# Patient Record
Sex: MALE | Race: WHITE | Employment: OTHER | URBAN - METROPOLITAN AREA
[De-identification: names, ages, dates, MRNs, and addresses within clinical notes are randomized per-mention and may not be internally consistent; named-entity substitution may affect disease eponyms.]

---

## 2017-03-19 ENCOUNTER — TRANSFERRED RECORDS (OUTPATIENT)
Dept: CARDIOLOGY | Facility: CLINIC | Age: 82
End: 2017-03-19

## 2017-03-20 ENCOUNTER — TRANSFERRED RECORDS (OUTPATIENT)
Dept: CARDIOLOGY | Facility: CLINIC | Age: 82
End: 2017-03-20

## 2017-03-22 ENCOUNTER — OFFICE VISIT (OUTPATIENT)
Dept: CARDIOLOGY | Facility: CLINIC | Age: 82
End: 2017-03-22
Payer: MEDICARE

## 2017-03-22 VITALS
BODY MASS INDEX: 25.74 KG/M2 | DIASTOLIC BLOOD PRESSURE: 65 MMHG | HEART RATE: 88 BPM | HEIGHT: 67 IN | SYSTOLIC BLOOD PRESSURE: 160 MMHG | WEIGHT: 164 LBS

## 2017-03-22 DIAGNOSIS — E78.00 HYPERCHOLESTEROLEMIA: ICD-10-CM

## 2017-03-22 DIAGNOSIS — I10 BENIGN ESSENTIAL HYPERTENSION: ICD-10-CM

## 2017-03-22 DIAGNOSIS — I25.10 CORONARY ARTERY DISEASE INVOLVING NATIVE CORONARY ARTERY OF NATIVE HEART WITHOUT ANGINA PECTORIS: ICD-10-CM

## 2017-03-22 DIAGNOSIS — R07.2 PRECORDIAL PAIN: Primary | ICD-10-CM

## 2017-03-22 DIAGNOSIS — I42.9 PRIMARY CARDIOMYOPATHY (H): ICD-10-CM

## 2017-03-22 PROCEDURE — 99214 OFFICE O/P EST MOD 30 MIN: CPT | Performed by: INTERNAL MEDICINE

## 2017-03-22 NOTE — MR AVS SNAPSHOT
After Visit Summary   3/22/2017    Katie Shannon    MRN: [id number]           Patient Information     Date Of Birth          1935        Visit Information        Provider Department      3/22/2017 10:15 AM Robles Edwards MD [facility]        Today's Diagnoses     Precordial pain    -  1    Hypercholesterolemia        Benign essential hypertension        Primary cardiomyopathy (H)           Follow-ups after your visit        Your next 10 appointments already scheduled     Jun 08, 2017 10:10 AM CDT   LAB with [facility]   [facility] ([facility])    [address] [place] [address] [address]  [facility] [id number] [phone]           Patient must bring picture ID.  Patient should be prepared to give a urine specimen  Please do not eat 10-12 hours before your appointment if you are coming in fasting for labs on lipids, cholesterol, or glucose (sugar).  Pregnant women should follow their Care Team instructions. Water with medications is okay. Do not drink coffee or other fluids.   If you have concerns about taking  your medications, please ask at office or if scheduling via Ho[employer], send a message by clicking on Secure Messaging, Message Your Care Team.            Jun 08, 2017 10:30 AM CDT   Ech Complete with [id number]   [facility] CV Echocardiography (Card[facility]ovascular [facility] at [facility])    [address] [place] [address]  [address]  [facility] [id number] [phone]           1.  Please bring or wear a comfortable two-piece outfit. 2.  You may eat, drink and take your normal medicines. 3.  For any questions that cannot be answered, please contact the ordering physician            Jun 08, 2017  1:15 PM CDT   Return Visit with Rah Ayala MD   [facility] ([facility])    [address] [place] [address] [address]  [facility] [id number] [phone]             "  Future tests that were ordered for you today     Open Future Orders        Priority Expected Expires Ordered    NM Lexiscan stress test (nuc card) Routine 3/29/2017 3/22/2018 3/22/2017            Who to contact     If you have questions or need follow up information about today's clinic visit or your schedule please contact [facility] [place] directly at [phone].  Normal or non-critical lab and imaging results will be communicated to you by Hit Systemshart, letter or phone within 4 business days after the clinic has received the results. If you do not hear from us within 7 days, please contact the clinic through Hit Systemshart or phone. If you have a critical or abnormal lab result, we will notify you by phone as soon as possible.  Submit refill requests through youbeQ - Maps With Life or call your pharmacy and they will forward the refill request to us. Please allow 3 business days for your refill to be completed.          Additional Information About Your Visit        Hit SystemsharKeemotion Information     youbeQ - Maps With Life lets you send messages to your doctor, view your test results, renew your prescriptions, schedule appointments and more. To sign up, go to [email or web address] - Maps With Life . Click on \"Log in\" on the left side of the screen, which will take you to the Welcome page. Then click on \"Sign up Now\" on the right side of the page.     You will be asked to enter the access code listed below, as well as some personal information. Please follow the directions to create your username and password.     Your access code is: [email or web address]  Expires: 2017 11:18 AM     Your access code will  in 90 days. If you need help or a new code, please call your [facility] clinic or [phone].        Care EveryWhere ID     This is your Care EveryWhere ID. This could be used by other organizations to access your [facility] medical records  [id number]        Your Vitals Were     Pulse Height BMI (Body Mass Index)             88 1.702 m " "(5' 7\") 25.69 kg/m2          Blood Pressure from Last 3 Encounters:   03/22/17 160/65   05/18/16 120/50   07/28/15 122/68    Weight from Last 3 Encounters:   03/22/17 74.4 kg (164 lb)   05/18/16 74.3 kg (163 lb 11.2 oz)   07/28/15 75.2 kg (165 lb 12.8 oz)                 Today's Medication Changes          These changes are accurate as of: 3/22/17 11:18 AM.  If you have any questions, ask your nurse or doctor.               Stop taking these medicines if you haven't already. Please contact your care team if you have questions.     pravastatin 20 MG tablet   Commonly known as:  PRAVACHOL   Stopped by:  Robles Edwards MD                    Primary Care Provider Office Phone # Fax #    Jaden Butts [phone]       [place] [address]        Thank you!     Thank you for choosing [facility] [place]  for your care. Our goal is always to provide you with excellent care. Hearing back from our patients is one way we can continue to improve our services. Please take a few minutes to complete the written survey that you may receive in the mail after your visit with us. Thank you!             Your Updated Medication List - Protect others around you: Learn how to safely use, store and throw away your medicines at www.disposemymeds.org.          This list is accurate as of: 3/22/17 11:18 AM.  Always use your most recent med list.                   Brand Name Dispense Instructions for use    carvedilol 6.25 MG tablet    COREG     Take 6.25 mg by mouth 2 times daily (with meals)       CYCLOBENZAPRINE HCL PO      Take 5 mg by mouth At Bedtime       diphenhydrAMINE-acetaminophen  MG tablet    TYLENOL PM     Take 1 tablet by mouth nightly as needed for sleep       FLOMAX 0.4 MG capsule   Generic drug:  tamsulosin      Take 0.4 mg by mouth daily       isosorbide mononitrate 60 MG 24 hr tablet    IMDUR     Take 60 mg by mouth " daily       lisinopril 10 MG tablet    PRINIVIL/ZESTRIL    90 tablet    Take 1 tablet (10 mg) by mouth daily       PEPCID PO      Take 20 mg by mouth 2 times daily       PLAVIX PO      Take 75 mg by mouth daily       PRILOSEC PO      Take 20 mg by mouth daily       SIMVASTATIN PO      Take 20 mg by mouth At Bedtime       sodium chloride 5 % ophthalmic solution    SHELL 128     Place 1 drop into both eyes At Bedtime

## 2017-03-22 NOTE — LETTER
3/22/2017    Jaden Butts MD  [facility]   [address] [facility] [address]    RE: Katie Shannon       Dear Colleague,    I had the pleasure of seeing your patient, Katie Shannon, at [facility] for evaluation of atypical chest discomfort.  This patient is normally followed by my associate, Dr. Rah Ayala, for a nonischemic cardiomyopathy.  The patient has known mild coronary artery disease when admitted for chest discomfort at [facility] in 2011.  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  The study does not provide details as to the course of the circumflex artery, i.e., whether it is between the pulmonary artery and aorta or not.  His ejection fraction has been stable at 40%-45% without significant valvular insufficiency.  One wonders if this is due to longstanding hypertension or not.  The patient describes abdominal pain intermittently as well as left thigh pain.      The patient had atypical back and chest discomfort 2 days ago.  He sought medical attention at the [facility] for evaluation of his blood pressure as well.  He states he wanted to check his blood pressure and heart rate and see if his machine at home was accurate.  He ruled out for myocardial infarction after being placed in observation overnight.  The patient has a history of mixed hyperlipidemia and hypertriglyceridemia of 700 at one point.  He is now treated with simvastatin and was previously on fenofibrate.  This was not covered by his insurance, and the fibric acid was discontinued completely.      PHYSICAL EXAMINATION:   VITAL SIGNS:  Current blood pressure is 160/65, pulse is 88 with frequent ectopics.  Weight is 164 pounds, BMI is 26.   CHEST:  Clear to auscultation.   CARDIAC:  Regularly irregular rhythm with frequent ectopics, most likely PVCs with the compensatory pause.  Normal S1 and S2 without gallop or murmur.  No JVD.  Pulses  were all intact without bruits.   ABDOMEN:  Benign.   EXTREMITIES:  Without cyanosis, clubbing or edema.      EKG from 03/20/2017 at [facility] shows normal sinus rhythm with a left bundle branch block.     Outpatient Encounter Prescriptions as of 3/22/2017   Medication Sig Dispense Refill     SIMVASTATIN PO Take 20 mg by mouth At Bedtime       lisinopril (PRINIVIL,ZESTRIL) 10 MG tablet Take 1 tablet (10 mg) by mouth daily 90 tablet 3     isosorbide mononitrate (IMDUR) 60 MG 24 hr tablet Take 60 mg by mouth daily       carvedilol (COREG) 6.25 MG tablet Take 6.25 mg by mouth 2 times daily (with meals)       CYCLOBENZAPRINE HCL PO Take 5 mg by mouth At Bedtime        Clopidogrel Bisulfate (PLAVIX PO) Take 75 mg by mouth daily       tamsulosin (FLOMAX) 0.4 MG 24 hr capsule Take 0.4 mg by mouth daily       Omeprazole (PRILOSEC PO) Take 20 mg by mouth daily       diphenhydrAMINE-acetaminophen (TYLENOL PM)  MG tablet Take 1 tablet by mouth nightly as needed for sleep       Famotidine (PEPCID PO) Take 20 mg by mouth 2 times daily       sodium chloride (SHELL 128) 5 % ophthalmic solution Place 1 drop into both eyes At Bedtime       [DISCONTINUED] pravastatin (PRAVACHOL) 20 MG tablet Take by mouth daily       No facility-administered encounter medications on file as of 3/22/2017.       ASSESSMENT:   1.  Katie Shannon is a delightful 82-year-old gentleman with mild to moderate coronary artery disease on coronary angiography in 2011.  He has a mild nonischemic cardiomyopathy with ejection fraction of 40%-45%.  Etiology of this is not clear but given his current hypertension one wonders if this has been relatively undetected in the past.  Because of his chest discomfort I looked at his old records to see if he has had a recent stress test.  I cannot find a recent stress test within the [employer] system.  Perhaps it was done it [place], [place], in the distant past.  We are going to set this patient up for a [facility]  nuclear stress test to evaluate for cardiac ischemia.  In addition we will review his blood pressure at that visit.  This has all been explained to the patient completely.   2.  Left bundle branch block.  I suggest that this may also be due to possible longstanding hypertension.  I reviewed this patient's most recent echocardiogram from 05/18/2016 where he had grade I left ventricular diastolic dysfunction.  There was mild concentric left ventricular hypertrophy as well.      It is my pleasure to assist in the care of Katie Shannon.  I will make sure that Dr. Ayala sees the results of the nuclear stress test.  All the patient's questions were answered to his satisfaction today.     Sincerely,    Robles Edwards MD     [facility]

## 2017-03-22 NOTE — PROGRESS NOTES
HPI and Plan:   See dictation:[id number]    Orders Placed This Encounter   Procedures     NM Lexiscan stress test (nuc card)       Orders Placed This Encounter   Medications     SIMVASTATIN PO     Sig: Take 20 mg by mouth At Bedtime       Medications Discontinued During This Encounter   Medication Reason     pravastatin (PRAVACHOL) 20 MG tablet Alternate therapy         Encounter Diagnoses   Name Primary?     Hypercholesterolemia      Benign essential hypertension      Primary cardiomyopathy (H)      Precordial pain Yes       CURRENT MEDICATIONS:  Current Outpatient Prescriptions   Medication Sig Dispense Refill     SIMVASTATIN PO Take 20 mg by mouth At Bedtime       lisinopril (PRINIVIL,ZESTRIL) 10 MG tablet Take 1 tablet (10 mg) by mouth daily 90 tablet 3     isosorbide mononitrate (IMDUR) 60 MG 24 hr tablet Take 60 mg by mouth daily       carvedilol (COREG) 6.25 MG tablet Take 6.25 mg by mouth 2 times daily (with meals)       CYCLOBENZAPRINE HCL PO Take 5 mg by mouth At Bedtime        Clopidogrel Bisulfate (PLAVIX PO) Take 75 mg by mouth daily       tamsulosin (FLOMAX) 0.4 MG 24 hr capsule Take 0.4 mg by mouth daily       Omeprazole (PRILOSEC PO) Take 20 mg by mouth daily       diphenhydrAMINE-acetaminophen (TYLENOL PM)  MG tablet Take 1 tablet by mouth nightly as needed for sleep       Famotidine (PEPCID PO) Take 20 mg by mouth 2 times daily       sodium chloride (SHELL 128) 5 % ophthalmic solution Place 1 drop into both eyes At Bedtime         ALLERGIES     Allergies   Allergen Reactions     Atorvastatin      Muscle aches     Diphenhydramine      Metoprolol Succinate [Metoprolol]      Strawberry      Sulfa Drugs        PAST MEDICAL HISTORY:  History reviewed. No pertinent past medical history.    PAST SURGICAL HISTORY:  History reviewed. No pertinent surgical history.    FAMILY HISTORY:  Family History   Problem Relation Age of Onset     HEART DISEASE Mother 88     mi     CEREBROVASCULAR DISEASE Father 71  "      SOCIAL HISTORY:  Social History     Social History     Marital status:      Spouse name: N/A     Number of children: N/A     Years of education: N/A     Social History Main Topics     Smoking status: Never Smoker     Smokeless tobacco: None     Alcohol use No     Drug use: No     Sexual activity: Not Asked     Other Topics Concern     Caffeine Concern No     Sleep Concern No     Weight Concern No     Special Diet No     Exercise Yes     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 No     Social History Narrative       Review of Systems:  Skin:  Negative       Eyes:  Positive for glasses    ENT:  Positive for hearing loss    Respiratory:  Negative       Cardiovascular:    Positive for states has chest discomfort when shoveling snow in the winter that goes a way resting in b/n  Gastroenterology: Negative      Genitourinary:  not assessed      Musculoskeletal:  Positive for joint pain;arthritis    Neurologic:  Negative      Psychiatric:  Negative      Heme/Lymph/Imm:  Negative      Endocrine:  Negative        Physical Exam:  Vitals: /65 (BP Location: Left arm, Cuff Size: Adult Large)  Pulse 88  Ht 1.702 m (5' 7\")  Wt 74.4 kg (164 lb)  BMI 25.69 kg/m2    Constitutional:  cooperative, alert and oriented, well developed, well nourished, in no acute distress        Skin:  warm and dry to the touch, no apparent skin lesions or masses noted        Head:  normocephalic, no masses or lesions        Eyes:  pupils equal and round, conjunctivae and lids unremarkable, sclera white, no xanthalasma, EOMS intact, no nystagmus        ENT:  no pallor or cyanosis, dentition good        Neck:  carotid pulses are full and equal bilaterally, JVP normal, no carotid bruit, no thyromegaly        Chest:  normal breath sounds, clear to auscultation, normal A-P diameter, normal symmetry, normal respiratory excursion, no use of accessory muscles          Cardiac: regular rhythm, normal S1/S2, " no S3 or S4, apical impulse not displaced, no murmurs, gallops or rubs frequent premature beats           PVCs    Abdomen:  abdomen soft, non-tender, BS normoactive, no mass, no HSM, no bruits        Vascular: pulses full and equal, no bruits auscultated;pulses full and equal                                        Extremities and Back:  no deformities, clubbing, cyanosis, erythema observed;no edema              Neurological:  affect appropriate, oriented to time, person and place;no gross motor deficits              CC  No referring provider defined for this encounter.

## 2017-03-23 ENCOUNTER — HOSPITAL ENCOUNTER (OUTPATIENT)
Dept: CARDIOLOGY | Facility: CLINIC | Age: 82
Discharge: HOME | End: 2017-03-23
Attending: INTERNAL MEDICINE | Admitting: INTERNAL MEDICINE
Payer: MEDICARE

## 2017-03-23 DIAGNOSIS — R07.2 PRECORDIAL PAIN: ICD-10-CM

## 2017-03-23 PROCEDURE — 25000128 H RX IP 250 OP 636: Performed by: INTERNAL MEDICINE

## 2017-03-23 PROCEDURE — 78452 HT MUSCLE IMAGE SPECT MULT: CPT

## 2017-03-23 PROCEDURE — A9502 TC99M TETROFOSMIN: HCPCS | Performed by: INTERNAL MEDICINE

## 2017-03-23 PROCEDURE — 93018 CV STRESS TEST I&R ONLY: CPT | Performed by: INTERNAL MEDICINE

## 2017-03-23 PROCEDURE — 78452 HT MUSCLE IMAGE SPECT MULT: CPT | Mod: 26 | Performed by: INTERNAL MEDICINE

## 2017-03-23 PROCEDURE — 34300033 ZZH RX 343: Performed by: INTERNAL MEDICINE

## 2017-03-23 PROCEDURE — 93016 CV STRESS TEST SUPVJ ONLY: CPT | Performed by: INTERNAL MEDICINE

## 2017-03-23 RX ORDER — REGADENOSON 0.08 MG/ML
0.4 INJECTION, SOLUTION INTRAVENOUS ONCE
Status: COMPLETED | OUTPATIENT
Start: 2017-03-23 | End: 2017-03-23

## 2017-03-23 RX ORDER — ACYCLOVIR 200 MG/1
0-1 CAPSULE ORAL
Status: DISCONTINUED | OUTPATIENT
Start: 2017-03-23 | End: 2017-03-24 | Stop reason: HOSPADM

## 2017-03-23 RX ORDER — ALBUTEROL SULFATE 90 UG/1
2 AEROSOL, METERED RESPIRATORY (INHALATION) EVERY 5 MIN PRN
Status: DISCONTINUED | OUTPATIENT
Start: 2017-03-23 | End: 2017-03-24 | Stop reason: HOSPADM

## 2017-03-23 RX ORDER — AMINOPHYLLINE 25 MG/ML
50-100 INJECTION, SOLUTION INTRAVENOUS
Status: COMPLETED | OUTPATIENT
Start: 2017-03-23 | End: 2017-03-23

## 2017-03-23 RX ADMIN — TETROFOSMIN 3.35 MCI.: 0.23 INJECTION, POWDER, LYOPHILIZED, FOR SOLUTION INTRAVENOUS at 11:33

## 2017-03-23 RX ADMIN — TETROFOSMIN 9.21 MCI.: 0.23 INJECTION, POWDER, LYOPHILIZED, FOR SOLUTION INTRAVENOUS at 13:05

## 2017-03-23 RX ADMIN — AMINOPHYLLINE 50 MG: 25 INJECTION, SOLUTION INTRAVENOUS at 13:27

## 2017-03-23 RX ADMIN — REGADENOSON 0.4 MG: 0.08 INJECTION, SOLUTION INTRAVENOUS at 13:03

## 2017-03-23 NOTE — PROGRESS NOTES
HISTORY OF PRESENT ILLNESS:  I had the pleasure of seeing your patient, Katie Shannon, at [facility] for evaluation of atypical chest discomfort.  This patient is normally followed by my associate, Dr. Rah Ayala, for a nonischemic cardiomyopathy.  The patient has known mild coronary artery disease when admitted for chest discomfort at [facility] in 2011.  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  The study does not provide details as to the course of the circumflex artery, i.e., whether it is between the pulmonary artery and aorta or not.  His ejection fraction has been stable at 40%-45% without significant valvular insufficiency.  One wonders if this is due to longstanding hypertension or not.  The patient describes abdominal pain intermittently as well as left thigh pain.      The patient had atypical back and chest discomfort 2 days ago.  He sought medical attention at the [facility] for evaluation of his blood pressure as well.  He states he wanted to check his blood pressure and heart rate and see if his machine at home was accurate.  He ruled out for myocardial infarction after being placed in observation overnight.  The patient has a history of mixed hyperlipidemia and hypertriglyceridemia of 700 at one point.  He is now treated with simvastatin and was previously on fenofibrate.  This was not covered by his insurance, and the fibric acid was discontinued completely.      PHYSICAL EXAMINATION:   VITAL SIGNS:  Current blood pressure is 160/65, pulse is 88 with frequent ectopics.  Weight is 164 pounds, BMI is 26.   CHEST:  Clear to auscultation.   CARDIAC:  Regularly irregular rhythm with frequent ectopics, most likely PVCs with the compensatory pause.  Normal S1 and S2 without gallop or murmur.  No JVD.  Pulses were all intact without bruits.   ABDOMEN:  Benign.   EXTREMITIES:  Without cyanosis, clubbing or edema.      EKG  from 2017 at [facility] shows normal sinus rhythm with a left bundle branch block.      ASSESSMENT:   1.  Rupert Man is a delightful 82-year-old gentleman with mild to moderate coronary artery disease on coronary angiography in .  He has a mild nonischemic cardiomyopathy with ejection fraction of 40%-45%.  Etiology of this is not clear but given his current hypertension one wonders if this has been relatively undetected in the past.  Because of his chest discomfort I looked at his old records to see if he has had a recent stress test.  I cannot find a recent stress test within the [employer] system.  Perhaps it was done it [place], [place], in the distant past.  We are going to set this patient up for a Lexiscan nuclear stress test to evaluate for cardiac ischemia.  In addition we will review his blood pressure at that visit.  This has all been explained to the patient completely.   2.  Left bundle branch block.  I suggest that this may also be due to possible longstanding hypertension.  I reviewed this patient's most recent echocardiogram from 2016 where he had grade I left ventricular diastolic dysfunction.  There was mild concentric left ventricular hypertrophy as well.      It is my pleasure to assist in the care of Rupert Man.  I will make sure that Dr. Ayala sees the results of the nuclear stress test.  All the patient's questions were answered to his satisfaction today.      cc:   [place] [address]         EARNEST CONCEPCION MD, [facility]             D: 2017 11:32   T: 2017 03:17   MT: VISHAL      Name:     RUPERT MAN   MRN:      [id number]        Account:      [id number]   :      1935           Service Date: 2017      Document: [id number]

## 2017-03-24 ENCOUNTER — TELEPHONE (OUTPATIENT)
Dept: CARDIOLOGY | Facility: CLINIC | Age: 82
End: 2017-03-24

## 2017-03-31 ENCOUNTER — TELEPHONE (OUTPATIENT)
Dept: DERMATOLOGY | Facility: CLINIC | Age: 82
End: 2017-03-31

## 2017-03-31 NOTE — LETTER
[address] [address]  Phone: [phone]  Fax: [phone]     March 31, 2017     Katie Shannon  [address] [place] [address]            Dear Katie:    You have appointment scheduled with Dr Villalobos on May 4 th @ 1 15 pm.     Thank you,      [facility].

## 2017-03-31 NOTE — TELEPHONE ENCOUNTER
Reason for Call:  Same Day Appointment, Requested Provider:    Mal Villalobos MD    PCP: Jaden Butts    Reason for visit: sore on chest not healing     Duration of symptoms:   had approx 1 yr off/on.    Have you been treated for this in the past? yes    Additional comments:  Referral from Dr Robles Edwards, cardiologist. The patient did schedule an appt with Perri on 04.21.17, but wants to know if he can get in sooner with beverley Will    Can we leave a detailed message on this number? YES    Phone number patient can be reached at: Home number on file [phone] (home)    Best Time: anytime    Call taken on 3/31/2017 at 11:09 AM by Nani Ocampo

## 2017-05-04 ENCOUNTER — OFFICE VISIT (OUTPATIENT)
Dept: DERMATOLOGY | Facility: CLINIC | Age: 82
End: 2017-05-04
Payer: MEDICARE

## 2017-05-04 VITALS — DIASTOLIC BLOOD PRESSURE: 73 MMHG | OXYGEN SATURATION: 98 % | HEART RATE: 94 BPM | SYSTOLIC BLOOD PRESSURE: 146 MMHG

## 2017-05-04 DIAGNOSIS — D18.00 ANGIOMA: ICD-10-CM

## 2017-05-04 DIAGNOSIS — L81.4 LENTIGO: ICD-10-CM

## 2017-05-04 DIAGNOSIS — L82.1 SK (SEBORRHEIC KERATOSIS): Primary | ICD-10-CM

## 2017-05-04 DIAGNOSIS — C44.41 BASAL CELL CARCINOMA, SCALP/NECK: ICD-10-CM

## 2017-05-04 DIAGNOSIS — C44.519 BASAL CELL CARCINOMA OF ANTERIOR CHEST: ICD-10-CM

## 2017-05-04 PROCEDURE — 17313 MOHS 1 STAGE T/A/L: CPT | Performed by: DERMATOLOGY

## 2017-05-04 PROCEDURE — 13101 CMPLX RPR TRUNK 2.6-7.5 CM: CPT | Performed by: DERMATOLOGY

## 2017-05-04 PROCEDURE — 11101 HC REPAIR COMPLEX, WOUND TRUNK 2.6-7.5 CM: CPT | Performed by: DERMATOLOGY

## 2017-05-04 PROCEDURE — 11100: CPT | Mod: 59 | Performed by: DERMATOLOGY

## 2017-05-04 PROCEDURE — 88331 PATH CONSLTJ SURG 1 BLK 1SPC: CPT | Mod: 59 | Performed by: DERMATOLOGY

## 2017-05-04 PROCEDURE — 17311 MOHS 1 STAGE H/N/HF/G: CPT | Performed by: DERMATOLOGY

## 2017-05-04 PROCEDURE — 99203 OFFICE O/P NEW LOW 30 MIN: CPT | Mod: 25 | Performed by: DERMATOLOGY

## 2017-05-04 NOTE — PROGRESS NOTES
Katie Shannon is a 82 year old year old male patient here today for growth on scalp and chest.   .  Patient states this has been present for years.  Patient reports the following symptoms:  bleeding.  Patient reports the following previous treatments none.  Patient reports the following modifying factors none.  Associated symptoms: none.  Patient has no other skin complaints today.  Remainder of the HPI, Meds, PMH, Allergies, FH, and SH was reviewed in chart.    Pertinent Hx:   No hx of skin cancer  No past medical history on file.    No past surgical history on file.     Family History   Problem Relation Age of Onset     HEART DISEASE Mother 88     mi     CEREBROVASCULAR DISEASE Father 71       Social History     Social History     Marital status:      Spouse name: N/A     Number of children: N/A     Years of education: N/A     Occupational History     Not on file.     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 No     Social History Narrative       Outpatient Encounter Prescriptions as of 5/4/2017   Medication Sig Dispense Refill     SIMVASTATIN PO Take 20 mg by mouth At Bedtime       lisinopril (PRINIVIL,ZESTRIL) 10 MG tablet Take 1 tablet (10 mg) by mouth daily 90 tablet 3     isosorbide mononitrate (IMDUR) 60 MG 24 hr tablet Take 60 mg by mouth daily       carvedilol (COREG) 6.25 MG tablet Take 6.25 mg by mouth 2 times daily (with meals)       CYCLOBENZAPRINE HCL PO Take 5 mg by mouth At Bedtime        Clopidogrel Bisulfate (PLAVIX PO) Take 75 mg by mouth daily       tamsulosin (FLOMAX) 0.4 MG 24 hr capsule Take 0.4 mg by mouth daily       Omeprazole (PRILOSEC PO) Take 20 mg by  mouth daily       diphenhydrAMINE-acetaminophen (TYLENOL PM)  MG tablet Take 1 tablet by mouth nightly as needed for sleep       Famotidine (PEPCID PO) Take 20 mg by mouth 2 times daily       sodium chloride (SHELL 128) 5 % ophthalmic solution Place 1 drop into both eyes At Bedtime       No facility-administered encounter medications on file as of 5/4/2017.              Review Of Systems  Skin: As above  Eyes: negative  Ears/Nose/Throat: negative  Respiratory: No shortness of breath, dyspnea on exertion, cough, or hemoptysis  Cardiovascular: negative  Gastrointestinal: negative  Genitourinary: negative  Musculoskeletal: negative  Neurologic: negative  Psychiatric: negative  Hematologic/Lymphatic/Immunologic: negative  Endocrine: negative      O:   NAD, WDWN, Alert & Oriented, Mood & Affect wnl, Vitals stable   Here today alone   /73  Pulse 94  SpO2 98%   General appearance normal   Vitals stable   Alert, oriented and in no acute distress      Following lymph nodes palpated: Occipital, Cervical, Supraclavicular no lad   L vertex scalp 9mm pink pearly papule    L chest 2.2cm red plaque   Stuck on papules and brown macules on trunk and ext    Red papules on trunk         The remainder of expanded problem focused exam was unremarkable; the following areas were examined:  scalp/hair, conjunctiva/lids, face, neck, lips, back, chest, ab, , chest, digits/nails, RUE, LUE.      Eyes: Conjunctivae/lids:Normal     ENT: Lips, buccal mucosa, tongue: normal    MSK:Normal    Cardiovascular: peripheral edema none    Pulm: Breathing Normal    Lymph Nodes: No Head and Neck Lymphadenopathy     Neuro/Psych: Orientation:Normal; Mood/Affect:Normal      MICRO:     L chest:Orthokeratosis of epidermis with a proliferation of nests of basaloid cells, with peripheral palisading and a haphazard arrangement in the center extending into the dermis, forming infiltrative strands.   The tumor cells have hyperchromatic nuclei. Poor  cytoplasm and intercellular bridging.    Scalp:Orthokeratosis of epidermis with a proliferation of nests of basaloid cells, with peripheral palisading and a haphazard arrangement in the center extending into the dermis, forming infiltrative strands. .  The tumor cells have hyperchromatic nuclei. Poor cytoplasm and intercellular bridging.    A/P:  1. Seborrheic keratosis, lentigo, angioma  2. R/o basal cell carcinoma   TANGENTIAL BIOPSY IN HOUSE:  After consent, anesthesia with LEC and prep, tangential excision performed and dx above confirmed with frozen section histology.  No complications and routine wound care.  Patient told result   L chest basal cell carcinoma infiltrative  Scalp basal cell carcinoma infiltrative   Treat today        BENIGN LESIONS DISCUSSED WITH PATIENT:  I discussed the specifics of tumor, prognosis, and genetics of benign lesions.  I explained that treatment of these lesions would be purely cosmetic and not medically neccessary.  I discussed with patient different removal options including excision, cautery and /or laser.      Nature and genetics of benign skin lesions dicussed with patient.  Signs and Symptoms of skin cancer discussed with patient.  Patient encouraged to perform monthly skin exams.  UV precautions reviewed with patient.  Skin care regimen reviewed with patient: Eliminate harsh soaps, i.e. Dial, zest, irsih spring; Mild soaps such as Cetaphil or Dove sensitive skin, avoid hot or cold showers, aggressive use of emollients including vanicream, cetaphil or cerave discussed with patient.    Risks of non-melanoma skin cancer discussed with patient   Return to clinic 6 months      PROCEDURE NOTE  Scalp basal cell carcinoma   MOHS:   Location    After PGACAC discussed with patient, decision for Mohs surgery was made. Indication for Mohs was Location. Patient confirmed the site with Dr. Villalobos.  After anesthesia with LEC, the tumor was excised using standard Mohs technique in 1  stages(s).  CLEAR MARGINS OBTAINED and Final defect size was 1.3 cm.       REPAIR SECOND INTENT: We discussed the options for wound management in full with the patient including risks/benefits/possible outcomes. Decision made to allow the wound to heal by second intention. EBL minimal; complications none; wound care routine.  The patient was discharged in good condition and will return in one month or prn for wound evaluation.      L chest basal cell carcinoma   MOHS:   Size, histology    After PGACAC discussed with patient, decision for Mohs surgery was made. Indication for Mohs was Size and Aggressive histology. Patient confirmed the site with Dr. Villalobos.  After anesthesia with LEC, the tumor was excised using standard Mohs technique in 1 stages(s).  Single positive margin, patient didn't want additioanl layer,.   Final defect size was 3 cm.       REPAIR COMPLEX: Because of the tightness of the surrounding skin and Because of the size and full thickness nature of the defect, a complex closure was planned. After LEC anesthesia and prep, Burow's triangles were excised in the relaxed skin tension lines. The wound edges were widely undermined by dissection in the subcutaneous plane until adequate tissue mobility was obtained. Hemostasis was obtained. The wound edges were closed in a layered fashion using Vicryl and Fast Absorbing Plain Gut sutures. Postoperative length was 4.8 cm.   EBL minimal; complications none; wound care routine.  The patient was discharged in good condition and will return in one week for wound evaluation.

## 2017-05-04 NOTE — PATIENT INSTRUCTIONS
Wound Care Instructions  [facility]     FOR SUPERFICIAL WOUNDS     [facility] [phone]    [facility] [phone]                       AFTER 24 HOURS YOU SHOULD REMOVE THE BANDAGE AND BEGIN DAILY DRESSING CHANGES AS FOLLOWS:     1) Remove Dressing.     2) Clean and dry the area with tap water using a Q-tip or sterile gauze pad.     3) Apply Vaseline, Aquaphor, Polysporin ointment or Bacitracin ointment over entire wound.  Do NOT use Neosporin ointment.     4) Cover the wound with a band-aid, or a sterile non-stick gauze pad and micropore paper tape      REPEAT THESE INSTRUCTIONS AT LEAST ONCE A DAY UNTIL THE WOUND HAS COMPLETELY HEALED.    It is an old wives tale that a wound heals better when it is exposed to air and allowed to dry out. The wound will heal faster with a better cosmetic result if it is kept moist with ointment and covered with a bandage.    **Do not let the wound dry out.**      Supplies Needed:      *Cotton tipped applicators (Q-tips)    *Polysporin Ointment or Bacitracin Ointment (NOT NEOSPORIN)    *Band-aids or non-stick gauze pads and micropore paper tape.      PATIENT INFORMATION:    During the healing process you will notice a number of changes. All wounds develop a small halo of redness surrounding the wound.  This means healing is occurring. Severe itching with extensive redness usually indicates sensitivity to the ointment or bandage tape used to dress the wound.  You should call our office if this develops.      Swelling  and/or discoloration around your surgical site is common, particularly when performed around the eye.    All wounds normally drain.  The larger the wound the more drainage there will be.  After 7-10 days, you will notice the wound beginning to shrink and new skin will begin to grow.  The wound is healed when you can see skin has formed over the entire area.  A healed wound has a healthy, shiny look to the surface and is red to dark pink in  color to normalize.  Wounds may take approximately 4-6 weeks to heal.  Larger wounds may take 6-8 weeks.  After the wound is healed you may discontinue dressing changes.    You may experience a sensation of tightness as your wound heals. This is normal and will gradually subside.    Your healed wound may be sensitive to temperature changes. This sensitivity improves with time, but if you re having a lot of discomfort, try to avoid temperature extremes.    Patients frequently experience itching after their wound appears to have healed because of the continue healing under the skin.  Plain Vaseline will help relieve the itching.        POSSIBLE COMPLICATIONS    BLEEDIN. Leave the bandage in place.  2. Use tightly rolled up gauze or a cloth to apply direct pressure over the bandage for 30  minutes.  3. Reapply pressure for an additional 30 minutes if necessary  4. Use additional gauze and tape to maintain pressure once the bleeding has stopped.        Sutured Wound Care [facility]: [phone]    [facility]: [phone]          ? No strenuous activity for 48 hours. Resume moderate activity in 48 hours. No heavy exercising until you are seen for follow up in one week.     ? Take Tylenol as needed for discomfort.                         ? Do not drink alcoholic beverages for 48 hours.     ? Keep the pressure bandage in place for 24 hours. If the bandage becomes blood tinged or loose, reinforce it with gauze and tape.        (Refer to the reverse side of this page for management of bleeding).    ? Remove pressure bandage in 24 hours     ? Leave the flat bandage in place until next week when you change bandage    ? Keep the bandage dry. Wash around it carefully.    ? If the tape becomes soiled or starts to come off, reinforce it with additional paper tape.    ? Do not smoke for 3 weeks; smoking is detrimental to wound healing.    ? It is normal to have swelling and bruising around the  surgical site. The bruising will fade in approximately 10-14 days. Elevate the area to reduce swelling.    ? Numbness, itchiness and sensitivity to temperature changes can occur after surgery and may take up to 18 months to normalize.      POSSIBLE COMPLICATIONS    BLEEDIN. Leave the bandage in place.  6. Use tightly rolled up gauze or a cloth to apply direct pressure over the bandage for 20   minutes.  7. Reapply pressure for an additional 20 minutes if necessary  8. Call the office or go to the nearest emergency room if pressure fails to stop the bleeding.  9. Use additional gauze and tape to maintain pressure once the bleeding has stopped.        PAIN:    1. Post operative pain should slowly get better, never worse.  2. A severe increase in pain may indicate a problem. Call the office if this occurs.    In case of emergency phone:Dr Villalobos [phone]        Thursday, MAY 11TH CHANGE BANDAGE  Remove bandage  Blot stitches with water  Apply steri-stipes  Apply brown tape  Apply tegaderm film    WOUND CARE INSTRUCTIONS  For  CHEST    ONE WEEK AFTER SURGERY          1) Leave flat bandage on your skin for one week after today s bandage change ()  2) In one week when you remove the bandage, MAY 18TH you may resume your regular skin care routine, including washing with mild soap and water, applying moisturizer, make-up and sunscreen.    3) If there are any open or bleeding areas at the incision/graft site you should begin to cover the area with a bandage daily as follows:    1) Clean and dry the area with plain tap water using a Q-tip or sterile gauze pad.  2) Apply Polysporin or Bacitracin ointment to the open area.  3) Cover the wound with a band-aid or a sterile non-stick gauze pad and micropore paper tape.     BANDAGE ON CHEST CAN COME OFF FOR GOOD ON MAY 18TH.            *Once the bandages are removed, the scar will be red and firm (especially in the lip/chin area). This is normal and will fade in  time. It might take 6-12 months for this to happen.     *Massaging the area will help the scar soften and fade quicker. Begin to massage the area one month after the bandages have been removed. To massage apply pressure directly and firmly over the scar with the fingertips and move in a circular motion. Massage the area for a few minutes several times a day. Continue to massage the site for several months.    *Approximately 6-8 weeks after surgery it is not uncommon to see the formation of  tender pimple-like  bump along the scar. This is normal. As the scar continues to mature and the stitches underneath the skin begin to dissolve, this might occur. Do not pick or squeeze, this will resolve on it s own. Should one break open producing a small amount of drainage, apply Polysporin or Bacitracin ointment a few times a day until the wound is completely healed.    *Numbness in the surgical area is expected. It might take 12-18 months for the feeling to return to normal. During this time sensations of itchiness, tingling and occasional sharp pains might be noted. These feelings are normal and will subside once the nerves have completely healed.         IN CASE OF EMERGENCY: Dr Villalobos [phone]     If you were seen in [place] call: [phone]    If you were seen in [place] call: [phone]

## 2017-05-04 NOTE — NURSING NOTE
Surgical Office Location:  [facility]  [address] [place] [id number]

## 2017-05-04 NOTE — NURSING NOTE
"Initial /73  Pulse 94  SpO2 98% Estimated body mass index is 25.69 kg/(m^2) as calculated from the following:    Height as of 3/22/17: 1.702 m (5' 7\").    Weight as of 3/22/17: 74.4 kg (164 lb). .      "

## 2017-05-04 NOTE — MR AVS SNAPSHOT
After Visit Summary   5/4/2017    Katie Shannon    MRN: [id number]           Patient Information     Date Of Birth          1935        Visit Information        Provider Department      5/4/2017 1:15 PM Mal Villalobos MD [facility]        Care Instructions          Wound Care Instructions  SCALP     FOR SUPERFICIAL WOUNDS     [facility] [phone]    [facility] [phone]                       AFTER 24 HOURS YOU SHOULD REMOVE THE BANDAGE AND BEGIN DAILY DRESSING CHANGES AS FOLLOWS:     1) Remove Dressing.     2) Clean and dry the area with tap water using a Q-tip or sterile gauze pad.     3) Apply Vaseline, Aquaphor, Polysporin ointment or Bacitracin ointment over entire wound.  Do NOT use Neosporin ointment.     4) Cover the wound with a band-aid, or a sterile non-stick gauze pad and micropore paper tape      REPEAT THESE INSTRUCTIONS AT LEAST ONCE A DAY UNTIL THE WOUND HAS COMPLETELY HEALED.    It is an old wives tale that a wound heals better when it is exposed to air and allowed to dry out. The wound will heal faster with a better cosmetic result if it is kept moist with ointment and covered with a bandage.    **Do not let the wound dry out.**      Supplies Needed:      *Cotton tipped applicators (Q-tips)    *Polysporin Ointment or Bacitracin Ointment (NOT NEOSPORIN)    *Band-aids or non-stick gauze pads and micropore paper tape.      PATIENT INFORMATION:    During the healing process you will notice a number of changes. All wounds develop a small halo of redness surrounding the wound.  This means healing is occurring. Severe itching with extensive redness usually indicates sensitivity to the ointment or bandage tape used to dress the wound.  You should call our office if this develops.      Swelling  and/or discoloration around your surgical site is common, particularly when performed around the eye.    All wounds normally  drain.  The larger the wound the more drainage there will be.  After 7-10 days, you will notice the wound beginning to shrink and new skin will begin to grow.  The wound is healed when you can see skin has formed over the entire area.  A healed wound has a healthy, shiny look to the surface and is red to dark pink in color to normalize.  Wounds may take approximately 4-6 weeks to heal.  Larger wounds may take 6-8 weeks.  After the wound is healed you may discontinue dressing changes.    You may experience a sensation of tightness as your wound heals. This is normal and will gradually subside.    Your healed wound may be sensitive to temperature changes. This sensitivity improves with time, but if you re having a lot of discomfort, try to avoid temperature extremes.    Patients frequently experience itching after their wound appears to have healed because of the continue healing under the skin.  Plain Vaseline will help relieve the itching.        POSSIBLE COMPLICATIONS    BLEEDIN. Leave the bandage in place.  2. Use tightly rolled up gauze or a cloth to apply direct pressure over the bandage for 30  minutes.  3. Reapply pressure for an additional 30 minutes if necessary  4. Use additional gauze and tape to maintain pressure once the bleeding has stopped.        Sutured Wound Care [facility]: [phone]    [facility]: [phone]          ? No strenuous activity for 48 hours. Resume moderate activity in 48 hours. No heavy exercising until you are seen for follow up in one week.     ? Take Tylenol as needed for discomfort.                         ? Do not drink alcoholic beverages for 48 hours.     ? Keep the pressure bandage in place for 24 hours. If the bandage becomes blood tinged or loose, reinforce it with gauze and tape.        (Refer to the reverse side of this page for management of bleeding).    ? Remove pressure bandage in 24 hours     ? Leave the flat bandage in place  until next week when you change bandage    ? Keep the bandage dry. Wash around it carefully.    ? If the tape becomes soiled or starts to come off, reinforce it with additional paper tape.    ? Do not smoke for 3 weeks; smoking is detrimental to wound healing.    ? It is normal to have swelling and bruising around the surgical site. The bruising will fade in approximately 10-14 days. Elevate the area to reduce swelling.    ? Numbness, itchiness and sensitivity to temperature changes can occur after surgery and may take up to 18 months to normalize.      POSSIBLE COMPLICATIONS    BLEEDIN. Leave the bandage in place.  6. Use tightly rolled up gauze or a cloth to apply direct pressure over the bandage for 20   minutes.  7. Reapply pressure for an additional 20 minutes if necessary  8. Call the office or go to the nearest emergency room if pressure fails to stop the bleeding.  9. Use additional gauze and tape to maintain pressure once the bleeding has stopped.        PAIN:    1. Post operative pain should slowly get better, never worse.  2. A severe increase in pain may indicate a problem. Call the office if this occurs.    In case of emergency phone:Dr Villalobos [phone]        Thursday, MAY 11TH CHANGE BANDAGE  Remove bandage  Blot stitches with water  Apply steri-stipes  Apply brown tape  Apply tegaderm film    WOUND CARE INSTRUCTIONS  For  CHEST    ONE WEEK AFTER SURGERY          1) Leave flat bandage on your skin for one week after today s bandage change ()  2) In one week when you remove the bandage, MAY 18TH you may resume your regular skin care routine, including washing with mild soap and water, applying moisturizer, make-up and sunscreen.    3) If there are any open or bleeding areas at the incision/graft site you should begin to cover the area with a bandage daily as follows:    1) Clean and dry the area with plain tap water using a Q-tip or sterile gauze pad.  2) Apply Polysporin or Bacitracin  ointment to the open area.  3) Cover the wound with a band-aid or a sterile non-stick gauze pad and micropore paper tape.     BANDAGE ON CHEST CAN COME OFF FOR GOOD ON MAY 18TH.            *Once the bandages are removed, the scar will be red and firm (especially in the lip/chin area). This is normal and will fade in time. It might take 6-12 months for this to happen.     *Massaging the area will help the scar soften and fade quicker. Begin to massage the area one month after the bandages have been removed. To massage apply pressure directly and firmly over the scar with the fingertips and move in a circular motion. Massage the area for a few minutes several times a day. Continue to massage the site for several months.    *Approximately 6-8 weeks after surgery it is not uncommon to see the formation of  tender pimple-like  bump along the scar. This is normal. As the scar continues to mature and the stitches underneath the skin begin to dissolve, this might occur. Do not pick or squeeze, this will resolve on it s own. Should one break open producing a small amount of drainage, apply Polysporin or Bacitracin ointment a few times a day until the wound is completely healed.    *Numbness in the surgical area is expected. It might take 12-18 months for the feeling to return to normal. During this time sensations of itchiness, tingling and occasional sharp pains might be noted. These feelings are normal and will subside once the nerves have completely healed.         IN CASE OF EMERGENCY: Dr Villalobos [phone]     If you were seen in [place] call: [phone]    If you were seen in [place] call: [phone]            Follow-ups after your visit        Your next 10 appointments already scheduled     Jun 08, 2017 10:10 AM CDT   LAB with BYOD LAB   [facility] AT [place] ([facility])    [address] [place] [id number] [phone]           Patient must  bring picture ID.  Patient should be prepared to give a urine specimen  Please do not eat 10-12 hours before your appointment if you are coming in fasting for labs on lipids, cholesterol, or glucose (sugar).  Pregnant women should follow their Care Team instructions. Water with medications is okay. Do not drink coffee or other fluids.   If you have concerns about taking  your medications, please ask at office or if scheduling via [employer], send a message by clicking on Secure Messaging, Message Your Care Team.            Jun 08, 2017 10:30 AM CDT   Ech Complete with [id number]   [facility] CV Echocardiography (Cardiovascular Imaging at [facility])    [address]  [facility] [id number] [phone]           1.  Please bring or wear a comfortable two-piece outfit. 2.  You may eat, drink and take your normal medicines. 3.  For any questions that cannot be answered, please contact the ordering physician            Jun 08, 2017  1:15 PM CDT   Return Visit with Rah Ayala MD   [facility] PHYSICIANS [facility] AT [place] ([facility])    [address]  [facility] [address] [phone]              Who to contact     If you have questions or need follow up information about today's clinic visit or your schedule please contact [facility] directly at [phone].  Normal or non-critical lab and imaging results will be communicated to you by MyChart, letter or phone within 4 business days after the clinic has received the results. If you do not hear from us within 7 days, please contact the clinic through MyChart or phone. If you have a critical or abnormal lab result, we will notify you by phone as soon as possible.  Submit refill requests through [employer] or call your pharmacy and they will forward the refill request to us. Please allow 3 business days for your refill to be completed.          Additional Information  "About Your Visit        MyChart Information     [employer] lets you send messages to your doctor, view your test results, renew your prescriptions, schedule appointments and more. To sign up, go to [email or web address] . Click on \"Log in\" on the left side of the screen, which will take you to the Welcome page. Then click on \"Sign up Now\" on the right side of the page.     You will be asked to enter the access code listed below, as well as some personal information. Please follow the directions to create your username and password.     Your access code is: [email or web address]  Expires: 2017 11:18 AM     Your access code will  in 90 days. If you need help or a new code, please call your [place] clinic or [phone].        Care EveryWhere ID     This is your Care EveryWhere ID. This could be used by other organizations to access your [place] medical records  [id number]        Your Vitals Were     Pulse Pulse Oximetry                94 98%           Blood Pressure from Last 3 Encounters:   17 146/73   17 160/65   16 120/50    Weight from Last 3 Encounters:   17 74.4 kg (164 lb)   16 74.3 kg (163 lb 11.2 oz)   07/28/15 75.2 kg (165 lb 12.8 oz)              Today, you had the following     No orders found for display       Primary Care Provider Office Phone # Fax #    Jaden Butts [phone]       [address] [address]        Thank you!     Thank you for choosing [facility]  for your care. Our goal is always to provide you with excellent care. Hearing back from our patients is one way we can continue to improve our services. Please take a few minutes to complete the written survey that you may receive in the mail after your visit with us. Thank you!             Your Updated Medication List - Protect others around you: Learn how to safely use, store and throw away your medicines at " www.disposemymeds.org.          This list is accurate as of: 5/4/17  1:53 PM.  Always use your most recent med list.                   Brand Name Dispense Instructions for use    carvedilol 6.25 MG tablet    COREG     Take 6.25 mg by mouth 2 times daily (with meals)       CYCLOBENZAPRINE HCL PO      Take 5 mg by mouth At Bedtime       diphenhydrAMINE-acetaminophen  MG tablet    TYLENOL PM     Take 1 tablet by mouth nightly as needed for sleep       FLOMAX 0.4 MG capsule   Generic drug:  tamsulosin      Take 0.4 mg by mouth daily       isosorbide mononitrate 60 MG 24 hr tablet    IMDUR     Take 60 mg by mouth daily       lisinopril 10 MG tablet    PRINIVIL/ZESTRIL    90 tablet    Take 1 tablet (10 mg) by mouth daily       PEPCID PO      Take 20 mg by mouth 2 times daily       PLAVIX PO      Take 75 mg by mouth daily       PRILOSEC PO      Take 20 mg by mouth daily       SIMVASTATIN PO      Take 20 mg by mouth At Bedtime       sodium chloride 5 % ophthalmic solution    SHELL 128     Place 1 drop into both eyes At Bedtime

## 2017-05-09 ENCOUNTER — TELEPHONE (OUTPATIENT)
Dept: DERMATOLOGY | Facility: CLINIC | Age: 82
End: 2017-05-09

## 2017-06-08 ENCOUNTER — TELEPHONE (OUTPATIENT)
Dept: CARDIOLOGY | Facility: CLINIC | Age: 82
End: 2017-06-08

## 2017-06-08 ENCOUNTER — OFFICE VISIT (OUTPATIENT)
Dept: CARDIOLOGY | Facility: CLINIC | Age: 82
End: 2017-06-08
Attending: INTERNAL MEDICINE
Payer: MEDICARE

## 2017-06-08 ENCOUNTER — HOSPITAL ENCOUNTER (OUTPATIENT)
Dept: ULTRASOUND IMAGING | Facility: CLINIC | Age: 82
End: 2017-06-08
Attending: INTERNAL MEDICINE
Payer: MEDICARE

## 2017-06-08 ENCOUNTER — HOSPITAL ENCOUNTER (OUTPATIENT)
Dept: CARDIOLOGY | Facility: CLINIC | Age: 82
Discharge: HOME | End: 2017-06-08
Attending: INTERNAL MEDICINE | Admitting: INTERNAL MEDICINE
Payer: MEDICARE

## 2017-06-08 VITALS
SYSTOLIC BLOOD PRESSURE: 132 MMHG | BODY MASS INDEX: 26.18 KG/M2 | HEART RATE: 69 BPM | WEIGHT: 166.8 LBS | DIASTOLIC BLOOD PRESSURE: 70 MMHG | HEIGHT: 67 IN

## 2017-06-08 DIAGNOSIS — I25.10 CORONARY ARTERY DISEASE INVOLVING NATIVE CORONARY ARTERY OF NATIVE HEART WITHOUT ANGINA PECTORIS: ICD-10-CM

## 2017-06-08 DIAGNOSIS — I10 BENIGN ESSENTIAL HYPERTENSION: ICD-10-CM

## 2017-06-08 DIAGNOSIS — I10 ESSENTIAL HYPERTENSION WITH GOAL BLOOD PRESSURE LESS THAN 140/90: ICD-10-CM

## 2017-06-08 DIAGNOSIS — I42.9 PRIMARY CARDIOMYOPATHY (H): ICD-10-CM

## 2017-06-08 DIAGNOSIS — E78.00 HYPERCHOLESTEROLEMIA: ICD-10-CM

## 2017-06-08 DIAGNOSIS — R60.0 LOCALIZED EDEMA: ICD-10-CM

## 2017-06-08 DIAGNOSIS — I42.9 PRIMARY CARDIOMYOPATHY (H): Primary | ICD-10-CM

## 2017-06-08 LAB
ALT SERPL W P-5'-P-CCNC: <5 U/L (ref 5–30)
ANION GAP SERPL CALCULATED.3IONS-SCNC: 11.2 MMOL/L (ref 6–17)
BUN SERPL-MCNC: 20 MG/DL (ref 7–30)
CALCIUM SERPL-MCNC: 8.9 MG/DL (ref 8.5–10.5)
CHLORIDE SERPL-SCNC: 101 MMOL/L (ref 98–107)
CHOLEST SERPL-MCNC: 107 MG/DL
CO2 SERPL-SCNC: 26 MMOL/L (ref 23–29)
CREAT SERPL-MCNC: 1.04 MG/DL (ref 0.7–1.3)
GFR SERPL CREATININE-BSD FRML MDRD: 69 ML/MIN/1.7M2
GLUCOSE SERPL-MCNC: 91 MG/DL (ref 70–105)
HDLC SERPL-MCNC: 32 MG/DL
LDLC SERPL CALC-MCNC: 47 MG/DL
NONHDLC SERPL-MCNC: 75 MG/DL
POTASSIUM SERPL-SCNC: 4.2 MMOL/L (ref 3.5–5.1)
SODIUM SERPL-SCNC: 134 MMOL/L (ref 136–145)
TRIGL SERPL-MCNC: 142 MG/DL

## 2017-06-08 PROCEDURE — 80048 BASIC METABOLIC PNL TOTAL CA: CPT | Performed by: INTERNAL MEDICINE

## 2017-06-08 PROCEDURE — 84460 ALANINE AMINO (ALT) (SGPT): CPT | Performed by: INTERNAL MEDICINE

## 2017-06-08 PROCEDURE — 93306 TTE W/DOPPLER COMPLETE: CPT

## 2017-06-08 PROCEDURE — 80061 LIPID PANEL: CPT | Performed by: INTERNAL MEDICINE

## 2017-06-08 PROCEDURE — 93306 TTE W/DOPPLER COMPLETE: CPT | Mod: 26 | Performed by: INTERNAL MEDICINE

## 2017-06-08 PROCEDURE — 36415 COLL VENOUS BLD VENIPUNCTURE: CPT | Performed by: INTERNAL MEDICINE

## 2017-06-08 PROCEDURE — 93971 EXTREMITY STUDY: CPT | Mod: LT

## 2017-06-08 PROCEDURE — 93971 EXTREMITY STUDY: CPT | Mod: LT | Performed by: INTERNAL MEDICINE

## 2017-06-08 PROCEDURE — 99214 OFFICE O/P EST MOD 30 MIN: CPT | Mod: 25 | Performed by: INTERNAL MEDICINE

## 2017-06-08 RX ORDER — CARVEDILOL 12.5 MG/1
12.5 TABLET ORAL 2 TIMES DAILY WITH MEALS
Qty: 180 TABLET | Refills: 3 | Status: SHIPPED | OUTPATIENT
Start: 2017-06-08 | End: 2022-10-24

## 2017-06-08 RX ORDER — SIMVASTATIN 10 MG
10 TABLET ORAL AT BEDTIME
Qty: 90 TABLET | Refills: 3 | Status: SHIPPED | OUTPATIENT
Start: 2017-06-08 | End: 2019-12-04

## 2017-06-08 NOTE — MR AVS SNAPSHOT
After Visit Summary   6/8/2017    Katie Shannon    MRN: [id number]           Patient Information     Date Of Birth          1935        Visit Information        Provider Department      6/8/2017 1:15 PM Rah Ayala MD [facility] HEART AT [place]        Today's Diagnoses     Primary cardiomyopathy (H)    -  1    Benign essential hypertension        Localized edema        Coronary artery disease involving native coronary artery of native heart without angina pectoris        Hypercholesterolemia           Follow-ups after your visit        Additional Services     Follow-Up with Cardiologist                 Your next 10 appointments already scheduled     Oct 12, 2017 10:15 AM CDT   [facility]    [address] [place] [address] [phone]              Future tests that were ordered for you today     Open Future Orders        Priority Expected Expires Ordered    Echocardiogram Routine 10/9/2017 6/8/2018 6/8/2017    Follow-Up with Cardiologist Routine 10/6/2017 6/8/2018 6/8/2017    Basic metabolic panel Routine 9/6/2017 6/8/2018 6/8/2017    US Lower Extremity Venous Duplex Left Routine 7/8/2017 6/8/2018 6/8/2017            Who to contact     If you have questions or need follow up information about today's clinic visit or your schedule please contact [facility] directly at [phone].  Normal or non-critical lab and imaging results will be communicated to you by MyChart, letter or phone within 4 business days after the clinic has received the results. If you do not hear from us within 7 days, please contact the clinic through MyChart or phone. If you have a critical or abnormal lab result, we will notify you by phone as soon as possible.  Submit refill requests through [employer] or call your pharmacy and  "they will forward the refill request to us. Please allow 3 business days for your refill to be completed.          Additional Information About Your Visit        Mytonomyhart Information     [employer] lets you send messages to your doctor, view your test results, renew your prescriptions, schedule appointments and more. To sign up, go to [email or web address] . Click on \"Log in\" on the left side of the screen, which will take you to the Welcome page. Then click on \"Sign up Now\" on the right side of the page.     You will be asked to enter the access code listed below, as well as some personal information. Please follow the directions to create your username and password.     Your access code is: [email or web address]  Expires: 2017 11:18 AM     Your access code will  in 90 days. If you need help or a new code, please call your [place] clinic or [phone].        Care EveryWhere ID     This is your Care EveryWhere ID. This could be used by other organizations to access your [place] medical records  [id number]        Your Vitals Were     Pulse Height BMI (Body Mass Index)             69 1.702 m (5' 7\") 26.12 kg/m2          Blood Pressure from Last 3 Encounters:   17 132/70   17 146/73   17 160/65    Weight from Last 3 Encounters:   17 75.7 kg (166 lb 12.8 oz)   17 74.4 kg (164 lb)   16 74.3 kg (163 lb 11.2 oz)              We Performed the Following     Follow-Up with Cardiologist          Today's Medication Changes          These changes are accurate as of: 17  1:59 PM.  If you have any questions, ask your nurse or doctor.               These medicines have changed or have updated prescriptions.        Dose/Directions    carvedilol 12.5 MG tablet   Commonly known as:  COREG   This may have changed:    - medication strength  - how much to take   Used for:  Primary cardiomyopathy (H)   Changed by:  Rah Ayala MD        Dose:  12.5 mg   Take 1 tablet (12.5 mg) by mouth " 2 times daily (with meals)   Quantity:  180 tablet   Refills:  3       simvastatin 10 MG tablet   Commonly known as:  ZOCOR   This may have changed:    - medication strength  - how much to take   Used for:  Hypercholesterolemia   Changed by:  Rah Ayala MD        Dose:  10 mg   Take 1 tablet (10 mg) by mouth At Bedtime   Quantity:  90 tablet   Refills:  3            Where to get your medicines      These medications were sent to [facility] - [place], [place] - [address]  [address] [address]     Phone:  [phone]     carvedilol 12.5 MG tablet    simvastatin 10 MG tablet                Primary Care Provider Office Phone # Fax #    Jaden Butts [phone]       [facility] [address]  [facility] [address]        Thank you!     Thank you for choosing [facility]YS[facility] HEART AT [place]  for your care. Our goal is always to provide you with excellent care. Hearing back from our patients is one way we can continue to improve our services. Please take a few minutes to complete the written survey that you may receive in the mail after your visit with us. Thank you!             Your Updated Medication List - Protect others around you: Learn how to safely use, store and throw away your medicines at www.disposemymeds.org.          This list is accurate as of: 6/8/17  1:59 PM.  Always use your most recent med list.                   Brand Name Dispense Instructions for use    carvedilol 12.5 MG tablet    COREG    180 tablet    Take 1 tablet (12.5 mg) by mouth 2 times daily (with meals)       CYCLOBENZAPRINE HCL PO      Take 5 mg by mouth At Bedtime       diphenhydrAMINE-acetaminophen  MG tablet    TYLENOL PM     Take 1 tablet by mouth nightly as needed for sleep       FLOMAX 0.4 MG capsule   Generic drug:  tamsulosin      Take 0.4 mg by mouth daily       isosorbide mononitrate 60 MG 24 hr tablet    IMDUR     Take 60 mg by mouth  daily       lisinopril 10 MG tablet    PRINIVIL/ZESTRIL    90 tablet    Take 1 tablet (10 mg) by mouth daily       PEPCID PO      Take 20 mg by mouth 2 times daily       PLAVIX PO      Take 75 mg by mouth daily       PRILOSEC PO      Take 20 mg by mouth daily       simvastatin 10 MG tablet    ZOCOR    90 tablet    Take 1 tablet (10 mg) by mouth At Bedtime       sodium chloride 5 % ophthalmic solution    SHELL 128     Place 1 drop into both eyes At Bedtime

## 2017-06-08 NOTE — TELEPHONE ENCOUNTER
Received call from Lee in Vascular that ultrasound of left leg is negative for DVT but probably has a ruptured Baker's cyst. He was told he could go home. Dr. Ayala notified and wants patient to follow up on the cyst. Wilbert

## 2017-06-08 NOTE — LETTER
6/8/2017    Jaden Butts MD  [address] [address]    RE: Katie Shannon       Dear Colleague,    I had the opportunity to see Mr. Katie Shannon in Cardiology Clinic today at the [facility] in [place] for reevaluation of nonischemic cardiomyopathy.  Mr. Shannon was originally diagnosed with coronary artery disease back in 2001, but he did not have any severe blockages requiring stenting or bypass.  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  At that time he was also found to have a cardiomyopathy with an ejection fraction of 40%-45% for unclear reasons.  Fortunately, he did not have any severe congestive heart failure issues and we have been able to manage that situation well with basic medical therapy.      He developed some chest discomfort earlier this spring and came in to see us in March with that concern.  His chest discomfort was occurring primarily with shoveling snow in the cold air and he described it as a central burning chest discomfort.  Fortunately, his stress test on 03/23/2017 using Lexiscan and nuclear perfusion imaging did not show any evidence of ischemia.  However, it continued to show evidence of a cardiomyopathy with moderate left ventricular enlargement and reduced ejection fraction of 34%.  He followed up with me today to address that further.  We repeated an echocardiogram to compare with his previous one and confirmed that his left ventricular systolic function did appear lower with a similar ejection fraction on the echo today of 30%-35% with biplane measurement of 33%.  He had mild mitral regurgitation as well.      He continues to be free of any concerning congestive heart failure symptoms.  He denies shortness of breath, fatigue and has not had any recurrent chest discomfort since his stress test in March.  He does have some lower extremity edema, but it is primarily left-sided.   He complains of a lot of left knee pain on that side as well.      PHYSICAL EXAMINATION:  Blood pressure is 132/70, heart rate 69, weight 166 pounds.  His lungs are clear.  His heart rhythm is regular.  I do not hear any cardiac murmurs or carotid bruits.     Outpatient Encounter Prescriptions as of 6/8/2017   Medication Sig Dispense Refill     carvedilol (COREG) 12.5 MG tablet Take 1 tablet (12.5 mg) by mouth 2 times daily (with meals) 180 tablet 3     simvastatin (ZOCOR) 10 MG tablet Take 1 tablet (10 mg) by mouth At Bedtime 90 tablet 3     lisinopril (PRINIVIL,ZESTRIL) 10 MG tablet Take 1 tablet (10 mg) by mouth daily 90 tablet 3     isosorbide mononitrate (IMDUR) 60 MG 24 hr tablet Take 60 mg by mouth daily       CYCLOBENZAPRINE HCL PO Take 5 mg by mouth At Bedtime        Clopidogrel Bisulfate (PLAVIX PO) Take 75 mg by mouth daily       tamsulosin (FLOMAX) 0.4 MG 24 hr capsule Take 0.4 mg by mouth daily       Omeprazole (PRILOSEC PO) Take 20 mg by mouth daily       diphenhydrAMINE-acetaminophen (TYLENOL PM)  MG tablet Take 1 tablet by mouth nightly as needed for sleep       Famotidine (PEPCID PO) Take 20 mg by mouth 2 times daily       sodium chloride (SHELL 128) 5 % ophthalmic solution Place 1 drop into both eyes At Bedtime       [DISCONTINUED] SIMVASTATIN PO Take 20 mg by mouth At Bedtime       [DISCONTINUED] carvedilol (COREG) 6.25 MG tablet Take 6.25 mg by mouth 2 times daily (with meals)       No facility-administered encounter medications on file as of 6/8/2017.       IMPRESSIONS:  Mr. Katie Shannon is an 82-year-old gentleman with nonocclusive coronary artery disease and recent stress test showing no evidence of ischemia.  He has a nonischemic cardiomyopathy with decreasing left ventricular systolic function, now with an ejection fraction down to 30%-35%.  He has no congestive heart failure symptoms but is somewhat limited by knee pain.      His cholesterol numbers are now excellent on a small dose  of simvastatin 20 mg a day.  In fact, his cholesterol numbers are a bit low and he seems to have some issue tolerating statins very well.  I will decrease his simvastatin 10 mg a day.      I will increase his carvedilol to 12.5 mg p.o. b.i.d. and then have him follow up with me again in about 4 months to reevaluate his left ventricular function.  I am hopeful that it may improve and we do not necessarily have to consider more aggressive therapies for his heart failure issues.  However, if his heart function does not improve, we may wish to consider an ICD or biventricular device based on his symptoms at that time.      He has unilateral left-sided lower extremity edema and I am concerned about the possibility of the left lower extremity DVT.  I will go ahead and check an ultrasound of that left leg today.      Thank you for allowing me to participate in Mr. Shannon's care.     Sincerely,    ISIDORO MARI MD     [facility]

## 2017-06-08 NOTE — PROGRESS NOTES
HPI and Plan:   See dictation    Orders Placed This Encounter   Procedures     US Lower Extremity Venous Duplex Left     Basic metabolic panel     Follow-Up with Cardiologist     Echocardiogram       Orders Placed This Encounter   Medications     carvedilol (COREG) 12.5 MG tablet     Sig: Take 1 tablet (12.5 mg) by mouth 2 times daily (with meals)     Dispense:  180 tablet     Refill:  3     simvastatin (ZOCOR) 10 MG tablet     Sig: Take 1 tablet (10 mg) by mouth At Bedtime     Dispense:  90 tablet     Refill:  3       Medications Discontinued During This Encounter   Medication Reason     carvedilol (COREG) 6.25 MG tablet Reorder     SIMVASTATIN PO Reorder         Encounter Diagnoses   Name Primary?     Primary cardiomyopathy (H) Yes     Benign essential hypertension      Localized edema      Coronary artery disease involving native coronary artery of native heart without angina pectoris      Hypercholesterolemia        CURRENT MEDICATIONS:  Current Outpatient Prescriptions   Medication Sig Dispense Refill     carvedilol (COREG) 12.5 MG tablet Take 1 tablet (12.5 mg) by mouth 2 times daily (with meals) 180 tablet 3     simvastatin (ZOCOR) 10 MG tablet Take 1 tablet (10 mg) by mouth At Bedtime 90 tablet 3     lisinopril (PRINIVIL,ZESTRIL) 10 MG tablet Take 1 tablet (10 mg) by mouth daily 90 tablet 3     isosorbide mononitrate (IMDUR) 60 MG 24 hr tablet Take 60 mg by mouth daily       CYCLOBENZAPRINE HCL PO Take 5 mg by mouth At Bedtime        Clopidogrel Bisulfate (PLAVIX PO) Take 75 mg by mouth daily       tamsulosin (FLOMAX) 0.4 MG 24 hr capsule Take 0.4 mg by mouth daily       Omeprazole (PRILOSEC PO) Take 20 mg by mouth daily       diphenhydrAMINE-acetaminophen (TYLENOL PM)  MG tablet Take 1 tablet by mouth nightly as needed for sleep       Famotidine (PEPCID PO) Take 20 mg by mouth 2 times daily       sodium chloride (SHELL 128) 5 % ophthalmic solution Place 1 drop into both eyes At Bedtime        "[DISCONTINUED] SIMVASTATIN PO Take 20 mg by mouth At Bedtime       [DISCONTINUED] carvedilol (COREG) 6.25 MG tablet Take 6.25 mg by mouth 2 times daily (with meals)         ALLERGIES     Allergies   Allergen Reactions     Atorvastatin      Muscle aches     Diphenhydramine      Metoprolol Succinate [Metoprolol]      Strawberry      Sulfa Drugs        PAST MEDICAL HISTORY:  History reviewed. No pertinent past medical history.    PAST SURGICAL HISTORY:  History reviewed. No pertinent surgical history.    FAMILY HISTORY:  Family History   Problem Relation Age of Onset     HEART DISEASE Mother 88     mi     CEREBROVASCULAR DISEASE Father 71       SOCIAL HISTORY:  Social History     Social History     Marital status:      Spouse name: N/A     Number of children: N/A     Years of education: N/A     Social History Main Topics     Smoking status: Never Smoker     Smokeless tobacco: None     Alcohol use No     Drug use: No     Sexual activity: Not Asked     Other Topics Concern     Caffeine Concern No     Sleep Concern No     Weight Concern No     Special Diet No     Exercise Yes     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 No     Social History Narrative       Review of Systems:  Skin:  Negative       Eyes:  Positive for glasses    ENT:  Negative      Respiratory:  Negative       Cardiovascular:  Negative      Gastroenterology: Negative      Genitourinary:  not assessed      Musculoskeletal:  Positive for arthritis    Neurologic:  Positive for numbness or tingling of feet    Psychiatric:  Negative      Heme/Lymph/Imm:  Positive for allergies    Endocrine:  Negative        Physical Exam:  Vitals: /70  Pulse 69  Ht 1.702 m (5' 7\")  Wt 75.7 kg (166 lb 12.8 oz)  BMI 26.12 kg/m2    Constitutional:  cooperative, alert and oriented, well developed, well nourished, in no acute distress        Skin:  warm and dry to the touch, no apparent skin lesions or masses noted        Head:  " normocephalic, no masses or lesions        Eyes:  pupils equal and round, conjunctivae and lids unremarkable, sclera white, no xanthalasma, EOMS intact, no nystagmus        ENT:  no pallor or cyanosis, dentition good        Neck:  carotid pulses are full and equal bilaterally, JVP normal, no carotid bruit, no thyromegaly        Chest:  normal breath sounds, clear to auscultation, normal A-P diameter, normal symmetry, normal respiratory excursion, no use of accessory muscles          Cardiac: regular rhythm, normal S1/S2, no S3 or S4, apical impulse not displaced, no murmurs, gallops or rubs                  Abdomen:           Vascular: pulses full and equal, no bruits auscultated;pulses full and equal                                        Extremities and Back:  no deformities, clubbing, cyanosis, erythema observed       LLE edema;2+      Neurological:  affect appropriate, oriented to time, person and place;no gross motor deficits              CC  Rah Ayala MD   [facility]  [address]  [place] [address]

## 2017-06-08 NOTE — TELEPHONE ENCOUNTER
Left message with patient that ultrasound did not show a clot but he has a cyst. I asked that he call us back to discuss. Wilbert

## 2017-06-09 NOTE — TELEPHONE ENCOUNTER
Patient returned call. I told him that the ultrasound showed no clot but the possibility of a Baker's cyst. I advised him to follow up on this issue with his PMD. I will mail a copy of this report to him today. Wilbert

## 2017-06-09 NOTE — PROGRESS NOTES
HISTORY OF PRESENT ILLNESS:  I had the opportunity to see Mr. Katie Shannon in Cardiology Clinic today at the [facility] in [place] for reevaluation of nonischemic cardiomyopathy.  Mr. Shannon was originally diagnosed with coronary artery disease back in 2001, but he did not have any severe blockages requiring stenting or bypass.  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  At that time he was also found to have a cardiomyopathy with an ejection fraction of 40%-45% for unclear reasons.  Fortunately, he did not have any severe congestive heart failure issues and we have been able to manage that situation well with basic medical therapy.      He developed some chest discomfort earlier this spring and came in to see us in March with that concern.  His chest discomfort was occurring primarily with shoveling snow in the cold air and he described it as a central burning chest discomfort.  Fortunately, his stress test on 03/23/2017 using Lexiscan and nuclear perfusion imaging did not show any evidence of ischemia.  However, it continued to show evidence of a cardiomyopathy with moderate left ventricular enlargement and reduced ejection fraction of 34%.  He followed up with me today to address that further.  We repeated an echocardiogram to compare with his previous one and confirmed that his left ventricular systolic function did appear lower with a similar ejection fraction on the echo today of 30%-35% with biplane measurement of 33%.  He had mild mitral regurgitation as well.      He continues to be free of any concerning congestive heart failure symptoms.  He denies shortness of breath, fatigue and has not had any recurrent chest discomfort since his stress test in March.  He does have some lower extremity edema, but it is primarily left-sided.  He complains of a lot of left knee pain on that side as well.      PHYSICAL EXAMINATION:  Blood pressure is  132/70, heart rate 69, weight 166 pounds.  His lungs are clear.  His heart rhythm is regular.  I do not hear any cardiac murmurs or carotid bruits.      IMPRESSIONS:  Mr. Rupert Shannon is an 82-year-old gentleman with nonocclusive coronary artery disease and recent stress test showing no evidence of ischemia.  He has a nonischemic cardiomyopathy with decreasing left ventricular systolic function, now with an ejection fraction down to 30%-35%.  He has no congestive heart failure symptoms but is somewhat limited by knee pain.      His cholesterol numbers are now excellent on a small dose of simvastatin 20 mg a day.  In fact, his cholesterol numbers are a bit low and he seems to have some issue tolerating statins very well.  I will decrease his simvastatin 10 mg a day.      I will increase his carvedilol to 12.5 mg p.o. b.i.d. and then have him follow up with me again in about 4 months to reevaluate his left ventricular function.  I am hopeful that it may improve and we do not necessarily have to consider more aggressive therapies for his heart failure issues.  However, if his heart function does not improve, we may wish to consider an ICD or biventricular device based on his symptoms at that time.      He has unilateral left-sided lower extremity edema and I am concerned about the possibility of the left lower extremity DVT.  I will go ahead and check an ultrasound of that left leg today.      Thank you for allowing me to participate in Mr. Shannon's care.      cc:      [place] [address]         ISIDORO MARI MD, [facility]             D: 2017 14:08   T: 2017 08:42   MT: [id number]      Name:     RUPERT SHANNON   MRN:      [id number]        Account:      [id number]   :      1935           Service Date: 2017      Document: [id number]

## 2017-06-14 ENCOUNTER — TRANSFERRED RECORDS (OUTPATIENT)
Dept: CARDIOLOGY | Facility: CLINIC | Age: 82
End: 2017-06-14

## 2017-06-30 ENCOUNTER — TRANSFERRED RECORDS (OUTPATIENT)
Dept: HEALTH INFORMATION MANAGEMENT | Facility: CLINIC | Age: 82
End: 2017-06-30

## 2017-10-12 ENCOUNTER — OFFICE VISIT (OUTPATIENT)
Dept: DERMATOLOGY | Facility: CLINIC | Age: 82
End: 2017-10-12
Payer: MEDICARE

## 2017-10-12 VITALS
HEIGHT: 67 IN | BODY MASS INDEX: 25.27 KG/M2 | WEIGHT: 161 LBS | SYSTOLIC BLOOD PRESSURE: 126 MMHG | HEART RATE: 69 BPM | DIASTOLIC BLOOD PRESSURE: 68 MMHG | OXYGEN SATURATION: 98 %

## 2017-10-12 DIAGNOSIS — L82.1 SK (SEBORRHEIC KERATOSIS): ICD-10-CM

## 2017-10-12 DIAGNOSIS — Z85.828 HISTORY OF SKIN CANCER: Primary | ICD-10-CM

## 2017-10-12 DIAGNOSIS — D18.00 ANGIOMA: ICD-10-CM

## 2017-10-12 DIAGNOSIS — L81.4 LENTIGO: ICD-10-CM

## 2017-10-12 PROCEDURE — 99213 OFFICE O/P EST LOW 20 MIN: CPT | Performed by: DERMATOLOGY

## 2017-10-12 NOTE — MR AVS SNAPSHOT
After Visit Summary   10/12/2017    Katie Shannon    MRN: [id number]           Patient Information     Date Of Birth          1935        Visit Information        Provider Department      10/12/2017 10:15 AM Mal Villalobos MD [facility]        Today's Diagnoses     History of skin cancer    -  1    Lentigo        SK (seborrheic keratosis)        Angioma           Follow-ups after your visit        Your next 10 appointments already scheduled     Oct 13, 2017  8:30 AM CDT   Ech Complete with [id number]   [facility] CV Echocardiography (Cardiovascular Imaging at [facility])    [address] [place] [address]  [address]  [facility] [id number] [phone]           1. Please bring or wear a comfortable two-piece outfit. 2. You may eat, drink and take your normal medicines. 3. For any questions that cannot be answered, please contact the ordering physician            Oct 13, 2017  9:30 AM CDT   LAB with BOYD LAB   [facility])    [address] [place] [address] [address]  [facility] [id number] [phone]           Patient must bring picture ID. Patient should be prepared to give a urine specimen  Please do not eat 10-12 hours before your appointment if you are coming in fasting for labs on lipids, cholesterol, or glucose (sugar). Pregnant women should follow their Care Team instructions. Water with medications is okay. Do not drink coffee or other fluids. If you have concerns about taking  your medications, please ask at office or if scheduling via Avanthahart, send a message by clicking on Secure Messaging, Message Your Care Team.            Oct 13, 2017 11:15 AM CDT   Return Visit with Rah Ayala MD   [facility])    [address] [place] [address] [address]  [facility] [id number] [phone]              Who to contact     If you have questions or  "need follow up information about today's clinic visit or your schedule please contact [facility] directly at [phone].  Normal or non-critical lab and imaging results will be communicated to you by MyChart, letter or phone within 4 business days after the clinic has received the results. If you do not hear from us within 7 days, please contact the clinic through Bangbitehart or phone. If you have a critical or abnormal lab result, we will notify you by phone as soon as possible.  Submit refill requests through [employer]Sphere or call your pharmacy and they will forward the refill request to us. Please allow 3 business days for your refill to be completed.          Additional Information About Your Visit        BangbiteharRayV Information     EndoSphere lets you send messages to your doctor, view your test results, renew your prescriptions, schedule appointments and more. To sign up, go to [email or web address] . Click on \"Log in\" on the left side of the screen, which will take you to the Welcome page. Then click on \"Sign up Now\" on the right side of the page.     You will be asked to enter the access code listed below, as well as some personal information. Please follow the directions to create your username and password.     Your access code is: [email or web address]  Expires: 1/10/2018 10:35 AM     Your access code will  in 90 days. If you need help or a new code, please call your [facility] clinic or [phone].        Care EveryWhere ID     This is your Care EveryWhere ID. This could be used by other organizations to access your [facility] medical records  [id number]        Your Vitals Were     Pulse Height Pulse Oximetry BMI (Body Mass Index)          69 1.702 m (5' 7\") 98% 25.22 kg/m2         Blood Pressure from Last 3 Encounters:   10/12/17 126/68   17 132/70   17 146/73    Weight from Last 3 Encounters:   10/12/17 73 kg (161 lb)   17 75.7 kg (166 lb 12.8 oz)   17 74.4 kg (164 lb) "              Today, you had the following     No orders found for display       Primary Care Provider Office Phone # Fax #    Jaden Butts [phone]       [facility] CLINIC [address] [place] [address]        Equal Access to Services     JAYSHREELENA MARILYNN : Hadii aad ku hadasho Soomaali, waaxda luqadaha, qaybta kaalmada adeegyada, waxay rafaelin hayjackien adeeg scarlett laKatinajackieroscoe . So [facility] [phone].    ATENCIÓN: Si habla español, tiene a howell disposición servicios gratuitos de asistencia lingüística. Llame al [phone].    We comply with applicable federal civil rights laws and [place] laws. We do not discriminate on the basis of race, color, national origin, age, disability, sex, sexual orientation, or gender identity.            Thank you!     Thank you for choosing [facility]  for your care. Our goal is always to provide you with excellent care. Hearing back from our patients is one way we can continue to improve our services. Please take a few minutes to complete the written survey that you may receive in the mail after your visit with us. Thank you!             Your Updated Medication List - Protect others around you: Learn how to safely use, store and throw away your medicines at www.disposemymeds.org.          This list is accurate as of: 10/12/17 10:35 AM.  Always use your most recent med list.                   Brand Name Dispense Instructions for use Diagnosis    carvedilol 12.5 MG tablet    COREG    180 tablet    Take 1 tablet (12.5 mg) by mouth 2 times daily (with meals)    Primary cardiomyopathy (H)       CYCLOBENZAPRINE HCL PO      Take 5 mg by mouth At Bedtime        diphenhydrAMINE-acetaminophen  MG tablet    TYLENOL PM     Take 1 tablet by mouth nightly as needed for sleep        FLOMAX 0.4 MG capsule   Generic drug:  tamsulosin      Take 0.4 mg by mouth daily        isosorbide mononitrate 60 MG 24 hr tablet    IMDUR     Take 60 mg by mouth  daily        lisinopril 10 MG tablet    PRINIVIL/ZESTRIL    90 tablet    Take 1 tablet (10 mg) by mouth daily    Primary cardiomyopathy (H)       PEPCID PO      Take 20 mg by mouth 2 times daily        PLAVIX PO      Take 75 mg by mouth daily        PRILOSEC PO      Take 20 mg by mouth daily        simvastatin 10 MG tablet    ZOCOR    90 tablet    Take 1 tablet (10 mg) by mouth At Bedtime    Hypercholesterolemia       sodium chloride 5 % ophthalmic solution    SHELL 128     Place 1 drop into both eyes At Bedtime

## 2017-10-12 NOTE — NURSING NOTE
"Chief Complaint   Patient presents with     Derm Problem     full body exam       Initial /68  Pulse 69  Ht 1.702 m (5' 7\")  Wt 73 kg (161 lb)  SpO2 98%  BMI 25.22 kg/m2 Estimated body mass index is 25.22 kg/(m^2) as calculated from the following:    Height as of this encounter: 1.702 m (5' 7\").    Weight as of this encounter: 73 kg (161 lb).  Medication Reconciliation: complete    "

## 2017-10-12 NOTE — PROGRESS NOTES
Katie Shannon is a 82 year old year old male patient here today for f/u hx of non-melanoma skin cancer.  He has no concerns today.  Patient reports the following symptoms:  none .  Patient reports the following previous treatments none.  Patient reports the following modifying factors none.  Associated symptoms: none.  Patient has no other skin complaints today.  Remainder of the HPI, Meds, PMH, Allergies, FH, and SH was reviewed in chart.    Pertinent Hx:   Non-melanoma skin cancer   No past medical history on file.    No past surgical history on file.     Family History   Problem Relation Age of Onset     HEART DISEASE Mother 88     mi     CEREBROVASCULAR DISEASE Father 71       Social History     Social History     Marital status:      Spouse name: N/A     Number of children: N/A     Years of education: N/A     Occupational History     Not on file.     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 No     Social History Narrative       Outpatient Encounter Prescriptions as of 10/12/2017   Medication Sig Dispense Refill     carvedilol (COREG) 12.5 MG tablet Take 1 tablet (12.5 mg) by mouth 2 times daily (with meals) 180 tablet 3     simvastatin (ZOCOR) 10 MG tablet Take 1 tablet (10 mg) by mouth At Bedtime 90 tablet 3     lisinopril (PRINIVIL,ZESTRIL) 10 MG tablet Take 1 tablet (10 mg) by mouth daily 90 tablet 3     isosorbide mononitrate (IMDUR) 60 MG 24 hr tablet Take 60 mg by mouth daily       CYCLOBENZAPRINE HCL PO Take 5 mg by mouth At Bedtime        Clopidogrel Bisulfate (PLAVIX PO) Take 75 mg by mouth daily       tamsulosin (FLOMAX) 0.4 MG 24 hr capsule Take 0.4 mg by mouth daily  "      Omeprazole (PRILOSEC PO) Take 20 mg by mouth daily       diphenhydrAMINE-acetaminophen (TYLENOL PM)  MG tablet Take 1 tablet by mouth nightly as needed for sleep       Famotidine (PEPCID PO) Take 20 mg by mouth 2 times daily       sodium chloride (SHELL 128) 5 % ophthalmic solution Place 1 drop into both eyes At Bedtime       No facility-administered encounter medications on file as of 10/12/2017.              Review Of Systems  Skin: As above  Eyes: negative  Ears/Nose/Throat: negative  Respiratory: No shortness of breath, dyspnea on exertion, cough, or hemoptysis  Cardiovascular: negative  Gastrointestinal: negative  Genitourinary: negative  Musculoskeletal: negative  Neurologic: negative  Psychiatric: negative  Hematologic/Lymphatic/Immunologic: negative  Endocrine: negative      O:   NAD, WDWN, Alert & Oriented, Mood & Affect wnl, Vitals stable   Here today alone   /68  Pulse 69  Ht 1.702 m (5' 7\")  Wt 73 kg (161 lb)  SpO2 98%  BMI 25.22 kg/m2   General appearance normal   Vitals stable   Alert, oriented and in no acute distress      Following lymph nodes palpated: Occipital, Cervical, Supraclavicular no lad   Stuck on papules and brown macules on trunk and ext    Red papules on trunk   Scalp and chest well healed         The remainder of the full exam was unremarkable; the following areas were examined:  conjunctiva/lids, oral mucosa, neck, peripheral vascular system, abdomen, lymph nodes, digits/nails, eccrine and apocrine glands, scalp/hair, face, neck, chest, abdomen, buttocks, back, RUE, LUE, RLE, LLE       Eyes: Conjunctivae/lids:Normal     ENT: Lips, buccal mucosa, tongue: normal    MSK:Normal    Cardiovascular: peripheral edema none    Pulm: Breathing Normal    Lymph Nodes: No Head and Neck Lymphadenopathy     Neuro/Psych: Orientation:Normal; Mood/Affect:Normal      A/P:  1. Seborrheic keratosis,lentigo, angioma, hx of non-melanoma skin cancer   BENIGN LESIONS DISCUSSED WITH " PATIENT:  I discussed the specifics of tumor, prognosis, and genetics of benign lesions.  I explained that treatment of these lesions would be purely cosmetic and not medically neccessary.  I discussed with patient different removal options including excision, cautery and /or laser.      Nature and genetics of benign skin lesions dicussed with patient.  Signs and Symptoms of skin cancer discussed with patient.  ABCDEs of melanoma reviewed with patient.  Patient encouraged to perform monthly skin exams.  UV precautions reviewed with patient.  Skin care regimen reviewed with patient: Eliminate harsh soaps, i.e. Dial, zest, irsih spring; Mild soaps such as Cetaphil or Dove sensitive skin, avoid hot or cold showers, aggressive use of emollients including vanicream, cetaphil or cerave discussed with patient.    Risks of non-melanoma skin cancer discussed with patient   Return to clinic 12 months

## 2017-10-12 NOTE — LETTER
10/12/2017         RE: Katie Shannon  [address]  [facility] [address]        Dear Colleague,    Thank you for referring your patient, Katie Shannon, to the [facility]. Please see a copy of my visit note below.    Katie Shannon is a 82 year old year old male patient here today for f/u hx of non-melanoma skin cancer.  He has no concerns today.  Patient reports the following symptoms:  none .  Patient reports the following previous treatments none.  Patient reports the following modifying factors none.  Associated symptoms: none.  Patient has no other skin complaints today.  Remainder of the HPI, Meds, PMH, Allergies, FH, and SH was reviewed in chart.    Pertinent Hx:   Non-melanoma skin cancer   No past medical history on file.    No past surgical history on file.     Family History   Problem Relation Age of Onset     HEART DISEASE Mother 88     mi     CEREBROVASCULAR DISEASE Father 71       Social History     Social History     Marital status:      Spouse name: N/A     Number of children: N/A     Years of education: N/A     Occupational History     Not on file.     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 No     Social History Narrative       Outpatient Encounter Prescriptions as of 10/12/2017   Medication Sig Dispense Refill     carvedilol (COREG) 12.5 MG tablet Take 1 tablet (12.5 mg) by mouth 2 times daily (with meals) 180 tablet 3     simvastatin (ZOCOR) 10 MG tablet Take 1 tablet (10 mg) by mouth At Bedtime 90 tablet 3     lisinopril (PRINIVIL,ZESTRIL) 10 MG tablet Take 1 tablet (10 mg) by mouth daily 90 tablet 3     isosorbide mononitrate  "(IMDUR) 60 MG 24 hr tablet Take 60 mg by mouth daily       CYCLOBENZAPRINE HCL PO Take 5 mg by mouth At Bedtime        Clopidogrel Bisulfate (PLAVIX PO) Take 75 mg by mouth daily       tamsulosin (FLOMAX) 0.4 MG 24 hr capsule Take 0.4 mg by mouth daily       Omeprazole (PRILOSEC PO) Take 20 mg by mouth daily       diphenhydrAMINE-acetaminophen (TYLENOL PM)  MG tablet Take 1 tablet by mouth nightly as needed for sleep       Famotidine (PEPCID PO) Take 20 mg by mouth 2 times daily       sodium chloride (SHELL 128) 5 % ophthalmic solution Place 1 drop into both eyes At Bedtime       No facility-administered encounter medications on file as of 10/12/2017.              Review Of Systems  Skin: As above  Eyes: negative  Ears/Nose/Throat: negative  Respiratory: No shortness of breath, dyspnea on exertion, cough, or hemoptysis  Cardiovascular: negative  Gastrointestinal: negative  Genitourinary: negative  Musculoskeletal: negative  Neurologic: negative  Psychiatric: negative  Hematologic/Lymphatic/Immunologic: negative  Endocrine: negative      O:   NAD, WDWN, Alert & Oriented, Mood & Affect wnl, Vitals stable   Here today alone   /68  Pulse 69  Ht 1.702 m (5' 7\")  Wt 73 kg (161 lb)  SpO2 98%  BMI 25.22 kg/m2   General appearance normal   Vitals stable   Alert, oriented and in no acute distress      Following lymph nodes palpated: Occipital, Cervical, Supraclavicular no lad   Stuck on papules and brown macules on trunk and ext    Red papules on trunk   Scalp and chest well healed         The remainder of the full exam was unremarkable; the following areas were examined:  conjunctiva/lids, oral mucosa, neck, peripheral vascular system, abdomen, lymph nodes, digits/nails, eccrine and apocrine glands, scalp/hair, face, neck, chest, abdomen, buttocks, back, RUE, LUE, RLE, LLE       Eyes: Conjunctivae/lids:Normal     ENT: Lips, buccal mucosa, tongue: normal    MSK:Normal    Cardiovascular: peripheral edema " none    Pulm: Breathing Normal    Lymph Nodes: No Head and Neck Lymphadenopathy     Neuro/Psych: Orientation:Normal; Mood/Affect:Normal      A/P:  1. Seborrheic keratosis,lentigo, angioma, hx of non-melanoma skin cancer   BENIGN LESIONS DISCUSSED WITH PATIENT:  I discussed the specifics of tumor, prognosis, and genetics of benign lesions.  I explained that treatment of these lesions would be purely cosmetic and not medically neccessary.  I discussed with patient different removal options including excision, cautery and /or laser.      Nature and genetics of benign skin lesions dicussed with patient.  Signs and Symptoms of skin cancer discussed with patient.  ABCDEs of melanoma reviewed with patient.  Patient encouraged to perform monthly skin exams.  UV precautions reviewed with patient.  Skin care regimen reviewed with patient: Eliminate harsh soaps, i.e. Dial, zest, irsih spring; Mild soaps such as Cetaphil or Dove sensitive skin, avoid hot or cold showers, aggressive use of emollients including vanicream, cetaphil or cerave discussed with patient.    Risks of non-melanoma skin cancer discussed with patient   Return to clinic 12 months      Again, thank you for allowing me to participate in the care of your patient.        Sincerely,        Mal Villalobos MD

## 2017-10-13 ENCOUNTER — HOSPITAL ENCOUNTER (OUTPATIENT)
Dept: CARDIOLOGY | Facility: CLINIC | Age: 82
Discharge: HOME | End: 2017-10-13
Attending: INTERNAL MEDICINE | Admitting: INTERNAL MEDICINE
Payer: MEDICARE

## 2017-10-13 ENCOUNTER — OFFICE VISIT (OUTPATIENT)
Dept: CARDIOLOGY | Facility: CLINIC | Age: 82
End: 2017-10-13
Attending: INTERNAL MEDICINE
Payer: MEDICARE

## 2017-10-13 VITALS
HEIGHT: 67 IN | WEIGHT: 163.7 LBS | BODY MASS INDEX: 25.69 KG/M2 | DIASTOLIC BLOOD PRESSURE: 64 MMHG | SYSTOLIC BLOOD PRESSURE: 144 MMHG | HEART RATE: 73 BPM

## 2017-10-13 DIAGNOSIS — I10 ESSENTIAL HYPERTENSION WITH GOAL BLOOD PRESSURE LESS THAN 140/90: ICD-10-CM

## 2017-10-13 DIAGNOSIS — I50.22 CHRONIC SYSTOLIC HEART FAILURE (H): Primary | ICD-10-CM

## 2017-10-13 DIAGNOSIS — I10 BENIGN ESSENTIAL HYPERTENSION: ICD-10-CM

## 2017-10-13 DIAGNOSIS — I42.9 PRIMARY CARDIOMYOPATHY (H): ICD-10-CM

## 2017-10-13 LAB
ANION GAP SERPL CALCULATED.3IONS-SCNC: 9.7 MMOL/L (ref 6–17)
BUN SERPL-MCNC: 14 MG/DL (ref 7–30)
CALCIUM SERPL-MCNC: 8.8 MG/DL (ref 8.5–10.5)
CHLORIDE SERPL-SCNC: 95 MMOL/L (ref 98–107)
CO2 SERPL-SCNC: 30 MMOL/L (ref 23–29)
CREAT SERPL-MCNC: 1 MG/DL (ref 0.7–1.3)
GFR SERPL CREATININE-BSD FRML MDRD: 72 ML/MIN/1.7M2
GLUCOSE SERPL-MCNC: 93 MG/DL (ref 70–105)
POTASSIUM SERPL-SCNC: 4.7 MMOL/L (ref 3.5–5.1)
SODIUM SERPL-SCNC: 130 MMOL/L (ref 136–145)

## 2017-10-13 PROCEDURE — 36415 COLL VENOUS BLD VENIPUNCTURE: CPT | Performed by: INTERNAL MEDICINE

## 2017-10-13 PROCEDURE — 93306 TTE W/DOPPLER COMPLETE: CPT | Mod: 26 | Performed by: INTERNAL MEDICINE

## 2017-10-13 PROCEDURE — 93306 TTE W/DOPPLER COMPLETE: CPT

## 2017-10-13 PROCEDURE — 99214 OFFICE O/P EST MOD 30 MIN: CPT | Mod: 25 | Performed by: INTERNAL MEDICINE

## 2017-10-13 PROCEDURE — 80048 BASIC METABOLIC PNL TOTAL CA: CPT | Performed by: INTERNAL MEDICINE

## 2017-10-13 RX ORDER — TRAMADOL HYDROCHLORIDE 50 MG/1
50 TABLET ORAL EVERY 6 HOURS PRN
COMMUNITY
End: 2018-01-09

## 2017-10-13 NOTE — MR AVS SNAPSHOT
After Visit Summary   10/13/2017    Katie Shannon    MRN: [id number]           Patient Information     Date Of Birth          1935        Visit Information        Provider Department      10/13/2017 11:15 AM Rah Ayala MD [facility]        Today's Diagnoses     Chronic systolic heart failure (H)    -  1    Primary cardiomyopathy (H)        Benign essential hypertension        Essential hypertension with goal blood pressure less than 140/90           Follow-ups after your visit        Additional Services     [facility]Up with [facility]           Follow-Up with Cardiac Advanced Practice Provider           Follow-Up with Cardiologist                 Future tests that were ordered for you today     Open Future Orders        Priority Expected Expires Ordered    Basic metabolic panel Routine 4/11/2018 10/13/2018 10/13/2017    Echocardiogram Routine 4/11/2018 10/13/2018 10/13/2017    Follow-Up with Cardiologist Routine 4/11/2018 10/13/2018 10/13/2017    Basic metabolic panel Routine 1/11/2018 10/13/2018 10/13/2017    Follow-Up with Cardiac Advanced Practice Provider Routine 1/11/2018 10/13/2018 10/13/2017    Follow-[facility] with [facility] Routine 1/11/2018 10/13/2018 10/13/2017            Who to contact     If you have questions or need follow up information about today's clinic visit or your schedule please contact [facility] directly at [phone].  Normal or non-critical lab and imaging results will be communicated to you by MyChart, letter or phone within 4 business days after the clinic has received the results. If you do not hear from us within 7 days, please contact the clinic through MyChart or phone. If you have a critical or abnormal lab result, we will notify you by phone as soon as possible.  Submit refill requests through [email or web address] or call your pharmacy and they will forward the refill request to us. Please  "allow 3 business days for your refill to be completed.          Additional Information About Your Visit        MyChart Information     [facility] lets you send messages to your doctor, view your test results, renew your prescriptions, schedule appointments and more. To sign up, go to [email or web address] . Click on \"Log in\" on the left side of the screen, which will take you to the Welcome page. Then click on \"Sign up Now\" on the right side of the page.     You will be asked to enter the access code listed below, as well as some personal information. Please follow the directions to create your username and password.     Your access code is: [email or web address]  Expires: 1/10/2018 10:35 AM     Your access code will  in 90 days. If you need help or a new code, please call your [place] clinic or [phone].        Care EveryWhere ID     This is your Care EveryWhere ID. This could be used by other organizations to access your [place] medical records  [id number]        Your Vitals Were     Pulse Height BMI (Body Mass Index)             73 1.702 m (5' 7\") 25.64 kg/m2          Blood Pressure from Last 3 Encounters:   10/13/17 144/64   10/12/17 126/68   17 132/70    Weight from Last 3 Encounters:   10/13/17 74.3 kg (163 lb 11.2 oz)   10/12/17 73 kg (161 lb)   17 75.7 kg (166 lb 12.8 oz)              We Performed the Following     Follow-Up with Cardiologist          Today's Medication Changes          These changes are accurate as of: 10/13/17 12:31 PM.  If you have any questions, ask your nurse or doctor.               Start taking these medicines.        Dose/Directions    sacubitril-valsartan 24-26 MG per tablet   Commonly known as:  ENTRESTO   Used for:  Chronic systolic heart failure (H)   Started by:  Rah Ayala MD        Dose:  1 tablet   Take 1 tablet by mouth 2 times daily   Quantity:  60 tablet   Refills:  11         Stop taking these medicines if you haven't already. Please contact your " care team if you have questions.     lisinopril 10 MG tablet   Commonly known as:  PRINIVIL/ZESTRIL   Stopped by:  Rah Ayala MD                Where to get your medicines      These medications were sent to [facility] - [place], [place] - [address]  [address], [facility] [address]     Phone:  [phone]     sacubitril-valsartan 24-26 MG per tablet                Primary Care Provider Office Phone # Fax #    Jaden Butts [phone]       [facility] [address]  [facility] [address]        Equal Access to Services     [facility]: Hadii aad ku hadasho Soomaali, waaxda luqadaha, qaybta kaalmada adeegyada, waxvaleriano maldonado . So [facility] [phone].    ATENCIÓN: Si habla español, tiene a howell disposición servicios gratuitos de asistencia lingüística. Llame al [phone].    We comply with applicable federal civil rights laws and [place] laws. We do not discriminate on the basis of race, color, national origin, age, disability, sex, sexual orientation, or gender identity.            Thank you!     Thank you for choosing [facility]  for your care. Our goal is always to provide you with excellent care. Hearing back from our patients is one way we can continue to improve our services. Please take a few minutes to complete the written survey that you may receive in the mail after your visit with us. Thank you!             Your Updated Medication List - Protect others around you: Learn how to safely use, store and throw away your medicines at www.disposemymeds.org.          This list is accurate as of: 10/13/17 12:31 PM.  Always use your most recent med list.                   Brand Name Dispense Instructions for use Diagnosis    carvedilol 12.5 MG tablet    COREG    180 tablet    Take 1 tablet (12.5 mg) by mouth 2 times daily (with meals)    Primary cardiomyopathy (H)       CYCLOBENZAPRINE HCL PO       Take 5 mg by mouth At Bedtime        diphenhydrAMINE-acetaminophen  MG tablet    TYLENOL PM     Take 1 tablet by mouth nightly as needed for sleep        FLOMAX 0.4 MG capsule   Generic drug:  tamsulosin      Take 0.4 mg by mouth daily        isosorbide mononitrate 60 MG 24 hr tablet    IMDUR     Take 60 mg by mouth daily        PEPCID PO      Take 20 mg by mouth 2 times daily        PLAVIX PO      Take 75 mg by mouth daily        PRILOSEC PO      Take 20 mg by mouth daily        sacubitril-valsartan 24-26 MG per tablet    ENTRESTO    60 tablet    Take 1 tablet by mouth 2 times daily    Chronic systolic heart failure (H)       simvastatin 10 MG tablet    ZOCOR    90 tablet    Take 1 tablet (10 mg) by mouth At Bedtime    Hypercholesterolemia       sodium chloride 5 % ophthalmic solution    SHELL 128     Place 1 drop into both eyes At Bedtime        traMADol 50 MG tablet    ULTRAM     Take 50 mg by mouth every 6 hours as needed for moderate pain

## 2017-10-13 NOTE — PROGRESS NOTES
HPI and Plan:   See dictation    Orders Placed This Encounter   Procedures     Basic metabolic panel     Basic metabolic panel     Follow-Up with Cardiologist     Follow-Up with Cardiac Advanced Practice Provider     [facility] with [facility]     Echocardiogram       Orders Placed This Encounter   Medications     traMADol (ULTRAM) 50 MG tablet     Sig: Take 50 mg by mouth every 6 hours as needed for moderate pain     sacubitril-valsartan (ENTRESTO) 24-26 MG per tablet     Sig: Take 1 tablet by mouth 2 times daily     Dispense:  60 tablet     Refill:  11       Medications Discontinued During This Encounter   Medication Reason     lisinopril (PRINIVIL,ZESTRIL) 10 MG tablet          Encounter Diagnoses   Name Primary?     Primary cardiomyopathy (H)      Benign essential hypertension      Chronic systolic heart failure (H) Yes     Essential hypertension with goal blood pressure less than 140/90        CURRENT MEDICATIONS:  Current Outpatient Prescriptions   Medication Sig Dispense Refill     traMADol (ULTRAM) 50 MG tablet Take 50 mg by mouth every 6 hours as needed for moderate pain       sacubitril-valsartan (ENTRESTO) 24-26 MG per tablet Take 1 tablet by mouth 2 times daily 60 tablet 11     carvedilol (COREG) 12.5 MG tablet Take 1 tablet (12.5 mg) by mouth 2 times daily (with meals) 180 tablet 3     simvastatin (ZOCOR) 10 MG tablet Take 1 tablet (10 mg) by mouth At Bedtime 90 tablet 3     isosorbide mononitrate (IMDUR) 60 MG 24 hr tablet Take 60 mg by mouth daily       CYCLOBENZAPRINE HCL PO Take 5 mg by mouth At Bedtime        Clopidogrel Bisulfate (PLAVIX PO) Take 75 mg by mouth daily       tamsulosin (FLOMAX) 0.4 MG 24 hr capsule Take 0.4 mg by mouth daily       Omeprazole (PRILOSEC PO) Take 20 mg by mouth daily       diphenhydrAMINE-acetaminophen (TYLENOL PM)  MG tablet Take 1 tablet by mouth nightly as needed for sleep       Famotidine (PEPCID PO) Take 20 mg by mouth 2 times daily       sodium chloride  "(SHELL 128) 5 % ophthalmic solution Place 1 drop into both eyes At Bedtime         ALLERGIES     Allergies   Allergen Reactions     Atorvastatin      Muscle aches     Diphenhydramine      Metoprolol Succinate [Metoprolol]      Strawberry      Sulfa Drugs        PAST MEDICAL HISTORY:  History reviewed. No pertinent past medical history.    PAST SURGICAL HISTORY:  History reviewed. No pertinent surgical history.    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 No     Social History Narrative       Review of Systems:  Skin:  Negative       Eyes:  Positive for glasses    ENT:  Negative      Respiratory:  Negative       Cardiovascular:  Negative      Gastroenterology: Negative      Genitourinary:  Negative      Musculoskeletal:  Positive for arthritis    Neurologic:  Negative      Psychiatric:  Negative      Heme/Lymph/Imm:  Positive for allergies    Endocrine:  Negative        Physical Exam:  Vitals: /64  Pulse 73  Ht 1.702 m (5' 7\")  Wt 74.3 kg (163 lb 11.2 oz)  BMI 25.64 kg/m2    Constitutional:  cooperative, alert and oriented, well developed, well nourished, in no acute distress        Skin:  warm and dry to the touch, no apparent skin lesions or masses noted        Head:  normocephalic, no masses or lesions        Eyes:  pupils equal and round, conjunctivae and lids unremarkable, sclera " white, no xanthalasma, EOMS intact, no nystagmus        ENT:  no pallor or cyanosis, dentition good        Neck:  carotid pulses are full and equal bilaterally, JVP normal, no carotid bruit, no thyromegaly        Chest:  normal breath sounds, clear to auscultation, normal A-P diameter, normal symmetry, normal respiratory excursion, no use of accessory muscles          Cardiac: regular rhythm, normal S1/S2, no S3 or S4, apical impulse not displaced, no murmurs, gallops or rubs frequent premature beats           PVCs    Abdomen:  abdomen soft, non-tender, BS normoactive, no mass, no HSM, no bruits        Vascular: pulses full and equal, no bruits auscultated;pulses full and equal                                        Extremities and Back:  no deformities, clubbing, cyanosis, erythema observed;no edema              Neurological:  affect appropriate, oriented to time, person and place;no gross motor deficits              CC  Rah Ayala MD  [address]  [place] [address]

## 2017-10-13 NOTE — PROGRESS NOTES
HISTORY OF PRESENT ILLNESS:  I had the opportunity to see Mr. Katie Shannon in Cardiology Clinic today at [facility] in [place] for reevaluation of chronic nonischemic cardiomyopathy with a history of mild to moderate nonocclusive coronary artery disease, hypertension, dyslipidemia and mild congestive heart failure at most.  When I saw him last in June, I was concerned that his left ventricular function appeared to be declining.  He had an ejection fraction of 40%-45% prior to that but had been experiencing some angina during snow shoveling in March and was referred for a stress test with nuclear imaging, which suggested a drop in ejection fraction to about 34%.  There was no ischemia identified on that study.  A subsequent echocardiogram confirmed that, with an ejection fraction of 30%-35% and a biplane quantitative measurement of ejection fraction of 33%.      Fortunately, when I saw him in June, he was still feeling quite well without any concerning congestive heart failure symptoms and without any recurrent angina.  He continued to be active, although was limited by some joint pains.      I suggested that we intensify his medical program for treatment of his cardiomyopathy and have him return at this time for reevaluation of his left ventricular function.  Over the summer, he has still been somewhat active but can only walk about a half a block because of hip and knee pain.  He does not get short of breath doing that.  He does not have PND or orthopnea and has not been having any problems with edema.  He did fall recently on the sidewalk and bruised his left anterior rib cage.  He then came in for his followup echocardiogram, which was somewhat challenging due to the pain of the procedure when we were pressing on his chest.  I reviewed those images myself, and I agree with the report that he appears to have mild left ventricular enlargement with a further drop in ejection fraction down to  about 25%-30%.  The hypokinesis appears to be global.      PHYSICAL EXAMINATION:  On examination today, his blood pressure was 144/64, although it was just 126/68 yesterday.  His weight is down a few pounds at 163.  His heart rate is 73.  His lungs are quite clear.  Heart rhythm is regular, and he has no significant cardiac murmurs.      IMPRESSIONS:  Mr. Katie Shannon is an 82-year-old gentleman with a history of mild to moderate nonocclusive coronary artery disease by angiogram with a nonischemic cardiomyopathy.  His left ventricular function appears to be declining over the course of the last year, and now his ejection fraction looks like 25%-30% based on his echocardiogram earlier today.  He is not having any recurrent anginal symptoms, and his stress test last spring did not suggest any progression in coronary artery disease.  The cause of his cardiomyopathy remains unclear but is probably nonischemic.  He does have a wide QRS complex.  I am still hopeful that we might be able to improve his left ventricular function and avoid implantation of a prophylactic defibrillator.  I suggested that we could add Entresto to his program and stop his lisinopril.  I advised them to stop his lipid lisinopril for 36 hours prior to initiating Entresto and wrote that instruction out for them as well.  He will continue his carvedilol 12.5 b.i.d. and Imdur 60 mg daily.  I will have him follow up with us in Cardiology Clinic here in 3 months to continue to adjust his medications, probably increasing his Entresto and carvedilol at the next visit if possible.  We might have to reduce his Imdur to accomplish that.  I will then reevaluate his left ventricular function in 6 months and determine whether we need to add a prophylactic defibrillator or a biventricular ICD at that time.  So far he seems to be having only mild heart failure symptoms at most.      cc:   Jaden Butts MD    [address] [place]  [address]         ISIDORO MARI MD, [facility]             D: 10/13/2017 14:22   T: 10/13/2017 16:50   MT: VISHAL      Name:     RUPERT MAN   MRN:      [id number]        Account:      [id number]   :      1935           Service Date: 10/13/2017      Document: [id number]

## 2017-10-13 NOTE — LETTER
10/13/2017    Jaden Butts MD  [facility]   [address] [address]      RE: Katie Shannon       Dear Colleague,    I had the pleasure of seeing Katie Shannon in the [facility].    HISTORY OF PRESENT ILLNESS:  I had the opportunity to see Mr. 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  When I saw him last in June, I was concerned that his left ventricular function appeared to be declining.  He had an ejection fraction of 40%-45% prior to that but had been experiencing some angina during snow shoveling in March and was referred for a stress test with nuclear imaging, which suggested a drop in ejection fraction to about 34%.  There was no ischemia identified on that study.  A subsequent echocardiogram confirmed that, with an ejection fraction of 30%-35% and a biplane quantitative measurement of ejection fraction of 33%.      Fortunately, when I saw him in June, he was still feeling quite well without any concerning congestive heart failure symptoms and without any recurrent angina.  He continued to be active, although was limited by some joint pains.      I suggested that we intensify his medical program for treatment of his cardiomyopathy and have him return at this time for reevaluation of his left ventricular function.  Over the summer, he has still been somewhat active but can only walk about a half a block because of hip and knee pain.  He does not get short of breath doing that.  He does not have PND or orthopnea and has not been having any problems with edema.  He did fall recently on the sidewalk and bruised his left anterior rib cage.  He then came in for his followup echocardiogram, which was somewhat  challenging due to the pain of the procedure when we were pressing on his chest.  I reviewed those images myself, and I agree with the report that he appears to have mild left ventricular enlargement with a further drop in ejection fraction down to about 25%-30%.  The hypokinesis appears to be global.      PHYSICAL EXAMINATION:  On examination today, his blood pressure was 144/64, although it was just 126/68 yesterday.  His weight is down a few pounds at 163.  His heart rate is 73.  His lungs are quite clear.  Heart rhythm is regular, and he has no significant cardiac murmurs.   Outpatient Encounter Prescriptions as of 10/13/2017   Medication Sig Dispense Refill     traMADol (ULTRAM) 50 MG tablet Take 50 mg by mouth every 6 hours as needed for moderate pain       sacubitril-valsartan (ENTRESTO) 24-26 MG per tablet Take 1 tablet by mouth 2 times daily 60 tablet 11     carvedilol (COREG) 12.5 MG tablet Take 1 tablet (12.5 mg) by mouth 2 times daily (with meals) 180 tablet 3     simvastatin (ZOCOR) 10 MG tablet Take 1 tablet (10 mg) by mouth At Bedtime 90 tablet 3     isosorbide mononitrate (IMDUR) 60 MG 24 hr tablet Take 60 mg by mouth daily       CYCLOBENZAPRINE HCL PO Take 5 mg by mouth At Bedtime        Clopidogrel Bisulfate (PLAVIX PO) Take 75 mg by mouth daily       tamsulosin (FLOMAX) 0.4 MG 24 hr capsule Take 0.4 mg by mouth daily       Omeprazole (PRILOSEC PO) Take 20 mg by mouth daily       diphenhydrAMINE-acetaminophen (TYLENOL PM)  MG tablet Take 1 tablet by mouth nightly as needed for sleep       Famotidine (PEPCID PO) Take 20 mg by mouth 2 times daily       sodium chloride (SHELL 128) 5 % ophthalmic solution Place 1 drop into both eyes At Bedtime       [DISCONTINUED] lisinopril (PRINIVIL,ZESTRIL) 10 MG tablet Take 1 tablet (10 mg) by mouth daily 90 tablet 3     No facility-administered encounter medications on file as of 10/13/2017.       IMPRESSIONS:  Mr. 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  His left ventricular function appears to be declining over the course of the last year, and now his ejection fraction looks like 25%-30% based on his echocardiogram earlier today.  He is not having any recurrent anginal symptoms, and his stress test last spring did not suggest any progression in coronary artery disease.  The cause of his cardiomyopathy remains unclear but is probably nonischemic.  He does have a wide QRS complex.  I am still hopeful that we might be able to improve his left ventricular function and avoid implantation of a prophylactic defibrillator.  I suggested that we could add Entresto to his program and stop his lisinopril.  I advised them to stop his lipid lisinopril for 36 hours prior to initiating Entresto and wrote that instruction out for them as well.  He will continue his carvedilol 12.5 b.i.d. and Imdur 60 mg daily.  I will have him follow up with us in Cardiology Clinic here in 3 months to continue to adjust his medications, probably increasing his Entresto and carvedilol at the next visit if possible.  We might have to reduce his Imdur to accomplish that.  I will then reevaluate his left ventricular function in 6 months and determine whether we need to add a prophylactic defibrillator or a biventricular ICD at that time.  So far he seems to be having only mild heart failure symptoms at most.      Again, thank you for allowing me to participate in the care of your patient.      Sincerely,    ISIDORO MARI MD     [facility]

## 2018-01-09 ENCOUNTER — CARE COORDINATION (OUTPATIENT)
Dept: CARDIOLOGY | Facility: CLINIC | Age: 83
End: 2018-01-09

## 2018-01-09 ENCOUNTER — OFFICE VISIT (OUTPATIENT)
Dept: CARDIOLOGY | Facility: CLINIC | Age: 83
End: 2018-01-09
Attending: INTERNAL MEDICINE
Payer: MEDICARE

## 2018-01-09 VITALS
HEIGHT: 67 IN | HEART RATE: 74 BPM | DIASTOLIC BLOOD PRESSURE: 76 MMHG | BODY MASS INDEX: 25.69 KG/M2 | SYSTOLIC BLOOD PRESSURE: 128 MMHG | WEIGHT: 163.7 LBS

## 2018-01-09 DIAGNOSIS — I50.22 CHRONIC SYSTOLIC HEART FAILURE (H): Primary | ICD-10-CM

## 2018-01-09 DIAGNOSIS — I50.22 CHRONIC SYSTOLIC HEART FAILURE (H): ICD-10-CM

## 2018-01-09 LAB
ANION GAP SERPL CALCULATED.3IONS-SCNC: 10.6 MMOL/L (ref 6–17)
BUN SERPL-MCNC: 15 MG/DL (ref 7–30)
CALCIUM SERPL-MCNC: 8.7 MG/DL (ref 8.5–10.5)
CHLORIDE SERPL-SCNC: 99 MMOL/L (ref 98–107)
CO2 SERPL-SCNC: 29 MMOL/L (ref 23–29)
CREAT SERPL-MCNC: 1.07 MG/DL (ref 0.7–1.3)
GFR SERPL CREATININE-BSD FRML MDRD: 67 ML/MIN/1.7M2
GLUCOSE SERPL-MCNC: 106 MG/DL (ref 70–105)
POTASSIUM SERPL-SCNC: 4.6 MMOL/L (ref 3.5–5.1)
SODIUM SERPL-SCNC: 134 MMOL/L (ref 136–145)

## 2018-01-09 PROCEDURE — 36415 COLL VENOUS BLD VENIPUNCTURE: CPT | Performed by: INTERNAL MEDICINE

## 2018-01-09 PROCEDURE — 80048 BASIC METABOLIC PNL TOTAL CA: CPT | Performed by: INTERNAL MEDICINE

## 2018-01-09 PROCEDURE — 99214 OFFICE O/P EST MOD 30 MIN: CPT | Performed by: PHYSICIAN ASSISTANT

## 2018-01-09 NOTE — PROGRESS NOTES
PRIMARY CARDIOLOGIST:  Rah Ayala MD, F.A.C.C.       REASON FOR VISIT:  Entresto change.      HISTORY OF PRESENT ILLNESS:  Mr. Shannon is a delightful 82-year-old gentleman who has a past cardiac history significant for the followin.  Nonischemic cardiomyopathy with history of EF initially about 40%-45% recently declining down to about 30%-35%.   2.  Nonocclusive coronary artery disease with angiogram in  showing a moderate nonocclusive disease with a stress test in 2017 showing no ischemia or infarct.   3.  Dyslipidemia.   4.  Hypertension.      He has been followed closely by Dr. Ayala, and given his drop in his EF and his normal stress test, he elected to switch him from lisinopril to sacubitril losartan.  This was a 24/26 mg dose.  He comes in today for followup.        Unfortunately, he thought he was getting an echocardiogram today and that was not part of the plan, so he was initially a little frustrated, although he quickly becomes what I expect this is a normal jovial self.  He says that even though he felt pretty well before starting the Entresto, now he feels even better.  He noticed over the first 6 weeks, he did not have much change and over the last 6 weeks, he has really felt better.  He has had more energy.  He continues to deny orthopnea or PND.  He tells me that the last week in the cold and high winds, he cleared about a quarter mile of brush and small trees of the [address] near his house.  This is about 200 yards of cutting trees in about 1 hour.  He had no angina with this.  His urination is fine.  He is anxious to know if his heart is improved or if he needs a defibrillator.      SOCIAL HISTORY:  He comes in today with his wife, Radha.  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  He is , nonsmoker, no drinking.      PHYSICAL EXAMINATION:   GENERAL:  Well-developed, well-nourished, delightful and jovial gentleman in no acute distress.    VITAL SIGNS:  Blood pressure today of 128/76, pulse 74.  His weight is 161 pounds at home 163 on our clinic scale, which is stable.   HEENT:  Normocephalic, atraumatic.   HEART:  Regular, I do not appreciate murmur, rub or gallop.   LUNGS:  Clear with good airflow deep bilateral lobes.   EXTREMITIES:  No peripheral edema.   SKIN:  Warm and dry.   NECK:  Veins are flat at 30 degrees.      ASSESSMENT AND PLAN:  Chronic systolic heart failure with class I-II symptoms and an excellent response to contrast.  We do know from the research that the benefits of reduce mortality and morbidity were on the maximum dose.  At this point, his renal function is stable as is his potassium and his sodium levels are improved, we will increase the dose to 49/51 mg b.i.d.  The full dose is 97/103 mg and ideally I would like to get him on that before we reassess his EF.  He lives about 150 miles away.  As such, we decided that he will check his blood pressure daily.  We discussed in very clear terms that blood pressures in the 110s, 100 or even 90s systolic are fine as long as he feels okay.  He will note these.  He will get a BMP in 2 weeks in his home clinic.  Our nursing staff will call him and as long as his BMP looks stable and his pressures are adequate, we will increase him to 97/103 mg dose.  He will then followup with an echocardiogram and Dr. Ayala in April.  If his EF has not improved at that time, he can be considered for BiV ICD.  His last ECG showed a wide QRS so he would likely benefit from biventricular pacemaker to help with recovery of his EF.      Thank you for allowing me to participate in this delightful patient's care.  I'm happy to share his care with Dr. Ayala.           ELISSA GUERRERO PA-C             D: 2018 14:46   T: 2018 16:38   MT: JENNYFER      Name:     RUPERT MAN   MRN:      [id number]        Account:      [id number]   :      1935           Service Date: 2018       Document: [id number]

## 2018-01-09 NOTE — NURSING NOTE
The After Visit Summary was reviewed with the patient following their office visit with ALMA Hart. Patient was educated about any medication changes, the importance of following a low sodium diet, importance of recording daily weights, and when to call [facility] clinic. Patient verbalized understanding of the information and agrees to call with any questions or concerns.     Labs: Lab results from today were reviewed with the patient during the office visit. A copy of the results were provided on the After Visit Summary.     Return appointment: Patient was given instructions on when and how to schedule their next office visit with the [facility] clinic.     Medication changes: Increase Entresto to 49/51mg twice daily    Pt will have BMP done in 2 weeks at PCP clinic in [place], [place]. Order given to patient's wife. Per ALMA Hart, messaged CORE board to call in 2 weeks for BP update and labs.      Abril Davis RN  [facility] Care Coordinator  [phone]

## 2018-01-09 NOTE — LETTER
2018      Jaden Butts MD  [facility] [address] [address]      RE: Katie Shannon       Dear Colleague,    I had the pleasure of seeing Katie Shannon in the [facility].    PRIMARY CARDIOLOGIST:  Rah Ayala MD, F.A.C.C.       REASON FOR VISIT:  Entresto change.      HISTORY OF PRESENT ILLNESS:  Mr. Shannon is a delightful 82-year-old gentleman who has a past cardiac history significant for the followin.  Nonischemic cardiomyopathy with history of EF initially about 40%-45% recently declining down to about 30%-35%.   2.  Nonocclusive coronary artery disease with angiogram in  showing a moderate nonocclusive disease with a stress test in 2017 showing no ischemia or infarct.   3.  Dyslipidemia.   4.  Hypertension.      He has been followed closely by Dr. Ayala, and given his drop in his EF and his normal stress test, he elected to switch him from lisinopril to sacubitril losartan.  This was a 24/26 mg dose.  He comes in today for followup.        Unfortunately, he thought he was getting an echocardiogram today and that was not part of the plan, so he was initially a little frustrated, although he quickly becomes what I expect this is a normal jovial self.  He says that even though he felt pretty well before starting the Entresto, now he feels even better.  He noticed over the first 6 weeks, he did not have much change and over the last 6 weeks, he has really felt better.  He has had more energy.  He continues to deny orthopnea or PND.  He tells me that the last week in the cold and high winds, he cleared about a quarter mile of brush and small trees of the [address] near his house.  This is about 200 yards of cutting trees in about 1 hour.  He had no angina with this.  His urination is fine.  He is anxious to know if his heart is improved or if he needs a defibrillator.      SOCIAL HISTORY:  He comes in today with his wife, Radha.  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  He is , nonsmoker, no drinking.      PHYSICAL EXAMINATION:   GENERAL:  Well-developed, well-nourished, delightful and jovial gentleman in no acute distress.   VITAL SIGNS:  Blood pressure today of 128/76, pulse 74.  His weight is 161 pounds at home 163 on our clinic scale, which is stable.   HEENT:  Normocephalic, atraumatic.   HEART:  Regular, I do not appreciate murmur, rub or gallop.   LUNGS:  Clear with good airflow deep bilateral lobes.   EXTREMITIES:  No peripheral edema.   SKIN:  Warm and dry.   NECK:  Veins are flat at 30 degrees.      ASSESSMENT AND PLAN:  Chronic systolic heart failure with class I-II symptoms and an excellent response to contrast.  We do know from the research that the benefits of reduce mortality and morbidity were on the maximum dose.  At this point, his renal function is stable as is his potassium and his sodium levels are improved, we will increase the dose to 49/51 mg b.i.d.  The full dose is 97/103 mg and ideally I would like to get him on that before we reassess his EF.  He lives about 150 miles away.  As such, we decided that he will check his blood pressure daily.  We discussed in very clear terms that blood pressures in the 110s, 100 or even 90s systolic are fine as long as he feels okay.  He will note these.  He will get a BMP in 2 weeks in his home clinic.  Our nursing staff will call him and as long as his BMP looks stable and his pressures are adequate, we will increase him to 97/103 mg dose.  He will then followup with an echocardiogram and Dr. Ayala in April.  If his EF has not improved at that time, he can be considered for BiV ICD.  His last ECG showed a wide QRS so he would likely benefit from biventricular pacemaker to help with recovery of his EF.      Thank you for allowing me to participate in this delightful patient's care.  I'm happy to share his care with Dr. Ayala.         Outpatient  Encounter Prescriptions as of 1/9/2018   Medication Sig Dispense Refill     sacubitril-valsartan (ENTRESTO) 49-51 MG per tablet Take 1 tablet by mouth 2 times daily 180 tablet 3     carvedilol (COREG) 12.5 MG tablet Take 1 tablet (12.5 mg) by mouth 2 times daily (with meals) 180 tablet 3     simvastatin (ZOCOR) 10 MG tablet Take 1 tablet (10 mg) by mouth At Bedtime 90 tablet 3     isosorbide mononitrate (IMDUR) 60 MG 24 hr tablet Take 60 mg by mouth daily       CYCLOBENZAPRINE HCL PO Take 5 mg by mouth At Bedtime        Clopidogrel Bisulfate (PLAVIX PO) Take 75 mg by mouth daily       tamsulosin (FLOMAX) 0.4 MG 24 hr capsule Take 0.4 mg by mouth daily       Omeprazole (PRILOSEC PO) Take 20 mg by mouth daily       diphenhydrAMINE-acetaminophen (TYLENOL PM)  MG tablet Take 1 tablet by mouth nightly as needed for sleep       Famotidine (PEPCID PO) Take 20 mg by mouth 2 times daily       sodium chloride (SHELL 128) 5 % ophthalmic solution Place 1 drop into both eyes At Bedtime       [DISCONTINUED] traMADol (ULTRAM) 50 MG tablet Take 50 mg by mouth every 6 hours as needed for moderate pain       [DISCONTINUED] sacubitril-valsartan (ENTRESTO) 24-26 MG per tablet Take 1 tablet by mouth 2 times daily 60 tablet 11     No facility-administered encounter medications on file as of 1/9/2018.      Again, thank you for allowing me to participate in the care of your patient.      Sincerely,    Oneyda Matt PA-C     [facility]

## 2018-01-09 NOTE — PATIENT INSTRUCTIONS
Call CORE nurse for any questions or concerns:  [phone]   *If you have concerns after hours, please call [phone], option 2 to speak with on call Cardiologist.    1.  Increase your Entresto to 49/51 mg twice a day.      Please check your blood pressure once a day.  If your blood pressure is higher than 95/50 AND your feel well don't worry about the numbers.      In 2 weeks, please get your labs checked.  As long as they look good and your blood pressure is ok, we'll increase your Entresto over the phone.      Then in April, we'll check and echocardiogram and you can see Dr. Ayala.       2. Weigh yourself daily and write it down.     3. Call CORE nurse if your weight is up more than 2 pounds in one day or 5 pounds in one week.     4. Call CORE nurse if you feel more short of breath, have more abdominal bloating, or leg swelling.     5. Continue low sodium diet (less than 2000 mg daily). If you eat less salt, you will retain less fluid.     6. Alcohol can weaken your heart further. You should avoid alcohol or limit its use to special times, such as a holiday or birthday.      7. Do NOT take Aleve or ibuprofen without talking to your doctor first.      8. Lab Results:  Labs today look good.    Component      Latest Ref Rng & Units 1/9/2018   Sodium      136 - 145 mmol/L 134 (L)   Potassium      3.5 - 5.1 mmol/L 4.6   Chloride      98 - 107 mmol/L 99   Carbon Dioxide      23 - 29 mmol/L 29   Anion Gap      6 - 17 mmol/L 10.6   Glucose      70 - 105 mg/dL 106 (H)   Urea Nitrogen      7 - 30 mg/dL 15   Creatinine      0.70 - 1.30 mg/dL 1.07   GFR Estimate      >60 mL/min/1.7m2 67   GFR Estimate If Black      >60 mL/min/1.7m2 80   Calcium      8.5 - 10.5 mg/dL 8.7        [facility]: Cardiomyopathy, Optimization, Rehabilitation, Education  The [facility] is a heart failure specialty clinic within the [facility] where you will work with specialized nurse practitioners, physician assistants, doctors, and  registered nurses. They are dedicated to helping patients with heart failure to carefully adjust medications, receive education, and learn who and when to call if symptoms develop. They specialize in helping you better understand your condition, slow the progression of your disease, improve the length and quality of your life, help you detect future heart problems before they become life threatening, and avoid hospitalizations.

## 2018-01-09 NOTE — PROGRESS NOTES
Dictation cut off early...  HPI and Plan:   See dictation    Orders this Visit:  Orders Placed This Encounter   Procedures     Basic metabolic panel     Basic metabolic panel     Follow-Up with [facility] - Return MD visit     Echocardiogram     Orders Placed This Encounter   Medications     sacubitril-valsartan (ENTRESTO) 49-51 MG per tablet     Sig: Take 1 tablet by mouth 2 times daily     Dispense:  180 tablet     Refill:  3     Medications Discontinued During This Encounter   Medication Reason     traMADol (ULTRAM) 50 MG tablet Therapy completed     sacubitril-valsartan (ENTRESTO) 24-26 MG per tablet          Encounter Diagnosis   Name Primary?     Chronic systolic heart failure (H)        CURRENT MEDICATIONS:  Current Outpatient Prescriptions   Medication Sig Dispense Refill     sacubitril-valsartan (ENTRESTO) 49-51 MG per tablet Take 1 tablet by mouth 2 times daily 180 tablet 3     carvedilol (COREG) 12.5 MG tablet Take 1 tablet (12.5 mg) by mouth 2 times daily (with meals) 180 tablet 3     simvastatin (ZOCOR) 10 MG tablet Take 1 tablet (10 mg) by mouth At Bedtime 90 tablet 3     isosorbide mononitrate (IMDUR) 60 MG 24 hr tablet Take 60 mg by mouth daily       CYCLOBENZAPRINE HCL PO Take 5 mg by mouth At Bedtime        Clopidogrel Bisulfate (PLAVIX PO) Take 75 mg by mouth daily       tamsulosin (FLOMAX) 0.4 MG 24 hr capsule Take 0.4 mg by mouth daily       Omeprazole (PRILOSEC PO) Take 20 mg by mouth daily       diphenhydrAMINE-acetaminophen (TYLENOL PM)  MG tablet Take 1 tablet by mouth nightly as needed for sleep       Famotidine (PEPCID PO) Take 20 mg by mouth 2 times daily       sodium chloride (SHELL 128) 5 % ophthalmic solution Place 1 drop into both eyes At Bedtime       [DISCONTINUED] sacubitril-valsartan (ENTRESTO) 24-26 MG per tablet Take 1 tablet by mouth 2 times daily 60 tablet 11       ALLERGIES     Allergies   Allergen Reactions     Atorvastatin      Muscle aches     Diphenhydramine       "Metoprolol Succinate [Metoprolol]      Strawberry      Sulfa Drugs        PAST MEDICAL HISTORY:  No past medical history on file.    PAST SURGICAL HISTORY:  No past surgical history on file.    FAMILY HISTORY:  Family History   Problem Relation Age of Onset     HEART DISEASE Mother 88     mi     CEREBROVASCULAR DISEASE Father 71       SOCIAL HISTORY:  Social History     Social History     Marital status:      Spouse name: N/A     Number of children: N/A     Years of education: N/A     Social History Main Topics     Smoking status: Never Smoker     Smokeless tobacco: Never Used     Alcohol use No     Drug use: No     Sexual activity: Not on file     Other Topics Concern     Caffeine Concern No     Sleep Concern No     Weight Concern No     Special Diet No     Exercise Yes     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 No     Social History Narrative       Review of Systems:  Skin:  Negative     Eyes:  Positive for glasses  ENT:  Negative    Respiratory:  Negative    Cardiovascular:  Negative    Gastroenterology: Positive for diarrhea  Genitourinary:  Negative    Musculoskeletal:  Positive for arthritis  Neurologic:  Negative    Psychiatric:  Negative    Heme/Lymph/Imm:  Positive for allergies  Endocrine:  Negative      Physical Exam:  Vitals: /76  Pulse 74  Ht 1.702 m (5' 7\")  Wt 74.3 kg (163 lb 11.2 oz)  BMI 25.64 kg/m2   Please refer to dictation for physical exam    Recent Lab Results:  LIPID RESULTS:  Lab Results   Component Value Date    CHOL 107 06/08/2017    HDL 32 (L) 06/08/2017    LDL 47 06/08/2017    TRIG 142 06/08/2017    CHOLHDLRATIO 3.8 07/28/2015       LIVER ENZYME RESULTS:  Lab Results   Component Value Date    AST 40 06/12/2011    ALT <5 (L) 06/08/2017       CBC RESULTS:  Lab Results   Component Value Date    WBC 5.3 04/16/2014    RBC 4.39 04/16/2014    HGB 13.6 04/16/2014    HCT 38.8 04/16/2014    MCV 88.4 04/16/2014    MCH 31.0 04/16/2014    MCHC " 35.1 04/16/2014    RDW 10.9 04/16/2014     04/16/2014     06/13/2011       BMP RESULTS:  Lab Results   Component Value Date     (L) 01/09/2018    POTASSIUM 4.6 01/09/2018    CHLORIDE 99 01/09/2018    CO2 29 01/09/2018    ANIONGAP 10.6 01/09/2018     (H) 01/09/2018    BUN 15 01/09/2018    CR 1.07 01/09/2018    GFRESTIMATED 67 01/09/2018    GFRESTBLACK 80 01/09/2018    ELIZABETH 8.7 01/09/2018        A1C RESULTS:  No results found for: A1C    INR RESULTS:  Lab Results   Component Value Date    INR 0.97 06/12/2011           CC  Rah Ayala MD  [id number]  [place] [id number]

## 2018-01-09 NOTE — MR AVS SNAPSHOT
After Visit Summary   1/9/2018    Katie Shannon    MRN: [id number]           Patient Information     Date Of Birth          1935        Visit Information        Provider Department      1/9/2018 1:50 PM Vernell, Oneyda Rivas PA-C [facility]        Today's Diagnoses     Chronic systolic heart failure (H)          Care Instructions    Call [facility] nurse for any questions or concerns:  [phone]   *If you have concerns after hours, please call [phone], option 2 to speak with on call Cardiologist.    1.  Increase your Entresto to 49/51 mg twice a day.      Please check your blood pressure once a day.  If your blood pressure is higher than 95/50 AND your feel well don't worry about the numbers.      In 2 weeks, please get your labs checked.  As long as they look good and your blood pressure is ok, we'll increase your Entresto over the phone.      Then in April, we'll check and echocardiogram and you can see Dr. Ayala.       2. Weigh yourself daily and write it down.     3. Call [facility] nurse if your weight is up more than 2 pounds in one day or 5 pounds in one week.     4. Call [facility] nurse if you feel more short of breath, have more abdominal bloating, or leg swelling.     5. Continue low sodium diet (less than 2000 mg daily). If you eat less salt, you will retain less fluid.     6. Alcohol can weaken your heart further. You should avoid alcohol or limit its use to special times, such as a holiday or birthday.      7. Do NOT take Aleve or ibuprofen without talking to your doctor first.      8. Lab Results:  Labs today look good.    Component      Latest Ref Rng & Units 1/9/2018   Sodium      136 - 145 mmol/L 134 (L)   Potassium      3.5 - 5.1 mmol/L 4.6   Chloride      98 - 107 mmol/L 99   Carbon Dioxide      23 - 29 mmol/L 29   Anion Gap      6 - 17 mmol/L 10.6   Glucose      70 - 105 mg/dL 106 (H)   Urea Nitrogen      7 - 30 mg/dL 15   Creatinine      0.70 -  1.30 mg/dL 1.07   GFR Estimate      >60 mL/min/1.7m2 67   GFR Estimate If Black      >60 mL/min/1.7m2 80   Calcium      8.5 - 10.5 mg/dL 8.7        [facility]: Cardiomyopathy, Optimization, Rehabilitation, Education  The [facility] is a heart failure specialty clinic within the [facility] where you will work with specialized nurse practitioners, physician assistants, doctors, and registered nurses. They are dedicated to helping patients with heart failure to carefully adjust medications, receive education, and learn who and when to call if symptoms develop. They specialize in helping you better understand your condition, slow the progression of your disease, improve the length and quality of your life, help you detect future heart problems before they become life threatening, and avoid hospitalizations.                Follow-ups after your visit        Additional Services     Follow-Up with [facility] - Return MD visit                 Future tests that were ordered for you today     Open Future Orders        Priority Expected Expires Ordered    Basic metabolic panel Routine 2/8/2018 1/9/2019 1/9/2018    Echocardiogram Routine 4/9/2018 1/9/2019 1/9/2018    Follow-Up with [facility] - Return MD visit Routine 4/9/2018 1/9/2019 1/9/2018    Basic metabolic panel Routine 1/23/2018 1/9/2019 1/9/2018            Who to contact     If you have questions or need follow up information about today's clinic visit or your schedule please contact [facility] directly at [phone].  Normal or non-critical lab and imaging results will be communicated to you by MyChart, letter or phone within 4 business days after the clinic has received the results. If you do not hear from us within 7 days, please contact the clinic through MyChart or phone. If you have a critical or abnormal lab result, we will notify you by phone as soon as possible.  Submit refill requests through [employer] or  "call your pharmacy and they will forward the refill request to us. Please allow 3 business days for your refill to be completed.          Additional Information About Your Visit        MyChart Information     [email or web address] lets you send messages to your doctor, view your test results, renew your prescriptions, schedule appointments and more. To sign up, go to [email or web address] . Click on \"Log in\" on the left side of the screen, which will take you to the Welcome page. Then click on \"Sign up Now\" on the right side of the page.     You will be asked to enter the access code listed below, as well as some personal information. Please follow the directions to create your username and password.     Your access code is: [email or web address]  Expires: 1/10/2018  9:35 AM     Your access code will  in 90 days. If you need help or a new code, please call your [place] clinic or [phone].        Care EveryWhere ID     This is your Care EveryWhere ID. This could be used by other organizations to access your [place] medical records  [id number]        Your Vitals Were     Pulse Height BMI (Body Mass Index)             74 1.702 m (5' 7\") 25.64 kg/m2          Blood Pressure from Last 3 Encounters:   18 128/76   10/13/17 144/64   10/12/17 126/68    Weight from Last 3 Encounters:   18 74.3 kg (163 lb 11.2 oz)   10/13/17 74.3 kg (163 lb 11.2 oz)   10/12/17 73 kg (161 lb)              We Performed the Following     Follow[facility] with [facility]          Today's Medication Changes          These changes are accurate as of: 18  2:13 PM.  If you have any questions, ask your nurse or doctor.               Start taking these medicines.        Dose/Directions    sacubitril-valsartan 49-51 MG per tablet   Commonly known as:  ENTRESTO   Used for:  Chronic systolic heart failure (H)   Replaces:  sacubitril-valsartan 24-26 MG per tablet   Started by:  Oneyda Matt PA-C        Dose:  1 tablet   Take 1 tablet by mouth 2 " times daily   Quantity:  180 tablet   Refills:  3         Stop taking these medicines if you haven't already. Please contact your care team if you have questions.     sacubitril-valsartan 24-26 MG per tablet   Commonly known as:  ENTRESTO   Replaced by:  sacubitril-valsartan 49-51 MG per tablet   Stopped by:  Oneyda Matt PA-C                Where to get your medicines      These medications were sent to [facility] - [place], [place] - [address]  [address], [facility] [address]     Phone:  [phone]     sacubitril-valsartan 49-51 MG per tablet                Primary Care Provider Office Phone # Fax #    Jaden Butts [phone]       [facility] [address]  [facility] [address]        Equal Access to Services     [facility]: Hadii aad ku hadasho Soomaali, waaxda luqadaha, qaybta kaalmada adeegyada, waxay rafaelin hayaan mike maldonado . So [facility] [phone].    ATENCIÓN: Si habla español, tiene a howell disposición servicios gratuitos de asistencia lingüística. Llame al [phone].    We comply with applicable federal civil rights laws and [place] laws. We do not discriminate on the basis of race, color, national origin, age, disability, sex, sexual orientation, or gender identity.            Thank you!     Thank you for choosing [facility]  for your care. Our goal is always to provide you with excellent care. Hearing back from our patients is one way we can continue to improve our services. Please take a few minutes to complete the written survey that you may receive in the mail after your visit with us. Thank you!             Your Updated Medication List - Protect others around you: Learn how to safely use, store and throw away your medicines at www.disposemymeds.org.          This list is accurate as of: 1/9/18  2:13 PM.  Always use your most recent med list.                   Brand Name Dispense  Instructions for use Diagnosis    carvedilol 12.5 MG tablet    COREG    180 tablet    Take 1 tablet (12.5 mg) by mouth 2 times daily (with meals)    Primary cardiomyopathy (H)       CYCLOBENZAPRINE HCL PO      Take 5 mg by mouth At Bedtime        diphenhydrAMINE-acetaminophen  MG tablet    TYLENOL PM     Take 1 tablet by mouth nightly as needed for sleep        FLOMAX 0.4 MG capsule   Generic drug:  tamsulosin      Take 0.4 mg by mouth daily        isosorbide mononitrate 60 MG 24 hr tablet    IMDUR     Take 60 mg by mouth daily        PEPCID PO      Take 20 mg by mouth 2 times daily        PLAVIX PO      Take 75 mg by mouth daily        PRILOSEC PO      Take 20 mg by mouth daily        sacubitril-valsartan 49-51 MG per tablet    ENTRESTO    180 tablet    Take 1 tablet by mouth 2 times daily    Chronic systolic heart failure (H)       simvastatin 10 MG tablet    ZOCOR    90 tablet    Take 1 tablet (10 mg) by mouth At Bedtime    Hypercholesterolemia       sodium chloride 5 % ophthalmic solution    SHELL 128     Place 1 drop into both eyes At Bedtime

## 2018-01-14 ENCOUNTER — TRANSFERRED RECORDS (OUTPATIENT)
Dept: HEALTH INFORMATION MANAGEMENT | Facility: CLINIC | Age: 83
End: 2018-01-14

## 2018-01-24 ENCOUNTER — TRANSFERRED RECORDS (OUTPATIENT)
Dept: HEALTH INFORMATION MANAGEMENT | Facility: CLINIC | Age: 83
End: 2018-01-24

## 2018-04-11 ENCOUNTER — HOSPITAL ENCOUNTER (OUTPATIENT)
Dept: CARDIOLOGY | Facility: CLINIC | Age: 83
Discharge: HOME | End: 2018-04-11
Attending: INTERNAL MEDICINE | Admitting: INTERNAL MEDICINE
Payer: MEDICARE

## 2018-04-11 ENCOUNTER — OFFICE VISIT (OUTPATIENT)
Dept: CARDIOLOGY | Facility: CLINIC | Age: 83
End: 2018-04-11
Attending: INTERNAL MEDICINE
Payer: MEDICARE

## 2018-04-11 VITALS
SYSTOLIC BLOOD PRESSURE: 114 MMHG | DIASTOLIC BLOOD PRESSURE: 57 MMHG | HEART RATE: 75 BPM | HEIGHT: 67 IN | WEIGHT: 165 LBS | BODY MASS INDEX: 25.9 KG/M2

## 2018-04-11 DIAGNOSIS — I10 BENIGN ESSENTIAL HYPERTENSION: ICD-10-CM

## 2018-04-11 DIAGNOSIS — I50.22 CHRONIC SYSTOLIC HEART FAILURE (H): ICD-10-CM

## 2018-04-11 DIAGNOSIS — I50.22 CHRONIC SYSTOLIC HEART FAILURE (H): Primary | ICD-10-CM

## 2018-04-11 DIAGNOSIS — E78.00 HYPERCHOLESTEROLEMIA: ICD-10-CM

## 2018-04-11 DIAGNOSIS — I25.10 CORONARY ARTERY DISEASE INVOLVING NATIVE CORONARY ARTERY OF NATIVE HEART WITHOUT ANGINA PECTORIS: ICD-10-CM

## 2018-04-11 DIAGNOSIS — I42.9 PRIMARY CARDIOMYOPATHY (H): ICD-10-CM

## 2018-04-11 LAB
ANION GAP SERPL CALCULATED.3IONS-SCNC: 15.1 MMOL/L (ref 6–17)
BUN SERPL-MCNC: 26 MG/DL (ref 7–30)
CALCIUM SERPL-MCNC: 8.9 MG/DL (ref 8.5–10.5)
CHLORIDE SERPL-SCNC: 99 MMOL/L (ref 98–107)
CO2 SERPL-SCNC: 22 MMOL/L (ref 23–29)
CREAT SERPL-MCNC: 1.21 MG/DL (ref 0.7–1.3)
GFR SERPL CREATININE-BSD FRML MDRD: 58 ML/MIN/1.7M2
GLUCOSE SERPL-MCNC: 159 MG/DL (ref 70–105)
POTASSIUM SERPL-SCNC: 4.1 MMOL/L (ref 3.5–5.1)
SODIUM SERPL-SCNC: 132 MMOL/L (ref 136–145)

## 2018-04-11 PROCEDURE — 36415 COLL VENOUS BLD VENIPUNCTURE: CPT | Performed by: INTERNAL MEDICINE

## 2018-04-11 PROCEDURE — 93306 TTE W/DOPPLER COMPLETE: CPT | Mod: 26 | Performed by: INTERNAL MEDICINE

## 2018-04-11 PROCEDURE — 93306 TTE W/DOPPLER COMPLETE: CPT

## 2018-04-11 PROCEDURE — 99215 OFFICE O/P EST HI 40 MIN: CPT | Performed by: INTERNAL MEDICINE

## 2018-04-11 PROCEDURE — 80048 BASIC METABOLIC PNL TOTAL CA: CPT | Performed by: INTERNAL MEDICINE

## 2018-04-11 PROCEDURE — 93000 ELECTROCARDIOGRAM COMPLETE: CPT | Performed by: INTERNAL MEDICINE

## 2018-04-11 NOTE — LETTER
"4/11/2018      Jaden Butts MD  [facility] [address] [facility] [address]      RE: Katie Shannon       Dear Colleague,    I had the pleasure of seeing Katie Shannon in the [facility].    Service Date: 04/11/2018      HISTORY OF PRESENT ILLNESS:  I had the opportunity to see Mr. 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        He has had an ejection fraction of about 40%-45% in the past, but over the last year we have seen his heart function declining.  Subsequent stress test late in 2017 showed no significant ischemia consistent with previous evaluations of his coronary arteries with angiography showing only mild to moderate coronary disease.  His symptoms have been relatively mild.  We have tried increasing his medical therapy by adding Entresto and continued his carvedilol and isosorbide mononitrate.  Despite that, his left ventricular ejection fraction has remained stable over the last 6 months with an ejection fraction of 25%-30%.      He gets winded with a feeling of \"congestion\" in his chest when he is doing active work.  He has to stop for a moment and let those symptoms clear before he can breathe normally again and continue his activity.  He is able to continue to be quite active as long as he can stop for a moment to catch his breath from time to time.  He does not have chest pain symptoms.  He has not had lightheadedness, palpitations or syncope.      Since I started him on Entresto in 10/2017, he has been able to titrate almost to the maximum dose.  He is still taking the middle dose 49/51 mg in the morning and the maximum dose 97/103 mg at night.  He seemed to tolerate that quite well and seems " to have more energy since he started that.  He believes it is helping him significantly.      I did an ECG today in the office and it continues to show a stable chronic left bundle branch block with underlying normal sinus rhythm.  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  His QRS duration of his left bundle branch block is greater than 160 milliseconds.      We also did a followup echocardiogram today which is unchanged compared to 10/13/2017.  Again, his ejection fraction is 25%-30% with mild mitral regurgitation.      PHYSICAL EXAMINATION:  His blood pressure is 114/57, heart rate 75 and weight 165 pounds, which is stable.  His lungs are clear.  Heart rhythm is regular with occasional extrasystoles and no significant cardiac murmurs.  He has no lower extremity edema.  There is no significant jugular venous distention.      IMPRESSIONS:  Mr. Katie Shannon is an 83-year-old gentleman with hypertension, dyslipidemia and mild to moderate nonocclusive coronary artery disease.  He has a severe idiopathic cardiomyopathy with an ejection fraction of 25%-30%, which has not changed with more aggressive medical therapy over the last 6 months.  He continues to be mildly symptomatic with episodes of shortness of breath and chest congestion occurring with exertion requiring him to stop and rest from time to time.  He has a wide left bundle branch block on ECG with a QRS duration greater than 160 milliseconds.  However, he also has frequent PVCs which were also noted on his echocardiogram, although not consistently bigeminal.      I will go ahead and increase his Entresto to the maximum dose both morning and night and make no other medication changes at this point.  I think his medication program is basically optimized and we are not seeing any improvement in his left ventricular function.  I suggested that we go forward with implantation of a defibrillator for primary prevention of sudden cardiac death.  His ECG is  a bit concerning that he is having fairly frequent PVCs.  I would certainly recommend implantation of a biventricular ICD for cardiac resynchronization given his wide left bundle branch block, but he may also need a PVC ablation so that we can make sure that his device consistently paces in the biventricular mode.  Frequent PVCs will prevent this biventricular pacing consistently and may prevent some of the benefit of cardiac resynchronization.      I will work with Dr. Hardin in Electrophysiology to accomplish this defibrillator implantation and decide whether we need to do some additional monitoring to determine whether PVC ablation is necessary or whether we should wait until after his device implantation to go forward with that procedure.  I have talked to him at length about the biventricular ICD implantation procedure as well as the risks including bleeding, infection, pneumothorax, cardiac perforation and death.  He understands and agrees to proceed.  He does [place] 150 miles away and so we are trying to do this as conveniently as possible for him.  If additional monitoring is necessary, we may want to pursue that for him closer to home and then review that before he comes back for another procedure.      cc:      Jaden Butts MD    [place] [address]         ISIDORO MARI MD, [facility]         D: 2018   T: 2018   MT: [id number]      Name:     RUPERT MAN   MRN:      [id number]        Account:      [id number]   :      1935           Service Date: 2018      Document: [id number]      Outpatient Encounter Prescriptions as of 2018   Medication Sig Dispense Refill     sacubitril-valsartan (ENTRESTO)  MG per tablet Take 1 tablet by mouth 2 times daily 180 tablet 3     carvedilol (COREG) 12.5 MG tablet Take 1 tablet (12.5 mg) by mouth 2 times daily (with meals) 180 tablet 3     simvastatin (ZOCOR) 10 MG tablet Take 1 tablet (10 mg) by  mouth At Bedtime 90 tablet 3     isosorbide mononitrate (IMDUR) 60 MG 24 hr tablet Take 60 mg by mouth daily       CYCLOBENZAPRINE HCL PO Take 5 mg by mouth At Bedtime Taking PRN       Clopidogrel Bisulfate (PLAVIX PO) Take 75 mg by mouth daily       tamsulosin (FLOMAX) 0.4 MG 24 hr capsule Take 0.4 mg by mouth daily       Omeprazole (PRILOSEC PO) Take 20 mg by mouth daily       diphenhydrAMINE-acetaminophen (TYLENOL PM)  MG tablet Take 1 tablet by mouth nightly as needed for sleep       Famotidine (PEPCID PO) Take 20 mg by mouth 2 times daily       sodium chloride (SHELL 128) 5 % ophthalmic solution Place 1 drop into both eyes At Bedtime       [DISCONTINUED] sacubitril-valsartan (ENTRESTO)  MG per tablet Take one tablet in the evening. 90 tablet 1     [DISCONTINUED] sacubitril-valsartan (ENTRESTO) 49-51 MG per tablet Take 1 tablet by mouth 2 times daily (Patient taking differently: Take 1 tablet by mouth 2 times daily Take 1 tab in AM.) 180 tablet 3     No facility-administered encounter medications on file as of 4/11/2018.      Again, thank you for allowing me to participate in the care of your patient.      Sincerely,    ISIDORO MARI MD     [facility]

## 2018-04-11 NOTE — MR AVS SNAPSHOT
After Visit Summary   4/11/2018    Katie Shannon    MRN: [id number]           Patient Information     Date Of Birth          1935        Visit Information        Provider Department      4/11/2018 12:45 PM Rah Ayala MD [facility]        Today's Diagnoses     Primary cardiomyopathy (H)    -  1    Chronic systolic heart failure (H)        Benign essential hypertension        Coronary artery disease involving native coronary artery of native heart without angina pectoris        Hypercholesterolemia           Follow-ups after your visit        Additional Services     Follow-Up with [facility] - VANESSA visit           Follow-Up with [facility] - Return MD visit                 Future tests that were ordered for you today     Open Future Orders        Priority Expected Expires Ordered    ICD/Pacemaker/Loop Recorder Procedure Routine 4/18/2018 4/11/2019 4/11/2018    Follow-Up with [facility] - VANESSA visit Routine 7/10/2018 4/11/2019 4/11/2018    Follow-Up with [facility] - Return MD visit Routine 10/8/2018 4/11/2019 4/11/2018    Basic metabolic panel Routine 7/10/2018 4/11/2019 4/11/2018            Who to contact     If you have questions or need follow up information about today's clinic visit or your schedule please contact [facility] directly at [phone].  Normal or non-critical lab and imaging results will be communicated to you by MyChart, letter or phone within 4 business days after the clinic has received the results. If you do not hear from us within 7 days, please contact the clinic through MyChart or phone. If you have a critical or abnormal lab result, we will notify you by phone as soon as possible.  Submit refill requests through [employer] or call your pharmacy and they will forward the refill request to us. Please allow 3 business days for your refill to be completed.          Additional Information About  "Your Visit        [employer] Information     [employer] lets you send messages to your doctor, view your test results, renew your prescriptions, schedule appointments and more. To sign up, go to [email or web address] . Click on \"Log in\" on the left side of the screen, which will take you to the Welcome page. Then click on \"Sign up Now\" on the right side of the page.     You will be asked to enter the access code listed below, as well as some personal information. Please follow the directions to create your username and password.     Your access code is: [email or web address]  Expires: 7/10/2018  1:59 PM     Your access code will  in 90 days. If you need help or a new code, please call your [place] clinic or [phone].        Care EveryWhere ID     This is your Care EveryWhere ID. This could be used by other organizations to access your [place] medical records  [id number]        Your Vitals Were     Pulse Height BMI (Body Mass Index)             75 1.702 m (5' 7\") 25.84 kg/m2          Blood Pressure from Last 3 Encounters:   18 114/57   18 128/76   10/13/17 144/64    Weight from Last 3 Encounters:   18 74.8 kg (165 lb)   18 74.3 kg (163 lb 11.2 oz)   10/13/17 74.3 kg (163 lb 11.2 oz)              We Performed the Following     EKG 12-lead complete w/read  (performed today)     Follow-Up with Cardiologist          Today's Medication Changes          These changes are accurate as of 18  2:10 PM.  If you have any questions, ask your nurse or doctor.               These medicines have changed or have updated prescriptions.        Dose/Directions    * sacubitril-valsartan 49-51 MG per tablet   Commonly known as:  ENTRESTO   This may have changed:  additional instructions   Used for:  Chronic systolic heart failure (H)        Dose:  1 tablet   Take 1 tablet by mouth 2 times daily   Quantity:  180 tablet   Refills:  3       * sacubitril-valsartan  MG per tablet   Commonly known as:  " ENTRESTO   This may have changed:    - Another medication with the same name was added. Make sure you understand how and when to take each.  - Another medication with the same name was changed. Make sure you understand how and when to take each.   Used for:  Chronic systolic heart failure (H)   Changed by:  Rah Ayala MD        Take one tablet in the evening.   Quantity:  90 tablet   Refills:  1       * sacubitril-valsartan  MG per tablet   Commonly known as:  ENTRESTO   This may have changed:  You were already taking a medication with the same name, and this prescription was added. Make sure you understand how and when to take each.   Used for:  Chronic systolic heart failure (H)   Changed by:  Rah Ayala MD        Dose:  1 tablet   Take 1 tablet by mouth 2 times daily   Quantity:  180 tablet   Refills:  3       * Notice:  This list has 3 medication(s) that are the same as other medications prescribed for you. Read the directions carefully, and ask your doctor or other care provider to review them with you.         Where to get your medicines      These medications were sent to [facility] [place] [address]  [address] [address]     Phone:  [phone]     sacubitril-valsartan  MG per tablet                Primary Care Provider Office Phone # Fax #    Jaden Butts [phone]       [facility] [address] [facility] [address]        Equal Access to Services     [facility] Hospital[facility] AH: Hadii golden ku hadasho Soomaali, waaxda luqadaha, qaybta kaalmada adeegyada, baldev maldonado . So [facility] [phone].    ATENCIÓN: Si franco león, tiene a howell disposición servicios gratuitos de asistencia lingüística. Llame al [phone].    We comply with applicable federal civil rights laws and [place] laws. We do not discriminate on the basis of race, color, national origin, age, disability, sex, sexual  orientation, or gender identity.            Thank you!     Thank you for choosing [facility]  for your care. Our goal is always to provide you with excellent care. Hearing back from our patients is one way we can continue to improve our services. Please take a few minutes to complete the written survey that you may receive in the mail after your visit with us. Thank you!             Your Updated Medication List - Protect others around you: Learn how to safely use, store and throw away your medicines at www.disposemymeds.org.          This list is accurate as of 4/11/18  2:10 PM.  Always use your most recent med list.                   Brand Name Dispense Instructions for use Diagnosis    carvedilol 12.5 MG tablet    COREG    180 tablet    Take 1 tablet (12.5 mg) by mouth 2 times daily (with meals)    Primary cardiomyopathy (H)       CYCLOBENZAPRINE HCL PO      Take 5 mg by mouth At Bedtime Taking PRN        diphenhydrAMINE-acetaminophen  MG tablet    TYLENOL PM     Take 1 tablet by mouth nightly as needed for sleep        FLOMAX 0.4 MG capsule   Generic drug:  tamsulosin      Take 0.4 mg by mouth daily        isosorbide mononitrate 60 MG 24 hr tablet    IMDUR     Take 60 mg by mouth daily        PEPCID PO      Take 20 mg by mouth 2 times daily        PLAVIX PO      Take 75 mg by mouth daily        PRILOSEC PO      Take 20 mg by mouth daily        * sacubitril-valsartan 49-51 MG per tablet    ENTRESTO    180 tablet    Take 1 tablet by mouth 2 times daily    Chronic systolic heart failure (H)       * sacubitril-valsartan  MG per tablet    ENTRESTO    90 tablet    Take one tablet in the evening.    Chronic systolic heart failure (H)       * sacubitril-valsartan  MG per tablet    ENTRESTO    180 tablet    Take 1 tablet by mouth 2 times daily    Chronic systolic heart failure (H)       simvastatin 10 MG tablet    ZOCOR    90 tablet    Take 1 tablet (10 mg) by mouth  At Bedtime    Hypercholesterolemia       sodium chloride 5 % ophthalmic solution    SHELL 128     Place 1 drop into both eyes At Bedtime        * Notice:  This list has 3 medication(s) that are the same as other medications prescribed for you. Read the directions carefully, and ask your doctor or other care provider to review them with you.

## 2018-04-11 NOTE — LETTER
4/11/2018    Jaden Butts MD  [facility] [address] [facility] [address]    RE: Katie Yasmanyparag       Dear Colleague,    I had the pleasure of seeing Katie Shannon in the [facility].    HPI and Plan:   See dictation    Orders Placed This Encounter   Procedures     Basic metabolic panel     Follow-Up with [facility] - VANESSA visit     Follow-Up with [facility] - Return MD visit     EKG 12-lead complete w/read  (performed today)     ICD/Pacemaker/Loop Recorder Procedure       Orders Placed This Encounter   Medications     sacubitril-valsartan (ENTRESTO)  MG per tablet     Sig: Take 1 tablet by mouth 2 times daily     Dispense:  180 tablet     Refill:  3       Medications Discontinued During This Encounter   Medication Reason     sacubitril-valsartan (ENTRESTO) 49-51 MG per tablet      sacubitril-valsartan (ENTRESTO)  MG per tablet          Encounter Diagnoses   Name Primary?     Chronic systolic heart failure (H)      Primary cardiomyopathy (H) Yes     Benign essential hypertension      Coronary artery disease involving native coronary artery of native heart without angina pectoris      Hypercholesterolemia        CURRENT MEDICATIONS:  Current Outpatient Prescriptions   Medication Sig Dispense Refill     sacubitril-valsartan (ENTRESTO)  MG per tablet Take 1 tablet by mouth 2 times daily 180 tablet 3     carvedilol (COREG) 12.5 MG tablet Take 1 tablet (12.5 mg) by mouth 2 times daily (with meals) 180 tablet 3     simvastatin (ZOCOR) 10 MG tablet Take 1 tablet (10 mg) by mouth At Bedtime 90 tablet 3     isosorbide mononitrate (IMDUR) 60 MG 24 hr tablet Take 60 mg by mouth daily       CYCLOBENZAPRINE HCL PO Take 5 mg by mouth At Bedtime Taking PRN       Clopidogrel Bisulfate (PLAVIX PO) Take 75 mg by mouth daily       tamsulosin (FLOMAX) 0.4 MG 24 hr capsule Take 0.4 mg by mouth daily       Omeprazole (PRILOSEC PO) Take 20 mg by mouth daily        diphenhydrAMINE-acetaminophen (TYLENOL PM)  MG tablet Take 1 tablet by mouth nightly as needed for sleep       Famotidine (PEPCID PO) Take 20 mg by mouth 2 times daily       sodium chloride (SHELL 128) 5 % ophthalmic solution Place 1 drop into both eyes At Bedtime       [DISCONTINUED] sacubitril-valsartan (ENTRESTO)  MG per tablet Take one tablet in the evening. 90 tablet 1     [DISCONTINUED] sacubitril-valsartan (ENTRESTO) 49-51 MG per tablet Take 1 tablet by mouth 2 times daily (Patient taking differently: Take 1 tablet by mouth 2 times daily Take 1 tab in AM.) 180 tablet 3       ALLERGIES     Allergies   Allergen Reactions     Atorvastatin      Muscle aches     Diphenhydramine      Metoprolol Succinate [Metoprolol]      Strawberry      Sulfa Drugs        PAST MEDICAL HISTORY:  History reviewed. No pertinent past medical history.    PAST SURGICAL HISTORY:  History reviewed. No pertinent surgical history.    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 No     Social History Narrative       Review of Systems:  Skin:  Negative       Eyes:  Positive for glasses    ENT:  Negative hearing loss    Respiratory:  Negative dyspnea on exertion     Cardiovascular:  Negative;edema;palpitations;chest pain;fatigue;dizziness      Gastroenterology: Negative diarrhea    Genitourinary:  Negative prostate  "problem;nocturia enlarged prostate  Musculoskeletal:  Positive for arthritis  arthrits in the knees  Neurologic:  Negative numbness or tingling of feet numbness on toes   Psychiatric:  Negative      Heme/Lymph/Imm:  Positive for allergies    Endocrine:  Negative        Physical Exam:  Vitals: /57  Pulse 75  Ht 1.702 m (5' 7\")  Wt 74.8 kg (165 lb)  BMI 25.84 kg/m2    Constitutional:  cooperative, alert and oriented, well developed, well nourished, in no acute distress        Skin:  warm and dry to the touch, no apparent skin lesions or masses noted          Head:  normocephalic, no masses or lesions        Eyes:  pupils equal and round, conjunctivae and lids unremarkable, sclera white, no xanthalasma, EOMS intact, no nystagmus        Lymph:      ENT:  no pallor or cyanosis, dentition good        Neck:  carotid pulses are full and equal bilaterally, JVP normal, no carotid bruit        Respiratory:  normal breath sounds, clear to auscultation, normal A-P diameter, normal symmetry, normal respiratory excursion, no use of accessory muscles         Cardiac: regular rhythm, normal S1/S2, no S3 or S4, apical impulse not displaced, no murmurs, gallops or rubs frequent premature beats           PVCs  pulses full and equal, no bruits auscultated;pulses full and equal                                        GI:  abdomen soft, non-tender, BS normoactive, no mass, no HSM, no bruits        Extremities and Muscular Skeletal:  no deformities, clubbing, cyanosis, erythema observed;no edema              Neurological:  no gross motor deficits;affect appropriate        Psych:  Alert and Oriented x 3        CC  Rah Ayala MD  [address]  [place] [address]                Thank you for allowing me to participate in the care of your patient.      Sincerely,     RAH AYALA MD     [facility]    cc:   Rah Ayala MD  [address]  [place] [address]        "

## 2018-04-11 NOTE — PROGRESS NOTES
"Service Date: 04/11/2018      HISTORY OF PRESENT ILLNESS:  I had the opportunity to see Mr. Katie Shannon could be seen in Cardiology Clinic today at the [facility] in [place] for reevaluation of chronic nonischemic cardiomyopathy and a history of mild to moderate nonocclusive coronary artery disease, hypertension, dyslipidemia and fairly mild congestive heart failure.        He has had an ejection fraction of about 40%-45% in the past, but over the last year we have seen his heart function declining.  Subsequent stress test late in 2017 showed no significant ischemia consistent with previous evaluations of his coronary arteries with angiography showing only mild to moderate coronary disease.  His symptoms have been relatively mild.  We have tried increasing his medical therapy by adding Entresto and continued his carvedilol and isosorbide mononitrate.  Despite that, his left ventricular ejection fraction has remained stable over the last 6 months with an ejection fraction of 25%-30%.      He gets winded with a feeling of \"congestion\" in his chest when he is doing active work.  He has to stop for a moment and let those symptoms clear before he can breathe normally again and continue his activity.  He is able to continue to be quite active as long as he can stop for a moment to catch his breath from time to time.  He does not have chest pain symptoms.  He has not had lightheadedness, palpitations or syncope.      Since I started him on Entresto in 10/2017, he has been able to titrate almost to the maximum dose.  He is still taking the middle dose 49/51 mg in the morning and the maximum dose 97/103 mg at night.  He seemed to tolerate that quite well and seems to have more energy since he started that.  He believes it is helping him significantly.      I did an ECG today in the office and it continues to show a stable chronic left bundle branch block with underlying normal sinus rhythm.  He does " have bigeminal PVCs on this particular ECG.  His QRS duration of his left bundle branch block is greater than 160 milliseconds.      We also did a followup echocardiogram today which is unchanged compared to 10/13/2017.  Again, his ejection fraction is 25%-30% with mild mitral regurgitation.      PHYSICAL EXAMINATION:  His blood pressure is 114/57, heart rate 75 and weight 165 pounds, which is stable.  His lungs are clear.  Heart rhythm is regular with occasional extrasystoles and no significant cardiac murmurs.  He has no lower extremity edema.  There is no significant jugular venous distention.      IMPRESSIONS:  Mr. Katie Shannon is an 83-year-old gentleman with hypertension, dyslipidemia and mild to moderate nonocclusive coronary artery disease.  He has a severe idiopathic cardiomyopathy with an ejection fraction of 25%-30%, which has not changed with more aggressive medical therapy over the last 6 months.  He continues to be mildly symptomatic with episodes of shortness of breath and chest congestion occurring with exertion requiring him to stop and rest from time to time.  He has a wide left bundle branch block on ECG with a QRS duration greater than 160 milliseconds.  However, he also has frequent PVCs which were also noted on his echocardiogram, although not consistently bigeminal.      I will go ahead and increase his Entresto to the maximum dose both morning and night and make no other medication changes at this point.  I think his medication program is basically optimized and we are not seeing any improvement in his left ventricular function.  I suggested that we go forward with implantation of a defibrillator for primary prevention of sudden cardiac death.  His ECG is a bit concerning that he is having fairly frequent PVCs.  I would certainly recommend implantation of a biventricular ICD for cardiac resynchronization given his wide left bundle branch block, but he may also need a PVC ablation so that we  can make sure that his device consistently paces in the biventricular mode.  Frequent PVCs will prevent this biventricular pacing consistently and may prevent some of the benefit of cardiac resynchronization.      I will work with Dr. Hardin in Electrophysiology to accomplish this defibrillator implantation and decide whether we need to do some additional monitoring to determine whether PVC ablation is necessary or whether we should wait until after his device implantation to go forward with that procedure.  I have talked to him at length about the biventricular ICD implantation procedure as well as the risks including bleeding, infection, pneumothorax, cardiac perforation and death.  He understands and agrees to proceed.  He does live 150 miles away and so we are trying to do this as conveniently as possible for him.  If additional monitoring is necessary, we may want to pursue that for him closer to home and then review that before he comes back for another procedure.      cc:      [place] [address]         [facility] MARI [facility], [facility]             D: 2018   T: 2018   MT: JENNYFER      Name:     RUPERT MAN   MRN:      [id number]        Account:      [id number]   :      1935           Service Date: 2018      Document: [id number]

## 2018-04-12 ENCOUNTER — CARE COORDINATION (OUTPATIENT)
Dept: CARDIOLOGY | Facility: CLINIC | Age: 83
End: 2018-04-12

## 2018-04-12 ENCOUNTER — TELEPHONE (OUTPATIENT)
Dept: CARDIOLOGY | Facility: CLINIC | Age: 83
End: 2018-04-12

## 2018-04-12 PROBLEM — I49.3 PVC'S (PREMATURE VENTRICULAR CONTRACTIONS): Status: ACTIVE | Noted: 2018-04-12

## 2018-04-12 NOTE — TELEPHONE ENCOUNTER
Spoke with Dr Ayala yesterday regarding the need for BIV ICD and requesting EP do this without seeing them due to patients distance to travel. I reviewed findings with Dr Hardin; Non occlusive CAD with cardiomyopathy (seen in repeated echos), Most recent EF was 25-30% and a LBBB. Dr Hardin  noted on today's ECG that pt was in a bigeminal rhythm. Before proceeding with CRT-D Dr Hardin wanted to get a better idea of PVC burden. I spoke to patient about this being done at his PCP office. I then called his PCP office to verify that they could do this. They can. Order sent- will await results before proceeding with ordering implant. Kelly Kat will watch for Holter results while I am gone. CHRISTY Fisher

## 2018-04-12 NOTE — PROGRESS NOTES
"Received call from pt who reports he was told by Dr. Ayala at OV yesterday that he would receive call yesterday afternoon regarding getting scheduled for ICD placement and possible ablation.  Pt states he waited by the phone all afternoon/evening and never received call.  Pt states he will be available today until 1:00pm and would like to know what     Per chart review, order placed by Dr. Ayala yesterday for ICD procedure and Dr. Ayala's OV note states:  \" I will work with Dr. Hardin in Electrophysiology to accomplish this defibrillator implantation and decide whether we need to do some additional monitoring to determine whether PVC ablation is necessary or whether we should wait until after his device implantation to go forward with that procedure.  I have talked to him at length about the biventricular ICD implantation procedure as well as the risks including bleeding, infection, pneumothorax, cardiac perforation and death.  He understands and agrees to proceed.  He does live 150 miles away and so we are trying to do this as conveniently as possible for him.  If additional monitoring is necessary, we may want to pursue that for him closer to home and then review that before he comes back for another procedure.\"    Discussed with pt that will review Dr. Ayala's plan with Dr. Hardin's nurse and someone will call back today with update on plan.    He verbalized understanding and agrees with this plan.     Routed to Dr. Hardin's nurse team for review.    CHRISTY Morley 10:13 AM 4/12/2018      "

## 2018-04-18 ENCOUNTER — TELEPHONE (OUTPATIENT)
Dept: CARDIOLOGY | Facility: CLINIC | Age: 83
End: 2018-04-18

## 2018-04-18 ENCOUNTER — TRANSFERRED RECORDS (OUTPATIENT)
Dept: HEALTH INFORMATION MANAGEMENT | Facility: CLINIC | Age: 83
End: 2018-04-18

## 2018-04-18 DIAGNOSIS — I42.0 DILATED CARDIOMYOPATHY (H): Primary | ICD-10-CM

## 2018-04-18 NOTE — TELEPHONE ENCOUNTER
Received holter from PCP ( via [facility] in [place]) and placed on Dr Singh desk for reivew prior to deciding about BV implant. Called pt and informed him that holter was faxed. He is anxious to proceed with procedure. Isai

## 2018-04-19 NOTE — TELEPHONE ENCOUNTER
Per Dr Hardin, pt can proceed with BV/ICD implant. Holter showed only 2% PVCs. He will have a [facility] [employer] device. Called and informed pt and answered several questions about the BV/ICD implant. He agrees to proceed. Isai

## 2018-04-25 RX ORDER — CEFAZOLIN SODIUM 2 G/100ML
2 INJECTION, SOLUTION INTRAVENOUS
Status: CANCELLED | OUTPATIENT
Start: 2018-04-25

## 2018-04-25 RX ORDER — SODIUM CHLORIDE 450 MG/100ML
INJECTION, SOLUTION INTRAVENOUS CONTINUOUS
Status: CANCELLED | OUTPATIENT
Start: 2018-04-30

## 2018-04-25 RX ORDER — LIDOCAINE 40 MG/G
CREAM TOPICAL
Status: CANCELLED | OUTPATIENT
Start: 2018-04-25

## 2018-04-25 NOTE — PROGRESS NOTES
Called patient with pre-procedure instructions for device implant, but pt is [place], so reviewed with wife.    Anticoagulation: None   Oral diabetes meds: None  Insulin: None  Diuretic: None  Contrast allergy: Denies  Wife informed to have pt be NPO at midnight  (if procedure scheduled 1pm or later, may have clear liquid breakfast before 8am)    Pt has post-procedure transportation and 24 hours monitoring set up.   Wife aware of no driving for 24 hours post procedure due to sedation.     Wife aware of arrival time and location. Wife verbalized understanding of instructions. YOSELIN Chapman RN.

## 2018-04-30 ENCOUNTER — APPOINTMENT (OUTPATIENT)
Dept: CARDIOLOGY | Facility: CLINIC | Age: 83
End: 2018-04-30
Attending: INTERNAL MEDICINE
Payer: MEDICARE

## 2018-04-30 ENCOUNTER — APPOINTMENT (OUTPATIENT)
Dept: GENERAL RADIOLOGY | Facility: CLINIC | Age: 83
End: 2018-04-30
Attending: INTERNAL MEDICINE
Payer: MEDICARE

## 2018-04-30 ENCOUNTER — HOSPITAL ENCOUNTER (OUTPATIENT)
Facility: CLINIC | Age: 83
Discharge: HOME | End: 2018-04-30
Attending: INTERNAL MEDICINE | Admitting: INTERNAL MEDICINE
Payer: MEDICARE

## 2018-04-30 VITALS
OXYGEN SATURATION: 96 % | WEIGHT: 160.5 LBS | DIASTOLIC BLOOD PRESSURE: 81 MMHG | RESPIRATION RATE: 16 BRPM | BODY MASS INDEX: 25.19 KG/M2 | SYSTOLIC BLOOD PRESSURE: 159 MMHG | HEIGHT: 67 IN | HEART RATE: 69 BPM | TEMPERATURE: 97.7 F

## 2018-04-30 DIAGNOSIS — I42.0 DILATED CARDIOMYOPATHY (H): ICD-10-CM

## 2018-04-30 LAB
ANION GAP SERPL CALCULATED.3IONS-SCNC: 7 MMOL/L (ref 3–14)
BUN SERPL-MCNC: 18 MG/DL (ref 7–30)
CALCIUM SERPL-MCNC: 8 MG/DL (ref 8.5–10.1)
CHLORIDE SERPL-SCNC: 105 MMOL/L (ref 94–109)
CO2 SERPL-SCNC: 25 MMOL/L (ref 20–32)
CREAT SERPL-MCNC: 0.78 MG/DL (ref 0.66–1.25)
ERYTHROCYTE [DISTWIDTH] IN BLOOD BY AUTOMATED COUNT: 12.8 % (ref 10–15)
GFR SERPL CREATININE-BSD FRML MDRD: >90 ML/MIN/1.7M2
GLUCOSE SERPL-MCNC: 112 MG/DL (ref 70–99)
HCT VFR BLD AUTO: 37.1 % (ref 40–53)
HGB BLD-MCNC: 12.8 G/DL (ref 13.3–17.7)
MCH RBC QN AUTO: 30.5 PG (ref 26.5–33)
MCHC RBC AUTO-ENTMCNC: 34.5 G/DL (ref 31.5–36.5)
MCV RBC AUTO: 89 FL (ref 78–100)
PLATELET # BLD AUTO: 155 10E9/L (ref 150–450)
POTASSIUM SERPL-SCNC: 4 MMOL/L (ref 3.4–5.3)
RBC # BLD AUTO: 4.19 10E12/L (ref 4.4–5.9)
SODIUM SERPL-SCNC: 137 MMOL/L (ref 133–144)
WBC # BLD AUTO: 5.5 10E9/L (ref 4–11)

## 2018-04-30 PROCEDURE — C1882 AICD, OTHER THAN SING/DUAL: HCPCS

## 2018-04-30 PROCEDURE — 33215 REPOSITION PACING-DEFIB LEAD: CPT

## 2018-04-30 PROCEDURE — C1777 LEAD, AICD, ENDO SINGLE COIL: HCPCS

## 2018-04-30 PROCEDURE — C1769 GUIDE WIRE: HCPCS

## 2018-04-30 PROCEDURE — 93010 ELECTROCARDIOGRAM REPORT: CPT | Mod: 76 | Performed by: INTERNAL MEDICINE

## 2018-04-30 PROCEDURE — 40000852 ZZH STATISTIC HEART CATH LAB OR EP LAB

## 2018-04-30 PROCEDURE — 80048 BASIC METABOLIC PNL TOTAL CA: CPT | Performed by: INTERNAL MEDICINE

## 2018-04-30 PROCEDURE — 40000986 XR CHEST 2 VW

## 2018-04-30 PROCEDURE — 25000128 H RX IP 250 OP 636: Performed by: INTERNAL MEDICINE

## 2018-04-30 PROCEDURE — 33215 REPOSITION PACING-DEFIB LEAD: CPT | Mod: 78 | Performed by: INTERNAL MEDICINE

## 2018-04-30 PROCEDURE — 33249 INSJ/RPLCMT DEFIB W/LEAD(S): CPT | Mod: Q0 | Performed by: INTERNAL MEDICINE

## 2018-04-30 PROCEDURE — 27210784 ZZH KIT PACEMAKER CR8

## 2018-04-30 PROCEDURE — 40000235 ZZH STATISTIC TELEMETRY

## 2018-04-30 PROCEDURE — 99152 MOD SED SAME PHYS/QHP 5/>YRS: CPT

## 2018-04-30 PROCEDURE — 99153 MOD SED SAME PHYS/QHP EA: CPT

## 2018-04-30 PROCEDURE — C1898 LEAD, PMKR, OTHER THAN TRANS: HCPCS

## 2018-04-30 PROCEDURE — 33225 L VENTRIC PACING LEAD ADD-ON: CPT

## 2018-04-30 PROCEDURE — C1730 CATH, EP, 19 OR FEW ELECT: HCPCS

## 2018-04-30 PROCEDURE — 33225 L VENTRIC PACING LEAD ADD-ON: CPT | Performed by: INTERNAL MEDICINE

## 2018-04-30 PROCEDURE — 36415 COLL VENOUS BLD VENIPUNCTURE: CPT

## 2018-04-30 PROCEDURE — 27210995 ZZH RX 272: Performed by: INTERNAL MEDICINE

## 2018-04-30 PROCEDURE — 93005 ELECTROCARDIOGRAM TRACING: CPT

## 2018-04-30 PROCEDURE — C1892 INTRO/SHEATH,FIXED,PEEL-AWAY: HCPCS

## 2018-04-30 PROCEDURE — 25000125 ZZHC RX 250: Performed by: INTERNAL MEDICINE

## 2018-04-30 PROCEDURE — 99152 MOD SED SAME PHYS/QHP 5/>YRS: CPT | Mod: XE | Performed by: INTERNAL MEDICINE

## 2018-04-30 PROCEDURE — 40000065 ZZH STATISTIC EKG NON-CHARGEABLE

## 2018-04-30 PROCEDURE — 85027 COMPLETE CBC AUTOMATED: CPT | Performed by: INTERNAL MEDICINE

## 2018-04-30 PROCEDURE — 33249 INSJ/RPLCMT DEFIB W/LEAD(S): CPT

## 2018-04-30 PROCEDURE — 27210795 ZZH PAD DEFIB QUICK CR4

## 2018-04-30 RX ORDER — MORPHINE SULFATE 2 MG/ML
1-2 INJECTION, SOLUTION INTRAMUSCULAR; INTRAVENOUS EVERY 5 MIN PRN
Status: DISCONTINUED | OUTPATIENT
Start: 2018-04-30 | End: 2018-04-30 | Stop reason: HOSPADM

## 2018-04-30 RX ORDER — CEFAZOLIN SODIUM 2 G/100ML
2 INJECTION, SOLUTION INTRAVENOUS
Status: CANCELLED | OUTPATIENT
Start: 2018-04-30

## 2018-04-30 RX ORDER — PROTAMINE SULFATE 10 MG/ML
1-5 INJECTION, SOLUTION INTRAVENOUS
Status: DISCONTINUED | OUTPATIENT
Start: 2018-04-30 | End: 2018-04-30 | Stop reason: HOSPADM

## 2018-04-30 RX ORDER — LIDOCAINE HYDROCHLORIDE 10 MG/ML
10-30 INJECTION, SOLUTION EPIDURAL; INFILTRATION; INTRACAUDAL; PERINEURAL
Status: COMPLETED | OUTPATIENT
Start: 2018-04-30 | End: 2018-04-30

## 2018-04-30 RX ORDER — IBUTILIDE FUMARATE 1 MG/10ML
0.01 INJECTION, SOLUTION INTRAVENOUS
Status: DISCONTINUED | OUTPATIENT
Start: 2018-04-30 | End: 2018-04-30 | Stop reason: HOSPADM

## 2018-04-30 RX ORDER — CEFAZOLIN SODIUM 2 G/100ML
2 INJECTION, SOLUTION INTRAVENOUS
Status: COMPLETED | OUTPATIENT
Start: 2018-04-30 | End: 2018-04-30

## 2018-04-30 RX ORDER — PROMETHAZINE HYDROCHLORIDE 25 MG/ML
6.25-25 INJECTION, SOLUTION INTRAMUSCULAR; INTRAVENOUS EVERY 4 HOURS PRN
Status: DISCONTINUED | OUTPATIENT
Start: 2018-04-30 | End: 2018-04-30 | Stop reason: HOSPADM

## 2018-04-30 RX ORDER — ONDANSETRON 2 MG/ML
4 INJECTION INTRAMUSCULAR; INTRAVENOUS EVERY 4 HOURS PRN
Status: DISCONTINUED | OUTPATIENT
Start: 2018-04-30 | End: 2018-04-30 | Stop reason: HOSPADM

## 2018-04-30 RX ORDER — KETOROLAC TROMETHAMINE 30 MG/ML
15 INJECTION, SOLUTION INTRAMUSCULAR; INTRAVENOUS
Status: DISCONTINUED | OUTPATIENT
Start: 2018-04-30 | End: 2018-04-30 | Stop reason: HOSPADM

## 2018-04-30 RX ORDER — LIDOCAINE 40 MG/G
CREAM TOPICAL
Status: DISCONTINUED | OUTPATIENT
Start: 2018-04-30 | End: 2018-04-30 | Stop reason: HOSPADM

## 2018-04-30 RX ORDER — SODIUM CHLORIDE 450 MG/100ML
INJECTION, SOLUTION INTRAVENOUS CONTINUOUS
Status: DISCONTINUED | OUTPATIENT
Start: 2018-04-30 | End: 2018-04-30 | Stop reason: HOSPADM

## 2018-04-30 RX ORDER — FENTANYL CITRATE 50 UG/ML
25-50 INJECTION, SOLUTION INTRAMUSCULAR; INTRAVENOUS
Status: DISCONTINUED | OUTPATIENT
Start: 2018-04-30 | End: 2018-04-30 | Stop reason: HOSPADM

## 2018-04-30 RX ORDER — IBUTILIDE FUMARATE 1 MG/10ML
1 INJECTION, SOLUTION INTRAVENOUS
Status: DISCONTINUED | OUTPATIENT
Start: 2018-04-30 | End: 2018-04-30 | Stop reason: HOSPADM

## 2018-04-30 RX ORDER — HEPARIN SODIUM 1000 [USP'U]/ML
1000-10000 INJECTION, SOLUTION INTRAVENOUS; SUBCUTANEOUS EVERY 5 MIN PRN
Status: DISCONTINUED | OUTPATIENT
Start: 2018-04-30 | End: 2018-04-30 | Stop reason: HOSPADM

## 2018-04-30 RX ORDER — LIDOCAINE HYDROCHLORIDE AND EPINEPHRINE 10; 10 MG/ML; UG/ML
10-30 INJECTION, SOLUTION INFILTRATION; PERINEURAL
Status: DISCONTINUED | OUTPATIENT
Start: 2018-04-30 | End: 2018-04-30 | Stop reason: HOSPADM

## 2018-04-30 RX ORDER — PROTAMINE SULFATE 10 MG/ML
5-40 INJECTION, SOLUTION INTRAVENOUS EVERY 10 MIN PRN
Status: DISCONTINUED | OUTPATIENT
Start: 2018-04-30 | End: 2018-04-30 | Stop reason: HOSPADM

## 2018-04-30 RX ORDER — OXYCODONE AND ACETAMINOPHEN 5; 325 MG/1; MG/1
1 TABLET ORAL EVERY 4 HOURS PRN
Status: DISCONTINUED | OUTPATIENT
Start: 2018-04-30 | End: 2018-04-30 | Stop reason: HOSPADM

## 2018-04-30 RX ORDER — NALOXONE HYDROCHLORIDE 0.4 MG/ML
0.4 INJECTION, SOLUTION INTRAMUSCULAR; INTRAVENOUS; SUBCUTANEOUS EVERY 5 MIN PRN
Status: DISCONTINUED | OUTPATIENT
Start: 2018-04-30 | End: 2018-04-30 | Stop reason: HOSPADM

## 2018-04-30 RX ORDER — BUPIVACAINE HYDROCHLORIDE 2.5 MG/ML
10-30 INJECTION, SOLUTION EPIDURAL; INFILTRATION; INTRACAUDAL
Status: DISCONTINUED | OUTPATIENT
Start: 2018-04-30 | End: 2018-04-30 | Stop reason: HOSPADM

## 2018-04-30 RX ORDER — FUROSEMIDE 10 MG/ML
20-100 INJECTION INTRAMUSCULAR; INTRAVENOUS
Status: DISCONTINUED | OUTPATIENT
Start: 2018-04-30 | End: 2018-04-30 | Stop reason: HOSPADM

## 2018-04-30 RX ORDER — ATROPINE SULFATE 0.1 MG/ML
.5-1 INJECTION INTRAVENOUS
Status: DISCONTINUED | OUTPATIENT
Start: 2018-04-30 | End: 2018-04-30 | Stop reason: HOSPADM

## 2018-04-30 RX ORDER — LORAZEPAM 2 MG/ML
.5-2 INJECTION INTRAMUSCULAR EVERY 10 MIN PRN
Status: DISCONTINUED | OUTPATIENT
Start: 2018-04-30 | End: 2018-04-30 | Stop reason: HOSPADM

## 2018-04-30 RX ORDER — NALOXONE HYDROCHLORIDE 0.4 MG/ML
.1-.4 INJECTION, SOLUTION INTRAMUSCULAR; INTRAVENOUS; SUBCUTANEOUS
Status: DISCONTINUED | OUTPATIENT
Start: 2018-04-30 | End: 2018-04-30 | Stop reason: HOSPADM

## 2018-04-30 RX ORDER — ADENOSINE 3 MG/ML
6-12 INJECTION, SOLUTION INTRAVENOUS EVERY 5 MIN PRN
Status: DISCONTINUED | OUTPATIENT
Start: 2018-04-30 | End: 2018-04-30 | Stop reason: HOSPADM

## 2018-04-30 RX ORDER — CEFAZOLIN SODIUM 1 G/3ML
2 INJECTION, POWDER, FOR SOLUTION INTRAMUSCULAR; INTRAVENOUS
Status: COMPLETED | OUTPATIENT
Start: 2018-04-30 | End: 2018-04-30

## 2018-04-30 RX ORDER — LIDOCAINE HYDROCHLORIDE 10 MG/ML
10-30 INJECTION, SOLUTION EPIDURAL; INFILTRATION; INTRACAUDAL; PERINEURAL
Status: DISCONTINUED | OUTPATIENT
Start: 2018-04-30 | End: 2018-04-30 | Stop reason: HOSPADM

## 2018-04-30 RX ORDER — DIPHENHYDRAMINE HYDROCHLORIDE 50 MG/ML
25-50 INJECTION INTRAMUSCULAR; INTRAVENOUS
Status: DISCONTINUED | OUTPATIENT
Start: 2018-04-30 | End: 2018-04-30 | Stop reason: HOSPADM

## 2018-04-30 RX ORDER — FLUMAZENIL 0.1 MG/ML
0.2 INJECTION, SOLUTION INTRAVENOUS
Status: DISCONTINUED | OUTPATIENT
Start: 2018-04-30 | End: 2018-04-30 | Stop reason: HOSPADM

## 2018-04-30 RX ORDER — DOBUTAMINE HYDROCHLORIDE 200 MG/100ML
5-40 INJECTION INTRAVENOUS CONTINUOUS PRN
Status: DISCONTINUED | OUTPATIENT
Start: 2018-04-30 | End: 2018-04-30 | Stop reason: HOSPADM

## 2018-04-30 RX ORDER — LIDOCAINE 40 MG/G
CREAM TOPICAL
Status: CANCELLED | OUTPATIENT
Start: 2018-04-30

## 2018-04-30 RX ORDER — BUPIVACAINE HYDROCHLORIDE 2.5 MG/ML
10-30 INJECTION, SOLUTION EPIDURAL; INFILTRATION; INTRACAUDAL
Status: COMPLETED | OUTPATIENT
Start: 2018-04-30 | End: 2018-04-30

## 2018-04-30 RX ORDER — SODIUM CHLORIDE 450 MG/100ML
INJECTION, SOLUTION INTRAVENOUS CONTINUOUS
Status: CANCELLED | OUTPATIENT
Start: 2018-04-30

## 2018-04-30 RX ADMIN — CEFAZOLIN 2 G: 1 INJECTION, POWDER, FOR SOLUTION INTRAMUSCULAR; INTRAVENOUS at 16:45

## 2018-04-30 RX ADMIN — BUPIVACAINE HYDROCHLORIDE 25 MG: 2.5 INJECTION, SOLUTION EPIDURAL; INFILTRATION; INTRACAUDAL at 17:04

## 2018-04-30 RX ADMIN — BACITRACIN 25000 UNITS: 5000 INJECTION, POWDER, FOR SOLUTION INTRAMUSCULAR at 17:23

## 2018-04-30 RX ADMIN — MIDAZOLAM 0.5 MG: 1 INJECTION INTRAMUSCULAR; INTRAVENOUS at 10:17

## 2018-04-30 RX ADMIN — FENTANYL CITRATE 100 MCG: 50 INJECTION, SOLUTION INTRAMUSCULAR; INTRAVENOUS at 17:23

## 2018-04-30 RX ADMIN — FENTANYL CITRATE 25 MCG: 50 INJECTION, SOLUTION INTRAMUSCULAR; INTRAVENOUS at 11:00

## 2018-04-30 RX ADMIN — FENTANYL CITRATE 25 MCG: 50 INJECTION, SOLUTION INTRAMUSCULAR; INTRAVENOUS at 10:06

## 2018-04-30 RX ADMIN — FENTANYL CITRATE 25 MCG: 50 INJECTION, SOLUTION INTRAMUSCULAR; INTRAVENOUS at 10:16

## 2018-04-30 RX ADMIN — FENTANYL CITRATE 25 MCG: 50 INJECTION, SOLUTION INTRAMUSCULAR; INTRAVENOUS at 11:08

## 2018-04-30 RX ADMIN — FENTANYL CITRATE 25 MCG: 50 INJECTION, SOLUTION INTRAMUSCULAR; INTRAVENOUS at 10:11

## 2018-04-30 RX ADMIN — MIDAZOLAM 0.5 MG: 1 INJECTION INTRAMUSCULAR; INTRAVENOUS at 10:12

## 2018-04-30 RX ADMIN — MIDAZOLAM 0.5 MG: 1 INJECTION INTRAMUSCULAR; INTRAVENOUS at 10:27

## 2018-04-30 RX ADMIN — LIDOCAINE HYDROCHLORIDE 10 ML: 10 INJECTION, SOLUTION EPIDURAL; INFILTRATION; INTRACAUDAL; PERINEURAL at 17:04

## 2018-04-30 RX ADMIN — LIDOCAINE HYDROCHLORIDE 30 ML: 10 INJECTION, SOLUTION EPIDURAL; INFILTRATION; INTRACAUDAL; PERINEURAL at 10:28

## 2018-04-30 RX ADMIN — CEFAZOLIN SODIUM 2 G: 2 INJECTION, SOLUTION INTRAVENOUS at 09:54

## 2018-04-30 RX ADMIN — SODIUM CHLORIDE: 4.5 INJECTION, SOLUTION INTRAVENOUS at 09:02

## 2018-04-30 RX ADMIN — FENTANYL CITRATE 25 MCG: 50 INJECTION, SOLUTION INTRAMUSCULAR; INTRAVENOUS at 10:21

## 2018-04-30 RX ADMIN — MIDAZOLAM 2 MG: 1 INJECTION INTRAMUSCULAR; INTRAVENOUS at 17:24

## 2018-04-30 RX ADMIN — MIDAZOLAM 0.5 MG: 1 INJECTION INTRAMUSCULAR; INTRAVENOUS at 10:07

## 2018-04-30 NOTE — PROGRESS NOTES
ICD site looks good. No bleeding/No hematoma. ICD rep has been here to see pt. EKG was done. Pt has voided and ambulated in room. Denies pain. Awaiting chest xray results.

## 2018-04-30 NOTE — IP AVS SNAPSHOT
MRN:[id number]                      After Visit Summary   4/30/2018    Katie Shannon    MRN: [id number]           Visit Information        Department      4/30/2018  7:37 AM [facility]          Review of your medicines      CONTINUE these medicines which have NOT CHANGED        Dose / Directions    carvedilol 12.5 MG tablet   Commonly known as:  COREG   Used for:  Primary cardiomyopathy (H)        Dose:  12.5 mg   Take 1 tablet (12.5 mg) by mouth 2 times daily (with meals)   Quantity:  180 tablet   Refills:  3       CYCLOBENZAPRINE HCL PO        Dose:  5 mg   Take 5 mg by mouth At Bedtime Taking PRN   Refills:  0       diphenhydrAMINE-acetaminophen  MG tablet   Commonly known as:  TYLENOL PM        Dose:  1 tablet   Take 1 tablet by mouth nightly as needed for sleep   Refills:  0       FLOMAX 0.4 MG capsule   Generic drug:  tamsulosin        Dose:  0.4 mg   Take 0.4 mg by mouth daily   Refills:  0       isosorbide mononitrate 60 MG 24 hr tablet   Commonly known as:  IMDUR        Dose:  60 mg   Take 60 mg by mouth daily   Refills:  0       PEPCID PO        Dose:  20 mg   Take 20 mg by mouth 2 times daily   Refills:  0       PLAVIX PO        Dose:  75 mg   Take 75 mg by mouth daily   Refills:  0       PRILOSEC PO        Dose:  20 mg   Take 20 mg by mouth daily   Refills:  0       sacubitril-valsartan  MG per tablet   Commonly known as:  ENTRESTO   Used for:  Chronic systolic heart failure (H)        Dose:  1 tablet   Take 1 tablet by mouth 2 times daily   Quantity:  180 tablet   Refills:  3       simvastatin 10 MG tablet   Commonly known as:  ZOCOR   Used for:  Hypercholesterolemia        Dose:  10 mg   Take 1 tablet (10 mg) by mouth At Bedtime   Quantity:  90 tablet   Refills:  3       sodium chloride 5 % ophthalmic solution   Commonly known as:  SHELL 128        Dose:  1 drop   Place 1 drop into both eyes At Bedtime   Refills:  0                Protect others around  you: Learn how to safely use, store and throw away your medicines at www.disposemymeds.org.         Follow-ups after your visit         Care Instructions        After Care Instructions     Discharge Instructions - Follow up with Device Check RN        Follow up with Device Check RN in 7-10 days.            Discharge Instructions - Keep incision dry for 72 hours       Keep incision dry for 72 hours (unless Derma Babb was applied)                  Further instructions from your care team       ICD Implant Discharge Instructions    After you go home:      Have an adult stay with you until tomorrow.    You may resume your normal diet.       For 24 hours - due to the sedation you received:    Relax and take it easy.    Do NOT make any important or legal decisions.    Do NOT drive or operate machines at home or at work.    Do NOT drink alcohol.    Care of Chest Incision:      Keep the bandage on at least 3 days. You may remove the dressing on Thursday, May 3. Change it only if it gets loose or soaked. If you need to change it, use 4x4-inch gauze and a large clear bandage.     If there is a pressure dressing (gauze & tape) - 24 hours after your procedure you may remove ONLY the top dressing. Leave the bottom dressing on.    Leave the strips of tape on. They will fall off on their own, or we will remove them at your first check-up.    Check your wound daily for signs of infection, such as increased redness, severe swelling or draining. Fever may also be a sign of infection. Call us if you see any of these signs.    If there are no signs of infection, you may shower after the bandage comes off in 3 days. If you take a tub bath, keep the wound dry.    No soaking the incision (swimming pool, bathtub, hot tub) for 2 weeks.    You may have mild to medium pain for 3 to 5 days. Take Acetaminophen (Tylenol) or Ibuprofen (Advil) for the pain. If the pain persists or is severe, call us.    Activity:      For at least 2 weeks: Do not  raise your elbow above your shoulder. You can begin to use your arm as it feels comfortable to you.    Do not use arm on implant side to lift more than 10 pounds for 2 weeks.    In 6 to 8 weeks: You may begin to golf, play tennis, swim and do similar activities.    No driving for one day & limit to necessary driving for one week. Please talk to your doctor for specific recommendations.    Bleeding:      If you start bleeding from the incision site, sit down and press firmly on the site for 10 minutes.     Once bleeding stops, call [facility] as soon as you can.    Call 911 right away if you have heavy bleeding or bleeding that does not stop.      Medicines:      Take your medications, including blood thinners, unless your provider tells you not to.    If you have stopped any medicines, check with your provider about when to restart them.    Follow Up Appointments:      Follow up with Device Clinic at [facility] of patient preference in 7-10 days.    Call the clinic if:      You have a large or growing hard lump around the site.    The site is red, swollen, hot or tender.    Blood or fluid is draining from the site.    You have chills or a fever greater than 101 F (38 C).    You feel dizzy or light-headed.    Questions or concerns.    Telling others about your device:      Before you leave the hospital, you will receive a temporary ID card. A permanent card will be mailed to you about 6 to 8 weeks later. Always carry the ID card with you. It has important details about your device.    You may also get a Medical Alert bracelet or tag that says you have a defibrillator (ICD).  Go to [email or web address].     Always tell doctors, dentists and other care providers that you have a device implanted in you.    Let us know before you plan any surgeries. Your care team must take special steps to keep you safe during certain procedures. These steps will depend on the type of device you have. Your provider will need  "to see your ID card. They may need to call us for instructions.    Device Safety:      Please refer to device  s booklet for further information.          [facility] [place]:    [phone] [facility] (7 days a week)           Additional Information About Your Visit        MyChart Information     [facility] lets you send messages to your doctor, view your test results, renew your prescriptions, schedule appointments and more. To sign up, go to [email or web address] . Click on \"Log in\" on the left side of the screen, which will take you to the Welcome page. Then click on \"Sign up Now\" on the right side of the page.     You will be asked to enter the access code listed below, as well as some personal information. Please follow the directions to create your username and password.     Your access code is: [email or web address]  Expires: 7/10/2018  1:59 PM     Your access code will  in 90 days. If you need help or a new code, please call your [place] clinic or [phone].        Care EveryWhere ID     This is your Care EveryWhere ID. This could be used by other organizations to access your [place] medical records  [id number]        Your Vitals Were     Blood Pressure Pulse Temperature Respirations Height Weight    141/75 53 97.7  F (36.5  C) (Oral) 18 1.702 m (5' 7\") 72.8 kg (160 lb 8 oz)    Pulse Oximetry BMI (Body Mass Index)                96% 25.14 kg/m2           Primary Care Provider Office Phone # Fax #    Jaden Butts [phone]      Equal Access to Services     LENA SUBRAMANIAN : Hadapril Hernandez, michael zarate, baldev greene. So [facility] [phone].    ATENCIÓN: Si habla español, tiene a howell disposición servicios gratuitos de asistencia lingüística. Llame al [phone].    We comply with applicable federal civil rights laws and [place] laws. We do not discriminate on the basis of " race, color, national origin, age, disability, sex, sexual orientation, or gender identity.            Thank you!     Thank you for choosing [facility] for your care. Our goal is always to provide you with excellent care. Hearing back from our patients is one way we can continue to improve our services. Please take a few minutes to complete the written survey that you may receive in the mail after you visit with us. Thank you!             Medication List: This is a list of all your medications and when to take them. Check marks below indicate your daily home schedule. Keep this list as a reference.      Medications           Morning Afternoon Evening Bedtime As Needed    carvedilol 12.5 MG tablet   Commonly known as:  COREG   Take 1 tablet (12.5 mg) by mouth 2 times daily (with meals)                                CYCLOBENZAPRINE HCL PO   Take 5 mg by mouth At Bedtime Taking PRN                                diphenhydrAMINE-acetaminophen  MG tablet   Commonly known as:  TYLENOL PM   Take 1 tablet by mouth nightly as needed for sleep                                FLOMAX 0.4 MG capsule   Take 0.4 mg by mouth daily   Generic drug:  tamsulosin                                isosorbide mononitrate 60 MG 24 hr tablet   Commonly known as:  IMDUR   Take 60 mg by mouth daily                                PEPCID PO   Take 20 mg by mouth 2 times daily                                PLAVIX PO   Take 75 mg by mouth daily                                PRILOSEC PO   Take 20 mg by mouth daily                                sacubitril-valsartan  MG per tablet   Commonly known as:  ENTRESTO   Take 1 tablet by mouth 2 times daily                                simvastatin 10 MG tablet   Commonly known as:  ZOCOR   Take 1 tablet (10 mg) by mouth At Bedtime                                sodium chloride 5 % ophthalmic solution   Commonly known as:  SHELL 128   Place 1 drop into both eyes At Bedtime

## 2018-04-30 NOTE — PROGRESS NOTES
CS venogram was performed and revealed two very tortuous upper posteriolateral branches. Attempt to cannulate this branch without success and moreover, even if it was able to cannulate it it would be unlikely that LV lead would not be dislodged. It was decided to implant His-bundle lead along with atrial and ICD lead. Ok for home provided no pneumothorax on cxr and normal device check.

## 2018-04-30 NOTE — DISCHARGE INSTRUCTIONS
ICD Implant Discharge Instructions    After you go home:      Have an adult stay with you until tomorrow.    You may resume your normal diet.       For 24 hours - due to the sedation you received:    Relax and take it easy.    Do NOT make any important or legal decisions.    Do NOT drive or operate machines at home or at work.    Do NOT drink alcohol.    Care of Chest Incision:      Keep the bandage on at least 3 days. You may remove the dressing on Thursday, May 3. Change it only if it gets loose or soaked. If you need to change it, use 4x4-inch gauze and a large clear bandage.     If there is a pressure dressing (gauze & tape) - 24 hours after your procedure you may remove ONLY the top dressing. Leave the bottom dressing on.    Leave the strips of tape on. They will fall off on their own, or we will remove them at your first check-up.    Check your wound daily for signs of infection, such as increased redness, severe swelling or draining. Fever may also be a sign of infection. Call us if you see any of these signs.    If there are no signs of infection, you may shower after the bandage comes off in 3 days. If you take a tub bath, keep the wound dry.    No soaking the incision (swimming pool, bathtub, hot tub) for 2 weeks.    You may have mild to medium pain for 3 to 5 days. Take Acetaminophen (Tylenol) or Ibuprofen (Advil) for the pain. If the pain persists or is severe, call us.    Activity:      For at least 2 weeks: Do not raise your elbow above your shoulder. You can begin to use your arm as it feels comfortable to you.    Do not use arm on implant side to lift more than 10 pounds for 2 weeks.    In 6 to 8 weeks: You may begin to golf, play tennis, swim and do similar activities.    No driving for one day & limit to necessary driving for one week. Please talk to your doctor for specific recommendations.    Bleeding:      If you start bleeding from the incision site, sit down and press firmly on the site for 10  minutes.     Once bleeding stops, call [facility] as soon as you can.    Call 911 right away if you have heavy bleeding or bleeding that does not stop.      Medicines:      Take your medications, including blood thinners, unless your provider tells you not to.    If you have stopped any medicines, check with your provider about when to restart them.    Follow Up Appointments:      Follow up with Device Clinic at [facility] of patient preference in 7-10 days.    Call the clinic if:      You have a large or growing hard lump around the site.    The site is red, swollen, hot or tender.    Blood or fluid is draining from the site.    You have chills or a fever greater than 101 F (38 C).    You feel dizzy or light-headed.    Questions or concerns.    Telling others about your device:      Before you leave the hospital, you will receive a temporary ID card. A permanent card will be mailed to you about 6 to 8 weeks later. Always carry the ID card with you. It has important details about your device.    You may also get a Medical Alert bracelet or tag that says you have a defibrillator (ICD).  Go to [email or web address].     Always tell doctors, dentists and other care providers that you have a device implanted in you.    Let us know before you plan any surgeries. Your care team must take special steps to keep you safe during certain procedures. These steps will depend on the type of device you have. Your provider will need to see your ID card. They may need to call us for instructions.    Device Safety:      Please refer to device  s booklet for further information.          [facility] at [place]:    [phone] [facility] (7 days a week)

## 2018-04-30 NOTE — IP AVS SNAPSHOT
[place] [address]    [place] [address]    Phone:  [phone]                                       After Visit Summary   4/30/2018    Katie Shannon    MRN: [id number]           After Visit Summary Signature Page     I have received my discharge instructions, and my questions have been answered. I have discussed any challenges I see with this plan with the nurse or doctor.    ..........................................................................................................................................  Patient/Patient Representative Signature      ..........................................................................................................................................  Patient Representative Print Name and Relationship to Patient    ..................................................               ................................................  Date                                            Time    ..........................................................................................................................................  Reviewed by Signature/Title    ...................................................              ..............................................  Date                                                            Time

## 2018-05-01 ENCOUNTER — TELEPHONE (OUTPATIENT)
Dept: CARDIOLOGY | Facility: CLINIC | Age: 83
End: 2018-05-01

## 2018-05-01 LAB — INTERPRETATION ECG - MUSE: NORMAL

## 2018-05-01 PROCEDURE — 27210795 ZZH PAD DEFIB QUICK CR4

## 2018-05-01 PROCEDURE — 27210784 ZZH KIT PACEMAKER CR8

## 2018-05-01 PROCEDURE — C1769 GUIDE WIRE: HCPCS

## 2018-05-01 NOTE — PROGRESS NOTES
1834 pt to CS. VSS. PM site WDL. X ray WDL per Dr. Hardin. PM interrogated, OK per rep. German Juice. OK to DC per Dr. Hardin. Pt to take BP meds at home.  1915 Will DC pt to ride per  with all belongings.

## 2018-05-01 NOTE — TELEPHONE ENCOUNTER
Post device implant discharge phone call completed with wife:    Reviewed the following:  No raising arm above shoulder on the side of implant for 3 weeks  Remove outer dressing 3 days after implant. May shower after outer dressing removed. Leave steri-strips in place, will be removed at 1 week device check  No driving for: One Week  Watch for redness, drainage, warmth, or fever. Call device clinic if any signs of infection.   -Reviewed when to call the clinic and when to call 911 if shock or syncopal episode should occur.    1 week device check scheduled: on Tuesday 5/8/18 at 1500 at our [facility].    Wife states understanding of all instructions.

## 2018-05-03 LAB
INTERPRETATION ECG - MUSE: NORMAL
INTERPRETATION ECG - MUSE: NORMAL

## 2018-05-08 ENCOUNTER — ALLIED HEALTH/NURSE VISIT (OUTPATIENT)
Dept: CARDIOLOGY | Facility: CLINIC | Age: 83
End: 2018-05-08
Payer: MEDICARE

## 2018-05-08 DIAGNOSIS — Z95.810 ICD (IMPLANTABLE CARDIOVERTER-DEFIBRILLATOR), BIVENTRICULAR, IN SITU: Primary | ICD-10-CM

## 2018-05-08 PROCEDURE — 93284 PRGRMG EVAL IMPLANTABLE DFB: CPT | Performed by: INTERNAL MEDICINE

## 2018-05-08 NOTE — MR AVS SNAPSHOT
"              After Visit Summary   5/8/2018    Katie Shannon    MRN: [id number]           Patient Information     Date Of Birth          1935        Visit Information        Provider Department      5/8/2018 3:00 PM BOYD DCR2 [facility]        Today's Diagnoses     ICD (implantable cardioverter-defibrillator), biventricular, in situ    -  1       Follow-ups after your visit        Your next 10 appointments already scheduled     Jun 19, 2018  3:00 PM CDT   ICD Check with BOYD DCR2   [facility] ([facility])    [address] [place], [place] [id number]  [facility] [id number] [phone] OPT 2              Who to contact     If you have questions or need follow up information about today's clinic visit or your schedule please contact [facility] directly at [phone].  Normal or non-critical lab and imaging results will be communicated to you by [facility], letter or phone within 4 business days after the clinic has received the results. If you do not hear from us within 7 days, please contact the clinic through [facility] or phone. If you have a critical or abnormal lab result, we will notify you by phone as soon as possible.  Submit refill requests through [facility] or call your pharmacy and they will forward the refill request to us. Please allow 3 business days for your refill to be completed.          Additional Information About Your Visit        MyChart Information     [facility] lets you send messages to your doctor, view your test results, renew your prescriptions, schedule appointments and more. To sign up, go to [email or web address] . Click on \"Log in\" on the left side of the screen, which will take you to the Welcome page. Then click on \"Sign up Now\" on the right side of the page.     You will be asked to enter the access code listed below, as well as some personal information. Please follow the " directions to create your username and password.     Your access code is: [email or web address]  Expires: 7/10/2018  1:59 PM     Your access code will  in 90 days. If you need help or a new code, please call your [place] clinic or [phone].        Care EveryWhere ID     This is your Care EveryWhere ID. This could be used by other organizations to access your [place] medical records  [id number]         Blood Pressure from Last 3 Encounters:   18 159/81   18 114/57   18 128/76    Weight from Last 3 Encounters:   18 72.8 kg (160 lb 8 oz)   18 74.8 kg (165 lb)   18 74.3 kg (163 lb 11.2 oz)              We Performed the Following     ICD DEVICE PROGRAMMING EVAL, MULTI LEAD ICD (06038)        Primary Care Provider Office Phone # Fax #    Jaden Butts [phone]       [place] [address]        Equal Access to Services     [facility]: Hadii aad ku hadasho Soomaali, waaxda luqadaha, qaybta kaalmada adeegyada, baldev hallin hayaan mike maldonado . So [facility] [phone].    ATENCIÓN: Si habla español, tiene a howell disposición servicios gratuitos de asistencia lingüística. [facility] [phone].    We comply with applicable federal civil rights laws and [place] laws. We do not discriminate on the basis of race, color, national origin, age, disability, sex, sexual orientation, or gender identity.            Thank you!     Thank you for choosing [facility]  for your care. Our goal is always to provide you with excellent care. Hearing back from our patients is one way we can continue to improve our services. Please take a few minutes to complete the written survey that you may receive in the mail after your visit with us. Thank you!             Your Updated Medication List - Protect others around you: Learn how to safely use, store and throw away your medicines at  www.disposemymeds.org.          This list is accurate as of 5/8/18  4:13 PM.  Always use your most recent med list.                   Brand Name Dispense Instructions for use Diagnosis    carvedilol 12.5 MG tablet    COREG    180 tablet    Take 1 tablet (12.5 mg) by mouth 2 times daily (with meals)    Primary cardiomyopathy (H)       CYCLOBENZAPRINE HCL PO      Take 5 mg by mouth At Bedtime Taking PRN        diphenhydrAMINE-acetaminophen  MG tablet    TYLENOL PM     Take 1 tablet by mouth nightly as needed for sleep        FLOMAX 0.4 MG capsule   Generic drug:  tamsulosin      Take 0.4 mg by mouth daily        isosorbide mononitrate 60 MG 24 hr tablet    IMDUR     Take 60 mg by mouth daily        PEPCID PO      Take 20 mg by mouth 2 times daily        PLAVIX PO      Take 75 mg by mouth daily        PRILOSEC PO      Take 20 mg by mouth daily        sacubitril-valsartan  MG per tablet    ENTRESTO    180 tablet    Take 1 tablet by mouth 2 times daily    Chronic systolic heart failure (H)       simvastatin 10 MG tablet    ZOCOR    90 tablet    Take 1 tablet (10 mg) by mouth At Bedtime    Hypercholesterolemia       sodium chloride 5 % ophthalmic solution    SHELL 128     Place 1 drop into both eyes At Bedtime

## 2018-05-08 NOTE — PROGRESS NOTES
[phone] 7 Day Post ICD Device Check--HIS is in the LV port  AP: 11 % BVP: 100 %  Mode: DDD  bpm        Underlying Rhythm: NSR in the 60's  Heart Rate: Stable with good variability.  Sensing: WNL    Pacing Threshold: WNL    Impedance: WNL  Battery Status: Approximately 4.8-5.2 years longevity.  Incision/Complications: Steri strips removed, incision clean and dry, moderate amount of bruising down side of chest.  Atrial Arrhythmia: 0  Ventricular Arrhythmia: 0  ATP: 0    Shocks: 0  Setting Change: 0    Care Plan: Follow up in 6 weeks. Reminded patient of Left arm restrictions, home monitor and s/s of infection.CHRISTY Adams

## 2018-06-19 ENCOUNTER — ALLIED HEALTH/NURSE VISIT (OUTPATIENT)
Dept: CARDIOLOGY | Facility: CLINIC | Age: 83
End: 2018-06-19
Payer: MEDICARE

## 2018-06-19 DIAGNOSIS — Z95.810 ICD (IMPLANTABLE CARDIOVERTER-DEFIBRILLATOR), BIVENTRICULAR, IN SITU: Primary | ICD-10-CM

## 2018-06-19 PROCEDURE — 93284 PRGRMG EVAL IMPLANTABLE DFB: CPT | Performed by: INTERNAL MEDICINE

## 2018-06-19 RX ORDER — DOXYCYCLINE 100 MG/1
100 CAPSULE ORAL 2 TIMES DAILY
Qty: 20 CAPSULE | Refills: 0 | Status: SHIPPED | OUTPATIENT
Start: 2018-06-19 | End: 2018-10-15

## 2018-06-19 NOTE — PROGRESS NOTES
[facility] [employer] CRT-D ICD Device Check (HIS in the LV port)  AP: 26 % HISP: 98 %  Mode: DDD  bpm        Underlying Rhythm: NSR  Heart Rate: Stable with good variability.  Sensing: WNL    Pacing Threshold: WNL    Impedance: WNL  Battery Status: Approximately 7.1 years longevity.  Device Site: A retained suture knot removed, a very small amount of purulent ooze from site. Dr Hardin assess the site orders received for antibiotics.   Atrial Arrhythmia: 2 mode switches comprising of <1% of the time. EGM showed brief PAT.  Ventricular Arrhythmia: 0  ATP: 0    Shocks: 0  Setting Change: Atrial output changed from 3.5V to 2.0V and the LV output changed from 3.5V to 2.0 V    Care Plan: Follow up with Q 3 month remote checks. Orders placed in EPIC for 3 month post implant for follow up. Place escript to [facility]. Since the patient lives 150 mile from clinic instructed patient to have the PCP follow up wound check. WATSON Diana RN

## 2018-06-19 NOTE — MR AVS SNAPSHOT
After Visit Summary   6/19/2018    Katie Shannon    MRN: [id number]           Patient Information     Date Of Birth          1935        Visit Information        Provider Department      6/19/2018 3:00 PM BOYD DCR2 [facility]        Today's Diagnoses     ICD (implantable cardioverter-defibrillator), biventricular, in situ    -  1       Follow-ups after your visit        Additional Services     Follow-Up with Electrophysiologist                 Your next 10 appointments already scheduled     Aug 14, 2018  4:15 PM CDT   [facility] EP RETURN with Ney Marie MD   [facility] ([facility])    [address]  [facility] [address]   [phone] OPT 2            Oct 16, 2018  4:30 PM CDT   Remote ICD Check with BOYD AYLEEN2   [facility] ([facility])    [id number]  [facility] [address]   [phone] OPT 2           This appointment is for a remote check of your debrillator.  This is not an appointment at the office.              Future tests that were ordered for you today     Open Future Orders        Priority Expected Expires Ordered    Follow-Up with Electrophysiologist Routine 12/16/2018 6/19/2019 6/19/2018            Who to contact     If you have questions or need follow up information about today's clinic visit or your schedule please contact [facility] directly at [phone].  Normal or non-critical lab and imaging results will be communicated to you by MyChart, letter or phone within 4 business days after the clinic has received the results. If you do not hear from us within 7 days, please contact the clinic through MyChart or phone. If you have a critical or abnormal lab result, we will notify you by phone as soon as possible.  Submit refill requests through [employer] or call your pharmacy and they  will forward the refill request to us. Please allow 3 business days for your refill to be completed.          Additional Information About Your Visit        Care EveryWhere ID     This is your Care EveryWhere ID. This could be used by other organizations to access your [place] medical records  [id number]         Blood Pressure from Last 3 Encounters:   04/30/18 159/81   04/11/18 114/57   01/09/18 128/76    Weight from Last 3 Encounters:   04/30/18 72.8 kg (160 lb 8 oz)   04/11/18 74.8 kg (165 lb)   01/09/18 74.3 kg (163 lb 11.2 oz)              We Performed the Following     ICD DEVICE PROGRAMMING EVAL, MULTI LEAD ICD (71259)          Today's Medication Changes          These changes are accurate as of 6/19/18  4:20 PM.  If you have any questions, ask your nurse or doctor.               Start taking these medicines.        Dose/Directions    doxycycline 100 MG capsule   Commonly known as:  VIBRAMYCIN   Used for:  ICD (implantable cardioverter-defibrillator), biventricular, in situ        Dose:  100 mg   Take 1 capsule (100 mg) by mouth 2 times daily   Quantity:  20 capsule   Refills:  0            Where to get your medicines      These medications were sent to [facility] - [place], [place] - [address] AT [facility] [address] [facility]  [address] [facility] [address]     Phone:  [phone]     doxycycline 100 MG capsule                Primary Care Provider Office Phone # Fax #    Jaden Butts [phone]       [address] [address]        Equal Access to Services     [facility]: Hadii aad placido hadasheder Soyasmine, waaxda luqadaha, qaybta kaalmada sung, baldev bean. So [facility] [phone].    ATENCIÓN: Si habla español, tiene a howell disposición servicios gratuitos de asistencia lingüística. Suzanne al [phone].    We comply with applicable federal civil rights laws and  [place] laws. We do not discriminate on the basis of race, color, national origin, age, disability, sex, sexual orientation, or gender identity.            Thank you!     Thank you for choosing [facility]  for your care. Our goal is always to provide you with excellent care. Hearing back from our patients is one way we can continue to improve our services. Please take a few minutes to complete the written survey that you may receive in the mail after your visit with us. Thank you!             Your Updated Medication List - Protect others around you: Learn how to safely use, store and throw away your medicines at www.disposemymeds.org.          This list is accurate as of 6/19/18  4:20 PM.  Always use your most recent med list.                   Brand Name Dispense Instructions for use Diagnosis    carvedilol 12.5 MG tablet    COREG    180 tablet    Take 1 tablet (12.5 mg) by mouth 2 times daily (with meals)    Primary cardiomyopathy (H)       CYCLOBENZAPRINE HCL PO      Take 5 mg by mouth At Bedtime Taking PRN        diphenhydrAMINE-acetaminophen  MG tablet    TYLENOL PM     Take 1 tablet by mouth nightly as needed for sleep        doxycycline 100 MG capsule    VIBRAMYCIN    20 capsule    Take 1 capsule (100 mg) by mouth 2 times daily    ICD (implantable cardioverter-defibrillator), biventricular, in situ       FLOMAX 0.4 MG capsule   Generic drug:  tamsulosin      Take 0.4 mg by mouth daily        isosorbide mononitrate 60 MG 24 hr tablet    IMDUR     Take 60 mg by mouth daily        PEPCID PO      Take 20 mg by mouth 2 times daily        PLAVIX PO      Take 75 mg by mouth daily        PRILOSEC PO      Take 20 mg by mouth daily        sacubitril-valsartan  MG per tablet    ENTRESTO    180 tablet    Take 1 tablet by mouth 2 times daily    Chronic systolic heart failure (H)       simvastatin 10 MG tablet    ZOCOR    90 tablet    Take 1 tablet (10 mg) by mouth At  Bedtime    Hypercholesterolemia       sodium chloride 5 % ophthalmic solution    SHELL 128     Place 1 drop into both eyes At Bedtime

## 2018-08-14 ENCOUNTER — HOSPITAL ENCOUNTER (OUTPATIENT)
Dept: GENERAL RADIOLOGY | Facility: CLINIC | Age: 83
Discharge: HOME | End: 2018-08-14
Attending: INTERNAL MEDICINE | Admitting: INTERNAL MEDICINE
Payer: MEDICARE

## 2018-08-14 ENCOUNTER — OFFICE VISIT (OUTPATIENT)
Dept: CARDIOLOGY | Facility: CLINIC | Age: 83
End: 2018-08-14
Attending: INTERNAL MEDICINE
Payer: MEDICARE

## 2018-08-14 VITALS
SYSTOLIC BLOOD PRESSURE: 126 MMHG | WEIGHT: 164 LBS | DIASTOLIC BLOOD PRESSURE: 60 MMHG | HEIGHT: 67 IN | HEART RATE: 52 BPM | BODY MASS INDEX: 25.74 KG/M2

## 2018-08-14 DIAGNOSIS — Z95.810 ICD (IMPLANTABLE CARDIOVERTER-DEFIBRILLATOR), BIVENTRICULAR, IN SITU: ICD-10-CM

## 2018-08-14 DIAGNOSIS — I42.0 DILATED CARDIOMYOPATHY (H): ICD-10-CM

## 2018-08-14 DIAGNOSIS — I49.3 PVC'S (PREMATURE VENTRICULAR CONTRACTIONS): Primary | ICD-10-CM

## 2018-08-14 PROCEDURE — 99214 OFFICE O/P EST MOD 30 MIN: CPT | Performed by: INTERNAL MEDICINE

## 2018-08-14 PROCEDURE — 71046 X-RAY EXAM CHEST 2 VIEWS: CPT

## 2018-08-14 PROCEDURE — 93000 ELECTROCARDIOGRAM COMPLETE: CPT | Performed by: INTERNAL MEDICINE

## 2018-08-14 NOTE — MR AVS SNAPSHOT
After Visit Summary   8/14/2018    Katie Shannon    MRN: [id number]           Patient Information     Date Of Birth          1935        Visit Information        Provider Department      8/14/2018 4:15 PM Ney Hardin MD [facility]        Today's Diagnoses     PVC's (premature ventricular contractions)    -  1    ICD (implantable cardioverter-defibrillator), biventricular, in situ        Dilated cardiomyopathy (H)           Follow-ups after your visit        Your next 10 appointments already scheduled     Oct 15, 2018 12:45 PM CDT   Ech Complete with SHCVECHR2   [facility] CV Echocardiography (Cardiovascular Imaging at [facility])    [address]  [facility] [id number] [phone]           1.  Please bring or wear a comfortable two-piece outfit. 2.  You may eat, drink and take your normal medicines. 3.  For any questions that cannot be answered, please contact the ordering physician 4.  Please do not wear perfumes or scented lotions on the day of your exam.            Oct 15, 2018  3:45 PM CDT   Core MD Return with Rah Ayala MD   [facility] ([facility])    [address]  [facility] [id number] [phone] OPT 2            Oct 16, 2018  4:30 PM CDT   Remote ICD Check with BOYD DCR2   [facility] ([facility])    [id number]  [facility] [id number] [phone] OPT 2           This appointment is for a remote check of your debrillator.  This is not an appointment at the office.              Who to contact     If you have questions or need follow up information about today's clinic visit or your schedule please contact [facility] directly at [phone].  Normal or non-critical lab and imaging results will be communicated to you by [employer]ur  "letter or phone within 4 business days after the clinic has received the results. If you do not hear from us within 7 days, please contact the clinic through Seesearch[employer] or phone. If you have a critical or abnormal lab result, we will notify you by phone as soon as possible.  Submit refill requests through [employer] or call your pharmacy and they will forward the refill request to us. Please allow 3 business days for your refill to be completed.          Additional Information About Your Visit        Care EveryWhere ID     This is your Care EveryWhere ID. This could be used by other organizations to access your [place] medical records  [id number]        Your Vitals Were     Pulse Height BMI (Body Mass Index)             52 1.702 m (5' 7.01\") 25.68 kg/m2          Blood Pressure from Last 3 Encounters:   No data found for BP    Weight from Last 3 Encounters:   No data found for Wt              Today, you had the following     No orders found for display       Primary Care Provider Office Phone # Fax #    Jaden Butts [phone]       [address] [address]        Equal Access to Services     [facility]: Hadii aad ku hadasho Soyasmine, waaxda luqadaha, qaybta kaalmada sung, baldev maldonado . So [facility] [phone].    ATENCIÓN: Si habla español, tiene a howell disposición servicios gratuitos de asistencia lingüística. Suzanne al [phone].    We comply with applicable federal civil rights laws and [place] laws. We do not discriminate on the basis of race, color, national origin, age, disability, sex, sexual orientation, or gender identity.            Thank you!     Thank you for choosing [facility]  for your care. Our goal is always to provide you with excellent care. Hearing back from our patients is one way we can continue to improve our services. Please take a few minutes to complete the " written survey that you may receive in the mail after your visit with us. Thank you!             Your Updated Medication List - Protect others around you: Learn how to safely use, store and throw away your medicines at www.disposemymeds.org.          This list is accurate as of 8/14/18 11:59 PM.  Always use your most recent med list.                   Brand Name Dispense Instructions for use Diagnosis    carvedilol 12.5 MG tablet    COREG    180 tablet    Take 1 tablet (12.5 mg) by mouth 2 times daily (with meals)    Primary cardiomyopathy (H)       CYCLOBENZAPRINE HCL PO      Take 5 mg by mouth At Bedtime Taking PRN        diphenhydrAMINE-acetaminophen  MG tablet    TYLENOL PM     Take 1 tablet by mouth nightly as needed for sleep        doxycycline 100 MG capsule    VIBRAMYCIN    20 capsule    Take 1 capsule (100 mg) by mouth 2 times daily    ICD (implantable cardioverter-defibrillator), biventricular, in situ       FLOMAX 0.4 MG capsule   Generic drug:  tamsulosin      Take 0.4 mg by mouth daily        isosorbide mononitrate 60 MG 24 hr tablet    IMDUR     Take 60 mg by mouth daily        PEPCID PO      Take 20 mg by mouth 2 times daily        PLAVIX PO      Take 75 mg by mouth daily        PRILOSEC PO      Take 20 mg by mouth daily        sacubitril-valsartan  MG per tablet    ENTRESTO    180 tablet    Take 1 tablet by mouth 2 times daily    Chronic systolic heart failure (H)       simvastatin 10 MG tablet    ZOCOR    90 tablet    Take 1 tablet (10 mg) by mouth At Bedtime    Hypercholesterolemia       sodium chloride 5 % ophthalmic solution    SHLEL 128     Place 1 drop into both eyes At Bedtime

## 2018-08-14 NOTE — PROGRESS NOTES
HPI and Plan:   See dictation  [id number]  Orders Placed This Encounter   Procedures     X-ray Chest 2 vws*     EKG 12-lead complete w/read - Clinics (performed today)     EKG 12-lead complete w/read - Clinics (performed today)     Echocardiogram       No orders of the defined types were placed in this encounter.      There are no discontinued medications.      Encounter Diagnoses   Name Primary?     ICD (implantable cardioverter-defibrillator), biventricular, in situ      PVC's (premature ventricular contractions) Yes     Dilated cardiomyopathy (H)        CURRENT MEDICATIONS:  Current Outpatient Prescriptions   Medication Sig Dispense Refill     carvedilol (COREG) 12.5 MG tablet Take 1 tablet (12.5 mg) by mouth 2 times daily (with meals) 180 tablet 3     Clopidogrel Bisulfate (PLAVIX PO) Take 75 mg by mouth daily       CYCLOBENZAPRINE HCL PO Take 5 mg by mouth At Bedtime Taking PRN       diphenhydrAMINE-acetaminophen (TYLENOL PM)  MG tablet Take 1 tablet by mouth nightly as needed for sleep       Famotidine (PEPCID PO) Take 20 mg by mouth 2 times daily       isosorbide mononitrate (IMDUR) 60 MG 24 hr tablet Take 60 mg by mouth daily       Omeprazole (PRILOSEC PO) Take 20 mg by mouth daily       sacubitril-valsartan (ENTRESTO)  MG per tablet Take 1 tablet by mouth 2 times daily 180 tablet 3     simvastatin (ZOCOR) 10 MG tablet Take 1 tablet (10 mg) by mouth At Bedtime 90 tablet 3     sodium chloride (SHELL 128) 5 % ophthalmic solution Place 1 drop into both eyes At Bedtime       tamsulosin (FLOMAX) 0.4 MG 24 hr capsule Take 0.4 mg by mouth daily       doxycycline (VIBRAMYCIN) 100 MG capsule Take 1 capsule (100 mg) by mouth 2 times daily 20 capsule 0       ALLERGIES     Allergies   Allergen Reactions     Atorvastatin      Muscle aches     Diphenhydramine      Metoprolol Succinate [Metoprolol]      Strawberry      Sulfa Drugs        PAST MEDICAL HISTORY:  Past Medical History:   Diagnosis Date     CAD (coronary  "artery disease)      Congestive heart failure (H)      Heart disease     non-ischemic cardiomyopathy     HTN (hypertension)      Idiopathic cardiomyopathy (H)        PAST SURGICAL HISTORY:  Past Surgical History:   Procedure Laterality Date     EYE SURGERY  2006    cataract     ORTHOPEDIC SURGERY  01/2018    pinning of right hip       FAMILY HISTORY:  Family History   Problem Relation Age of Onset     HEART DISEASE Mother 88     mi     Cerebrovascular Disease Father 71       SOCIAL HISTORY:  Social History     Social History     Marital status:      Spouse name: N/A     Number of children: N/A     Years of education: N/A     Social History Main Topics     Smoking status: Never Smoker     Smokeless tobacco: Never Used     Alcohol use No     Drug use: No     Sexual activity: Not Asked     Other Topics Concern     Caffeine Concern No     Sleep Concern No     Weight Concern No     Special Diet No     Exercise Yes     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 No     Social History Narrative       Review of Systems:  Skin:  Negative       Eyes:  Positive for glasses    ENT:  Negative hearing loss    Respiratory:  Negative dyspnea on exertion     Cardiovascular:  Negative;edema;palpitations;chest pain;fatigue;dizziness      Gastroenterology: Negative diarrhea    Genitourinary:  Negative prostate problem;nocturia enlarged prostate  Musculoskeletal:  Positive for arthritis  arthrits in the knees  Neurologic:  Negative numbness or tingling of feet numbness on toes   Psychiatric:  Negative      Heme/Lymph/Imm:  Positive for allergies    Endocrine:  Negative        Physical Exam:  Vitals: /60  Pulse 52  Ht 1.702 m (5' 7.01\")  Wt 74.4 kg (164 lb)  BMI 25.68 kg/m2    Constitutional:  cooperative, alert and oriented, well developed, well nourished, in no acute distress        Skin:  warm and dry to the touch, no apparent skin lesions or masses noted          Head:  normocephalic, " no masses or lesions        Eyes:  pupils equal and round, conjunctivae and lids unremarkable, sclera white, no xanthalasma, EOMS intact, no nystagmus        Lymph:No Cervical lymphadenopathy present     ENT:           Neck:  carotid pulses are full and equal bilaterally, JVP normal, no carotid bruit        Respiratory:  normal breath sounds, clear to auscultation, normal A-P diameter, normal symmetry, normal respiratory excursion, no use of accessory muscles         Cardiac: regular rhythm, normal S1/S2, no S3 or S4, apical impulse not displaced, no murmurs, gallops or rubs                pulses full and equal, no bruits auscultated                                        GI:  abdomen soft, non-tender, BS normoactive, no mass, no HSM, no bruits        Extremities and Muscular Skeletal:  no deformities, clubbing, cyanosis, erythema observed              Neurological:  no gross motor deficits        Psych:  Alert and Oriented x 3        CC  Ney Ross Hardin MD  [address]  [place] [address]

## 2018-08-14 NOTE — LETTER
8/14/2018    Jaden Butts MD  [facility] [address] [facility] [address]    RE: Katie Jadeparag       Dear Colleague,    I had the pleasure of seeing Katie Shannon in the [facility].    HPI and Plan:   See dictation  [id number]  Orders Placed This Encounter   Procedures     X-ray Chest 2 vws*     EKG 12-lead complete w/read - Clinics (performed today)     EKG 12-lead complete w/read - Clinics (performed today)     Echocardiogram       No orders of the defined types were placed in this encounter.      There are no discontinued medications.      Encounter Diagnoses   Name Primary?     ICD (implantable cardioverter-defibrillator), biventricular, in situ      PVC's (premature ventricular contractions) Yes     Dilated cardiomyopathy (H)        CURRENT MEDICATIONS:  Current Outpatient Prescriptions   Medication Sig Dispense Refill     carvedilol (COREG) 12.5 MG tablet Take 1 tablet (12.5 mg) by mouth 2 times daily (with meals) 180 tablet 3     Clopidogrel Bisulfate (PLAVIX PO) Take 75 mg by mouth daily       CYCLOBENZAPRINE HCL PO Take 5 mg by mouth At Bedtime Taking PRN       diphenhydrAMINE-acetaminophen (TYLENOL PM)  MG tablet Take 1 tablet by mouth nightly as needed for sleep       Famotidine (PEPCID PO) Take 20 mg by mouth 2 times daily       isosorbide mononitrate (IMDUR) 60 MG 24 hr tablet Take 60 mg by mouth daily       Omeprazole (PRILOSEC PO) Take 20 mg by mouth daily       sacubitril-valsartan (ENTRESTO)  MG per tablet Take 1 tablet by mouth 2 times daily 180 tablet 3     simvastatin (ZOCOR) 10 MG tablet Take 1 tablet (10 mg) by mouth At Bedtime 90 tablet 3     sodium chloride (SHELL 128) 5 % ophthalmic solution Place 1 drop into both eyes At Bedtime       tamsulosin (FLOMAX) 0.4 MG 24 hr capsule Take 0.4 mg by mouth daily       doxycycline (VIBRAMYCIN) 100 MG capsule Take 1 capsule (100 mg) by mouth 2 times daily 20 capsule 0       ALLERGIES    "  Allergies   Allergen Reactions     Atorvastatin      Muscle aches     Diphenhydramine      Metoprolol Succinate [Metoprolol]      Strawberry      Sulfa Drugs        PAST MEDICAL HISTORY:  Past Medical History:   Diagnosis Date     CAD (coronary artery disease)      Congestive heart failure (H)      Heart disease     non-ischemic cardiomyopathy     HTN (hypertension)      Idiopathic cardiomyopathy (H)        PAST SURGICAL HISTORY:  Past Surgical History:   Procedure Laterality Date     EYE SURGERY  2006    cataract     ORTHOPEDIC SURGERY  01/2018    pinning of right hip       FAMILY HISTORY:  Family History   Problem Relation Age of Onset     HEART DISEASE Mother 88     mi     Cerebrovascular Disease Father 71       SOCIAL HISTORY:  Social History     Social History     Marital status:      Spouse name: N/A     Number of children: N/A     Years of education: N/A     Social History Main Topics     Smoking status: Never Smoker     Smokeless tobacco: Never Used     Alcohol use No     Drug use: No     Sexual activity: Not Asked     Other Topics Concern     Caffeine Concern No     Sleep Concern No     Weight Concern No     Special Diet No     Exercise Yes     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 No     Social History Narrative       Review of Systems:  Skin:  Negative       Eyes:  Positive for glasses    ENT:  Negative hearing loss    Respiratory:  Negative dyspnea on exertion     Cardiovascular:  Negative;edema;palpitations;chest pain;fatigue;dizziness      Gastroenterology: Negative diarrhea    Genitourinary:  Negative prostate problem;nocturia enlarged prostate  Musculoskeletal:  Positive for arthritis  arthrits in the knees  Neurologic:  Negative numbness or tingling of feet numbness on toes   Psychiatric:  Negative      Heme/Lymph/Imm:  Positive for allergies    Endocrine:  Negative        Physical Exam:  Vitals: /60  Pulse 52  Ht 1.702 m (5' 7.01\")  Wt 74.4 " kg (164 lb)  BMI 25.68 kg/m2    Constitutional:  cooperative, alert and oriented, well developed, well nourished, in no acute distress        Skin:  warm and dry to the touch, no apparent skin lesions or masses noted          Head:  normocephalic, no masses or lesions        Eyes:  pupils equal and round, conjunctivae and lids unremarkable, sclera white, no xanthalasma, EOMS intact, no nystagmus        Lymph:No Cervical lymphadenopathy present     ENT:           Neck:  carotid pulses are full and equal bilaterally, JVP normal, no carotid bruit        Respiratory:  normal breath sounds, clear to auscultation, normal A-P diameter, normal symmetry, normal respiratory excursion, no use of accessory muscles         Cardiac: regular rhythm, normal S1/S2, no S3 or S4, apical impulse not displaced, no murmurs, gallops or rubs                pulses full and equal, no bruits auscultated                                        GI:  abdomen soft, non-tender, BS normoactive, no mass, no HSM, no bruits        Extremities and Muscular Skeletal:  no deformities, clubbing, cyanosis, erythema observed              Neurological:  no gross motor deficits        Psych:  Alert and Oriented x 3        CC  Ney Marie MD  [address]  [place] [address]                Thank you for allowing me to participate in the care of your patient.      Sincerely,     Ney Marie MD     [facility]    cc:   Ney Marie MD  [address]  [place] [address]

## 2018-08-14 NOTE — LETTER
8/14/2018      Jaden Butts MD  [facility] [address] [address]      RE: Katei Shannon       Dear Colleague,    I had the pleasure of seeing Katie Shannon in the [facility].    Service Date: 08/14/2018      HISTORY OF PRESENT ILLNESS:  Thank you for allowing me to participate in the care of your delightful patient.  As you know, Katie is an 83-year-old gentleman with history of nonischemic cardiomyopathy with a left bundle branch block.  His ejection fraction had dipped down from 45% to about 25% on optimal medical therapy.  His functional class, however, remains excellent, [place] class I-II.  I was asked to see the patient to implant a CRT with defibrillator by my partner, Dr. Ayala, who has been the patient's primary cardiologist.  This procedure took place this past March.        Unfortunately, the patient does not have a suitable CS anatomy to place an LV lead.  In light of that, I placed a His bundle lead with excellent result.  Unfortunately, later on it dislodged and required a revision.  The patient is here for followup.      Since then, the patient continues doing quite well.  He has remained very active.  He denies any change in exercise tolerance, and there is no change on his medications recently.  We checked his EKG today, and it demonstrates sinus rhythm with a left bundle branch block.  The last time the device was checked was just a month ago and demonstrated he is BiV pacing 90% of the time, which is His bundle essentially, as the LV lead has a His bundle lead in it.  I did have the device interrogated and checked his His bundle lead, which showed it is capturing but there is no change in the morphology of his left bundle branch block, suggesting that the His bundle lead may have dislodged, too, already.  I would like to obtain a chest x-ray to confirm that.      The patient is quite curious about whether the device has helped to  increase LV function.  If indeed his His bundle lead dislodged all along, it would be hard to make an argument it is helping to improve his function.  I did remind Mr. Man that the device is there to potentially improve survival, and if LV function improved, that would be a bonus.  I encouraged the patient to continue to be active as he can, and we will give him of the result of the chest x-ray.  I will have the patient see Dr. Ayala in a few months with an echocardiogram beforehand.      Should the His bundle dislodge, I may consider revising it 1 more time.      cc:   [place] [address]         JANY LEDESMA MD             D: 2018   T: 2018   MT: VISHAL      Name:     RUPERT MAN   MRN:      [id number]        Account:      [id number]   :      1935           Service Date: 2018      Document: [id number]         Outpatient Encounter Prescriptions as of 2018   Medication Sig Dispense Refill     carvedilol (COREG) 12.5 MG tablet Take 1 tablet (12.5 mg) by mouth 2 times daily (with meals) 180 tablet 3     Clopidogrel Bisulfate (PLAVIX PO) Take 75 mg by mouth daily       CYCLOBENZAPRINE HCL PO Take 5 mg by mouth At Bedtime Taking PRN       diphenhydrAMINE-acetaminophen (TYLENOL PM)  MG tablet Take 1 tablet by mouth nightly as needed for sleep       Famotidine (PEPCID PO) Take 20 mg by mouth 2 times daily       isosorbide mononitrate (IMDUR) 60 MG 24 hr tablet Take 60 mg by mouth daily       Omeprazole (PRILOSEC PO) Take 20 mg by mouth daily       sacubitril-valsartan (ENTRESTO)  MG per tablet Take 1 tablet by mouth 2 times daily 180 tablet 3     simvastatin (ZOCOR) 10 MG tablet Take 1 tablet (10 mg) by mouth At Bedtime 90 tablet 3     sodium chloride (SHELL 128) 5 % ophthalmic solution Place 1 drop into both eyes At Bedtime       tamsulosin (FLOMAX) 0.4 MG 24 hr capsule Take 0.4 mg by mouth  daily       doxycycline (VIBRAMYCIN) 100 MG capsule Take 1 capsule (100 mg) by mouth 2 times daily 20 capsule 0     No facility-administered encounter medications on file as of 8/14/2018.        Again, thank you for allowing me to participate in the care of your patient.      Sincerely,    Ney Marie MD     [facility]

## 2018-08-15 ENCOUNTER — TELEPHONE (OUTPATIENT)
Dept: CARDIOLOGY | Facility: CLINIC | Age: 83
End: 2018-08-15

## 2018-08-15 NOTE — TELEPHONE ENCOUNTER
Pt wife called wondering the results of pt CXR that was done yesterday after Dr Lashawn WOOD.  Per report device was fine, but wanted to review with an EP MD first.  Pt wife stated that they would be leaving, so to just leave a message.  Discussed with Dr Treviño who reviewed CXR and stated that leads look fine.  Messaged left and pt home phone as requested.  JNelsonCHRISTY

## 2018-08-15 NOTE — PROGRESS NOTES
Service Date: 08/14/2018      HISTORY OF PRESENT ILLNESS:  Thank you for allowing me to participate in the care of your delightful patient.  As you know, Katie is an 83-year-old gentleman with history of nonischemic cardiomyopathy with a left bundle branch block.  His ejection fraction had dipped down from 45% to about 25% on optimal medical therapy.  His functional class, however, remains excellent, [place] class I-II.  I was asked to see the patient to implant a CRT with defibrillator by my partner, Dr. Ayala, who has been the patient's primary cardiologist.  This procedure took place this past March.        Unfortunately, the patient does not have a suitable CS anatomy to place an LV lead.  In light of that, I placed a His bundle lead with excellent result.  Unfortunately, later on it dislodged and required a revision.  The patient is here for followup.      Since then, the patient continues doing quite well.  He has remained very active.  He denies any change in exercise tolerance, and there is no change on his medications recently.  We checked his EKG today, and it demonstrates sinus rhythm with a left bundle branch block.  The last time the device was checked was just a month ago and demonstrated he is BiV pacing 90% of the time, which is His bundle essentially, as the LV lead has a His bundle lead in it.  I did have the device interrogated and checked his His bundle lead, which showed it is capturing but there is no change in the morphology of his left bundle branch block, suggesting that the His bundle lead may have dislodged, too, already.  I would like to obtain a chest x-ray to confirm that.      The patient is quite curious about whether the device has helped to increase LV function.  If indeed his His bundle lead dislodged all along, it would be hard to make an argument it is helping to improve his function.  I did remind Mr. Shannon that the device is there to potentially improve survival, and if LV  function improved, that would be a bonus.  I encouraged the patient to continue to be active as he can, and we will give him of the result of the chest x-ray.  I will have the patient see Dr. Ayala in a few months with an echocardiogram beforehand.      Should the His bundle dislodge, I may consider revising it 1 more time.      cc:   [place] [address]         JANY LEDESMA MD             D: 2018   T: 2018   MT: VISHAL      Name:     RUPERT MNA   MRN:      [id number]        Account:      [id number]   :      1935           Service Date: 2018      Document: [id number]

## 2018-10-15 ENCOUNTER — OFFICE VISIT (OUTPATIENT)
Dept: CARDIOLOGY | Facility: CLINIC | Age: 83
End: 2018-10-15
Attending: INTERNAL MEDICINE
Payer: MEDICARE

## 2018-10-15 ENCOUNTER — HOSPITAL ENCOUNTER (OUTPATIENT)
Dept: CARDIOLOGY | Facility: CLINIC | Age: 83
Discharge: HOME | End: 2018-10-15
Attending: INTERNAL MEDICINE | Admitting: INTERNAL MEDICINE
Payer: MEDICARE

## 2018-10-15 VITALS
HEART RATE: 68 BPM | HEIGHT: 67 IN | WEIGHT: 168.9 LBS | BODY MASS INDEX: 26.51 KG/M2 | SYSTOLIC BLOOD PRESSURE: 118 MMHG | DIASTOLIC BLOOD PRESSURE: 60 MMHG

## 2018-10-15 DIAGNOSIS — I25.10 CORONARY ARTERY DISEASE INVOLVING NATIVE CORONARY ARTERY OF NATIVE HEART WITHOUT ANGINA PECTORIS: ICD-10-CM

## 2018-10-15 DIAGNOSIS — I49.3 PVC'S (PREMATURE VENTRICULAR CONTRACTIONS): ICD-10-CM

## 2018-10-15 DIAGNOSIS — I10 ESSENTIAL HYPERTENSION WITH GOAL BLOOD PRESSURE LESS THAN 140/90: ICD-10-CM

## 2018-10-15 DIAGNOSIS — I50.22 CHRONIC SYSTOLIC HEART FAILURE (H): Primary | ICD-10-CM

## 2018-10-15 DIAGNOSIS — E78.00 HYPERCHOLESTEROLEMIA: ICD-10-CM

## 2018-10-15 DIAGNOSIS — I42.0 DILATED CARDIOMYOPATHY (H): ICD-10-CM

## 2018-10-15 PROCEDURE — 93306 TTE W/DOPPLER COMPLETE: CPT | Mod: 26 | Performed by: INTERNAL MEDICINE

## 2018-10-15 PROCEDURE — 99214 OFFICE O/P EST MOD 30 MIN: CPT | Performed by: INTERNAL MEDICINE

## 2018-10-15 PROCEDURE — 93306 TTE W/DOPPLER COMPLETE: CPT

## 2018-10-15 RX ORDER — PREDNISOLONE ACETATE 10 MG/ML
1-2 SUSPENSION/ DROPS OPHTHALMIC 4 TIMES DAILY
Status: ON HOLD | COMMUNITY
End: 2020-01-28

## 2018-10-15 NOTE — PROGRESS NOTES
Service Date: 10/15/2018      HISTORY OF PRESENT ILLNESS:  I had the opportunity to see Mr. Katie Shannon in Cardiology Clinic today at the [facility] in [place] for reevaluation of nonischemic cardiomyopathy and chronic systolic heart failure.        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  Originally, his ejection fraction was about 40%-45%, but it gradually declined down to 25%-30% last spring.  At that point, his medical management was optimized and I felt compelled to offer implantation of a biventricular ICD.  He was having some feeling of congestion or tightness in his chest with shortness of breath during certain types of activities such as shoveling the snow last winter.  For that reason, we decided to go forward with a biventricular ICD.  Unfortunately, we were unable to implant a left ventricular lead.  We attempted to implant a lead in the proximal intraventricular septum as well as the right ventricular apex to stimulate normal synchronous contraction.  However, this may not have continued to be functional after the initial implantation day.  The lead had dislodged immediately after implantation and he went back later the same day for lead revision, but it is not clear that the lead remained in place properly.      However, he underwent an echocardiogram prior to my visit with him today and his left ventricular function does look slightly better.  His ejection fraction was up to 30%-35% on that study, 35% by quantitative analysis.  Indeed, he has continued to be very active.  He has been cutting a lot of branches off of his evergreen trees with a hand saw and not having any difficulty with being exhausted or short of breath.  He does not have any chest pain or lightheadedness.  He has not had any palpitations or syncope.      PHYSICAL EXAMINATION:  His blood pressure is 118/60,  heart rate 68 and weight 168 pounds.  His lungs are clear.  His heart rhythm is regular.  I do not hear any cardiac murmurs or carotid bruits.  He has no edema.      IMPRESSIONS:  Mr. Rupert Shannon is an 83-year-old gentleman with chronic systolic congestive heart failure with an ejection fraction that improved slightly up to about 35% after implantation of an ICD even though we were unable to implant a left ventricular lead and had to attempt a proximal interventricular septal lead in the area of the His bundle.  He continues to feel well with minimal congestive heart failure issues and a well-balanced medication program.  I do not think I will make any changes at this point.  I will have him come back and visit with me again in 6 months for reevaluation and will take a look at his function at that point with an echo.      cc:      [place] [address]         ISIDORO MARI MD, [facility]             D: 10/15/2018   T: 10/15/2018   MT: JENNYFER      Name:     RUPERT SHANNON   MRN:      [id number]        Account:      [id number]   :      1935           Service Date: 10/15/2018      Document: [id number]

## 2018-10-15 NOTE — LETTER
10/15/2018      Jaden Butts MD  [facility]   [address] [address]      RE: Katie Shannon       Dear Colleague,    I had the pleasure of seeing Katie Shannon in the [facility].    Service Date: 10/15/2018      HISTORY OF PRESENT ILLNESS:  I had the opportunity to see Mr. Katie Shannon in Cardiology Clinic today at the [facility] in [place] for reevaluation of nonischemic cardiomyopathy and chronic systolic heart failure.        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  Originally, his ejection fraction was about 40%-45%, but it gradually declined down to 25%-30% last spring.  At that point, his medical management was optimized and I felt compelled to offer implantation of a biventricular ICD.  He was having some feeling of congestion or tightness in his chest with shortness of breath during certain types of activities such as shoveling the snow last winter.  For that reason, we decided to go forward with a biventricular ICD.  Unfortunately, we were unable to implant a left ventricular lead.  We attempted to implant a lead in the proximal intraventricular septum as well as the right ventricular apex to stimulate normal synchronous contraction.  However, this may not have continued to be functional after the initial implantation day.  The lead had dislodged immediately after implantation and he went back later the same day for lead revision, but it is not clear that the lead remained in place properly.      However, he underwent an echocardiogram prior to my visit with him today and his left ventricular function does look slightly better.  His ejection fraction was up to 30%-35% on that study, 35% by quantitative analysis.  Indeed, he has continued to be very active.  He has been cutting a lot of branches off of his evergreen  trees with a hand saw and not having any difficulty with being exhausted or short of breath.  He does not have any chest pain or lightheadedness.  He has not had any palpitations or syncope.      PHYSICAL EXAMINATION:  His blood pressure is 118/60, heart rate 68 and weight 168 pounds.  His lungs are clear.  His heart rhythm is regular.  I do not hear any cardiac murmurs or carotid bruits.  He has no edema.      IMPRESSIONS:  Mr. Rupert Shannon is an 83-year-old gentleman with chronic systolic congestive heart failure with an ejection fraction that improved slightly up to about 35% after implantation of an ICD even though we were unable to implant a left ventricular lead and had to attempt a proximal interventricular septal lead in the area of the His bundle.  He continues to feel well with minimal congestive heart failure issues and a well-balanced medication program.  I do not think I will make any changes at this point.  I will have him come back and visit with me again in 6 months for reevaluation and will take a look at his function at that point with an echo.      cc:      [place] [address]         ISIDORO MARI MD, [facility]             D: 10/15/2018   T: 10/15/2018   MT: JENNYFER      Name:     RUPERT SHANNON   MRN:      [id number]        Account:      [id number]   :      1935           Service Date: 10/15/2018      Document: [id number]         Outpatient Encounter Prescriptions as of 10/15/2018   Medication Sig Dispense Refill     carvedilol (COREG) 12.5 MG tablet Take 1 tablet (12.5 mg) by mouth 2 times daily (with meals) 180 tablet 3     Clopidogrel Bisulfate (PLAVIX PO) Take 75 mg by mouth daily       CYCLOBENZAPRINE HCL PO Take 5 mg by mouth At Bedtime Taking PRN       diphenhydrAMINE-acetaminophen (TYLENOL PM)  MG tablet Take 1 tablet by mouth nightly as needed for sleep       Famotidine (PEPCID PO) Take 20 mg by mouth 2  times daily       isosorbide mononitrate (IMDUR) 60 MG 24 hr tablet Take 60 mg by mouth daily       Omeprazole (PRILOSEC PO) Take 20 mg by mouth daily       polyethylene glycol 400 (BLINK TEARS) 0.25 % SOLN ophthalmic solution 1 drop       prednisoLONE acetate (PRED FORTE) 1 % ophthalmic susp 1-2 drops 4 times daily       sacubitril-valsartan (ENTRESTO)  MG per tablet Take 1 tablet by mouth 2 times daily 180 tablet 3     simvastatin (ZOCOR) 10 MG tablet Take 1 tablet (10 mg) by mouth At Bedtime (Patient taking differently: Take 20 mg by mouth At Bedtime ) 90 tablet 3     sodium chloride (SHELL 128) 5 % ophthalmic solution Place 1 drop into both eyes At Bedtime       tamsulosin (FLOMAX) 0.4 MG 24 hr capsule Take 0.4 mg by mouth daily       [DISCONTINUED] doxycycline (VIBRAMYCIN) 100 MG capsule Take 1 capsule (100 mg) by mouth 2 times daily 20 capsule 0     No facility-administered encounter medications on file as of 10/15/2018.        Again, thank you for allowing me to participate in the care of your patient.      Sincerely,    ISIDORO MARI MD     [facility]

## 2018-10-15 NOTE — LETTER
10/15/2018    Jaden Butts MD  [facility] [address] [facility] [address]    RE: Katie Rasconkerri       Dear Colleague,    I had the pleasure of seeing Katie Shannon in the [facility].    HPI and Plan:   See dictation    Orders Placed This Encounter   Procedures     Basic metabolic panel     Follow-Up with [facility] - Return MD visit     Echocardiogram       Orders Placed This Encounter   Medications     prednisoLONE acetate (PRED FORTE) 1 % ophthalmic susp     Si-2 drops 4 times daily     polyethylene glycol 400 (BLINK TEARS) 0.25 % SOLN ophthalmic solution     Si drop       Medications Discontinued During This Encounter   Medication Reason     doxycycline (VIBRAMYCIN) 100 MG capsule          Encounter Diagnoses   Name Primary?     Chronic systolic heart failure (H) Yes     Dilated cardiomyopathy (H)      Coronary artery disease involving native coronary artery of native heart without angina pectoris      Hypercholesterolemia      Essential hypertension with goal blood pressure less than 140/90      PVC's (premature ventricular contractions)        CURRENT MEDICATIONS:  Current Outpatient Prescriptions   Medication Sig Dispense Refill     carvedilol (COREG) 12.5 MG tablet Take 1 tablet (12.5 mg) by mouth 2 times daily (with meals) 180 tablet 3     Clopidogrel Bisulfate (PLAVIX PO) Take 75 mg by mouth daily       CYCLOBENZAPRINE HCL PO Take 5 mg by mouth At Bedtime Taking PRN       diphenhydrAMINE-acetaminophen (TYLENOL PM)  MG tablet Take 1 tablet by mouth nightly as needed for sleep       Famotidine (PEPCID PO) Take 20 mg by mouth 2 times daily       isosorbide mononitrate (IMDUR) 60 MG 24 hr tablet Take 60 mg by mouth daily       Omeprazole (PRILOSEC PO) Take 20 mg by mouth daily       polyethylene glycol 400 (BLINK TEARS) 0.25 % SOLN ophthalmic solution 1 drop       prednisoLONE acetate (PRED FORTE) 1 % ophthalmic susp 1-2 drops 4 times daily        sacubitril-valsartan (ENTRESTO)  MG per tablet Take 1 tablet by mouth 2 times daily 180 tablet 3     simvastatin (ZOCOR) 10 MG tablet Take 1 tablet (10 mg) by mouth At Bedtime (Patient taking differently: Take 20 mg by mouth At Bedtime ) 90 tablet 3     sodium chloride (SHELL 128) 5 % ophthalmic solution Place 1 drop into both eyes At Bedtime       tamsulosin (FLOMAX) 0.4 MG 24 hr capsule Take 0.4 mg by mouth daily         ALLERGIES     Allergies   Allergen Reactions     Atorvastatin      Muscle aches     Diphenhydramine      Metoprolol Succinate [Metoprolol]      Strawberry      Sulfa Drugs        PAST MEDICAL HISTORY:  Past Medical History:   Diagnosis Date     CAD (coronary artery disease)      Congestive heart failure (H)      Heart disease     non-ischemic cardiomyopathy     HTN (hypertension)      Idiopathic cardiomyopathy (H)        PAST SURGICAL HISTORY:  Past Surgical History:   Procedure Laterality Date     EYE SURGERY  2006    cataract     ORTHOPEDIC SURGERY  01/2018    pinning of right hip       FAMILY HISTORY:  Family History   Problem Relation Age of Onset     HEART DISEASE Mother 88     mi     Cerebrovascular Disease Father 71       SOCIAL HISTORY:  Social History     Social History     Marital status:      Spouse name: N/A     Number of children: N/A     Years of education: N/A     Social History Main Topics     Smoking status: Never Smoker     Smokeless tobacco: Never Used     Alcohol use No     Drug use: No     Sexual activity: Not Asked     Other Topics Concern     Caffeine Concern No     Sleep Concern No     Weight Concern No     Special Diet No     Exercise Yes     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 No     Social History Narrative       Review of Systems:  Skin:  Negative       Eyes:  Positive for glasses    ENT:  Negative      Respiratory:  Negative       Cardiovascular:  Negative      Gastroenterology: Negative      Genitourinary:   "Negative      Musculoskeletal:  Positive for arthritis  arthrits in the knees  Neurologic:  Positive for numbness or tingling of feet numbness on toes   Psychiatric:  Negative      Heme/Lymph/Imm:  Positive for allergies    Endocrine:  Negative        Physical Exam:  Vitals: /60  Pulse 68  Ht 1.702 m (5' 7\")  Wt 76.6 kg (168 lb 14.4 oz)  BMI 26.45 kg/m2    Constitutional:  cooperative, alert and oriented, well developed, well nourished, in no acute distress        Skin:  warm and dry to the touch, no apparent skin lesions or masses noted          Head:  normocephalic, no masses or lesions        Eyes:  pupils equal and round, conjunctivae and lids unremarkable, sclera white, no xanthalasma, EOMS intact, no nystagmus        Lymph:      ENT:  no pallor or cyanosis, dentition good        Neck:  carotid pulses are full and equal bilaterally, JVP normal, no carotid bruit        Respiratory:  normal breath sounds, clear to auscultation, normal A-P diameter, normal symmetry, normal respiratory excursion, no use of accessory muscles         Cardiac: regular rhythm, normal S1/S2, no S3 or S4, apical impulse not displaced, no murmurs, gallops or rubs             PVCs  pulses full and equal, no bruits auscultated;pulses full and equal                                        GI:  abdomen soft;BS normoactive        Extremities and Muscular Skeletal:  no deformities, clubbing, cyanosis, erythema observed;no edema              Neurological:  no gross motor deficits;affect appropriate        Psych:  Alert and Oriented x 3        CC  Rah Ayala MD  [address]  [place] [address]                Thank you for allowing me to participate in the care of your patient.      Sincerely,     RAH AYALA MD     [facility]    cc:   Rah Ayala MD  [address]  [place] [address]        "

## 2018-10-15 NOTE — PROGRESS NOTES
HPI and Plan:   See dictation    Orders Placed This Encounter   Procedures     Basic metabolic panel     [facility] - Return MD visit     Echocardiogram       Orders Placed This Encounter   Medications     prednisoLONE acetate (PRED FORTE) 1 % ophthalmic susp     Si-2 drops 4 times daily     polyethylene glycol 400 (BLINK TEARS) 0.25 % SOLN ophthalmic solution     Si drop       Medications Discontinued During This Encounter   Medication Reason     doxycycline (VIBRAMYCIN) 100 MG capsule          Encounter Diagnoses   Name Primary?     Chronic systolic heart failure (H) Yes     Dilated cardiomyopathy (H)      Coronary artery disease involving native coronary artery of native heart without angina pectoris      Hypercholesterolemia      Essential hypertension with goal blood pressure less than 140/90      PVC's (premature ventricular contractions)        CURRENT MEDICATIONS:  Current Outpatient Prescriptions   Medication Sig Dispense Refill     carvedilol (COREG) 12.5 MG tablet Take 1 tablet (12.5 mg) by mouth 2 times daily (with meals) 180 tablet 3     Clopidogrel Bisulfate (PLAVIX PO) Take 75 mg by mouth daily       CYCLOBENZAPRINE HCL PO Take 5 mg by mouth At Bedtime Taking PRN       diphenhydrAMINE-acetaminophen (TYLENOL PM)  MG tablet Take 1 tablet by mouth nightly as needed for sleep       Famotidine (PEPCID PO) Take 20 mg by mouth 2 times daily       isosorbide mononitrate (IMDUR) 60 MG 24 hr tablet Take 60 mg by mouth daily       Omeprazole (PRILOSEC PO) Take 20 mg by mouth daily       polyethylene glycol 400 (BLINK TEARS) 0.25 % SOLN ophthalmic solution 1 drop       prednisoLONE acetate (PRED FORTE) 1 % ophthalmic susp 1-2 drops 4 times daily       sacubitril-valsartan (ENTRESTO)  MG per tablet Take 1 tablet by mouth 2 times daily 180 tablet 3     simvastatin (ZOCOR) 10 MG tablet Take 1 tablet (10 mg) by mouth At Bedtime (Patient taking differently: Take 20 mg by mouth At  Bedtime ) 90 tablet 3     sodium chloride (SHELL 128) 5 % ophthalmic solution Place 1 drop into both eyes At Bedtime       tamsulosin (FLOMAX) 0.4 MG 24 hr capsule Take 0.4 mg by mouth daily         ALLERGIES     Allergies   Allergen Reactions     Atorvastatin      Muscle aches     Diphenhydramine      Metoprolol Succinate [Metoprolol]      Strawberry      Sulfa Drugs        PAST MEDICAL HISTORY:  Past Medical History:   Diagnosis Date     CAD (coronary artery disease)      Congestive heart failure (H)      Heart disease     non-ischemic cardiomyopathy     HTN (hypertension)      Idiopathic cardiomyopathy (H)        PAST SURGICAL HISTORY:  Past Surgical History:   Procedure Laterality Date     EYE SURGERY  2006    cataract     ORTHOPEDIC SURGERY  01/2018    pinning of right hip       FAMILY HISTORY:  Family History   Problem Relation Age of Onset     HEART DISEASE Mother 88     mi     Cerebrovascular Disease Father 71       SOCIAL HISTORY:  Social History     Social History     Marital status:      Spouse name: N/A     Number of children: N/A     Years of education: N/A     Social History Main Topics     Smoking status: Never Smoker     Smokeless tobacco: Never Used     Alcohol use No     Drug use: No     Sexual activity: Not Asked     Other Topics Concern     Caffeine Concern No     Sleep Concern No     Weight Concern No     Special Diet No     Exercise Yes     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 No     Social History Narrative       Review of Systems:  Skin:  Negative       Eyes:  Positive for glasses    ENT:  Negative      Respiratory:  Negative       Cardiovascular:  Negative      Gastroenterology: Negative      Genitourinary:  Negative      Musculoskeletal:  Positive for arthritis  arthrits in the knees  Neurologic:  Positive for numbness or tingling of feet numbness on toes   Psychiatric:  Negative      Heme/Lymph/Imm:  Positive for allergies    Endocrine:   "Negative        Physical Exam:  Vitals: /60  Pulse 68  Ht 1.702 m (5' 7\")  Wt 76.6 kg (168 lb 14.4 oz)  BMI 26.45 kg/m2    Constitutional:  cooperative, alert and oriented, well developed, well nourished, in no acute distress        Skin:  warm and dry to the touch, no apparent skin lesions or masses noted          Head:  normocephalic, no masses or lesions        Eyes:  pupils equal and round, conjunctivae and lids unremarkable, sclera white, no xanthalasma, EOMS intact, no nystagmus        Lymph:      ENT:  no pallor or cyanosis, dentition good        Neck:  carotid pulses are full and equal bilaterally, JVP normal, no carotid bruit        Respiratory:  normal breath sounds, clear to auscultation, normal A-P diameter, normal symmetry, normal respiratory excursion, no use of accessory muscles         Cardiac: regular rhythm, normal S1/S2, no S3 or S4, apical impulse not displaced, no murmurs, gallops or rubs             PVCs  pulses full and equal, no bruits auscultated;pulses full and equal                                        GI:  abdomen soft;BS normoactive        Extremities and Muscular Skeletal:  no deformities, clubbing, cyanosis, erythema observed;no edema              Neurological:  no gross motor deficits;affect appropriate        Psych:  Alert and Oriented x 3        CC  Rah Ayala MD  [address]  [place] [id number]              "

## 2018-10-15 NOTE — MR AVS SNAPSHOT
After Visit Summary   10/15/2018    Katie Shannon    MRN: [id number]           Patient Information     Date Of Birth          1935        Visit Information        Provider Department      10/15/2018 3:45 PM Rah Ayala MD [facility]        Today's Diagnoses     Chronic systolic heart failure (H)    -  1    Dilated cardiomyopathy (H)        Coronary artery disease involving native coronary artery of native heart without angina pectoris        Hypercholesterolemia        Essential hypertension with goal blood pressure less than 140/90        PVC's (premature ventricular contractions)           Follow-ups after your visit        Additional Services     [facility] - Return MD visit                 Your next 10 appointments already scheduled     Oct 16, 2018  4:30 PM CDT   Remote ICD Check with BOYD DCR2   [facility] ([facility] [place] [facility])    [address] [place] [id number] [phone] OPT 2           This appointment is for a remote check of your debrillator.  This is not an appointment at the office.              Future tests that were ordered for you today     Open Future Orders        Priority Expected Expires Ordered    [facility] - Return MD visit Routine 4/13/2019 10/15/2019 10/15/2018    Basic metabolic panel Routine 4/13/2019 10/15/2019 10/15/2018    Echocardiogram Routine 4/13/2019 10/15/2019 10/15/2018            Who to contact     If you have questions or need follow up information about today's clinic visit or your schedule please contact [facility] directly at [phone].  Normal or non-critical lab and imaging results will be communicated to you by MyChart, letter or phone within 4 business days after the clinic has received the results. If you do not hear from us within 7 days, please contact the clinic through  "MyChart or phone. If you have a critical or abnormal lab result, we will notify you by phone as soon as possible.  Submit refill requests through [employer] or call your pharmacy and they will forward the refill request to us. Please allow 3 business days for your refill to be completed.          Additional Information About Your Visit        Care EveryWhere ID     This is your Care EveryWhere ID. This could be used by other organizations to access your [place] medical records  [id number]        Your Vitals Were     Pulse Height BMI (Body Mass Index)             68 1.702 m (5' 7\") 26.45 kg/m2          Blood Pressure from Last 3 Encounters:   10/15/18 118/60   08/14/18 126/60   04/30/18 159/81    Weight from Last 3 Encounters:   10/15/18 76.6 kg (168 lb 14.4 oz)   08/14/18 74.4 kg (164 lb)   04/30/18 72.8 kg (160 lb 8 oz)              We Performed the Following     [facility] - Return MD visit          Today's Medication Changes          These changes are accurate as of 10/15/18  4:35 PM.  If you have any questions, ask your nurse or doctor.               These medicines have changed or have updated prescriptions.        Dose/Directions    simvastatin 10 MG tablet   Commonly known as:  ZOCOR   This may have changed:  how much to take   Used for:  Hypercholesterolemia        Dose:  10 mg   Take 1 tablet (10 mg) by mouth At Bedtime   Quantity:  90 tablet   Refills:  3         Stop taking these medicines if you haven't already. Please contact your care team if you have questions.     doxycycline 100 MG capsule   Commonly known as:  VIBRAMYCIN   Stopped by:  Rah Ayala MD                    Primary Care Provider Office Phone # Fax #    Jaden Butts [phone]       [address] [address]        Equal Access to Services     SHEREE SUBRAMANIAN AH: Live Hernandez, michael zarate, paul almaraz, baldev mckeon " scarlett maldonado ah. So [facility] [phone].    ATENCIÓN: Si franco león, tiene a howell disposición servicios gratuitos de asistencia lingüística. Suzanne bentley [phone].    We comply with applicable federal civil rights laws and [place] laws. We do not discriminate on the basis of race, color, national origin, age, disability, sex, sexual orientation, or gender identity.            Thank you!     Thank you for choosing [facility]  for your care. Our goal is always to provide you with excellent care. Hearing back from our patients is one way we can continue to improve our services. Please take a few minutes to complete the written survey that you may receive in the mail after your visit with us. Thank you!             Your Updated Medication List - Protect others around you: Learn how to safely use, store and throw away your medicines at www.disposemymeds.org.          This list is accurate as of 10/15/18  4:35 PM.  Always use your most recent med list.                   Brand Name Dispense Instructions for use Diagnosis    BLINK TEARS 0.25 % Soln ophthalmic solution   Generic drug:  polyethylene glycol 400      1 drop        carvedilol 12.5 MG tablet    COREG    180 tablet    Take 1 tablet (12.5 mg) by mouth 2 times daily (with meals)    Primary cardiomyopathy (H)       CYCLOBENZAPRINE HCL PO      Take 5 mg by mouth At Bedtime Taking PRN        diphenhydrAMINE-acetaminophen  MG tablet    TYLENOL PM     Take 1 tablet by mouth nightly as needed for sleep        FLOMAX 0.4 MG capsule   Generic drug:  tamsulosin      Take 0.4 mg by mouth daily        isosorbide mononitrate 60 MG 24 hr tablet    IMDUR     Take 60 mg by mouth daily        PEPCID PO      Take 20 mg by mouth 2 times daily        PLAVIX PO      Take 75 mg by mouth daily        prednisoLONE acetate 1 % ophthalmic susp    PRED FORTE     1-2 drops 4 times daily        PRILOSEC PO      Take 20 mg by mouth daily         sacubitril-valsartan  MG per tablet    ENTRESTO    180 tablet    Take 1 tablet by mouth 2 times daily    Chronic systolic heart failure (H)       simvastatin 10 MG tablet    ZOCOR    90 tablet    Take 1 tablet (10 mg) by mouth At Bedtime    Hypercholesterolemia       sodium chloride 5 % ophthalmic solution    SHELL 128     Place 1 drop into both eyes At Bedtime

## 2018-10-17 ENCOUNTER — ALLIED HEALTH/NURSE VISIT (OUTPATIENT)
Dept: CARDIOLOGY | Facility: CLINIC | Age: 83
End: 2018-10-17
Payer: MEDICARE

## 2018-10-17 DIAGNOSIS — Z95.810 ICD (IMPLANTABLE CARDIOVERTER-DEFIBRILLATOR), BIVENTRICULAR, IN SITU: ICD-10-CM

## 2018-10-17 DIAGNOSIS — I42.9 PRIMARY CARDIOMYOPATHY (H): Primary | ICD-10-CM

## 2018-10-17 PROCEDURE — 93295 DEV INTERROG REMOTE 1/2/MLT: CPT | Performed by: INTERNAL MEDICINE

## 2018-10-17 PROCEDURE — 93296 REM INTERROG EVL PM/IDS: CPT | Performed by: INTERNAL MEDICINE

## 2018-10-17 NOTE — MR AVS SNAPSHOT
After Visit Summary   10/17/2018    Katie Shannon    MRN: [id number]           Patient Information     Date Of Birth          1935        Visit Information        Provider Department      10/17/2018 4:30 PM BOYD DCR2 [facility]        Today's Diagnoses     Primary cardiomyopathy (H)    -  1    ICD (implantable cardioverter-defibrillator), biventricular, in situ           Follow-ups after your visit        Your next 10 appointments already scheduled     Oct 17, 2018  4:30 PM CDT   Remote ICD Check with BOYD DCR2   [facility] ([facility])    [address] [id number] [phone] OPT 2           This appointment is for a remote check of your debrillator.  This is not an appointment at the office.            Feb 11, 2019  4:30 PM CST   Remote ICD Check with BOYD DCR2   [facility] ([facility])    [address] [id number] [phone] OPT 2           This appointment is for a remote check of your debrillator.  This is not an appointment at the office.              Who to contact     If you have questions or need follow up information about today's clinic visit or your schedule please contact [facility] directly at [phone].  Normal or non-critical lab and imaging results will be communicated to you by MyChart, letter or phone within 4 business days after the clinic has received the results. If you do not hear from us within 7 days, please contact the clinic through MyChart or phone. If you have a critical or abnormal lab result, we will notify you by phone as soon as possible.  Submit refill requests through [employer] or call your pharmacy and they will forward the refill request to us. Please allow 3 business days for your refill to be completed.          Additional Information  About Your Visit        Care EveryWhere ID     This is your Care EveryWhere ID. This could be used by other organizations to access your [place] medical records  [id number]         Blood Pressure from Last 3 Encounters:   10/15/18 118/60   08/14/18 126/60   04/30/18 159/81    Weight from Last 3 Encounters:   10/15/18 76.6 kg (168 lb 14.4 oz)   08/14/18 74.4 kg (164 lb)   04/30/18 72.8 kg (160 lb 8 oz)              We Performed the Following     ICD DEVICE INTERROGAT REMOTE (34262)     INTERROGATION DEVICE EVAL REMOTE, PACER/ICD (72510)          Today's Medication Changes          These changes are accurate as of 10/17/18 11:16 AM.  If you have any questions, ask your nurse or doctor.               These medicines have changed or have updated prescriptions.        Dose/Directions    simvastatin 10 MG tablet   Commonly known as:  ZOCOR   This may have changed:  how much to take   Used for:  Hypercholesterolemia        Dose:  10 mg   Take 1 tablet (10 mg) by mouth At Bedtime   Quantity:  90 tablet   Refills:  3                Primary Care Provider Office Phone # Fax #    Jaden Butts [phone]       [place] [address]        Equal Access to Services     SHEREE SUBRAMANIAN AH: Hadii golden cumimns hadasho Soomaali, waaxda luqadaha, qaybta kaalmada adeegyada, waxay pham bean. So [facility] [phone].    ATENCIÓN: Si habla español, tiene a howell disposición servicios gratuitos de asistencia lingüística. Llame al [phone].    We comply with applicable federal civil rights laws and [place] laws. We do not discriminate on the basis of race, color, national origin, age, disability, sex, sexual orientation, or gender identity.            Thank you!     Thank you for choosing [facility]  for your care. Our goal is always to provide you with excellent care. Hearing back from our patients is one way we can continue  to improve our services. Please take a few minutes to complete the written survey that you may receive in the mail after your visit with us. Thank you!             Your Updated Medication List - Protect others around you: Learn how to safely use, store and throw away your medicines at www.disposemymeds.org.          This list is accurate as of 10/17/18 11:16 AM.  Always use your most recent med list.                   Brand Name Dispense Instructions for use Diagnosis    BLINK TEARS 0.25 % Soln ophthalmic solution   Generic drug:  polyethylene glycol 400      1 drop        carvedilol 12.5 MG tablet    COREG    180 tablet    Take 1 tablet (12.5 mg) by mouth 2 times daily (with meals)    Primary cardiomyopathy (H)       CYCLOBENZAPRINE HCL PO      Take 5 mg by mouth At Bedtime Taking PRN        diphenhydrAMINE-acetaminophen  MG tablet    TYLENOL PM     Take 1 tablet by mouth nightly as needed for sleep        FLOMAX 0.4 MG capsule   Generic drug:  tamsulosin      Take 0.4 mg by mouth daily        isosorbide mononitrate 60 MG 24 hr tablet    IMDUR     Take 60 mg by mouth daily        PEPCID PO      Take 20 mg by mouth 2 times daily        PLAVIX PO      Take 75 mg by mouth daily        prednisoLONE acetate 1 % ophthalmic susp    PRED FORTE     1-2 drops 4 times daily        PRILOSEC PO      Take 20 mg by mouth daily        sacubitril-valsartan  MG per tablet    ENTRESTO    180 tablet    Take 1 tablet by mouth 2 times daily    Chronic systolic heart failure (H)       simvastatin 10 MG tablet    ZOCOR    90 tablet    Take 1 tablet (10 mg) by mouth At Bedtime    Hypercholesterolemia       sodium chloride 5 % ophthalmic solution    SHELL 128     Place 1 drop into both eyes At Bedtime

## 2018-10-17 NOTE — PROGRESS NOTES
[facility] [employer] CRT-D ICD Remote Device Check   AP: 22 % : 99 %  Mode: DDD      Presenting Rhythm: AP/ BiV paced  Heart Rate: Stable with adequate variability   Sensing: WNL    Pacing Threshold: Not available.    Impedance: WNL  Battery Status: 6.8 years  Atrial Arrhythmia: 9 episodes of PMT, successfully converted with algorithm.   Ventricular Arrhythmia: 2 ventricular high rate episodes, both with EGMs. One Lasting 14 beats at 179bpm. The other was 13 beats at 174bpm.   ATP: 0    Shocks: 0    Care Plan: Follow up remote in 3 months  Trinidad Roche RN

## 2018-12-18 DIAGNOSIS — I50.22 CHRONIC SYSTOLIC HEART FAILURE (H): ICD-10-CM

## 2019-02-11 ENCOUNTER — ANCILLARY PROCEDURE (OUTPATIENT)
Dept: CARDIOLOGY | Facility: CLINIC | Age: 84
End: 2019-02-11
Payer: MEDICARE

## 2019-02-11 ENCOUNTER — TELEPHONE (OUTPATIENT)
Dept: CARDIOLOGY | Facility: CLINIC | Age: 84
End: 2019-02-11

## 2019-02-11 DIAGNOSIS — I42.9 CARDIOMYOPATHY (H): ICD-10-CM

## 2019-02-11 PROCEDURE — 93294 REM INTERROG EVL PM/LDLS PM: CPT | Performed by: INTERNAL MEDICINE

## 2019-02-11 PROCEDURE — 93296 REM INTERROG EVL PM/IDS: CPT | Performed by: INTERNAL MEDICINE

## 2019-02-11 NOTE — TELEPHONE ENCOUNTER
I was speaking to pt today regarding his ICD remote check. He mentioned that he was concerned about his heart function as he has been having tingling in his fingers  (More left side than right) for the past 2-3 months. He states as he spoke with me on the phone when he had his left arm bent with elbow on recliner his fingers would tingle- as soon as he put his arm down the numbness went away. We talked about the different causes for this (He does and has had tingling and numbness in his toes for years) and that it was not a symptom of low EF. (He believes his heart is not pumping enough blood). He wanted me to run this by Dr Ayala to see if he should be seen sooner. I will review with Dr Ayala what his recommendations for this patient are. SARAH Fisher

## 2019-02-12 NOTE — TELEPHONE ENCOUNTER
Relayed Dr Ayala response to patient regarding th tingling in his fingers. No further questions. NataliaRN          I agree with Cher. This sounds like a simple compression neuropathy. This is not related to heart function. He does not need to be seen unless he has other cardiac issues. EE

## 2019-02-13 LAB
MDC_IDC_EPISODE_DTM: NORMAL
MDC_IDC_EPISODE_DURATION: 8 S
MDC_IDC_EPISODE_ID: NORMAL
MDC_IDC_EPISODE_TYPE: NORMAL
MDC_IDC_EPISODE_VENDOR_TYPE: NORMAL
MDC_IDC_LEAD_IMPLANT_DT: NORMAL
MDC_IDC_LEAD_LOCATION: NORMAL
MDC_IDC_LEAD_LOCATION_DETAIL_1: NORMAL
MDC_IDC_LEAD_MFG: NORMAL
MDC_IDC_LEAD_MODEL: NORMAL
MDC_IDC_LEAD_POLARITY_TYPE: NORMAL
MDC_IDC_LEAD_SERIAL: NORMAL
MDC_IDC_LEAD_SPECIAL_FUNCTION: NORMAL
MDC_IDC_MSMT_BATTERY_DTM: NORMAL
MDC_IDC_MSMT_BATTERY_REMAINING_LONGEVITY: 79 MO
MDC_IDC_MSMT_BATTERY_REMAINING_PERCENTAGE: 87 %
MDC_IDC_MSMT_BATTERY_RRT_TRIGGER: NORMAL
MDC_IDC_MSMT_BATTERY_STATUS: NORMAL
MDC_IDC_MSMT_BATTERY_VOLTAGE: 3.05 V
MDC_IDC_MSMT_CAP_CHARGE_DTM: NORMAL
MDC_IDC_MSMT_CAP_CHARGE_ENERGY: 40 J
MDC_IDC_MSMT_CAP_CHARGE_TIME: 8.6 S
MDC_IDC_MSMT_CAP_CHARGE_TYPE: NORMAL
MDC_IDC_MSMT_LEADCHNL_LV_IMPEDANCE_VALUE: 410 OHM
MDC_IDC_MSMT_LEADCHNL_LV_LEAD_CHANNEL_STATUS: NORMAL
MDC_IDC_MSMT_LEADCHNL_LV_PACING_THRESHOLD_AMPLITUDE: 1 V
MDC_IDC_MSMT_LEADCHNL_LV_PACING_THRESHOLD_PULSEWIDTH: 0.5 MS
MDC_IDC_MSMT_LEADCHNL_RA_IMPEDANCE_VALUE: 440 OHM
MDC_IDC_MSMT_LEADCHNL_RA_LEAD_CHANNEL_STATUS: NORMAL
MDC_IDC_MSMT_LEADCHNL_RA_PACING_THRESHOLD_AMPLITUDE: 0.5 V
MDC_IDC_MSMT_LEADCHNL_RA_PACING_THRESHOLD_PULSEWIDTH: 0.5 MS
MDC_IDC_MSMT_LEADCHNL_RA_SENSING_INTR_AMPL: 4.3 MV
MDC_IDC_MSMT_LEADCHNL_RV_IMPEDANCE_VALUE: 440 OHM
MDC_IDC_MSMT_LEADCHNL_RV_LEAD_CHANNEL_STATUS: NORMAL
MDC_IDC_MSMT_LEADCHNL_RV_PACING_THRESHOLD_AMPLITUDE: 1 V
MDC_IDC_MSMT_LEADCHNL_RV_PACING_THRESHOLD_PULSEWIDTH: 0.5 MS
MDC_IDC_MSMT_LEADCHNL_RV_SENSING_INTR_AMPL: 12 MV
MDC_IDC_PG_IMPLANT_DTM: NORMAL
MDC_IDC_PG_MFG: NORMAL
MDC_IDC_PG_MODEL: NORMAL
MDC_IDC_PG_SERIAL: NORMAL
MDC_IDC_PG_TYPE: NORMAL
MDC_IDC_SESS_CLINIC_NAME: NORMAL
MDC_IDC_SESS_DTM: NORMAL
MDC_IDC_SESS_REPROGRAMMED: NO
MDC_IDC_SESS_TYPE: NORMAL
MDC_IDC_SET_BRADY_AT_MODE_SWITCH_MODE: NORMAL
MDC_IDC_SET_BRADY_AT_MODE_SWITCH_RATE: 170 {BEATS}/MIN
MDC_IDC_SET_BRADY_LOWRATE: 60 {BEATS}/MIN
MDC_IDC_SET_BRADY_MAX_SENSOR_RATE: 120 {BEATS}/MIN
MDC_IDC_SET_BRADY_MAX_TRACKING_RATE: 120 {BEATS}/MIN
MDC_IDC_SET_BRADY_MODE: NORMAL
MDC_IDC_SET_BRADY_PAV_DELAY_HIGH: 100 MS
MDC_IDC_SET_BRADY_PAV_DELAY_LOW: 150 MS
MDC_IDC_SET_BRADY_SAV_DELAY_HIGH: 100 MS
MDC_IDC_SET_BRADY_SAV_DELAY_LOW: 120 MS
MDC_IDC_SET_CRT_LVRV_DELAY: 0 MS
MDC_IDC_SET_CRT_PACED_CHAMBERS: NORMAL
MDC_IDC_SET_LEADCHNL_LV_PACING_AMPLITUDE: 2 V
MDC_IDC_SET_LEADCHNL_LV_PACING_ANODE_ELECTRODE_1: NORMAL
MDC_IDC_SET_LEADCHNL_LV_PACING_ANODE_LOCATION_1: NORMAL
MDC_IDC_SET_LEADCHNL_LV_PACING_CAPTURE_MODE: NORMAL
MDC_IDC_SET_LEADCHNL_LV_PACING_CATHODE_ELECTRODE_1: NORMAL
MDC_IDC_SET_LEADCHNL_LV_PACING_CATHODE_LOCATION_1: NORMAL
MDC_IDC_SET_LEADCHNL_LV_PACING_POLARITY: NORMAL
MDC_IDC_SET_LEADCHNL_LV_PACING_PULSEWIDTH: 0.5 MS
MDC_IDC_SET_LEADCHNL_RA_PACING_AMPLITUDE: 2 V
MDC_IDC_SET_LEADCHNL_RA_PACING_ANODE_ELECTRODE_1: NORMAL
MDC_IDC_SET_LEADCHNL_RA_PACING_ANODE_LOCATION_1: NORMAL
MDC_IDC_SET_LEADCHNL_RA_PACING_CAPTURE_MODE: NORMAL
MDC_IDC_SET_LEADCHNL_RA_PACING_CATHODE_ELECTRODE_1: NORMAL
MDC_IDC_SET_LEADCHNL_RA_PACING_CATHODE_LOCATION_1: NORMAL
MDC_IDC_SET_LEADCHNL_RA_PACING_POLARITY: NORMAL
MDC_IDC_SET_LEADCHNL_RA_PACING_PULSEWIDTH: 0.5 MS
MDC_IDC_SET_LEADCHNL_RA_SENSING_ADAPTATION_MODE: NORMAL
MDC_IDC_SET_LEADCHNL_RA_SENSING_ANODE_ELECTRODE_1: NORMAL
MDC_IDC_SET_LEADCHNL_RA_SENSING_ANODE_LOCATION_1: NORMAL
MDC_IDC_SET_LEADCHNL_RA_SENSING_CATHODE_ELECTRODE_1: NORMAL
MDC_IDC_SET_LEADCHNL_RA_SENSING_CATHODE_LOCATION_1: NORMAL
MDC_IDC_SET_LEADCHNL_RA_SENSING_POLARITY: NORMAL
MDC_IDC_SET_LEADCHNL_RA_SENSING_SENSITIVITY: 0.3 MV
MDC_IDC_SET_LEADCHNL_RV_PACING_AMPLITUDE: 0.25 V
MDC_IDC_SET_LEADCHNL_RV_PACING_ANODE_ELECTRODE_1: NORMAL
MDC_IDC_SET_LEADCHNL_RV_PACING_ANODE_LOCATION_1: NORMAL
MDC_IDC_SET_LEADCHNL_RV_PACING_CAPTURE_MODE: NORMAL
MDC_IDC_SET_LEADCHNL_RV_PACING_CATHODE_ELECTRODE_1: NORMAL
MDC_IDC_SET_LEADCHNL_RV_PACING_CATHODE_LOCATION_1: NORMAL
MDC_IDC_SET_LEADCHNL_RV_PACING_POLARITY: NORMAL
MDC_IDC_SET_LEADCHNL_RV_PACING_PULSEWIDTH: 0.05 MS
MDC_IDC_SET_LEADCHNL_RV_SENSING_ADAPTATION_MODE: NORMAL
MDC_IDC_SET_LEADCHNL_RV_SENSING_ANODE_ELECTRODE_1: NORMAL
MDC_IDC_SET_LEADCHNL_RV_SENSING_ANODE_LOCATION_1: NORMAL
MDC_IDC_SET_LEADCHNL_RV_SENSING_CATHODE_ELECTRODE_1: NORMAL
MDC_IDC_SET_LEADCHNL_RV_SENSING_CATHODE_LOCATION_1: NORMAL
MDC_IDC_SET_LEADCHNL_RV_SENSING_POLARITY: NORMAL
MDC_IDC_SET_LEADCHNL_RV_SENSING_SENSITIVITY: 0.5 MV
MDC_IDC_SET_ZONE_DETECTION_INTERVAL: 300 MS
MDC_IDC_SET_ZONE_DETECTION_INTERVAL: 350 MS
MDC_IDC_SET_ZONE_TYPE: NORMAL
MDC_IDC_SET_ZONE_VENDOR_TYPE: NORMAL
MDC_IDC_STAT_AT_BURDEN_PERCENT: 0 %
MDC_IDC_STAT_AT_DTM_END: NORMAL
MDC_IDC_STAT_AT_DTM_START: NORMAL
MDC_IDC_STAT_AT_MODE_SW_COUNT: 5
MDC_IDC_STAT_AT_MODE_SW_COUNT_PER_DAY: 0
MDC_IDC_STAT_AT_MODE_SW_MAX_DURATION: 8 S
MDC_IDC_STAT_AT_MODE_SW_PERCENT_TIME: 1 %
MDC_IDC_STAT_BRADY_AP_VP_PERCENT: 25 %
MDC_IDC_STAT_BRADY_AP_VS_PERCENT: 1 %
MDC_IDC_STAT_BRADY_AS_VP_PERCENT: 75 %
MDC_IDC_STAT_BRADY_AS_VS_PERCENT: 1 %
MDC_IDC_STAT_BRADY_DTM_END: NORMAL
MDC_IDC_STAT_BRADY_DTM_START: NORMAL
MDC_IDC_STAT_BRADY_RA_PERCENT_PACED: 24 %
MDC_IDC_STAT_CRT_DTM_END: NORMAL
MDC_IDC_STAT_CRT_DTM_START: NORMAL
MDC_IDC_STAT_CRT_PERCENT_PACED: 99 %
MDC_IDC_STAT_EPISODE_RECENT_COUNT: 0
MDC_IDC_STAT_EPISODE_RECENT_COUNT: 2
MDC_IDC_STAT_EPISODE_RECENT_COUNT_DTM_END: NORMAL
MDC_IDC_STAT_EPISODE_RECENT_COUNT_DTM_START: NORMAL
MDC_IDC_STAT_EPISODE_TYPE: NORMAL
MDC_IDC_STAT_EPISODE_VENDOR_TYPE: NORMAL
MDC_IDC_STAT_HEART_RATE_ATRIAL_MAX: 270 {BEATS}/MIN
MDC_IDC_STAT_HEART_RATE_ATRIAL_MEAN: 74 {BEATS}/MIN
MDC_IDC_STAT_HEART_RATE_ATRIAL_MIN: 40 {BEATS}/MIN
MDC_IDC_STAT_HEART_RATE_DTM_END: NORMAL
MDC_IDC_STAT_HEART_RATE_DTM_START: NORMAL
MDC_IDC_STAT_HEART_RATE_VENTRICULAR_MAX: 330 {BEATS}/MIN
MDC_IDC_STAT_HEART_RATE_VENTRICULAR_MEAN: 74 {BEATS}/MIN
MDC_IDC_STAT_HEART_RATE_VENTRICULAR_MIN: 50 {BEATS}/MIN
MDC_IDC_STAT_TACHYTHERAPY_ATP_DELIVERED_RECENT: 0
MDC_IDC_STAT_TACHYTHERAPY_RECENT_DTM_END: NORMAL
MDC_IDC_STAT_TACHYTHERAPY_RECENT_DTM_START: NORMAL
MDC_IDC_STAT_TACHYTHERAPY_SHOCKS_ABORTED_RECENT: 0
MDC_IDC_STAT_TACHYTHERAPY_SHOCKS_DELIVERED_RECENT: 0

## 2019-05-02 ENCOUNTER — TELEPHONE (OUTPATIENT)
Dept: CARDIOLOGY | Facility: CLINIC | Age: 84
End: 2019-05-02

## 2019-05-02 ENCOUNTER — HOSPITAL ENCOUNTER (OUTPATIENT)
Dept: CARDIOLOGY | Facility: CLINIC | Age: 84
Discharge: HOME | End: 2019-05-02
Attending: INTERNAL MEDICINE | Admitting: INTERNAL MEDICINE
Payer: MEDICARE

## 2019-05-02 ENCOUNTER — OFFICE VISIT (OUTPATIENT)
Dept: CARDIOLOGY | Facility: CLINIC | Age: 84
End: 2019-05-02
Attending: INTERNAL MEDICINE
Payer: MEDICARE

## 2019-05-02 VITALS
DIASTOLIC BLOOD PRESSURE: 69 MMHG | BODY MASS INDEX: 26.45 KG/M2 | HEIGHT: 67 IN | HEART RATE: 60 BPM | SYSTOLIC BLOOD PRESSURE: 121 MMHG

## 2019-05-02 DIAGNOSIS — I10 ESSENTIAL HYPERTENSION WITH GOAL BLOOD PRESSURE LESS THAN 140/90: ICD-10-CM

## 2019-05-02 DIAGNOSIS — I49.3 PVC'S (PREMATURE VENTRICULAR CONTRACTIONS): ICD-10-CM

## 2019-05-02 DIAGNOSIS — I50.22 CHRONIC SYSTOLIC HEART FAILURE (H): Primary | ICD-10-CM

## 2019-05-02 DIAGNOSIS — I50.22 CHRONIC SYSTOLIC HEART FAILURE (H): ICD-10-CM

## 2019-05-02 DIAGNOSIS — I42.0 DILATED CARDIOMYOPATHY (H): ICD-10-CM

## 2019-05-02 DIAGNOSIS — Z95.810 ICD (IMPLANTABLE CARDIOVERTER-DEFIBRILLATOR), BIVENTRICULAR, IN SITU: ICD-10-CM

## 2019-05-02 DIAGNOSIS — E78.00 HYPERCHOLESTEROLEMIA: ICD-10-CM

## 2019-05-02 DIAGNOSIS — I25.10 CORONARY ARTERY DISEASE INVOLVING NATIVE CORONARY ARTERY OF NATIVE HEART WITHOUT ANGINA PECTORIS: ICD-10-CM

## 2019-05-02 LAB
ANION GAP SERPL CALCULATED.3IONS-SCNC: 9.4 MMOL/L (ref 6–17)
BUN SERPL-MCNC: 16 MG/DL (ref 7–30)
CALCIUM SERPL-MCNC: 8.8 MG/DL (ref 8.5–10.5)
CHLORIDE SERPL-SCNC: 100 MMOL/L (ref 98–107)
CO2 SERPL-SCNC: 26 MMOL/L (ref 23–29)
CREAT SERPL-MCNC: 1.08 MG/DL (ref 0.7–1.3)
GFR SERPL CREATININE-BSD FRML MDRD: 65 ML/MIN/{1.73_M2}
GLUCOSE SERPL-MCNC: 115 MG/DL (ref 70–105)
POTASSIUM SERPL-SCNC: 4.4 MMOL/L (ref 3.5–5.1)
SODIUM SERPL-SCNC: 131 MMOL/L (ref 136–145)

## 2019-05-02 PROCEDURE — 93306 TTE W/DOPPLER COMPLETE: CPT

## 2019-05-02 PROCEDURE — 36415 COLL VENOUS BLD VENIPUNCTURE: CPT | Performed by: INTERNAL MEDICINE

## 2019-05-02 PROCEDURE — 93306 TTE W/DOPPLER COMPLETE: CPT | Mod: 26 | Performed by: INTERNAL MEDICINE

## 2019-05-02 PROCEDURE — 99214 OFFICE O/P EST MOD 30 MIN: CPT | Performed by: INTERNAL MEDICINE

## 2019-05-02 PROCEDURE — 80048 BASIC METABOLIC PNL TOTAL CA: CPT | Performed by: INTERNAL MEDICINE

## 2019-05-02 NOTE — TELEPHONE ENCOUNTER
Visited pt during OV with Dr Ayala to answer questions about his PPM battery. He wonders why he had 11 years left on his battery last year and now he only has 6. Informed him that battery longevity was only 5 - 7 at implant and it is 6 now. Explained that all companies and devices are different and have individual battery longevity. The average is 10. He has a BV device with the LV lead in the His Bundle and that requires a little more energy and drains the battery a bit sooner. His battery is on alert for possible early battery depletion and we are monitoring every 3 months. And he is checked nightly for alerts.

## 2019-05-02 NOTE — LETTER
5/2/2019    Deborah Gandhi MD  [facility] [address]  [facility]    RE: Katie Shannon       Dear Colleague,    I had the pleasure of seeing Katie Rasconkerri in the [facility].    HPI and Plan:   See dictation    Orders Placed This Encounter   Procedures     Basic metabolic panel     Lipid Profile     ALT     Follow-Up with Cardiologist       No orders of the defined types were placed in this encounter.      There are no discontinued medications.      Encounter Diagnoses   Name Primary?     Chronic systolic heart failure (H) Yes     ICD (implantable cardioverter-defibrillator), biventricular, in situ      Dilated cardiomyopathy (H)      Coronary artery disease involving native coronary artery of native heart without angina pectoris      Hypercholesterolemia      Essential hypertension with goal blood pressure less than 140/90      PVC's (premature ventricular contractions)        CURRENT MEDICATIONS:  Current Outpatient Medications   Medication Sig Dispense Refill     carvedilol (COREG) 12.5 MG tablet Take 1 tablet (12.5 mg) by mouth 2 times daily (with meals) 180 tablet 3     Clopidogrel Bisulfate (PLAVIX PO) Take 75 mg by mouth daily       CYCLOBENZAPRINE HCL PO Take 5 mg by mouth At Bedtime Taking PRN       diphenhydrAMINE-acetaminophen (TYLENOL PM)  MG tablet Take 1 tablet by mouth nightly as needed for sleep       Famotidine (PEPCID PO) Take 20 mg by mouth 2 times daily       isosorbide mononitrate (IMDUR) 60 MG 24 hr tablet Take 60 mg by mouth daily       Omeprazole (PRILOSEC PO) Take 20 mg by mouth daily       polyethylene glycol 400 (BLINK TEARS) 0.25 % SOLN ophthalmic solution 1 drop       prednisoLONE acetate (PRED FORTE) 1 % ophthalmic susp 1-2 drops 4 times daily       sacubitril-valsartan (ENTRESTO)  MG per tablet Take 1 tablet by mouth 2 times daily 180 tablet 3     simvastatin (ZOCOR) 10 MG tablet Take 1 tablet (10 mg) by mouth At Bedtime (Patient  taking differently: Take 20 mg by mouth At Bedtime ) 90 tablet 3     sodium chloride (SHELL 128) 5 % ophthalmic solution Place 1 drop into both eyes At Bedtime       tamsulosin (FLOMAX) 0.4 MG 24 hr capsule Take 0.4 mg by mouth daily         ALLERGIES     Allergies   Allergen Reactions     Atorvastatin      Muscle aches     Diphenhydramine      Metoprolol Succinate [Metoprolol]      Strawberry      Sulfa Drugs        PAST MEDICAL HISTORY:  Past Medical History:   Diagnosis Date     CAD (coronary artery disease)      Congestive heart failure (H)      Heart disease     non-ischemic cardiomyopathy     HTN (hypertension)      Idiopathic cardiomyopathy (H)        PAST SURGICAL HISTORY:  Past Surgical History:   Procedure Laterality Date     EYE SURGERY  2006    cataract     ORTHOPEDIC SURGERY  01/2018    pinning of right hip       FAMILY HISTORY:  Family History   Problem Relation Age of Onset     Heart Disease Mother 88        mi     Cerebrovascular Disease Father 71       SOCIAL HISTORY:  Social History     Socioeconomic History     Marital status:      Spouse name: None     Number of children: None     Years of education: None     Highest education level: None   Occupational History     None   Social Needs     Financial resource strain: None     Food insecurity:     Worry: None     Inability: None     Transportation needs:     Medical: None     Non-medical: None   Tobacco Use     Smoking status: Never Smoker     Smokeless tobacco: Never Used   Substance and Sexual Activity     Alcohol use: No     Alcohol/week: 0.0 oz     Drug use: No     Sexual activity: None   Lifestyle     Physical activity:     Days per week: None     Minutes per session: None     Stress: None   Relationships     Social connections:     Talks on phone: None     Gets together: None     Attends Caodaism service: None     Active member of club or organization: None     Attends meetings of clubs or organizations: None     Relationship status:  "None     Intimate partner violence:     Fear of current or ex partner: None     Emotionally abused: None     Physically abused: None     Forced sexual activity: None   Other Topics Concern      Service Not Asked     Blood Transfusions Not Asked     Caffeine Concern No     Occupational Exposure Not Asked     Hobby Hazards Not Asked     Sleep Concern No     Stress Concern Not Asked     Weight Concern No     Special Diet No     Back Care Not Asked     Exercise Yes     Comment: active on farm     Bike Helmet Not Asked     Seat Belt Not Asked     Self-Exams Not Asked     Parent/sibling w/ CABG, MI or angioplasty before 65F 55M? No   Social History Narrative     None       Review of Systems:  Skin:  Negative       Eyes:  Positive for glasses    ENT:  Negative      Respiratory:  Negative       Cardiovascular:  Negative      Gastroenterology: Negative      Genitourinary:  Negative      Musculoskeletal:  Positive for arthritis    Neurologic:  Positive for numbness or tingling of hands left hand  Psychiatric:  Negative      Heme/Lymph/Imm:  Positive for allergies strawberries  Endocrine:  Negative        Physical Exam:  Vitals: /69   Pulse 60   Ht 1.702 m (5' 7\")   BMI 26.45 kg/m       Constitutional:  cooperative, alert and oriented, well developed, well nourished, in no acute distress        Skin:  warm and dry to the touch, no apparent skin lesions or masses noted          Head:  normocephalic, no masses or lesions        Eyes:  pupils equal and round, conjunctivae and lids unremarkable, sclera white, no xanthalasma, EOMS intact, no nystagmus        Lymph:      ENT:  no pallor or cyanosis, dentition good        Neck:  carotid pulses are full and equal bilaterally, JVP normal, no carotid bruit        Respiratory:  normal breath sounds, clear to auscultation, normal A-P diameter, normal symmetry, normal respiratory excursion, no use of accessory muscles         Cardiac: regular rhythm, normal S1/S2, no S3 or " S4, apical impulse not displaced, no murmurs, gallops or rubs             PVCs  pulses full and equal, no bruits auscultated;pulses full and equal                                        GI:  abdomen soft;BS normoactive        Extremities and Muscular Skeletal:  no deformities, clubbing, cyanosis, erythema observed;no edema              Neurological:  no gross motor deficits;affect appropriate        Psych:  Alert and Oriented x 3        CC  Rah Ayala MD  [address]  [place] [address]                Thank you for allowing me to participate in the care of your patient.      Sincerely,     RAH AYALA MD     [facility]    cc:   Rah Ayala MD  [address]  [place] [address]

## 2019-05-02 NOTE — PROGRESS NOTES
"Service Date: 05/02/2019      HISTORY OF PRESENT ILLNESS:  I had the opportunity to see Mr. Katie Shannon in Cardiology Clinic today at [facility] in [place] for reevaluation of chronic nonischemic cardiomyopathy and chronic systolic heart failure.      Mr. Shannon has a history of mild to moderate nonocclusive coronary artery disease, hypertension, dyslipidemia and mild heart failure symptoms.  His ejection fraction was 40%-45% at the time of the original diagnosis a number of years ago.  It had stayed relatively stable until declining to an ejection fraction of 25%-30% a year ago.  At that point, I elected to refer him for implantation of a biventricular ICD.  Unfortunately, he could not have a left ventricular lead implanted properly, and we attempted to implant a left ventricular lead in the right ventricular septum to help maintain synchronous contraction.  Unfortunately, I do not see significant improvement in left ventricular function since that device implantation.  His ejection fraction today is 30%-35%, perhaps minimally improved.      Fortunately, he is still able to be very active and does not have any shortness of breath symptoms.  He feels a little tightness or \"congestion\" in his chest if he exerts himself very strenuously, but that resolves immediately when he stops to rest.  He is able do quite a bit of heavy work without any problems.      PHYSICAL EXAMINATION:  On examination today, his blood pressure is 121/69, heart rate 60 and weight 168 pounds.  His lungs are clear.  Heart rhythm is regular.  He has no cardiac murmurs or carotid bruits and no edema.      IMPRESSIONS:  Mr. Katie Shannon is an 84-year-old gentleman with chronic nonischemic cardiomyopathy with a stable ejection fraction now of 30%-35% and minimal chronic systolic heart failure symptoms.  He has an ICD in place, although I doubt that he is receiving significant biventricular pacing benefit.  However, his " symptoms are quite minimal at this point now.  His medications are excellent, and I will not make any changes.      We discussed the recall of [employer] Shahid ICDs and biventricular pacemaker devices, and we will follow up with him on that.  Otherwise, I reviewed his laboratory studies and echo with him.  I am pleased that things are looking good, and I will have him continue on his current program until I see him again next fall.      Isidoro Ayala MD, FACC       cc:   [place] [address]         ISIDORO AYALA MD, FACC             D: 2019   T: 2019   MT: VISHAL      Name:     RUPERT MAN   MRN:      [id number]        Account:      [id number]   :      1935           Service Date: 2019      Document: [id number]

## 2019-05-02 NOTE — LETTER
"5/2/2019      Deborah Gandhi MD  [facility] [address]      RE: Katie Shannon       Dear Colleague,    I had the pleasure of seeing Katie Shannon in the [facility].    Service Date: 05/02/2019      HISTORY OF PRESENT ILLNESS:  I had the opportunity to see Mr. Katie Shannon in Cardiology Clinic today at [facility] in [place] for reevaluation of chronic nonischemic cardiomyopathy and chronic systolic heart failure.      Mr. Shannon has a history of mild to moderate nonocclusive coronary artery disease, hypertension, dyslipidemia and mild heart failure symptoms.  His ejection fraction was 40%-45% at the time of the original diagnosis a number of years ago.  It had stayed relatively stable until declining to an ejection fraction of 25%-30% a year ago.  At that point, I elected to refer him for implantation of a biventricular ICD.  Unfortunately, he could not have a left ventricular lead implanted properly, and we attempted to implant a left ventricular lead in the right ventricular septum to help maintain synchronous contraction.  Unfortunately, I do not see significant improvement in left ventricular function since that device implantation.  His ejection fraction today is 30%-35%, perhaps minimally improved.      Fortunately, he is still able to be very active and does not have any shortness of breath symptoms.  He feels a little tightness or \"congestion\" in his chest if he exerts himself very strenuously, but that resolves immediately when he stops to rest.  He is able do quite a bit of heavy work without any problems.      PHYSICAL EXAMINATION:  On examination today, his blood pressure is 121/69, heart rate 60 and weight 168 pounds.  His lungs are clear.  Heart rhythm is regular.  He has no cardiac murmurs or carotid bruits and no edema.      IMPRESSIONS:  Mr. Katie Shannon is an 84-year-old gentleman with chronic nonischemic cardiomyopathy " with a stable ejection fraction now of 30%-35% and minimal chronic systolic heart failure symptoms.  He has an ICD in place, although I doubt that he is receiving significant biventricular pacing benefit.  However, his symptoms are quite minimal at this point now.  His medications are excellent, and I will not make any changes.      We discussed the recall of [employer] Shahid ICDs and biventricular pacemaker devices, and we will follow up with him on that.  Otherwise, I reviewed his laboratory studies and echo with him.  I am pleased that things are looking good, and I will have him continue on his current program until I see him again next fall.      Isidoro Ayala MD, FACC       cc:   [place] [address]         ISIDORO AYALA MD, FACC             D: 2019   T: 2019   MT: VISHAL      Name:     RUPERT MAN   MRN:      [id number]        Account:      [id number]   :      1935           Service Date: 2019      Document: [id number]         Outpatient Encounter Medications as of 2019   Medication Sig Dispense Refill     carvedilol (COREG) 12.5 MG tablet Take 1 tablet (12.5 mg) by mouth 2 times daily (with meals) 180 tablet 3     Clopidogrel Bisulfate (PLAVIX PO) Take 75 mg by mouth daily       CYCLOBENZAPRINE HCL PO Take 5 mg by mouth At Bedtime Taking PRN       diphenhydrAMINE-acetaminophen (TYLENOL PM)  MG tablet Take 1 tablet by mouth nightly as needed for sleep       Famotidine (PEPCID PO) Take 20 mg by mouth 2 times daily       isosorbide mononitrate (IMDUR) 60 MG 24 hr tablet Take 60 mg by mouth daily       Omeprazole (PRILOSEC PO) Take 20 mg by mouth daily       polyethylene glycol 400 (BLINK TEARS) 0.25 % SOLN ophthalmic solution 1 drop       prednisoLONE acetate (PRED FORTE) 1 % ophthalmic susp 1-2 drops 4 times daily       sacubitril-valsartan (ENTRESTO)  MG per tablet Take 1 tablet by mouth 2 times daily 180 tablet 3      simvastatin (ZOCOR) 10 MG tablet Take 1 tablet (10 mg) by mouth At Bedtime (Patient taking differently: Take 20 mg by mouth At Bedtime ) 90 tablet 3     sodium chloride (SHELL 128) 5 % ophthalmic solution Place 1 drop into both eyes At Bedtime       tamsulosin (FLOMAX) 0.4 MG 24 hr capsule Take 0.4 mg by mouth daily       No facility-administered encounter medications on file as of 5/2/2019.        Again, thank you for allowing me to participate in the care of your patient.      Sincerely,    ISIDORO MARI MD     [facility]

## 2019-05-21 ENCOUNTER — ANCILLARY PROCEDURE (OUTPATIENT)
Dept: CARDIOLOGY | Facility: CLINIC | Age: 84
End: 2019-05-21
Attending: INTERNAL MEDICINE
Payer: MEDICARE

## 2019-05-21 DIAGNOSIS — I42.9 CARDIOMYOPATHY (H): ICD-10-CM

## 2019-05-21 PROCEDURE — 93296 REM INTERROG EVL PM/IDS: CPT | Performed by: INTERNAL MEDICINE

## 2019-05-21 PROCEDURE — 93295 DEV INTERROG REMOTE 1/2/MLT: CPT | Performed by: INTERNAL MEDICINE

## 2019-06-04 LAB
MDC_IDC_EPISODE_DTM: NORMAL
MDC_IDC_EPISODE_DTM: NORMAL
MDC_IDC_EPISODE_DURATION: 6 S
MDC_IDC_EPISODE_DURATION: 6 S
MDC_IDC_EPISODE_ID: NORMAL
MDC_IDC_EPISODE_ID: NORMAL
MDC_IDC_EPISODE_TYPE: NORMAL
MDC_IDC_EPISODE_TYPE: NORMAL
MDC_IDC_EPISODE_VENDOR_TYPE: NORMAL
MDC_IDC_EPISODE_VENDOR_TYPE: NORMAL
MDC_IDC_LEAD_IMPLANT_DT: NORMAL
MDC_IDC_LEAD_LOCATION: NORMAL
MDC_IDC_LEAD_LOCATION_DETAIL_1: NORMAL
MDC_IDC_LEAD_MFG: NORMAL
MDC_IDC_LEAD_MODEL: NORMAL
MDC_IDC_LEAD_POLARITY_TYPE: NORMAL
MDC_IDC_LEAD_SERIAL: NORMAL
MDC_IDC_LEAD_SPECIAL_FUNCTION: NORMAL
MDC_IDC_MSMT_BATTERY_DTM: NORMAL
MDC_IDC_MSMT_BATTERY_REMAINING_LONGEVITY: 76 MO
MDC_IDC_MSMT_BATTERY_REMAINING_PERCENTAGE: 84 %
MDC_IDC_MSMT_BATTERY_RRT_TRIGGER: NORMAL
MDC_IDC_MSMT_BATTERY_STATUS: NORMAL
MDC_IDC_MSMT_BATTERY_VOLTAGE: 3.02 V
MDC_IDC_MSMT_CAP_CHARGE_DTM: NORMAL
MDC_IDC_MSMT_CAP_CHARGE_ENERGY: 40 J
MDC_IDC_MSMT_CAP_CHARGE_TIME: 9.1 S
MDC_IDC_MSMT_CAP_CHARGE_TYPE: NORMAL
MDC_IDC_MSMT_LEADCHNL_LV_IMPEDANCE_VALUE: 400 OHM
MDC_IDC_MSMT_LEADCHNL_LV_LEAD_CHANNEL_STATUS: NORMAL
MDC_IDC_MSMT_LEADCHNL_LV_PACING_THRESHOLD_AMPLITUDE: 1 V
MDC_IDC_MSMT_LEADCHNL_LV_PACING_THRESHOLD_PULSEWIDTH: 0.5 MS
MDC_IDC_MSMT_LEADCHNL_RA_IMPEDANCE_VALUE: 480 OHM
MDC_IDC_MSMT_LEADCHNL_RA_LEAD_CHANNEL_STATUS: NORMAL
MDC_IDC_MSMT_LEADCHNL_RA_PACING_THRESHOLD_AMPLITUDE: 0.5 V
MDC_IDC_MSMT_LEADCHNL_RA_PACING_THRESHOLD_PULSEWIDTH: 0.5 MS
MDC_IDC_MSMT_LEADCHNL_RA_SENSING_INTR_AMPL: 5 MV
MDC_IDC_MSMT_LEADCHNL_RV_IMPEDANCE_VALUE: 390 OHM
MDC_IDC_MSMT_LEADCHNL_RV_LEAD_CHANNEL_STATUS: NORMAL
MDC_IDC_MSMT_LEADCHNL_RV_PACING_THRESHOLD_AMPLITUDE: 1 V
MDC_IDC_MSMT_LEADCHNL_RV_PACING_THRESHOLD_PULSEWIDTH: 0.5 MS
MDC_IDC_MSMT_LEADCHNL_RV_SENSING_INTR_AMPL: 12 MV
MDC_IDC_PG_IMPLANT_DTM: NORMAL
MDC_IDC_PG_MFG: NORMAL
MDC_IDC_PG_MODEL: NORMAL
MDC_IDC_PG_SERIAL: NORMAL
MDC_IDC_PG_TYPE: NORMAL
MDC_IDC_SESS_CLINIC_NAME: NORMAL
MDC_IDC_SESS_DTM: NORMAL
MDC_IDC_SESS_REPROGRAMMED: NO
MDC_IDC_SESS_TYPE: NORMAL
MDC_IDC_SET_BRADY_AT_MODE_SWITCH_MODE: NORMAL
MDC_IDC_SET_BRADY_AT_MODE_SWITCH_RATE: 170 {BEATS}/MIN
MDC_IDC_SET_BRADY_LOWRATE: 60 {BEATS}/MIN
MDC_IDC_SET_BRADY_MAX_SENSOR_RATE: 120 {BEATS}/MIN
MDC_IDC_SET_BRADY_MAX_TRACKING_RATE: 120 {BEATS}/MIN
MDC_IDC_SET_BRADY_MODE: NORMAL
MDC_IDC_SET_BRADY_PAV_DELAY_HIGH: 100 MS
MDC_IDC_SET_BRADY_PAV_DELAY_LOW: 150 MS
MDC_IDC_SET_BRADY_SAV_DELAY_HIGH: 100 MS
MDC_IDC_SET_BRADY_SAV_DELAY_LOW: 120 MS
MDC_IDC_SET_CRT_LVRV_DELAY: 0 MS
MDC_IDC_SET_CRT_PACED_CHAMBERS: NORMAL
MDC_IDC_SET_LEADCHNL_LV_PACING_AMPLITUDE: 2 V
MDC_IDC_SET_LEADCHNL_LV_PACING_ANODE_ELECTRODE_1: NORMAL
MDC_IDC_SET_LEADCHNL_LV_PACING_ANODE_LOCATION_1: NORMAL
MDC_IDC_SET_LEADCHNL_LV_PACING_CAPTURE_MODE: NORMAL
MDC_IDC_SET_LEADCHNL_LV_PACING_CATHODE_ELECTRODE_1: NORMAL
MDC_IDC_SET_LEADCHNL_LV_PACING_CATHODE_LOCATION_1: NORMAL
MDC_IDC_SET_LEADCHNL_LV_PACING_POLARITY: NORMAL
MDC_IDC_SET_LEADCHNL_LV_PACING_PULSEWIDTH: 0.5 MS
MDC_IDC_SET_LEADCHNL_RA_PACING_AMPLITUDE: 2 V
MDC_IDC_SET_LEADCHNL_RA_PACING_ANODE_ELECTRODE_1: NORMAL
MDC_IDC_SET_LEADCHNL_RA_PACING_ANODE_LOCATION_1: NORMAL
MDC_IDC_SET_LEADCHNL_RA_PACING_CAPTURE_MODE: NORMAL
MDC_IDC_SET_LEADCHNL_RA_PACING_CATHODE_ELECTRODE_1: NORMAL
MDC_IDC_SET_LEADCHNL_RA_PACING_CATHODE_LOCATION_1: NORMAL
MDC_IDC_SET_LEADCHNL_RA_PACING_POLARITY: NORMAL
MDC_IDC_SET_LEADCHNL_RA_PACING_PULSEWIDTH: 0.5 MS
MDC_IDC_SET_LEADCHNL_RA_SENSING_ADAPTATION_MODE: NORMAL
MDC_IDC_SET_LEADCHNL_RA_SENSING_ANODE_ELECTRODE_1: NORMAL
MDC_IDC_SET_LEADCHNL_RA_SENSING_ANODE_LOCATION_1: NORMAL
MDC_IDC_SET_LEADCHNL_RA_SENSING_CATHODE_ELECTRODE_1: NORMAL
MDC_IDC_SET_LEADCHNL_RA_SENSING_CATHODE_LOCATION_1: NORMAL
MDC_IDC_SET_LEADCHNL_RA_SENSING_POLARITY: NORMAL
MDC_IDC_SET_LEADCHNL_RA_SENSING_SENSITIVITY: 0.3 MV
MDC_IDC_SET_LEADCHNL_RV_PACING_AMPLITUDE: 0.25 V
MDC_IDC_SET_LEADCHNL_RV_PACING_ANODE_ELECTRODE_1: NORMAL
MDC_IDC_SET_LEADCHNL_RV_PACING_ANODE_LOCATION_1: NORMAL
MDC_IDC_SET_LEADCHNL_RV_PACING_CAPTURE_MODE: NORMAL
MDC_IDC_SET_LEADCHNL_RV_PACING_CATHODE_ELECTRODE_1: NORMAL
MDC_IDC_SET_LEADCHNL_RV_PACING_CATHODE_LOCATION_1: NORMAL
MDC_IDC_SET_LEADCHNL_RV_PACING_POLARITY: NORMAL
MDC_IDC_SET_LEADCHNL_RV_PACING_PULSEWIDTH: 0.05 MS
MDC_IDC_SET_LEADCHNL_RV_SENSING_ADAPTATION_MODE: NORMAL
MDC_IDC_SET_LEADCHNL_RV_SENSING_ANODE_ELECTRODE_1: NORMAL
MDC_IDC_SET_LEADCHNL_RV_SENSING_ANODE_LOCATION_1: NORMAL
MDC_IDC_SET_LEADCHNL_RV_SENSING_CATHODE_ELECTRODE_1: NORMAL
MDC_IDC_SET_LEADCHNL_RV_SENSING_CATHODE_LOCATION_1: NORMAL
MDC_IDC_SET_LEADCHNL_RV_SENSING_POLARITY: NORMAL
MDC_IDC_SET_LEADCHNL_RV_SENSING_SENSITIVITY: 0.5 MV
MDC_IDC_SET_ZONE_DETECTION_INTERVAL: 300 MS
MDC_IDC_SET_ZONE_DETECTION_INTERVAL: 350 MS
MDC_IDC_SET_ZONE_TYPE: NORMAL
MDC_IDC_SET_ZONE_VENDOR_TYPE: NORMAL
MDC_IDC_STAT_AT_BURDEN_PERCENT: 0 %
MDC_IDC_STAT_AT_DTM_END: NORMAL
MDC_IDC_STAT_AT_DTM_START: NORMAL
MDC_IDC_STAT_AT_MODE_SW_COUNT: 3
MDC_IDC_STAT_AT_MODE_SW_COUNT_PER_DAY: 0
MDC_IDC_STAT_AT_MODE_SW_MAX_DURATION: 8 S
MDC_IDC_STAT_AT_MODE_SW_PERCENT_TIME: 1 %
MDC_IDC_STAT_BRADY_AP_VP_PERCENT: 27 %
MDC_IDC_STAT_BRADY_AP_VS_PERCENT: 1 %
MDC_IDC_STAT_BRADY_AS_VP_PERCENT: 72 %
MDC_IDC_STAT_BRADY_AS_VS_PERCENT: 1 %
MDC_IDC_STAT_BRADY_DTM_END: NORMAL
MDC_IDC_STAT_BRADY_DTM_START: NORMAL
MDC_IDC_STAT_BRADY_RA_PERCENT_PACED: 26 %
MDC_IDC_STAT_CRT_DTM_END: NORMAL
MDC_IDC_STAT_CRT_DTM_START: NORMAL
MDC_IDC_STAT_CRT_PERCENT_PACED: 99 %
MDC_IDC_STAT_EPISODE_RECENT_COUNT: 0
MDC_IDC_STAT_EPISODE_RECENT_COUNT: 1
MDC_IDC_STAT_EPISODE_RECENT_COUNT_DTM_END: NORMAL
MDC_IDC_STAT_EPISODE_RECENT_COUNT_DTM_START: NORMAL
MDC_IDC_STAT_EPISODE_TYPE: NORMAL
MDC_IDC_STAT_EPISODE_VENDOR_TYPE: NORMAL
MDC_IDC_STAT_HEART_RATE_ATRIAL_MAX: 280 {BEATS}/MIN
MDC_IDC_STAT_HEART_RATE_ATRIAL_MEAN: 74 {BEATS}/MIN
MDC_IDC_STAT_HEART_RATE_ATRIAL_MIN: 50 {BEATS}/MIN
MDC_IDC_STAT_HEART_RATE_DTM_END: NORMAL
MDC_IDC_STAT_HEART_RATE_DTM_START: NORMAL
MDC_IDC_STAT_HEART_RATE_VENTRICULAR_MAX: 330 {BEATS}/MIN
MDC_IDC_STAT_HEART_RATE_VENTRICULAR_MEAN: 74 {BEATS}/MIN
MDC_IDC_STAT_HEART_RATE_VENTRICULAR_MIN: 50 {BEATS}/MIN
MDC_IDC_STAT_TACHYTHERAPY_ATP_DELIVERED_RECENT: 0
MDC_IDC_STAT_TACHYTHERAPY_RECENT_DTM_END: NORMAL
MDC_IDC_STAT_TACHYTHERAPY_RECENT_DTM_START: NORMAL
MDC_IDC_STAT_TACHYTHERAPY_SHOCKS_ABORTED_RECENT: 0
MDC_IDC_STAT_TACHYTHERAPY_SHOCKS_DELIVERED_RECENT: 0

## 2019-06-13 ENCOUNTER — OFFICE VISIT (OUTPATIENT)
Dept: DERMATOLOGY | Facility: CLINIC | Age: 84
End: 2019-06-13
Payer: MEDICARE

## 2019-06-13 VITALS — SYSTOLIC BLOOD PRESSURE: 99 MMHG | HEART RATE: 60 BPM | OXYGEN SATURATION: 97 % | DIASTOLIC BLOOD PRESSURE: 62 MMHG

## 2019-06-13 DIAGNOSIS — L82.1 SEBORRHEIC KERATOSIS: Primary | ICD-10-CM

## 2019-06-13 DIAGNOSIS — D18.00 ANGIOMA: ICD-10-CM

## 2019-06-13 DIAGNOSIS — L81.4 LENTIGO: ICD-10-CM

## 2019-06-13 DIAGNOSIS — D23.9 DERMAL NEVUS: ICD-10-CM

## 2019-06-13 DIAGNOSIS — L82.0 INFLAMED SEBORRHEIC KERATOSIS: ICD-10-CM

## 2019-06-13 PROCEDURE — 17110 DESTRUCTION B9 LES UP TO 14: CPT | Performed by: DERMATOLOGY

## 2019-06-13 PROCEDURE — 99213 OFFICE O/P EST LOW 20 MIN: CPT | Mod: 25 | Performed by: DERMATOLOGY

## 2019-06-13 NOTE — LETTER
6/13/2019         RE: Katie Shannon  [address]  [facility] [address]        Dear Colleague,    Thank you for referring your patient, Katie Shannon, to the [facility]. Please see a copy of my visit note below.    Katie Shannon is a 84 year old year old male patient here today for itching spots on scalp.   .  Patient states this has been present for a while.  Patient reports the following symptoms:  itching.  Patient reports the following previous treatments none.  Patient reports the following modifying factors none.  Associated symptoms: none.  Patient has no other skin complaints today.  Remainder of the HPI, Meds, PMH, Allergies, FH, and SH was reviewed in chart.      Past Medical History:   Diagnosis Date     CAD (coronary artery disease)      Congestive heart failure (H)      Heart disease     non-ischemic cardiomyopathy     HTN (hypertension)      Idiopathic cardiomyopathy (H)        Past Surgical History:   Procedure Laterality Date     EYE SURGERY  2006    cataract     ORTHOPEDIC SURGERY  01/2018    pinning of right hip        Family History   Problem Relation Age of Onset     Heart Disease Mother 88        mi     Cerebrovascular Disease Father 71       Social History     Socioeconomic History     Marital status:      Spouse name: Not on file     Number of children: Not on file     Years of education: Not on file     Highest education level: Not on file   Occupational History     Not on file   Social Needs     Financial resource strain: Not on file     Food insecurity:     Worry: Not on file     Inability: Not on file     Transportation needs:     Medical: Not on file     Non-medical: Not on file   Tobacco Use     Smoking status: Never Smoker     Smokeless tobacco: Never Used   Substance and Sexual Activity     Alcohol use: No     Alcohol/week: 0.0 oz     Drug use: No     Sexual activity: Not on file   Lifestyle     Physical activity:     Days per week: Not on file      Minutes per session: Not on file     Stress: Not on file   Relationships     Social connections:     Talks on phone: Not on file     Gets together: Not on file     Attends Restorationist service: Not on file     Active member of club or organization: Not on file     Attends meetings of clubs or organizations: Not on file     Relationship status: Not on file     Intimate partner violence:     Fear of current or ex partner: Not on file     Emotionally abused: Not on file     Physically abused: Not on file     Forced sexual activity: Not on file   Other Topics Concern      Service Not Asked     Blood Transfusions Not Asked     Caffeine Concern No     Occupational Exposure Not Asked     Hobby Hazards Not Asked     Sleep Concern No     Stress Concern Not Asked     Weight Concern No     Special Diet No     Back Care Not Asked     Exercise Yes     Comment: active on farm     Bike Helmet Not Asked     Seat Belt Not Asked     Self-Exams Not Asked     Parent/sibling w/ CABG, MI or angioplasty before 65F 55M? No   Social History Narrative     Not on file       Outpatient Encounter Medications as of 6/13/2019   Medication Sig Dispense Refill     carvedilol (COREG) 12.5 MG tablet Take 1 tablet (12.5 mg) by mouth 2 times daily (with meals) 180 tablet 3     Clopidogrel Bisulfate (PLAVIX PO) Take 75 mg by mouth daily       CYCLOBENZAPRINE HCL PO Take 5 mg by mouth At Bedtime Taking PRN       diphenhydrAMINE-acetaminophen (TYLENOL PM)  MG tablet Take 1 tablet by mouth nightly as needed for sleep       Famotidine (PEPCID PO) Take 20 mg by mouth 2 times daily       isosorbide mononitrate (IMDUR) 60 MG 24 hr tablet Take 60 mg by mouth daily       Omeprazole (PRILOSEC PO) Take 20 mg by mouth daily       polyethylene glycol 400 (BLINK TEARS) 0.25 % SOLN ophthalmic solution 1 drop       prednisoLONE acetate (PRED FORTE) 1 % ophthalmic susp 1-2 drops 4 times daily       sacubitril-valsartan (ENTRESTO)  MG per tablet Take 1  tablet by mouth 2 times daily 180 tablet 3     simvastatin (ZOCOR) 10 MG tablet Take 1 tablet (10 mg) by mouth At Bedtime (Patient taking differently: Take 20 mg by mouth At Bedtime ) 90 tablet 3     sodium chloride (SHELL 128) 5 % ophthalmic solution Place 1 drop into both eyes At Bedtime       tamsulosin (FLOMAX) 0.4 MG 24 hr capsule Take 0.4 mg by mouth daily       No facility-administered encounter medications on file as of 6/13/2019.              Review Of Systems  Skin: As above  Eyes: negative  Ears/Nose/Throat: negative  Respiratory: No shortness of breath, dyspnea on exertion, cough, or hemoptysis  Cardiovascular: negative  Gastrointestinal: negative  Genitourinary: negative  Musculoskeletal: negative  Neurologic: negative  Psychiatric: negative  Hematologic/Lymphatic/Immunologic: negative  Endocrine: negative      O:   NAD, WDWN, Alert & Oriented, Mood & Affect wnl, Vitals stable   Here today alone   There were no vitals taken for this visit.   General appearance normal   Vitals stable   Alert, oriented and in no acute distress      Following lymph nodes palpated: Occipital, Cervical, Supraclavicular no lad   Scalp inflamed seborrheic keratosis x2       Stuck on papules and brown macules on trunk and ext   Red papules on trunk  Flesh colored papules on trunk     The remainder of the full exam was unremarkable; the following areas were examined:  conjunctiva/lids, oral mucosa, neck, peripheral vascular system, abdomen, lymph nodes, digits/nails, eccrine and apocrine glands, scalp/hair, face, neck, chest, abdomen, buttocks, back, RUE, LUE, RLE, LLE       Eyes: Conjunctivae/lids:Normal     ENT: Lips, buccal mucosa, tongue: normal    MSK:Normal    Cardiovascular: peripheral edema none    Pulm: Breathing Normal    Lymph Nodes: No Head and Neck Lymphadenopathy     Neuro/Psych: Orientation:Normal; Mood/Affect:Normal      A/P:  1. Seborrheic keratosis, lentigo, angioma, dermal nevus  2. Inflamed seborrheic  keratosis x2 scalp  LN2:  Treated with LN2 for 5s for 1-2 cycles. Warned risks of blistering, pain, pigment change, scarring, and incomplete resolution.  Advised patient to return if lesions do not completely resolve.  Wound care sheet given.    BENIGN LESIONS DISCUSSED WITH PATIENT:  I discussed the specifics of tumor, prognosis, and genetics of benign lesions.  I explained that treatment of these lesions would be purely cosmetic and not medically neccessary.  I discussed with patient different removal options including excision, cautery and /or laser.      Nature and genetics of benign skin lesions dicussed with patient.  Signs and Symptoms of skin cancer discussed with patient.  ABCDEs of melanoma reviewed with patient.  Patient encouraged to perform monthly skin exams.  UV precautions reviewed with patient.  Patient to follow up with Primary Care provider regarding elevated blood pressure.  Skin care regimen reviewed with patient: Eliminate harsh soaps, i.e. Dial, zest, irsih spring; Mild soaps such as Cetaphil or Dove sensitive skin, avoid hot or cold showers, aggressive use of emollients including vanicream, cetaphil or cerave discussed with patient.    Risks of non-melanoma skin cancer discussed with patient   Return to clinic 12 months      Again, thank you for allowing me to participate in the care of your patient.        Sincerely,        Mal Villalobos MD

## 2019-06-13 NOTE — PROGRESS NOTES
Katie Shannon is a 84 year old year old male patient here today for itching spots on scalp.   .  Patient states this has been present for a while.  Patient reports the following symptoms:  itching.  Patient reports the following previous treatments none.  Patient reports the following modifying factors none.  Associated symptoms: none.  Patient has no other skin complaints today.  Remainder of the HPI, Meds, PMH, Allergies, FH, and SH was reviewed in chart.      Past Medical History:   Diagnosis Date     CAD (coronary artery disease)      Congestive heart failure (H)      Heart disease     non-ischemic cardiomyopathy     HTN (hypertension)      Idiopathic cardiomyopathy (H)        Past Surgical History:   Procedure Laterality Date     EYE SURGERY  2006    cataract     ORTHOPEDIC SURGERY  01/2018    pinning of right hip        Family History   Problem Relation Age of Onset     Heart Disease Mother 88        mi     Cerebrovascular Disease Father 71       Social History     Socioeconomic History     Marital status:      Spouse name: Not on file     Number of children: Not on file     Years of education: Not on file     Highest education level: Not on file   Occupational History     Not on file   Social Needs     Financial resource strain: Not on file     Food insecurity:     Worry: Not on file     Inability: Not on file     Transportation needs:     Medical: Not on file     Non-medical: Not on file   Tobacco Use     Smoking status: Never Smoker     Smokeless tobacco: Never Used   Substance and Sexual Activity     Alcohol use: No     Alcohol/week: 0.0 oz     Drug use: No     Sexual activity: Not on file   Lifestyle     Physical activity:     Days per week: Not on file     Minutes per session: Not on file     Stress: Not on file   Relationships     Social connections:     Talks on phone: Not on file     Gets together: Not on file     Attends Confucianism service: Not on file     Active member of club or organization:  Not on file     Attends meetings of clubs or organizations: Not on file     Relationship status: Not on file     Intimate partner violence:     Fear of current or ex partner: Not on file     Emotionally abused: Not on file     Physically abused: Not on file     Forced sexual activity: Not on file   Other Topics Concern      Service Not Asked     Blood Transfusions Not Asked     Caffeine Concern No     Occupational Exposure Not Asked     Hobby Hazards Not Asked     Sleep Concern No     Stress Concern Not Asked     Weight Concern No     Special Diet No     Back Care Not Asked     Exercise Yes     Comment: active on farm     Bike Helmet Not Asked     Seat Belt Not Asked     Self-Exams Not Asked     Parent/sibling w/ CABG, MI or angioplasty before 65F 55M? No   Social History Narrative     Not on file       Outpatient Encounter Medications as of 6/13/2019   Medication Sig Dispense Refill     carvedilol (COREG) 12.5 MG tablet Take 1 tablet (12.5 mg) by mouth 2 times daily (with meals) 180 tablet 3     Clopidogrel Bisulfate (PLAVIX PO) Take 75 mg by mouth daily       CYCLOBENZAPRINE HCL PO Take 5 mg by mouth At Bedtime Taking PRN       diphenhydrAMINE-acetaminophen (TYLENOL PM)  MG tablet Take 1 tablet by mouth nightly as needed for sleep       Famotidine (PEPCID PO) Take 20 mg by mouth 2 times daily       isosorbide mononitrate (IMDUR) 60 MG 24 hr tablet Take 60 mg by mouth daily       Omeprazole (PRILOSEC PO) Take 20 mg by mouth daily       polyethylene glycol 400 (BLINK TEARS) 0.25 % SOLN ophthalmic solution 1 drop       prednisoLONE acetate (PRED FORTE) 1 % ophthalmic susp 1-2 drops 4 times daily       sacubitril-valsartan (ENTRESTO)  MG per tablet Take 1 tablet by mouth 2 times daily 180 tablet 3     simvastatin (ZOCOR) 10 MG tablet Take 1 tablet (10 mg) by mouth At Bedtime (Patient taking differently: Take 20 mg by mouth At Bedtime ) 90 tablet 3     sodium chloride (SHELL 128) 5 % ophthalmic  solution Place 1 drop into both eyes At Bedtime       tamsulosin (FLOMAX) 0.4 MG 24 hr capsule Take 0.4 mg by mouth daily       No facility-administered encounter medications on file as of 6/13/2019.              Review Of Systems  Skin: As above  Eyes: negative  Ears/Nose/Throat: negative  Respiratory: No shortness of breath, dyspnea on exertion, cough, or hemoptysis  Cardiovascular: negative  Gastrointestinal: negative  Genitourinary: negative  Musculoskeletal: negative  Neurologic: negative  Psychiatric: negative  Hematologic/Lymphatic/Immunologic: negative  Endocrine: negative      O:   NAD, WDWN, Alert & Oriented, Mood & Affect wnl, Vitals stable   Here today alone   There were no vitals taken for this visit.   General appearance normal   Vitals stable   Alert, oriented and in no acute distress      Following lymph nodes palpated: Occipital, Cervical, Supraclavicular no lad   Scalp inflamed seborrheic keratosis x2       Stuck on papules and brown macules on trunk and ext   Red papules on trunk  Flesh colored papules on trunk     The remainder of the full exam was unremarkable; the following areas were examined:  conjunctiva/lids, oral mucosa, neck, peripheral vascular system, abdomen, lymph nodes, digits/nails, eccrine and apocrine glands, scalp/hair, face, neck, chest, abdomen, buttocks, back, RUE, LUE, RLE, LLE       Eyes: Conjunctivae/lids:Normal     ENT: Lips, buccal mucosa, tongue: normal    MSK:Normal    Cardiovascular: peripheral edema none    Pulm: Breathing Normal    Lymph Nodes: No Head and Neck Lymphadenopathy     Neuro/Psych: Orientation:Normal; Mood/Affect:Normal      A/P:  1. Seborrheic keratosis, lentigo, angioma, dermal nevus  2. Inflamed seborrheic keratosis x2 scalp  LN2:  Treated with LN2 for 5s for 1-2 cycles. Warned risks of blistering, pain, pigment change, scarring, and incomplete resolution.  Advised patient to return if lesions do not completely resolve.  Wound care sheet  given.    BENIGN LESIONS DISCUSSED WITH PATIENT:  I discussed the specifics of tumor, prognosis, and genetics of benign lesions.  I explained that treatment of these lesions would be purely cosmetic and not medically neccessary.  I discussed with patient different removal options including excision, cautery and /or laser.      Nature and genetics of benign skin lesions dicussed with patient.  Signs and Symptoms of skin cancer discussed with patient.  ABCDEs of melanoma reviewed with patient.  Patient encouraged to perform monthly skin exams.  UV precautions reviewed with patient.  Patient to follow up with Primary Care provider regarding elevated blood pressure.  Skin care regimen reviewed with patient: Eliminate harsh soaps, i.e. Dial, zest, irsih spring; Mild soaps such as Cetaphil or Dove sensitive skin, avoid hot or cold showers, aggressive use of emollients including vanicream, cetaphil or cerave discussed with patient.    Risks of non-melanoma skin cancer discussed with patient   Return to clinic 12 months

## 2019-07-22 NOTE — PROGRESS NOTES
HPI and Plan:   See dictation    Orders Placed This Encounter   Procedures     Basic metabolic panel     Lipid Profile     ALT     Follow-Up with Cardiologist       No orders of the defined types were placed in this encounter.      There are no discontinued medications.      Encounter Diagnoses   Name Primary?     Chronic systolic heart failure (H) Yes     ICD (implantable cardioverter-defibrillator), biventricular, in situ      Dilated cardiomyopathy (H)      Coronary artery disease involving native coronary artery of native heart without angina pectoris      Hypercholesterolemia      Essential hypertension with goal blood pressure less than 140/90      PVC's (premature ventricular contractions)        CURRENT MEDICATIONS:  Current Outpatient Medications   Medication Sig Dispense Refill     carvedilol (COREG) 12.5 MG tablet Take 1 tablet (12.5 mg) by mouth 2 times daily (with meals) 180 tablet 3     Clopidogrel Bisulfate (PLAVIX PO) Take 75 mg by mouth daily       CYCLOBENZAPRINE HCL PO Take 5 mg by mouth At Bedtime Taking PRN       diphenhydrAMINE-acetaminophen (TYLENOL PM)  MG tablet Take 1 tablet by mouth nightly as needed for sleep       Famotidine (PEPCID PO) Take 20 mg by mouth 2 times daily       isosorbide mononitrate (IMDUR) 60 MG 24 hr tablet Take 60 mg by mouth daily       Omeprazole (PRILOSEC PO) Take 20 mg by mouth daily       polyethylene glycol 400 (BLINK TEARS) 0.25 % SOLN ophthalmic solution 1 drop       prednisoLONE acetate (PRED FORTE) 1 % ophthalmic susp 1-2 drops 4 times daily       sacubitril-valsartan (ENTRESTO)  MG per tablet Take 1 tablet by mouth 2 times daily 180 tablet 3     simvastatin (ZOCOR) 10 MG tablet Take 1 tablet (10 mg) by mouth At Bedtime (Patient taking differently: Take 20 mg by mouth At Bedtime ) 90 tablet 3     sodium chloride (SHELL 128) 5 % ophthalmic solution Place 1 drop into both eyes At Bedtime       tamsulosin (FLOMAX) 0.4 MG 24 hr capsule Take 0.4 mg by  mouth daily         ALLERGIES     Allergies   Allergen Reactions     Atorvastatin      Muscle aches     Diphenhydramine      Metoprolol Succinate [Metoprolol]      Strawberry      Sulfa Drugs        PAST MEDICAL HISTORY:  Past Medical History:   Diagnosis Date     CAD (coronary artery disease)      Congestive heart failure (H)      Heart disease     non-ischemic cardiomyopathy     HTN (hypertension)      Idiopathic cardiomyopathy (H)        PAST SURGICAL HISTORY:  Past Surgical History:   Procedure Laterality Date     EYE SURGERY  2006    cataract     ORTHOPEDIC SURGERY  01/2018    pinning of right hip       FAMILY HISTORY:  Family History   Problem Relation Age of Onset     Heart Disease Mother 88        mi     Cerebrovascular Disease Father 71       SOCIAL HISTORY:  Social History     Socioeconomic History     Marital status:      Spouse name: None     Number of children: None     Years of education: None     Highest education level: None   Occupational History     None   Social Needs     Financial resource strain: None     Food insecurity:     Worry: None     Inability: None     Transportation needs:     Medical: None     Non-medical: None   Tobacco Use     Smoking status: Never Smoker     Smokeless tobacco: Never Used   Substance and Sexual Activity     Alcohol use: No     Alcohol/week: 0.0 oz     Drug use: No     Sexual activity: None   Lifestyle     Physical activity:     Days per week: None     Minutes per session: None     Stress: None   Relationships     Social connections:     Talks on phone: None     Gets together: None     Attends [employer] service: None     Active member of club or organization: None     Attends meetings of clubs or organizations: None     Relationship status: None     Intimate partner violence:     Fear of current or ex partner: None     Emotionally abused: None     Physically abused: None     Forced sexual activity: None   Other Topics Concern      Service Not Asked      "Blood Transfusions Not Asked     Caffeine Concern No     Occupational Exposure Not Asked     Hobby Hazards Not Asked     Sleep Concern No     Stress Concern Not Asked     Weight Concern No     Special Diet No     Back Care Not Asked     Exercise Yes     Comment: active on farm     Bike Helmet Not Asked     Seat Belt Not Asked     Self-Exams Not Asked     Parent/sibling w/ CABG, MI or angioplasty before 65F 55M? No   Social History Narrative     None       Review of Systems:  Skin:  Negative       Eyes:  Positive for glasses    ENT:  Negative      Respiratory:  Negative       Cardiovascular:  Negative      Gastroenterology: Negative      Genitourinary:  Negative      Musculoskeletal:  Positive for arthritis    Neurologic:  Positive for numbness or tingling of hands left hand  Psychiatric:  Negative      Heme/Lymph/Imm:  Positive for allergies strawberries  Endocrine:  Negative        Physical Exam:  Vitals: /69   Pulse 60   Ht 1.702 m (5' 7\")   BMI 26.45 kg/m      Constitutional:  cooperative, alert and oriented, well developed, well nourished, in no acute distress        Skin:  warm and dry to the touch, no apparent skin lesions or masses noted          Head:  normocephalic, no masses or lesions        Eyes:  pupils equal and round, conjunctivae and lids unremarkable, sclera white, no xanthalasma, EOMS intact, no nystagmus        Lymph:      ENT:  no pallor or cyanosis, dentition good        Neck:  carotid pulses are full and equal bilaterally, JVP normal, no carotid bruit        Respiratory:  normal breath sounds, clear to auscultation, normal A-P diameter, normal symmetry, normal respiratory excursion, no use of accessory muscles         Cardiac: regular rhythm, normal S1/S2, no S3 or S4, apical impulse not displaced, no murmurs, gallops or rubs             PVCs  pulses full and equal, no bruits auscultated;pulses full and equal                                        GI:  abdomen soft;BS normoactive    "     Extremities and Muscular Skeletal:  no deformities, clubbing, cyanosis, erythema observed;no edema              Neurological:  no gross motor deficits;affect appropriate        Psych:  Alert and Oriented x 3        CC  Rah Ayala MD  [id number]  [place] [id number]               none

## 2019-09-05 ENCOUNTER — ANCILLARY PROCEDURE (OUTPATIENT)
Dept: CARDIOLOGY | Facility: CLINIC | Age: 84
End: 2019-09-05
Attending: INTERNAL MEDICINE
Payer: MEDICARE

## 2019-09-05 DIAGNOSIS — I42.9 CARDIOMYOPATHY (H): ICD-10-CM

## 2019-09-05 PROCEDURE — 93295 DEV INTERROG REMOTE 1/2/MLT: CPT | Performed by: INTERNAL MEDICINE

## 2019-09-05 PROCEDURE — 93296 REM INTERROG EVL PM/IDS: CPT | Performed by: INTERNAL MEDICINE

## 2019-09-11 LAB
MDC_IDC_EPISODE_DTM: NORMAL
MDC_IDC_EPISODE_DURATION: 6 S
MDC_IDC_EPISODE_DURATION: 6 S
MDC_IDC_EPISODE_DURATION: 8 S
MDC_IDC_EPISODE_DURATION: 8 S
MDC_IDC_EPISODE_ID: NORMAL
MDC_IDC_EPISODE_TYPE: NORMAL
MDC_IDC_EPISODE_VENDOR_TYPE: NORMAL
MDC_IDC_LEAD_IMPLANT_DT: NORMAL
MDC_IDC_LEAD_LOCATION: NORMAL
MDC_IDC_LEAD_LOCATION_DETAIL_1: NORMAL
MDC_IDC_LEAD_MFG: NORMAL
MDC_IDC_LEAD_MODEL: NORMAL
MDC_IDC_LEAD_POLARITY_TYPE: NORMAL
MDC_IDC_LEAD_SERIAL: NORMAL
MDC_IDC_LEAD_SPECIAL_FUNCTION: NORMAL
MDC_IDC_MSMT_BATTERY_DTM: NORMAL
MDC_IDC_MSMT_BATTERY_REMAINING_LONGEVITY: 72 MO
MDC_IDC_MSMT_BATTERY_REMAINING_PERCENTAGE: 80 %
MDC_IDC_MSMT_BATTERY_RRT_TRIGGER: NORMAL
MDC_IDC_MSMT_BATTERY_STATUS: NORMAL
MDC_IDC_MSMT_BATTERY_VOLTAGE: 2.99 V
MDC_IDC_MSMT_CAP_CHARGE_DTM: NORMAL
MDC_IDC_MSMT_CAP_CHARGE_ENERGY: 40 J
MDC_IDC_MSMT_CAP_CHARGE_TIME: 9.1 S
MDC_IDC_MSMT_CAP_CHARGE_TYPE: NORMAL
MDC_IDC_MSMT_LEADCHNL_LV_IMPEDANCE_VALUE: 400 OHM
MDC_IDC_MSMT_LEADCHNL_LV_LEAD_CHANNEL_STATUS: NORMAL
MDC_IDC_MSMT_LEADCHNL_LV_PACING_THRESHOLD_AMPLITUDE: 1 V
MDC_IDC_MSMT_LEADCHNL_LV_PACING_THRESHOLD_PULSEWIDTH: 0.5 MS
MDC_IDC_MSMT_LEADCHNL_RA_IMPEDANCE_VALUE: 480 OHM
MDC_IDC_MSMT_LEADCHNL_RA_LEAD_CHANNEL_STATUS: NORMAL
MDC_IDC_MSMT_LEADCHNL_RA_PACING_THRESHOLD_AMPLITUDE: 0.5 V
MDC_IDC_MSMT_LEADCHNL_RA_PACING_THRESHOLD_PULSEWIDTH: 0.5 MS
MDC_IDC_MSMT_LEADCHNL_RA_SENSING_INTR_AMPL: 3.8 MV
MDC_IDC_MSMT_LEADCHNL_RV_IMPEDANCE_VALUE: 410 OHM
MDC_IDC_MSMT_LEADCHNL_RV_LEAD_CHANNEL_STATUS: NORMAL
MDC_IDC_MSMT_LEADCHNL_RV_PACING_THRESHOLD_AMPLITUDE: 1 V
MDC_IDC_MSMT_LEADCHNL_RV_PACING_THRESHOLD_PULSEWIDTH: 0.5 MS
MDC_IDC_MSMT_LEADCHNL_RV_SENSING_INTR_AMPL: 12 MV
MDC_IDC_PG_IMPLANT_DTM: NORMAL
MDC_IDC_PG_MFG: NORMAL
MDC_IDC_PG_MODEL: NORMAL
MDC_IDC_PG_SERIAL: NORMAL
MDC_IDC_PG_TYPE: NORMAL
MDC_IDC_SESS_CLINIC_NAME: NORMAL
MDC_IDC_SESS_DTM: NORMAL
MDC_IDC_SESS_REPROGRAMMED: NO
MDC_IDC_SESS_TYPE: NORMAL
MDC_IDC_SET_BRADY_AT_MODE_SWITCH_MODE: NORMAL
MDC_IDC_SET_BRADY_AT_MODE_SWITCH_RATE: 170 {BEATS}/MIN
MDC_IDC_SET_BRADY_LOWRATE: 60 {BEATS}/MIN
MDC_IDC_SET_BRADY_MAX_SENSOR_RATE: 120 {BEATS}/MIN
MDC_IDC_SET_BRADY_MAX_TRACKING_RATE: 120 {BEATS}/MIN
MDC_IDC_SET_BRADY_MODE: NORMAL
MDC_IDC_SET_BRADY_PAV_DELAY_HIGH: 100 MS
MDC_IDC_SET_BRADY_PAV_DELAY_LOW: 150 MS
MDC_IDC_SET_BRADY_SAV_DELAY_HIGH: 100 MS
MDC_IDC_SET_BRADY_SAV_DELAY_LOW: 120 MS
MDC_IDC_SET_CRT_LVRV_DELAY: 0 MS
MDC_IDC_SET_CRT_PACED_CHAMBERS: NORMAL
MDC_IDC_SET_LEADCHNL_LV_PACING_AMPLITUDE: 2 V
MDC_IDC_SET_LEADCHNL_LV_PACING_ANODE_ELECTRODE_1: NORMAL
MDC_IDC_SET_LEADCHNL_LV_PACING_ANODE_LOCATION_1: NORMAL
MDC_IDC_SET_LEADCHNL_LV_PACING_CAPTURE_MODE: NORMAL
MDC_IDC_SET_LEADCHNL_LV_PACING_CATHODE_ELECTRODE_1: NORMAL
MDC_IDC_SET_LEADCHNL_LV_PACING_CATHODE_LOCATION_1: NORMAL
MDC_IDC_SET_LEADCHNL_LV_PACING_POLARITY: NORMAL
MDC_IDC_SET_LEADCHNL_LV_PACING_PULSEWIDTH: 0.5 MS
MDC_IDC_SET_LEADCHNL_RA_PACING_AMPLITUDE: 2 V
MDC_IDC_SET_LEADCHNL_RA_PACING_ANODE_ELECTRODE_1: NORMAL
MDC_IDC_SET_LEADCHNL_RA_PACING_ANODE_LOCATION_1: NORMAL
MDC_IDC_SET_LEADCHNL_RA_PACING_CAPTURE_MODE: NORMAL
MDC_IDC_SET_LEADCHNL_RA_PACING_CATHODE_ELECTRODE_1: NORMAL
MDC_IDC_SET_LEADCHNL_RA_PACING_CATHODE_LOCATION_1: NORMAL
MDC_IDC_SET_LEADCHNL_RA_PACING_POLARITY: NORMAL
MDC_IDC_SET_LEADCHNL_RA_PACING_PULSEWIDTH: 0.5 MS
MDC_IDC_SET_LEADCHNL_RA_SENSING_ADAPTATION_MODE: NORMAL
MDC_IDC_SET_LEADCHNL_RA_SENSING_ANODE_ELECTRODE_1: NORMAL
MDC_IDC_SET_LEADCHNL_RA_SENSING_ANODE_LOCATION_1: NORMAL
MDC_IDC_SET_LEADCHNL_RA_SENSING_CATHODE_ELECTRODE_1: NORMAL
MDC_IDC_SET_LEADCHNL_RA_SENSING_CATHODE_LOCATION_1: NORMAL
MDC_IDC_SET_LEADCHNL_RA_SENSING_POLARITY: NORMAL
MDC_IDC_SET_LEADCHNL_RA_SENSING_SENSITIVITY: 0.3 MV
MDC_IDC_SET_LEADCHNL_RV_PACING_AMPLITUDE: 0.25 V
MDC_IDC_SET_LEADCHNL_RV_PACING_ANODE_ELECTRODE_1: NORMAL
MDC_IDC_SET_LEADCHNL_RV_PACING_ANODE_LOCATION_1: NORMAL
MDC_IDC_SET_LEADCHNL_RV_PACING_CAPTURE_MODE: NORMAL
MDC_IDC_SET_LEADCHNL_RV_PACING_CATHODE_ELECTRODE_1: NORMAL
MDC_IDC_SET_LEADCHNL_RV_PACING_CATHODE_LOCATION_1: NORMAL
MDC_IDC_SET_LEADCHNL_RV_PACING_POLARITY: NORMAL
MDC_IDC_SET_LEADCHNL_RV_PACING_PULSEWIDTH: 0.05 MS
MDC_IDC_SET_LEADCHNL_RV_SENSING_ADAPTATION_MODE: NORMAL
MDC_IDC_SET_LEADCHNL_RV_SENSING_ANODE_ELECTRODE_1: NORMAL
MDC_IDC_SET_LEADCHNL_RV_SENSING_ANODE_LOCATION_1: NORMAL
MDC_IDC_SET_LEADCHNL_RV_SENSING_CATHODE_ELECTRODE_1: NORMAL
MDC_IDC_SET_LEADCHNL_RV_SENSING_CATHODE_LOCATION_1: NORMAL
MDC_IDC_SET_LEADCHNL_RV_SENSING_POLARITY: NORMAL
MDC_IDC_SET_LEADCHNL_RV_SENSING_SENSITIVITY: 0.5 MV
MDC_IDC_SET_ZONE_DETECTION_INTERVAL: 300 MS
MDC_IDC_SET_ZONE_DETECTION_INTERVAL: 350 MS
MDC_IDC_SET_ZONE_TYPE: NORMAL
MDC_IDC_SET_ZONE_VENDOR_TYPE: NORMAL
MDC_IDC_STAT_AT_BURDEN_PERCENT: 0 %
MDC_IDC_STAT_AT_DTM_END: NORMAL
MDC_IDC_STAT_AT_DTM_START: NORMAL
MDC_IDC_STAT_AT_MODE_SW_COUNT: 3
MDC_IDC_STAT_AT_MODE_SW_COUNT_PER_DAY: 0
MDC_IDC_STAT_AT_MODE_SW_MAX_DURATION: 8 S
MDC_IDC_STAT_AT_MODE_SW_PERCENT_TIME: 1 %
MDC_IDC_STAT_BRADY_AP_VP_PERCENT: 28 %
MDC_IDC_STAT_BRADY_AP_VS_PERCENT: 1 %
MDC_IDC_STAT_BRADY_AS_VP_PERCENT: 70 %
MDC_IDC_STAT_BRADY_AS_VS_PERCENT: 1 %
MDC_IDC_STAT_BRADY_DTM_END: NORMAL
MDC_IDC_STAT_BRADY_DTM_START: NORMAL
MDC_IDC_STAT_BRADY_RA_PERCENT_PACED: 27 %
MDC_IDC_STAT_CRT_DTM_END: NORMAL
MDC_IDC_STAT_CRT_DTM_START: NORMAL
MDC_IDC_STAT_CRT_PERCENT_PACED: 99 %
MDC_IDC_STAT_EPISODE_RECENT_COUNT: 0
MDC_IDC_STAT_EPISODE_RECENT_COUNT: 1
MDC_IDC_STAT_EPISODE_RECENT_COUNT_DTM_END: NORMAL
MDC_IDC_STAT_EPISODE_RECENT_COUNT_DTM_START: NORMAL
MDC_IDC_STAT_EPISODE_TYPE: NORMAL
MDC_IDC_STAT_EPISODE_VENDOR_TYPE: NORMAL
MDC_IDC_STAT_HEART_RATE_ATRIAL_MAX: 260 {BEATS}/MIN
MDC_IDC_STAT_HEART_RATE_ATRIAL_MEAN: 74 {BEATS}/MIN
MDC_IDC_STAT_HEART_RATE_ATRIAL_MIN: 50 {BEATS}/MIN
MDC_IDC_STAT_HEART_RATE_DTM_END: NORMAL
MDC_IDC_STAT_HEART_RATE_DTM_START: NORMAL
MDC_IDC_STAT_HEART_RATE_VENTRICULAR_MAX: 330 {BEATS}/MIN
MDC_IDC_STAT_HEART_RATE_VENTRICULAR_MEAN: 74 {BEATS}/MIN
MDC_IDC_STAT_HEART_RATE_VENTRICULAR_MIN: 50 {BEATS}/MIN
MDC_IDC_STAT_TACHYTHERAPY_ATP_DELIVERED_RECENT: 0
MDC_IDC_STAT_TACHYTHERAPY_RECENT_DTM_END: NORMAL
MDC_IDC_STAT_TACHYTHERAPY_RECENT_DTM_START: NORMAL
MDC_IDC_STAT_TACHYTHERAPY_SHOCKS_ABORTED_RECENT: 0
MDC_IDC_STAT_TACHYTHERAPY_SHOCKS_DELIVERED_RECENT: 0

## 2019-10-07 ENCOUNTER — TELEPHONE (OUTPATIENT)
Dept: CARDIOLOGY | Facility: CLINIC | Age: 84
End: 2019-10-07

## 2019-10-07 DIAGNOSIS — I50.23 ACUTE ON CHRONIC SYSTOLIC HEART FAILURE (H): ICD-10-CM

## 2019-10-07 DIAGNOSIS — R07.9 CHEST PAIN: Primary | ICD-10-CM

## 2019-10-07 NOTE — TELEPHONE ENCOUNTER
"Call from pt; he reports that on Friday he was in [place]; on the way home he placed his cell phone in the left pocket over the defibrillator. He noted a \"gripping pain over my heart and I thought it was because of the cell phone and I did something that changed it\"  This sensation went away; it happened again this AM, but is wondering if it's acid reflux.   He has a h/o CAD with a BVICD. A review of the ICD record shows he did not have a CV from his device; there was no alert.   He would like to talk with Dr Ayala's nurse so writer transferred him. He was instructed to seek emergency care (911) if these symptoms return.  [email or web address] "

## 2019-10-07 NOTE — TELEPHONE ENCOUNTER
Received VM from pt requesting call back since he has some concerns but the device nurse this morning told him his device is functioning fine.  Returned call to pt. He reports that he was driving to [place], [place] on Friday afternoon and he had accidentally put his cell phone in his front left chest pocket, over his device, for the entire drive. He realized this when he arrived in [place], and then moved his cell phone.     Pt states around 3pm after doing appts and errands all day, he was then driving home, and experienced what he describes as a gripping pain in his heart. He states it was not a jolt, he does not believe he received a shock from his ICD, but says it was a tight, gripping sensation for a few minutes which really concerned him. Pt states he also experienced a burning sort of sensation in his chest this morning that occurred after he laid on his left side.      Pt reports that Samantha, Device RN, told him she found nothing concerning on his device check this morning and that it is functioning properly. Pt wondering what that episodes could have been. Pt reports feeling fine now, no chest pain or other cardiac symptoms. Pt said he took Tylenol and 7-up this morning in case it was his GERD or non-cardiac pain, and now he is feeling fine.     Advised pt if any further chest pain or chest burning episodes to call 911, and pt verbalized understanding. Pt wondering if he should come in sooner or have testing done. He is scheduled to see Dr. Ayala on 12/4/19 with labs. Last Echo . I told pt I would Dr. Ayala and call back with further recommendations.

## 2019-10-09 NOTE — TELEPHONE ENCOUNTER
I reviewed his last angiogram from 2011 (mild to moderate disease) and Massiel Nuclear stress test from 2017 (normal perfusion, decreased EF). Now he has new chest pain symptoms. I think we should have him come down for another Lexiscan Nuclear stress test since the last one was a few years ago. BENOIT

## 2019-10-10 NOTE — TELEPHONE ENCOUNTER
Called pt w/Dr. Ayala's recommendations to do a Lexiscan nuclear stress test.  Pt and wife prefer to do the Lexiscan closer to home, state they live ~ 2 hours away from [place].     Pt states he is going to [place], [place] for other appointments tomorrow and wondering if he can do it there somewhere. I called [facility]. They do outpatient Lexiscans, but only on Tuesdays and their coordinator is out of the office today.     I then called [facility] in [place], [place], where pt lives. I spoke w/ their outpatient coordinator, Laila. She said they do offer outpatient Lexiscans, but they are only done on Mondays. She would need a faxed order with provider name, patient demographics, and diagnosis code.     Will update Dr. Ayala to make sure he is ok with pt doing Lexiscan at an outside facility. Pt said he would like to get this done soon, but denies any further chest pain episodes or other cardiac symptoms since we last spoke.    I told pt the other option is to arrange a Lexiscan here in the morning with a STAT read and VANESSA OV after lunch to review the test and for an exam. Jayne Kelly RN on 10/10/2019 at 3:08 PM      [facility]  Outpatient Cardiac Testing  Attn: Laila Flores  Ph: [phone]  Fax: [phone]

## 2019-10-11 NOTE — TELEPHONE ENCOUNTER
Attempt to call pt back to get an update on whether he scheduled the Lexiscan at his local hospital, but pt's phone line was busy. Will try again on Monday. Jayne Kelly RN on 10/11/2019 at 5:16 PM

## 2019-10-11 NOTE — TELEPHONE ENCOUNTER
Call placed to pt. Reviewed options - best option either come to [place] or do the Lexiscan in their hometown at the local [facility]. They would like to get the test done soon, and prefer to do it in [place], [place].     Faxed Lexiscan order to:       [facility]  Outpatient Cardiac Testing  Attn: Laila Flores  Ph: [phone]  Fax: [phone]      Advised pt/pt's wife that faxed order was sent, and they should call Laila, at the [facility] to make the Lexiscan appt. Provided number. Advised that someone please update on us the date of the test, so that we can follow up and make sure we get the result right away. Advised that we will have no documentation of the results until we receive it via fax, so in the event it's abnormal, will need to know the date the test is scheduled so we can stay on top of getting the result right away. Pt's wife verbalized understanding, and states she will call Laila to make appt and then I said I will call back this afternoon for an update. Jayne Kelly RN on 10/11/2019 at 11:16 AM

## 2019-10-14 ENCOUNTER — TRANSFERRED RECORDS (OUTPATIENT)
Dept: HEALTH INFORMATION MANAGEMENT | Facility: CLINIC | Age: 84
End: 2019-10-14

## 2019-10-14 NOTE — TELEPHONE ENCOUNTER
Called pt back, left VM asking for return call to discuss whether he was able to get the Lexiscan stress test scheduled for today at his local hospital. Will await return call from pt. Jayne Kelly RN on 10/14/2019 at 9:17 AM

## 2019-10-15 NOTE — TELEPHONE ENCOUNTER
I can't vouch for the quality or the accuracy of interpretation of an outside nuclear study, but I will look at the results after it gets done and determine whether any other treatment would be necessary. EE

## 2019-10-17 NOTE — TELEPHONE ENCOUNTER
"Received Lexiscan stress test report via fax, for Lexiscan done 10/14/19 and read at [facility]. Incomplete report is in Care Everywhere, but [facility] faxed me the paper report. They will push images through to [email or web address] for viewing. Will send update to Dr. Ayala in the meantime. Jayne Kelly RN on 10/17/2019 at 5:02 PM      Summary:    \"Perfusion Images: Pharmacologic stress sestamibi perfusion imaging demonstrates essentially normal perfusion of the radiotracer after correction for soft tissue attenuation artifact. No significant redistribution to suggest myocardial ischemia.     Gated Wall Motion Analysis: The gated wall motion study demonstrates global hypokinesis. Ejection fraction 29%.  This may be related to the frequent ventricular ectopy and gating problems associated with that. Consider transthoracic echocardiogram for further evaluation of left ventricular function if clinically warranted.\"  "

## 2019-10-18 NOTE — TELEPHONE ENCOUNTER
reviewed nuclear stress test result report. We also received CD images, and I will send them up to be scanned.  said everything looks stable on his report, and he doesn't want to make any changes at this time. Pt will keep 12/4 device check, labs, and OV with . Pt wanted to know if he needs to bring anything to his device check and wants to know if this result means his device is working fine. I told him this test evaluates the blood flow to his heart. I told him his device check on 12/4 will focus on the functioning of his device. I will message EP to see if he needs to bring anything. Giovani HARRISON October 18, 2019, 4:45 PM

## 2019-12-04 ENCOUNTER — OFFICE VISIT (OUTPATIENT)
Dept: CARDIOLOGY | Facility: CLINIC | Age: 84
End: 2019-12-04
Attending: INTERNAL MEDICINE
Payer: MEDICARE

## 2019-12-04 VITALS
HEART RATE: 60 BPM | WEIGHT: 167.7 LBS | DIASTOLIC BLOOD PRESSURE: 80 MMHG | BODY MASS INDEX: 26.32 KG/M2 | SYSTOLIC BLOOD PRESSURE: 151 MMHG | HEIGHT: 67 IN

## 2019-12-04 DIAGNOSIS — I42.9 CARDIOMYOPATHY (H): ICD-10-CM

## 2019-12-04 DIAGNOSIS — E78.00 HYPERCHOLESTEROLEMIA: ICD-10-CM

## 2019-12-04 DIAGNOSIS — R07.9 CHEST PAIN, UNSPECIFIED TYPE: ICD-10-CM

## 2019-12-04 DIAGNOSIS — I50.22 CHRONIC SYSTOLIC HEART FAILURE (H): Primary | ICD-10-CM

## 2019-12-04 DIAGNOSIS — I50.22 CHRONIC SYSTOLIC HEART FAILURE (H): ICD-10-CM

## 2019-12-04 DIAGNOSIS — I42.0 DILATED CARDIOMYOPATHY (H): ICD-10-CM

## 2019-12-04 DIAGNOSIS — I10 ESSENTIAL HYPERTENSION WITH GOAL BLOOD PRESSURE LESS THAN 140/90: ICD-10-CM

## 2019-12-04 DIAGNOSIS — Z95.810 ICD (IMPLANTABLE CARDIOVERTER-DEFIBRILLATOR), BIVENTRICULAR, IN SITU: Primary | ICD-10-CM

## 2019-12-04 DIAGNOSIS — Z95.810 ICD (IMPLANTABLE CARDIOVERTER-DEFIBRILLATOR), BIVENTRICULAR, IN SITU: ICD-10-CM

## 2019-12-04 LAB
ALT SERPL W P-5'-P-CCNC: <5 U/L (ref 5–30)
ANION GAP SERPL CALCULATED.3IONS-SCNC: 14 MMOL/L (ref 6–17)
BUN SERPL-MCNC: 13 MG/DL (ref 7–30)
CALCIUM SERPL-MCNC: 8.9 MG/DL (ref 8.5–10.5)
CHLORIDE SERPL-SCNC: 98 MMOL/L (ref 98–107)
CHOLEST SERPL-MCNC: 145 MG/DL
CO2 SERPL-SCNC: 26 MMOL/L (ref 23–29)
CREAT SERPL-MCNC: 0.93 MG/DL (ref 0.7–1.3)
GFR SERPL CREATININE-BSD FRML MDRD: 78 ML/MIN/{1.73_M2}
GLUCOSE SERPL-MCNC: 93 MG/DL (ref 70–105)
HDLC SERPL-MCNC: 42 MG/DL
LDLC SERPL CALC-MCNC: 77 MG/DL
NONHDLC SERPL-MCNC: 103 MG/DL
POTASSIUM SERPL-SCNC: 4 MMOL/L (ref 3.5–5.1)
SODIUM SERPL-SCNC: 134 MMOL/L (ref 136–145)
TRIGL SERPL-MCNC: 130 MG/DL

## 2019-12-04 PROCEDURE — 99214 OFFICE O/P EST MOD 30 MIN: CPT | Performed by: INTERNAL MEDICINE

## 2019-12-04 PROCEDURE — 36415 COLL VENOUS BLD VENIPUNCTURE: CPT | Performed by: INTERNAL MEDICINE

## 2019-12-04 PROCEDURE — 93284 PRGRMG EVAL IMPLANTABLE DFB: CPT | Performed by: INTERNAL MEDICINE

## 2019-12-04 PROCEDURE — 80061 LIPID PANEL: CPT | Performed by: INTERNAL MEDICINE

## 2019-12-04 PROCEDURE — 80048 BASIC METABOLIC PNL TOTAL CA: CPT | Performed by: INTERNAL MEDICINE

## 2019-12-04 PROCEDURE — 84460 ALANINE AMINO (ALT) (SGPT): CPT | Performed by: INTERNAL MEDICINE

## 2019-12-04 RX ORDER — CLOPIDOGREL BISULFATE 75 MG/1
75 TABLET ORAL DAILY
Status: ON HOLD | COMMUNITY
End: 2020-01-31

## 2019-12-04 RX ORDER — CYCLOSPORINE 0.5 MG/ML
1 EMULSION OPHTHALMIC 2 TIMES DAILY
COMMUNITY
End: 2021-09-09

## 2019-12-04 RX ORDER — SIMVASTATIN 20 MG
20 TABLET ORAL DAILY
COMMUNITY
End: 2019-12-04

## 2019-12-04 RX ORDER — ROSUVASTATIN CALCIUM 20 MG/1
20 TABLET, COATED ORAL DAILY
Qty: 90 TABLET | Refills: 3 | Status: SHIPPED | OUTPATIENT
Start: 2019-12-04 | End: 2020-09-25

## 2019-12-04 ASSESSMENT — MIFFLIN-ST. JEOR: SCORE: 1419.31

## 2019-12-04 NOTE — PROGRESS NOTES
Service Date: 12/04/2019      HISTORY OF PRESENT ILLNESS:  I had the opportunity to see Mr. Katie Shannon in Cardiology Clinic today at the [facility] in [place] for reevaluation of nonischemic cardiomyopathy and chronic systolic heart failure.  Mr. Shannon has a history of mild to moderate nonocclusive coronary artery disease, hypertension, dyslipidemia and mild heart failure symptoms.  He has had a stable cardiomyopathy with an ejection fraction of 30%-35%.  We attempted to implant a biventricular ICD last year, but could not get the left ventricular lead implanted properly in the lateral wall.  Instead, the left ventricular lead was implanted in the right ventricular septum to try to reestablish cardiac synchrony.  Unfortunately, his left ventricular function did not seem to improve much.  It was measured at 30%-35% last spring and 29% on a stress test in October.  The stress test was performed because of an episode of 3-4 minutes of left-sided chest discomfort which concerned him while he was driving.  He was driving at the time, noticed a focal area of mild discomfort in his left chest which gradually expanded and then resolved spontaneously.  He has not had any recurrence of that.  At the time of the event, I suggested stress testing with nuclear perfusion imaging.  This was performed near his home and demonstrated no myocardial ischemia or infarction.  However, his ejection fraction remained low at 29%.  He has not had any recurrence of that left chest discomfort since that first episode.      He seems to be breathing well.  He is not having any shortness of breath.  He has no PND, orthopnea or edema.      PHYSICAL EXAMINATION:  Today, his blood pressure here was 151/80, heart rate 60 and weight 167 pounds.  His lungs are clear.  Heart rhythm is regular.  He has no cardiac murmurs or carotid bruits.      IMPRESSIONS:  Mr. Katie Shannon is an 84-year-old gentleman with chronic nonischemic  cardiomyopathy with a stable ejection fraction of 30%-35%.  He seems to be doing well without any concerning cardiac symptoms at this time.  He has an ICD in place, but we were not able to implant a biventricular device due to anatomic difficulties.  His left ventricular lead was implanted in the high RV septum.      His device checks show some brief nonsustained VT and atrial tachycardia.      He had an episode of chest pain this fall which lasted a few minutes and concerned him.  We set him up for a stress test which was negative for ischemia.  His ejection fraction looks stable at about 29%.      I will have him follow up with me again in the spring.  He is doing well and is not having any congestive heart failure symptoms or recurrent episodes of chest pain concerning for angina.  I do not think we need to do any further investigations at this point.  We will continue to monitor him through his device.      cc:   Deborah Gandhi MD   [address]         ISIDORO MARI MD, [facility]             D: 2019   T: 2019   MT: SOFIA      Name:     RUPERT MAN   MRN:      [id number]        Account:      [id number]   :      1935           Service Date: 2019      Document: [id number]

## 2019-12-04 NOTE — LETTER
12/4/2019    Deborah Gandhi MD  [facility] [address] [facility]    RE: Katie Rasconkerri       Dear Colleague,    I had the pleasure of seeing Katie Shannon in the [facility].    HPI and Plan:   See dictation    Orders Placed This Encounter   Procedures     Basic metabolic panel     Lipid Profile     ALT     Follow-Up with [facility] - Return MD visit     Echocardiogram Complete       Orders Placed This Encounter   Medications     clopidogrel (PLAVIX) 75 MG tablet     Sig: Take 75 mg by mouth daily     DISCONTD: simvastatin (ZOCOR) 20 MG tablet     Sig: Take 20 mg by mouth daily     Hypromellose (RETAINE [facility] OP)     Sig: Apply to eye daily     cycloSPORINE (RESTASIS) 0.05 % ophthalmic emulsion     Sig: daily     rosuvastatin (CRESTOR) 20 MG tablet     Sig: Take 1 tablet (20 mg) by mouth daily     Dispense:  90 tablet     Refill:  3       Medications Discontinued During This Encounter   Medication Reason     Clopidogrel Bisulfate (PLAVIX PO) Medication Reconciliation Clean Up     simvastatin (ZOCOR) 10 MG tablet Medication Reconciliation Clean Up     simvastatin (ZOCOR) 20 MG tablet          Encounter Diagnoses   Name Primary?     Chronic systolic heart failure (H) Yes     Chest pain, unspecified type      ICD (implantable cardioverter-defibrillator), biventricular, in situ      Dilated cardiomyopathy (H)      Essential hypertension with goal blood pressure less than 140/90      Hypercholesterolemia        CURRENT MEDICATIONS:  Current Outpatient Medications   Medication Sig Dispense Refill     carvedilol (COREG) 12.5 MG tablet Take 1 tablet (12.5 mg) by mouth 2 times daily (with meals) 180 tablet 3     clopidogrel (PLAVIX) 75 MG tablet Take 75 mg by mouth daily       CYCLOBENZAPRINE HCL PO Take 5 mg by mouth At Bedtime Taking PRN       cycloSPORINE (RESTASIS) 0.05 % ophthalmic emulsion daily       diphenhydrAMINE-acetaminophen (TYLENOL PM)  MG tablet Take 1 tablet by  mouth nightly as needed for sleep       Famotidine (PEPCID PO) Take 20 mg by mouth 2 times daily       Hypromellose (RETAINE [facility] OP) Apply to eye daily       isosorbide mononitrate (IMDUR) 60 MG 24 hr tablet Take 60 mg by mouth daily       Omeprazole (PRILOSEC PO) Take 20 mg by mouth daily       polyethylene glycol 400 (BLINK TEARS) 0.25 % SOLN ophthalmic solution 1 drop       rosuvastatin (CRESTOR) 20 MG tablet Take 1 tablet (20 mg) by mouth daily 90 tablet 3     sacubitril-valsartan (ENTRESTO)  MG per tablet Take 1 tablet by mouth 2 times daily 180 tablet 3     tamsulosin (FLOMAX) 0.4 MG 24 hr capsule Take 0.4 mg by mouth daily       prednisoLONE acetate (PRED FORTE) 1 % ophthalmic susp 1-2 drops 4 times daily       sodium chloride (SHELL 128) 5 % ophthalmic solution Place 1 drop into both eyes At Bedtime         ALLERGIES     Allergies   Allergen Reactions     Atorvastatin      Muscle aches     Diphenhydramine      Metoprolol Succinate [Metoprolol]      Strawberry      Sulfa Drugs        PAST MEDICAL HISTORY:  Past Medical History:   Diagnosis Date     CAD (coronary artery disease)      Congestive heart failure (H)      Heart disease     non-ischemic cardiomyopathy     HTN (hypertension)      Idiopathic cardiomyopathy (H)        PAST SURGICAL HISTORY:  Past Surgical History:   Procedure Laterality Date     EYE SURGERY  2006    cataract     ORTHOPEDIC SURGERY  01/2018    pinning of right hip       FAMILY HISTORY:  Family History   Problem Relation Age of Onset     Heart Disease Mother 88        mi     Cerebrovascular Disease Father 71       SOCIAL HISTORY:  Social History     Socioeconomic History     Marital status:      Spouse name: None     Number of children: None     Years of education: None     Highest education level: None   Occupational History     None   Social Needs     Financial resource strain: None     Food insecurity:     Worry: None     Inability: None     Transportation needs:      "Medical: None     Non-medical: None   Tobacco Use     Smoking status: Never Smoker     Smokeless tobacco: Never Used   Substance and Sexual Activity     Alcohol use: No     Alcohol/week: 0.0 standard drinks     Drug use: No     Sexual activity: None   Lifestyle     Physical activity:     Days per week: None     Minutes per session: None     Stress: None   Relationships     Social connections:     Talks on phone: None     Gets together: None     Attends [employer] service: None     Active member of club or organization: None     Attends meetings of clubs or organizations: None     Relationship status: None     Intimate partner violence:     Fear of current or ex partner: None     Emotionally abused: None     Physically abused: None     Forced sexual activity: None   Other Topics Concern      Service Not Asked     Blood Transfusions Not Asked     Caffeine Concern No     Occupational Exposure Not Asked     Hobby Hazards Not Asked     Sleep Concern No     Stress Concern Not Asked     Weight Concern No     Special Diet No     Back Care Not Asked     Exercise Yes     Comment: active on farm     Bike Helmet Not Asked     Seat Belt Not Asked     Self-Exams Not Asked     Parent/sibling w/ CABG, MI or angioplasty before 65F 55M? No   Social History Narrative     None       Review of Systems:  Skin:  Negative       Eyes:  Positive for glasses    ENT:  Negative      Respiratory:  Negative       Cardiovascular:  Negative      Gastroenterology: Negative      Genitourinary:  Negative      Musculoskeletal:  Positive for arthritis very little  Neurologic:  Positive for numbness or tingling of hands left hand  Psychiatric:  Negative      Heme/Lymph/Imm:  Positive for allergies    Endocrine:  Negative        Physical Exam:  Vitals: BP (!) 151/80   Pulse 60   Ht 1.702 m (5' 7\")   Wt 76.1 kg (167 lb 11.2 oz)   BMI 26.27 kg/m       Constitutional:  cooperative, alert and oriented, well developed, well nourished, in no acute " distress        Skin:  warm and dry to the touch, no apparent skin lesions or masses noted          Head:  normocephalic, no masses or lesions        Eyes:  pupils equal and round, conjunctivae and lids unremarkable, sclera white, no xanthalasma, EOMS intact, no nystagmus        Lymph:      ENT:  no pallor or cyanosis, dentition good        Neck:  carotid pulses are full and equal bilaterally, JVP normal, no carotid bruit        Respiratory:  normal breath sounds, clear to auscultation, normal A-P diameter, normal symmetry, normal respiratory excursion, no use of accessory muscles         Cardiac: regular rhythm, normal S1/S2, no S3 or S4, apical impulse not displaced, no murmurs, gallops or rubs             PVCs  pulses full and equal, no bruits auscultated;pulses full and equal                                        GI:  abdomen soft;BS normoactive        Extremities and Muscular Skeletal:  no deformities, clubbing, cyanosis, erythema observed;no edema              Neurological:  no gross motor deficits;affect appropriate        Psych:  Alert and Oriented x 3        CC  Rah Ayala MD  [address]  [place] [address]                Thank you for allowing me to participate in the care of your patient.      Sincerely,     RAH AYALA MD     [facility]    cc:   Rah Ayala MD  [address]  [place] [address]

## 2019-12-04 NOTE — LETTER
12/4/2019      Deborah Gandhi MD  [facility] [address] [facility]      RE: Katie Shannon       Dear Colleague,    I had the pleasure of seeing Katie Shannon in the [facility].    Service Date: 12/04/2019      HISTORY OF PRESENT ILLNESS:  I had the opportunity to see Mr. Katie Shannon in Cardiology Clinic today at the [facility] in [place] for reevaluation of nonischemic cardiomyopathy and chronic systolic heart failure.  Mr. Shannon has a history of mild to moderate nonocclusive coronary artery disease, hypertension, dyslipidemia and mild heart failure symptoms.  He has had a stable cardiomyopathy with an ejection fraction of 30%-35%.  We attempted to implant a biventricular ICD last year, but could not get the left ventricular lead implanted properly in the lateral wall.  Instead, the left ventricular lead was implanted in the right ventricular septum to try to reestablish cardiac synchrony.  Unfortunately, his left ventricular function did not seem to improve much.  It was measured at 30%-35% last spring and 29% on a stress test in October.  The stress test was performed because of an episode of 3-4 minutes of left-sided chest discomfort which concerned him while he was driving.  He was driving at the time, noticed a focal area of mild discomfort in his left chest which gradually expanded and then resolved spontaneously.  He has not had any recurrence of that.  At the time of the event, I suggested stress testing with nuclear perfusion imaging.  This was performed near his home and demonstrated no myocardial ischemia or infarction.  However, his ejection fraction remained low at 29%.  He has not had any recurrence of that left chest discomfort since that first episode.      He seems to be breathing well.  He is not having any shortness of breath.  He has no PND, orthopnea or edema.      PHYSICAL EXAMINATION:  Today, his blood pressure here was 151/80,  heart rate 60 and weight 167 pounds.  His lungs are clear.  Heart rhythm is regular.  He has no cardiac murmurs or carotid bruits.      IMPRESSIONS:  Mr. Rupert Shannon is an 84-year-old gentleman with chronic nonischemic cardiomyopathy with a stable ejection fraction of 30%-35%.  He seems to be doing well without any concerning cardiac symptoms at this time.  He has an ICD in place, but we were not able to implant a biventricular device due to anatomic difficulties.  His left ventricular lead was implanted in the high RV septum.      His device checks show some brief nonsustained VT and atrial tachycardia.      He had an episode of chest pain this fall which lasted a few minutes and concerned him.  We set him up for a stress test which was negative for ischemia.  His ejection fraction looks stable at about 29%.      I will have him follow up with me again in the spring.  He is doing well and is not having any congestive heart failure symptoms or recurrent episodes of chest pain concerning for angina.  I do not think we need to do any further investigations at this point.  We will continue to monitor him through his device.      cc:   Deborah Gandhi MD   [address]         ISIDORO MARI MD, [facility]             D: 2019   T: 2019   MT: SOFIA      Name:     RUPERT SHANNON   MRN:      [id number]        Account:      [id number]   :      1935           Service Date: 2019      Document: [id number]         Outpatient Encounter Medications as of 2019   Medication Sig Dispense Refill     carvedilol (COREG) 12.5 MG tablet Take 1 tablet (12.5 mg) by mouth 2 times daily (with meals) 180 tablet 3     clopidogrel (PLAVIX) 75 MG tablet Take 75 mg by mouth daily       CYCLOBENZAPRINE HCL PO Take 5 mg by mouth At Bedtime Taking PRN       cycloSPORINE (RESTASIS) 0.05 % ophthalmic emulsion daily       diphenhydrAMINE-acetaminophen (TYLENOL PM)  MG tablet  Take 1 tablet by mouth nightly as needed for sleep       Famotidine (PEPCID PO) Take 20 mg by mouth 2 times daily       Hypromellose (RETAINE [facility] OP) Apply to eye daily       isosorbide mononitrate (IMDUR) 60 MG 24 hr tablet Take 60 mg by mouth daily       Omeprazole (PRILOSEC PO) Take 20 mg by mouth daily       polyethylene glycol 400 (BLINK TEARS) 0.25 % SOLN ophthalmic solution 1 drop       rosuvastatin (CRESTOR) 20 MG tablet Take 1 tablet (20 mg) by mouth daily 90 tablet 3     sacubitril-valsartan (ENTRESTO)  MG per tablet Take 1 tablet by mouth 2 times daily 180 tablet 3     tamsulosin (FLOMAX) 0.4 MG 24 hr capsule Take 0.4 mg by mouth daily       prednisoLONE acetate (PRED FORTE) 1 % ophthalmic susp 1-2 drops 4 times daily       sodium chloride (SHELL 128) 5 % ophthalmic solution Place 1 drop into both eyes At Bedtime       [DISCONTINUED] Clopidogrel Bisulfate (PLAVIX PO) Take 75 mg by mouth daily       [DISCONTINUED] simvastatin (ZOCOR) 10 MG tablet Take 1 tablet (10 mg) by mouth At Bedtime (Patient taking differently: Take 20 mg by mouth At Bedtime ) 90 tablet 3     [DISCONTINUED] simvastatin (ZOCOR) 20 MG tablet Take 20 mg by mouth daily       No facility-administered encounter medications on file as of 12/4/2019.        Again, thank you for allowing me to participate in the care of your patient.      Sincerely,    ISIDORO MARI MD     [facility]

## 2019-12-10 LAB
MDC_IDC_LEAD_IMPLANT_DT: NORMAL
MDC_IDC_LEAD_LOCATION: NORMAL
MDC_IDC_LEAD_LOCATION_DETAIL_1: NORMAL
MDC_IDC_LEAD_MFG: NORMAL
MDC_IDC_LEAD_MODEL: NORMAL
MDC_IDC_LEAD_POLARITY_TYPE: NORMAL
MDC_IDC_LEAD_SERIAL: NORMAL
MDC_IDC_LEAD_SPECIAL_FUNCTION: NORMAL
MDC_IDC_MSMT_BATTERY_REMAINING_LONGEVITY: 60 MO
MDC_IDC_MSMT_CAP_CHARGE_DTM: NORMAL
MDC_IDC_MSMT_CAP_CHARGE_TIME: 9.12
MDC_IDC_MSMT_CAP_CHARGE_TYPE: NORMAL
MDC_IDC_MSMT_CAP_CHARGE_TYPE: NORMAL
MDC_IDC_MSMT_LEADCHNL_LV_IMPEDANCE_VALUE: 400 OHM
MDC_IDC_MSMT_LEADCHNL_LV_PACING_THRESHOLD_AMPLITUDE: 0.75 V
MDC_IDC_MSMT_LEADCHNL_LV_PACING_THRESHOLD_AMPLITUDE: 0.75 V
MDC_IDC_MSMT_LEADCHNL_LV_PACING_THRESHOLD_PULSEWIDTH: 0.5 MS
MDC_IDC_MSMT_LEADCHNL_LV_PACING_THRESHOLD_PULSEWIDTH: 0.5 MS
MDC_IDC_MSMT_LEADCHNL_RA_IMPEDANCE_VALUE: 487.5 OHM
MDC_IDC_MSMT_LEADCHNL_RA_PACING_THRESHOLD_AMPLITUDE: 0.5 V
MDC_IDC_MSMT_LEADCHNL_RA_PACING_THRESHOLD_AMPLITUDE: 0.5 V
MDC_IDC_MSMT_LEADCHNL_RA_PACING_THRESHOLD_PULSEWIDTH: 0.5 MS
MDC_IDC_MSMT_LEADCHNL_RA_PACING_THRESHOLD_PULSEWIDTH: 0.5 MS
MDC_IDC_MSMT_LEADCHNL_RA_SENSING_INTR_AMPL: 3.6 MV
MDC_IDC_MSMT_LEADCHNL_RV_IMPEDANCE_VALUE: 400 OHM
MDC_IDC_MSMT_LEADCHNL_RV_PACING_THRESHOLD_AMPLITUDE: 1.25 V
MDC_IDC_MSMT_LEADCHNL_RV_PACING_THRESHOLD_AMPLITUDE: 1.25 V
MDC_IDC_MSMT_LEADCHNL_RV_PACING_THRESHOLD_PULSEWIDTH: 0.5 MS
MDC_IDC_MSMT_LEADCHNL_RV_PACING_THRESHOLD_PULSEWIDTH: 0.5 MS
MDC_IDC_MSMT_LEADCHNL_RV_SENSING_INTR_AMPL: 12 MV
MDC_IDC_PG_IMPLANT_DTM: NORMAL
MDC_IDC_PG_MFG: NORMAL
MDC_IDC_PG_MODEL: NORMAL
MDC_IDC_PG_SERIAL: NORMAL
MDC_IDC_PG_TYPE: NORMAL
MDC_IDC_SESS_CLINIC_NAME: NORMAL
MDC_IDC_SESS_DTM: NORMAL
MDC_IDC_SESS_TYPE: NORMAL
MDC_IDC_SET_BRADY_AT_MODE_SWITCH_MODE: NORMAL
MDC_IDC_SET_BRADY_AT_MODE_SWITCH_RATE: 170 {BEATS}/MIN
MDC_IDC_SET_BRADY_HYSTRATE: NORMAL
MDC_IDC_SET_BRADY_LOWRATE: 60 {BEATS}/MIN
MDC_IDC_SET_BRADY_MAX_SENSOR_RATE: 120 {BEATS}/MIN
MDC_IDC_SET_BRADY_MAX_TRACKING_RATE: 120 {BEATS}/MIN
MDC_IDC_SET_BRADY_MODE: NORMAL
MDC_IDC_SET_BRADY_NIGHT_RATE: NORMAL
MDC_IDC_SET_BRADY_PAV_DELAY_HIGH: 100 MS
MDC_IDC_SET_BRADY_PAV_DELAY_LOW: 150 MS
MDC_IDC_SET_BRADY_SAV_DELAY_HIGH: 100 MS
MDC_IDC_SET_BRADY_SAV_DELAY_LOW: 120 MS
MDC_IDC_SET_CRT_LVRV_DELAY: 0 MS
MDC_IDC_SET_CRT_PACED_CHAMBERS: NORMAL
MDC_IDC_SET_LEADCHNL_LV_PACING_AMPLITUDE: 2 V
MDC_IDC_SET_LEADCHNL_LV_PACING_CAPTURE_MODE: NORMAL
MDC_IDC_SET_LEADCHNL_LV_PACING_POLARITY: NORMAL
MDC_IDC_SET_LEADCHNL_LV_PACING_PULSEWIDTH: 0.5 MS
MDC_IDC_SET_LEADCHNL_RA_PACING_AMPLITUDE: 2 V
MDC_IDC_SET_LEADCHNL_RA_PACING_ANODE_ELECTRODE_1: NORMAL
MDC_IDC_SET_LEADCHNL_RA_PACING_ANODE_LOCATION_1: NORMAL
MDC_IDC_SET_LEADCHNL_RA_PACING_CAPTURE_MODE: NORMAL
MDC_IDC_SET_LEADCHNL_RA_PACING_CATHODE_ELECTRODE_1: NORMAL
MDC_IDC_SET_LEADCHNL_RA_PACING_CATHODE_LOCATION_1: NORMAL
MDC_IDC_SET_LEADCHNL_RA_PACING_POLARITY: NORMAL
MDC_IDC_SET_LEADCHNL_RA_PACING_PULSEWIDTH: 0.5 MS
MDC_IDC_SET_LEADCHNL_RA_SENSING_ADAPTATION_MODE: NORMAL
MDC_IDC_SET_LEADCHNL_RA_SENSING_ANODE_ELECTRODE_1: NORMAL
MDC_IDC_SET_LEADCHNL_RA_SENSING_ANODE_LOCATION_1: NORMAL
MDC_IDC_SET_LEADCHNL_RA_SENSING_CATHODE_ELECTRODE_1: NORMAL
MDC_IDC_SET_LEADCHNL_RA_SENSING_CATHODE_LOCATION_1: NORMAL
MDC_IDC_SET_LEADCHNL_RA_SENSING_POLARITY: NORMAL
MDC_IDC_SET_LEADCHNL_RA_SENSING_SENSITIVITY: 0.3 MV
MDC_IDC_SET_LEADCHNL_RV_PACING_AMPLITUDE: 2.5 V
MDC_IDC_SET_LEADCHNL_RV_PACING_ANODE_ELECTRODE_1: NORMAL
MDC_IDC_SET_LEADCHNL_RV_PACING_ANODE_LOCATION_1: NORMAL
MDC_IDC_SET_LEADCHNL_RV_PACING_CAPTURE_MODE: NORMAL
MDC_IDC_SET_LEADCHNL_RV_PACING_CATHODE_ELECTRODE_1: NORMAL
MDC_IDC_SET_LEADCHNL_RV_PACING_CATHODE_LOCATION_1: NORMAL
MDC_IDC_SET_LEADCHNL_RV_PACING_POLARITY: NORMAL
MDC_IDC_SET_LEADCHNL_RV_PACING_PULSEWIDTH: 0.5 MS
MDC_IDC_SET_LEADCHNL_RV_SENSING_ANODE_ELECTRODE_1: NORMAL
MDC_IDC_SET_LEADCHNL_RV_SENSING_ANODE_LOCATION_1: NORMAL
MDC_IDC_SET_LEADCHNL_RV_SENSING_CATHODE_ELECTRODE_1: NORMAL
MDC_IDC_SET_LEADCHNL_RV_SENSING_CATHODE_LOCATION_1: NORMAL
MDC_IDC_SET_LEADCHNL_RV_SENSING_POLARITY: NORMAL
MDC_IDC_SET_LEADCHNL_RV_SENSING_SENSITIVITY: 0.5 MV
MDC_IDC_SET_ZONE_DETECTION_INTERVAL: 300 MS
MDC_IDC_SET_ZONE_DETECTION_INTERVAL: 350 MS
MDC_IDC_SET_ZONE_TYPE: NORMAL
MDC_IDC_SET_ZONE_VENDOR_TYPE: NORMAL
MDC_IDC_STAT_AT_DTM_END: NORMAL
MDC_IDC_STAT_AT_DTM_START: NORMAL
MDC_IDC_STAT_AT_MODE_SW_COUNT: 5
MDC_IDC_STAT_BRADY_DTM_END: NORMAL
MDC_IDC_STAT_BRADY_DTM_START: NORMAL
MDC_IDC_STAT_BRADY_RA_PERCENT_PACED: 32 %
MDC_IDC_STAT_BRADY_RV_PERCENT_PACED: 98 %
MDC_IDC_STAT_EPISODE_RECENT_COUNT: 0
MDC_IDC_STAT_EPISODE_RECENT_COUNT: 0
MDC_IDC_STAT_EPISODE_RECENT_COUNT_DTM_END: NORMAL
MDC_IDC_STAT_EPISODE_RECENT_COUNT_DTM_END: NORMAL
MDC_IDC_STAT_EPISODE_RECENT_COUNT_DTM_START: NORMAL
MDC_IDC_STAT_EPISODE_RECENT_COUNT_DTM_START: NORMAL
MDC_IDC_STAT_EPISODE_TYPE: NORMAL
MDC_IDC_STAT_EPISODE_TYPE: NORMAL
MDC_IDC_STAT_EPISODE_VENDOR_TYPE: NORMAL
MDC_IDC_STAT_HEART_RATE_DTM_END: NORMAL
MDC_IDC_STAT_HEART_RATE_DTM_START: NORMAL
MDC_IDC_STAT_TACHYTHERAPY_ATP_DELIVERED_RECENT: 0
MDC_IDC_STAT_TACHYTHERAPY_RECENT_DTM_END: NORMAL
MDC_IDC_STAT_TACHYTHERAPY_RECENT_DTM_START: NORMAL
MDC_IDC_STAT_TACHYTHERAPY_SHOCKS_ABORTED_RECENT: 0
MDC_IDC_STAT_TACHYTHERAPY_SHOCKS_DELIVERED_RECENT: 0

## 2019-12-17 DIAGNOSIS — I50.22 CHRONIC SYSTOLIC HEART FAILURE (H): ICD-10-CM

## 2020-01-27 ENCOUNTER — TRANSFERRED RECORDS (OUTPATIENT)
Dept: HEALTH INFORMATION MANAGEMENT | Facility: CLINIC | Age: 85
End: 2020-01-27

## 2020-01-27 ENCOUNTER — HOSPITAL ENCOUNTER (INPATIENT)
Facility: CLINIC | Age: 85
LOS: 4 days | Discharge: HOME | DRG: 394 | End: 2020-01-31
Attending: INTERNAL MEDICINE | Admitting: HOSPITALIST
Payer: MEDICARE

## 2020-01-27 PROBLEM — K92.2 GASTROINTESTINAL BLEEDING: Status: ACTIVE | Noted: 2020-01-27

## 2020-01-27 LAB
CREAT SERPL-MCNC: 0.92 MG/DL (ref 0.7–1.3)
GFR SERPL CREATININE-BSD FRML MDRD: >60 ML/MIN/1.73M(2)
GLUCOSE SERPL-MCNC: 100 MG/DL (ref 70–105)
INR PPP: 1 (ref 0–5)
POTASSIUM SERPL-SCNC: 4.4 MMOL/L (ref 3.5–5.1)

## 2020-01-27 PROCEDURE — 99223 1ST HOSP IP/OBS HIGH 75: CPT | Mod: AI | Performed by: HOSPITALIST

## 2020-01-27 PROCEDURE — 12000000 ZZH R&B MED SURG/OB

## 2020-01-27 RX ORDER — POTASSIUM CHLORIDE 7.45 MG/ML
10 INJECTION INTRAVENOUS
Status: DISCONTINUED | OUTPATIENT
Start: 2020-01-27 | End: 2020-01-31 | Stop reason: HOSPADM

## 2020-01-27 RX ORDER — POTASSIUM CL/LIDO/0.9 % NACL 10MEQ/0.1L
10 INTRAVENOUS SOLUTION, PIGGYBACK (ML) INTRAVENOUS
Status: DISCONTINUED | OUTPATIENT
Start: 2020-01-27 | End: 2020-01-31 | Stop reason: HOSPADM

## 2020-01-27 RX ORDER — LIDOCAINE 40 MG/G
CREAM TOPICAL
Status: DISCONTINUED | OUTPATIENT
Start: 2020-01-27 | End: 2020-01-31 | Stop reason: HOSPADM

## 2020-01-27 RX ORDER — SODIUM CHLORIDE 9 MG/ML
INJECTION, SOLUTION INTRAVENOUS CONTINUOUS
Status: DISCONTINUED | OUTPATIENT
Start: 2020-01-27 | End: 2020-01-29

## 2020-01-27 RX ORDER — ONDANSETRON 2 MG/ML
4 INJECTION INTRAMUSCULAR; INTRAVENOUS EVERY 6 HOURS PRN
Status: DISCONTINUED | OUTPATIENT
Start: 2020-01-27 | End: 2020-01-31 | Stop reason: HOSPADM

## 2020-01-27 RX ORDER — POTASSIUM CHLORIDE 29.8 MG/ML
20 INJECTION INTRAVENOUS
Status: DISCONTINUED | OUTPATIENT
Start: 2020-01-27 | End: 2020-01-31 | Stop reason: HOSPADM

## 2020-01-27 RX ORDER — NALOXONE HYDROCHLORIDE 0.4 MG/ML
.1-.4 INJECTION, SOLUTION INTRAMUSCULAR; INTRAVENOUS; SUBCUTANEOUS
Status: DISCONTINUED | OUTPATIENT
Start: 2020-01-27 | End: 2020-01-31 | Stop reason: HOSPADM

## 2020-01-27 RX ORDER — MAGNESIUM SULFATE HEPTAHYDRATE 40 MG/ML
4 INJECTION, SOLUTION INTRAVENOUS EVERY 4 HOURS PRN
Status: DISCONTINUED | OUTPATIENT
Start: 2020-01-27 | End: 2020-01-31 | Stop reason: HOSPADM

## 2020-01-27 RX ORDER — ONDANSETRON 4 MG/1
4 TABLET, ORALLY DISINTEGRATING ORAL EVERY 6 HOURS PRN
Status: DISCONTINUED | OUTPATIENT
Start: 2020-01-27 | End: 2020-01-31 | Stop reason: HOSPADM

## 2020-01-27 RX ORDER — ACETAMINOPHEN 650 MG/1
650 SUPPOSITORY RECTAL EVERY 4 HOURS PRN
Status: DISCONTINUED | OUTPATIENT
Start: 2020-01-27 | End: 2020-01-31 | Stop reason: HOSPADM

## 2020-01-27 RX ORDER — POTASSIUM CHLORIDE 1.5 G/1.58G
20-40 POWDER, FOR SOLUTION ORAL
Status: DISCONTINUED | OUTPATIENT
Start: 2020-01-27 | End: 2020-01-31 | Stop reason: HOSPADM

## 2020-01-27 RX ORDER — LABETALOL HYDROCHLORIDE 5 MG/ML
10 INJECTION, SOLUTION INTRAVENOUS
Status: DISCONTINUED | OUTPATIENT
Start: 2020-01-27 | End: 2020-01-31 | Stop reason: HOSPADM

## 2020-01-27 RX ORDER — ACETAMINOPHEN 325 MG/1
650 TABLET ORAL EVERY 4 HOURS PRN
Status: DISCONTINUED | OUTPATIENT
Start: 2020-01-27 | End: 2020-01-31 | Stop reason: HOSPADM

## 2020-01-27 RX ORDER — POTASSIUM CHLORIDE 1500 MG/1
20-40 TABLET, EXTENDED RELEASE ORAL
Status: DISCONTINUED | OUTPATIENT
Start: 2020-01-27 | End: 2020-01-31 | Stop reason: HOSPADM

## 2020-01-28 LAB
ALBUMIN SERPL-MCNC: 3 G/DL (ref 3.4–5)
ALBUMIN SERPL-MCNC: 3.4 G/DL (ref 3.4–5)
ALP SERPL-CCNC: 47 U/L (ref 40–150)
ALP SERPL-CCNC: 61 U/L (ref 40–150)
ALT SERPL W P-5'-P-CCNC: 18 U/L (ref 0–70)
ALT SERPL W P-5'-P-CCNC: 21 U/L (ref 0–70)
ANION GAP SERPL CALCULATED.3IONS-SCNC: 6 MMOL/L (ref 3–14)
ANION GAP SERPL CALCULATED.3IONS-SCNC: 7 MMOL/L (ref 3–14)
AST SERPL W P-5'-P-CCNC: 17 U/L (ref 0–45)
AST SERPL W P-5'-P-CCNC: 20 U/L (ref 0–45)
BILIRUB SERPL-MCNC: 0.7 MG/DL (ref 0.2–1.3)
BILIRUB SERPL-MCNC: 0.7 MG/DL (ref 0.2–1.3)
BUN SERPL-MCNC: 19 MG/DL (ref 7–30)
BUN SERPL-MCNC: 21 MG/DL (ref 7–30)
CALCIUM SERPL-MCNC: 8.1 MG/DL (ref 8.5–10.1)
CALCIUM SERPL-MCNC: 8.3 MG/DL (ref 8.5–10.1)
CHLORIDE SERPL-SCNC: 104 MMOL/L (ref 94–109)
CHLORIDE SERPL-SCNC: 108 MMOL/L (ref 94–109)
CO2 SERPL-SCNC: 23 MMOL/L (ref 20–32)
CO2 SERPL-SCNC: 25 MMOL/L (ref 20–32)
CREAT SERPL-MCNC: 0.87 MG/DL (ref 0.66–1.25)
CREAT SERPL-MCNC: 0.91 MG/DL (ref 0.66–1.25)
ERYTHROCYTE [DISTWIDTH] IN BLOOD BY AUTOMATED COUNT: 12.6 % (ref 10–15)
ERYTHROCYTE [DISTWIDTH] IN BLOOD BY AUTOMATED COUNT: 12.8 % (ref 10–15)
GFR SERPL CREATININE-BSD FRML MDRD: 77 ML/MIN/{1.73_M2}
GFR SERPL CREATININE-BSD FRML MDRD: 80 ML/MIN/{1.73_M2}
GLUCOSE SERPL-MCNC: 102 MG/DL (ref 70–99)
GLUCOSE SERPL-MCNC: 98 MG/DL (ref 70–99)
HCT VFR BLD AUTO: 28.4 % (ref 40–53)
HCT VFR BLD AUTO: 34.5 % (ref 40–53)
HGB BLD-MCNC: 10 G/DL (ref 13.3–17.7)
HGB BLD-MCNC: 11.6 G/DL (ref 13.3–17.7)
HGB BLD-MCNC: 9 G/DL (ref 13.3–17.7)
HGB BLD-MCNC: 9.2 G/DL (ref 13.3–17.7)
HGB BLD-MCNC: 9.8 G/DL (ref 13.3–17.7)
INR PPP: 1.11 (ref 0.86–1.14)
INR PPP: 1.19 (ref 0.86–1.14)
MAGNESIUM SERPL-MCNC: 1.9 MG/DL (ref 1.6–2.3)
MCH RBC QN AUTO: 30.6 PG (ref 26.5–33)
MCH RBC QN AUTO: 31.3 PG (ref 26.5–33)
MCHC RBC AUTO-ENTMCNC: 33.6 G/DL (ref 31.5–36.5)
MCHC RBC AUTO-ENTMCNC: 34.5 G/DL (ref 31.5–36.5)
MCV RBC AUTO: 91 FL (ref 78–100)
MCV RBC AUTO: 91 FL (ref 78–100)
PLATELET # BLD AUTO: 228 10E9/L (ref 150–450)
PLATELET # BLD AUTO: 245 10E9/L (ref 150–450)
POTASSIUM SERPL-SCNC: 4 MMOL/L (ref 3.4–5.3)
POTASSIUM SERPL-SCNC: 4.1 MMOL/L (ref 3.4–5.3)
PROT SERPL-MCNC: 5.6 G/DL (ref 6.8–8.8)
PROT SERPL-MCNC: 6.9 G/DL (ref 6.8–8.8)
RBC # BLD AUTO: 3.13 10E12/L (ref 4.4–5.9)
RBC # BLD AUTO: 3.79 10E12/L (ref 4.4–5.9)
SODIUM SERPL-SCNC: 135 MMOL/L (ref 133–144)
SODIUM SERPL-SCNC: 138 MMOL/L (ref 133–144)
UPPER GI ENDOSCOPY: NORMAL
WBC # BLD AUTO: 8.6 10E9/L (ref 4–11)
WBC # BLD AUTO: 9.5 10E9/L (ref 4–11)

## 2020-01-28 PROCEDURE — G0500 MOD SEDAT ENDO SERVICE >5YRS: HCPCS | Performed by: INTERNAL MEDICINE

## 2020-01-28 PROCEDURE — C9113 INJ PANTOPRAZOLE SODIUM, VIA: HCPCS | Performed by: HOSPITALIST

## 2020-01-28 PROCEDURE — 12000000 ZZH R&B MED SURG/OB

## 2020-01-28 PROCEDURE — 25000132 ZZH RX MED GY IP 250 OP 250 PS 637: Mod: GY | Performed by: PHYSICIAN ASSISTANT

## 2020-01-28 PROCEDURE — 85610 PROTHROMBIN TIME: CPT | Performed by: HOSPITALIST

## 2020-01-28 PROCEDURE — 85027 COMPLETE CBC AUTOMATED: CPT | Performed by: HOSPITALIST

## 2020-01-28 PROCEDURE — 99232 SBSQ HOSP IP/OBS MODERATE 35: CPT | Performed by: HOSPITALIST

## 2020-01-28 PROCEDURE — 80053 COMPREHEN METABOLIC PANEL: CPT | Performed by: HOSPITALIST

## 2020-01-28 PROCEDURE — 25000125 ZZHC RX 250: Performed by: INTERNAL MEDICINE

## 2020-01-28 PROCEDURE — 0DJ08ZZ INSPECTION OF UPPER INTESTINAL TRACT, VIA NATURAL OR ARTIFICIAL OPENING ENDOSCOPIC: ICD-10-PCS | Performed by: INTERNAL MEDICINE

## 2020-01-28 PROCEDURE — 25000128 H RX IP 250 OP 636: Performed by: INTERNAL MEDICINE

## 2020-01-28 PROCEDURE — 25800030 ZZH RX IP 258 OP 636: Performed by: HOSPITALIST

## 2020-01-28 PROCEDURE — 36415 COLL VENOUS BLD VENIPUNCTURE: CPT | Performed by: HOSPITALIST

## 2020-01-28 PROCEDURE — 25000132 ZZH RX MED GY IP 250 OP 250 PS 637: Mod: GY | Performed by: HOSPITALIST

## 2020-01-28 PROCEDURE — 83735 ASSAY OF MAGNESIUM: CPT | Performed by: HOSPITALIST

## 2020-01-28 PROCEDURE — 43235 EGD DIAGNOSTIC BRUSH WASH: CPT | Performed by: INTERNAL MEDICINE

## 2020-01-28 PROCEDURE — 0DJD8ZZ INSPECTION OF LOWER INTESTINAL TRACT, VIA NATURAL OR ARTIFICIAL OPENING ENDOSCOPIC: ICD-10-PCS | Performed by: INTERNAL MEDICINE

## 2020-01-28 PROCEDURE — 85018 HEMOGLOBIN: CPT | Performed by: HOSPITALIST

## 2020-01-28 PROCEDURE — 25000128 H RX IP 250 OP 636: Performed by: HOSPITALIST

## 2020-01-28 RX ORDER — MINERAL OIL/PETROLATUM,WHITE 20%-80%
OINTMENT (GRAM) OPHTHALMIC (EYE) AT BEDTIME
COMMUNITY

## 2020-01-28 RX ORDER — LIDOCAINE 40 MG/G
CREAM TOPICAL
Status: DISCONTINUED | OUTPATIENT
Start: 2020-01-28 | End: 2020-01-28 | Stop reason: HOSPADM

## 2020-01-28 RX ORDER — FENTANYL CITRATE 50 UG/ML
INJECTION, SOLUTION INTRAMUSCULAR; INTRAVENOUS PRN
Status: DISCONTINUED | OUTPATIENT
Start: 2020-01-28 | End: 2020-01-28 | Stop reason: HOSPADM

## 2020-01-28 RX ORDER — ONDANSETRON 2 MG/ML
INJECTION INTRAMUSCULAR; INTRAVENOUS PRN
Status: DISCONTINUED | OUTPATIENT
Start: 2020-01-28 | End: 2020-01-28 | Stop reason: HOSPADM

## 2020-01-28 RX ADMIN — PANTOPRAZOLE SODIUM 40 MG: 40 INJECTION, POWDER, FOR SOLUTION INTRAVENOUS at 22:11

## 2020-01-28 RX ADMIN — PANTOPRAZOLE SODIUM 40 MG: 40 INJECTION, POWDER, FOR SOLUTION INTRAVENOUS at 08:44

## 2020-01-28 RX ADMIN — SODIUM CHLORIDE, PRESERVATIVE FREE: 5 INJECTION INTRAVENOUS at 22:16

## 2020-01-28 RX ADMIN — SODIUM CHLORIDE, PRESERVATIVE FREE: 5 INJECTION INTRAVENOUS at 01:25

## 2020-01-28 RX ADMIN — POLYETHYLENE GLYCOL 3350, SODIUM SULFATE ANHYDROUS, SODIUM BICARBONATE, SODIUM CHLORIDE, POTASSIUM CHLORIDE 4000 ML: 236; 22.74; 6.74; 5.86; 2.97 POWDER, FOR SOLUTION ORAL at 16:51

## 2020-01-28 RX ADMIN — ONDANSETRON 4 MG: 4 TABLET, ORALLY DISINTEGRATING ORAL at 05:05

## 2020-01-28 RX ADMIN — ACETAMINOPHEN 650 MG: 325 TABLET, FILM COATED ORAL at 01:36

## 2020-01-28 RX ADMIN — PANTOPRAZOLE SODIUM 40 MG: 40 INJECTION, POWDER, FOR SOLUTION INTRAVENOUS at 01:25

## 2020-01-28 RX ADMIN — SODIUM CHLORIDE, PRESERVATIVE FREE: 5 INJECTION INTRAVENOUS at 11:44

## 2020-01-28 ASSESSMENT — ACTIVITIES OF DAILY LIVING (ADL)
SWALLOWING: 0-->SWALLOWS FOODS/LIQUIDS WITHOUT DIFFICULTY
TRANSFERRING: 0-->INDEPENDENT
DRESS: 0-->INDEPENDENT
ADLS_ACUITY_SCORE: 12
NUMBER_OF_TIMES_PATIENT_HAS_FALLEN_WITHIN_LAST_SIX_MONTHS: 1
BATHING: 0-->INDEPENDENT
ADLS_ACUITY_SCORE: 14
FALL_HISTORY_WITHIN_LAST_SIX_MONTHS: YES
ADLS_ACUITY_SCORE: 14
RETIRED_COMMUNICATION: 0-->UNDERSTANDS/COMMUNICATES WITHOUT DIFFICULTY
TOILETING: 0-->INDEPENDENT
AMBULATION: 0-->INDEPENDENT
COGNITION: 0 - NO COGNITION ISSUES REPORTED
ADLS_ACUITY_SCORE: 19
RETIRED_EATING: 0-->INDEPENDENT
ADLS_ACUITY_SCORE: 12

## 2020-01-28 NOTE — PLAN OF CARE
DATE & TIME: 1/28/2020 2611-1330    Cognitive Concerns/ Orientation : A/ox4   BEHAVIOR & AGGRESSION TOOL COLOR: Green  CIWA SCORE: NA   ABNL VS/O2: VSS  MOBILITY: Up with one, gait belt  PAIN MANAGMENT: Lower abdominal discomfort, declines intervention  DIET: NPO ex meds today, clears until midnight per GI, then NPO ex meds again for colonoscopy tomorrow  BOWEL/BLADDER: Continent, no bloody stools on this shift until this evening when pt started golytely prep  ABNL LAB/BG: See results, serial hemoglobins  DRAIN/DEVICES: PIV infusing  TELEMETRY RHYTHM: V-paced on demand with PVC  SKIN: Pale  TESTS/PROCEDURES: EGD this afternoon at 1430, colonoscopy scheduled tomorrow at 1145  D/C DAY/GOALS/PLACE: Pending   OTHER IMPORTANT INFO: Per Gracy with GI, EGD this afternoon, then Golytely prep. Pt can have clears until Midnight then NPO ex meds again in prep for colonoscopy.

## 2020-01-28 NOTE — PHARMACY-ADMISSION MEDICATION HISTORY
Pharmacy Medication History  Admission medication history interview status for the 1/27/2020  admission is complete. See EPIC admission navigator for prior to admission medications     Medication history sources: Patient  Medication history source reliability: Good  Adherence assessment: Good    Significant changes made to the medication list:  no      Additional medication history information:   none    Medication reconciliation completed by provider prior to medication history? Yes    Time spent in this activity: 20      Prior to Admission medications    Medication Sig Last Dose Taking? Auth Provider   carvedilol (COREG) 12.5 MG tablet Take 1 tablet (12.5 mg) by mouth 2 times daily (with meals) 1/27/2020 at PM Yes Rah Ayala MD   clopidogrel (PLAVIX) 75 MG tablet Take 75 mg by mouth daily 1/27/2020 at AM Yes Reported, Patient   CYCLOBENZAPRINE HCL PO Take 5 mg by mouth At Bedtime Taking PRN 1/27/2020 at PM Yes Reported, Patient   cycloSPORINE (RESTASIS) 0.05 % ophthalmic emulsion Place 1 drop into both eyes 2 times daily  1/27/2020 at PM Yes Reported, Patient   diphenhydrAMINE-acetaminophen (TYLENOL PM)  MG tablet Take 1 tablet by mouth nightly as needed for sleep Past Week at Unknown time Yes Reported, Patient   Famotidine (PEPCID PO) Take 40 mg by mouth daily  1/27/2020 at PM Yes Reported, Patient   isosorbide mononitrate (IMDUR) 60 MG 24 hr tablet Take 60 mg by mouth daily 1/27/2020 at AM Yes Reported, Patient   polyethylene glycol 400 (BLINK TEARS) 0.25 % SOLN ophthalmic solution Place 1 drop into both eyes 2 times daily as needed  1/27/2020 at PM Yes Reported, Patient   rosuvastatin (CRESTOR) 20 MG tablet Take 1 tablet (20 mg) by mouth daily 1/27/2020 at PM Yes Rah Ayala MD   sacubitril-valsartan (ENTRESTO)  MG per tablet Take 1 tablet by mouth 2 times daily 1/27/2020 at PM Yes Rah Ayala MD   tamsulosin (FLOMAX) 0.4 MG 24 hr capsule Take 0.4 mg by mouth daily 1/27/2020 at AM  Yes Reported, Patient   White Petrolatum-Mineral Oil (RETAINE PM) OINT Place into both eyes At Bedtime 1/27/2020 at PM Yes Unknown, Entered By History

## 2020-01-28 NOTE — H&P
[facility]    History and Physical  Hospitalist       Date of Admission:  1/27/2020  Date of Service (when I saw the patient): 01/27/20    Assessment & Plan   Katie Shannon is a 84 year old male who presents with Gastrointestinal bleeding.  Transferred from [facility] per report and admitted for further evaluation and treatment.    Acute gastrointestinal bleeding, bright red blood per rectum: Patient states that he has had several bowel movements with bright red blood.  Patient does state that he has history of hemorrhoids.  Patient denies chest pain, nausea, vomiting, dysuria, blood in the urine, lower extremity edema, rash, headache, vision changes, ear pain, eye pain, sore throat, fever, cough.  Patient states that he has had intermittent bleeding per rectum for the past day, however, intermittently with bleeding and most recently with a bowel movement without bleeding.  Patient states that he presented to the outside emergency department with concern for bleeding with last colonoscopy completed about 7 years ago without report from patient of remarkable findings.  Transferred from [facility].  -Gastroenterology consulted.  -Hold antihypertensives.  -IV fluids.  -Serial hemoglobin.  -We will need initial labs.  -Pharmacy for medication reconciliation.  -Hold prior to admission Plavix.  -Telemetry.    Cardiac, chronic systolic congestive heart failure, coronary artery disease, hypertension, hyperlipidemia: Patient with past medical history and the chart with coronary artery disease.  See chart for further details.  Patient states he has a history of pacemaker insertion.  -Continue prior to admission Coreg when verified by pharmacy.  -Continue prior to admission rosuvastatin when verified by pharmacy.  -Continue prior to admission Entresto when verified by pharmacy.  -Continue prior to admission Imdur verified by pharmacy.    GERD: Stable.  -Protonix IV twice  daily.    BPH: Stable.  -Continue prior to admission Flomax when verified by pharmacy.    DVT Prophylaxis: Pneumatic Compression Devices  Code Status: Full Code    Disposition: Inpatient.    Dr. Ilan Rosa D.O.  [facility] Hospitalist  Pager [phone]    Primary Care Physician   Deborah Gandhi    Chief Complaint   Bright red blood per rectum    History is obtained from the patient and medical records.     History of Present Illness   Katie Shannon is a 84 year old male who presents with Gastrointestinal bleeding.  Transferred from [facility] per report and admitted for further evaluation and treatment. Acute gastrointestinal bleeding, bright red blood per rectum: Patient states that he has had several bowel movements with bright red blood.  Patient does state that he has history of hemorrhoids.  Patient denies chest pain, nausea, vomiting, dysuria, blood in the urine, lower extremity edema, rash, headache, vision changes, ear pain, eye pain, sore throat, fever, cough.  Patient states that he has had intermittent bleeding per rectum for the past day, however, intermittently with bleeding and most recently with a bowel movement without bleeding.  Patient states that he presented to the outside emergency department with concern for bleeding with last colonoscopy completed about 7 years ago without report from patient of remarkable findings.  Transferred from [facility].    Past Medical History    I have reviewed this patient's medical history and updated it with pertinent information if needed.   Past Medical History:   Diagnosis Date     CAD (coronary artery disease)      Congestive heart failure (H)      Heart disease     non-ischemic cardiomyopathy     HTN (hypertension)      Idiopathic cardiomyopathy (H)        Past Surgical History   I have reviewed this patient's surgical history and updated it with pertinent information if needed.  Past Surgical History:    Procedure Laterality Date     EYE SURGERY      cataract     ORTHOPEDIC SURGERY  2018    pinning of right hip       Prior to Admission Medications   Prior to Admission Medications   Prescriptions Last Dose Informant Patient Reported? Taking?   CYCLOBENZAPRINE HCL PO   Yes No   Sig: Take 5 mg by mouth At Bedtime Taking PRN   Famotidine (PEPCID PO)   Yes No   Sig: Take 20 mg by mouth 2 times daily   Hypromellose (RETAINE [facility] OP)   Yes No   Sig: Apply to eye daily   Omeprazole (PRILOSEC PO)   Yes No   Sig: Take 20 mg by mouth daily   carvedilol (COREG) 12.5 MG tablet   No No   Sig: Take 1 tablet (12.5 mg) by mouth 2 times daily (with meals)   clopidogrel (PLAVIX) 75 MG tablet   Yes No   Sig: Take 75 mg by mouth daily   cycloSPORINE (RESTASIS) 0.05 % ophthalmic emulsion   Yes No   Sig: daily   diphenhydrAMINE-acetaminophen (TYLENOL PM)  MG tablet   Yes No   Sig: Take 1 tablet by mouth nightly as needed for sleep   isosorbide mononitrate (IMDUR) 60 MG 24 hr tablet   Yes No   Sig: Take 60 mg by mouth daily   polyethylene glycol 400 (BLINK TEARS) 0.25 % SOLN ophthalmic solution   Yes No   Si drop   prednisoLONE acetate (PRED FORTE) 1 % ophthalmic susp   Yes No   Si-2 drops 4 times daily   rosuvastatin (CRESTOR) 20 MG tablet   No No   Sig: Take 1 tablet (20 mg) by mouth daily   sacubitril-valsartan (ENTRESTO)  MG per tablet   No No   Sig: Take 1 tablet by mouth 2 times daily   sodium chloride (SHELL 128) 5 % ophthalmic solution   Yes No   Sig: Place 1 drop into both eyes At Bedtime   tamsulosin (FLOMAX) 0.4 MG 24 hr capsule   Yes No   Sig: Take 0.4 mg by mouth daily      Facility-Administered Medications: None     Allergies   Allergies   Allergen Reactions     Atorvastatin      Muscle aches     Diphenhydramine      Metoprolol Succinate [Metoprolol]      Strawberry      Sulfa Drugs        Social History   I have reviewed this patient's social history and updated it with pertinent information if  needed. Katie Shannon  reports that he has never smoked. He has never used smokeless tobacco. He reports that he does not drink alcohol or use drugs.    Family History   I have reviewed this patient's family history and updated it with pertinent information if needed.   Family History   Problem Relation Age of Onset     Heart Disease Mother 88        mi     Cerebrovascular Disease Father 71       Review of Systems   The 10 point Review of Systems is negative other than noted in the HPI or here.     Physical Exam   Temp: 98  F (36.7  C) Temp src: Oral BP: 131/63 Pulse: 87   Resp: 16 SpO2: 98 % O2 Device: None (Room air)    Vital Signs with Ranges  Temp:  [98  F (36.7  C)] 98  F (36.7  C)  Pulse:  [87] 87  Resp:  [16] 16  BP: (131)/(63) 131/63  SpO2:  [98 %] 98 %  0 lbs 0 oz    GENERAL: Alert and oriented. NAD. Conversational, appropriate.   HEENT: Normocephalic. EOMI. No icterus or injection. Nares normal.   LUNGS: Clear to auscultation. No dyspnea at rest.   HEART: Regular rate. Extremities perfused.   ABDOMEN: Soft, nontender, and nondistended. Positive bowel sounds.   EXTREMITIES: No LE edema noted.   NEUROLOGIC: Moves extremities x4 on command. No acute focal neurologic abnormalities noted.     Data   Data reviewed today:  I personally reviewed both laboratory and imaging data.   No lab results found in last 7 days.    No results found for this or any previous visit (from the past 24 hour(s)).

## 2020-01-28 NOTE — PROGRESS NOTES
"SPIRITUAL HEALTH SERVICES Progress Note  [facility] 68    Initiated visit due to pt request.  Pt was in room w/ spouse and daughter.  Pt reflected on a previous experience with  in [place], [place], stating that this had been a very positive experience.  Pt shared many things with writer about his life and beliefs, and engaged in meaning-making and storytelling.  Pt recounted that lately he has been questioning \"everything having a reason,\" stating that many horrible things happen in the world, and we have to deal with it because \"Joseluis dealt with it\".  Pt reflected on a meaningful experience at a recent class reunion, stating that his class had shown kindness to a woman on hospice care, stating that this experience gives him hope when he is cynical.  Pt was reflective about aliens, which has been concerning to him lately, as he believes that his kal system may be wrong.  SH assesses that pt derives a great deal of bernard and meaning-making from telling stories, and is reflective about his kal life, though recent incidents have caused him some spiritual distress.   offered supportive listening, reflective conversation, and offered prayer.  SH will f/u on Friday if pt is still hospitalized, per pt request.      Cameron Armenta  Chaplain Resident    "

## 2020-01-28 NOTE — PROGRESS NOTES
[facility]    Medicine Progress Note - Hospitalist Service       Date of Admission:  1/27/2020  Assessment & Plan     Katie Shannon is a 84 year old male who presents with Gastrointestinal bleeding.  Transferred from [facility] per report and admitted for further evaluation and treatment.     Acute gastrointestinal bleeding, bright red blood per rectum  Patient states that he has had several bowel movements with bright red blood.  Patient does state that he has history of hemorrhoids.  Patient denies chest pain, nausea, vomiting, dysuria, blood in the urine, lower extremity edema, rash, headache, vision changes, ear pain, eye pain, sore throat, fever, cough.  Patient states that he has had intermittent bleeding per rectum for the past day, however, intermittently with bleeding and most recently with a bowel movement without bleeding.  Patient states that he presented to the outside emergency department with concern for bleeding with last colonoscopy completed about 7 years ago without report from patient of remarkable findings.  Transferred from [facility].  He is continued to pass red blood but is hemodynamically stable.  His last 3 hemoglobins are 10 g, 9.8 g and 11.6 g.  He was seen by Gracy from Dr. Boothe's group.  He is scheduled for an EGD today and colonoscopy tomorrow.    Continue to monitor hemoglobin    Continue PPI although this is likely a lower GI hemorrhage    EGD today and colonoscopy tomorrow    Continue to hold antihypertensive medications and clopidogrel     Cardiac, congestive heart failure, coronary artery disease, hypertension, hyperlipidemia  Patient with past medical history and the chart with coronary artery disease.  See chart for further details.  Patient states he has a history of pacemaker insertion.  His home cardiac medications are clopidogrel, carvedilol, rosuvastatin, Imdur and Entresto    Hold his home medications today due to the plan  for endoscopy and active bleeding    GERD  Stable.    Protonix IV twice daily     BPH  Stable.    Continue prior to admission Flomax     Diet: NPO for Medical/Clinical Reasons Except for: Meds    DVT Prophylaxis: Pneumatic Compression Devices  Torres Catheter: not present  Code Status: Full Code      Disposition Plan   Expected discharge: 2 - 3 days, recommended to prior living arrangement once hemoglobin stable.  Entered: Sunday Dixon MD 01/28/2020, 1:51 PM       The patient's care was discussed with the Patient, Patient's Family and GI Consultant.    Sunday Dixon MD  Hospitalist Service  [facility]    ______________________________________________________________________    Interval History   The patient continues to pass some red blood.  He has some lower abdominal discomfort which he thinks is due to urinary bladder distention.  He has no nausea or vomiting.    Data reviewed today: I reviewed all medications, new labs and imaging results over the last 24 hours. I personally reviewed no images or EKG's today.    Physical Exam   Vital Signs: Temp: 98.7  F (37.1  C) Temp src: Oral BP: 127/57 Pulse: 57   Resp: 18 SpO2: 95 % O2 Device: None (Room air)    Weight: 0 lbs 0 oz  Constitutional: awake, alert, cooperative, no apparent distress, and appears stated age  Respiratory: No increased work of breathing, good air exchange, clear to auscultation bilaterally, no crackles or wheezing  Cardiovascular:  regular rate and rhythm, normal S1 and S2, no S3 or S4, and no murmur noted  GI:normal bowel sounds, soft, non-distended, non-tender, no masses palpated  Skin: no rashes and no jaundice  Neuropsychiatric: General: normal, calm and normal eye contact    Data   Recent Labs   Lab 01/28/20  1015 01/28/20  0623 01/28/20  0007   WBC  --  8.6 9.5   HGB 10.0* 9.8* 11.6*   MCV  --  91 91   PLT  --  228 245   INR  --  1.19* 1.11   NA  --  138 135   POTASSIUM  --  4.1 4.0   CHLORIDE  --  108 104    CO2  --  23 25   BUN  --  21 19   CR  --  0.91 0.87   ANIONGAP  --  7 6   ELIZABETH  --  8.1* 8.3*   GLC  --  102* 98   ALBUMIN  --  3.0* 3.4   PROTTOTAL  --  5.6* 6.9   BILITOTAL  --  0.7 0.7   ALKPHOS  --  47 61   ALT  --  18 21   AST  --  17 20     No results found for this or any previous visit (from the past 24 hour(s)).  Medications     sodium chloride 100 mL/hr at 01/28/20 1144       pantoprazole (PROTONIX) IV  40 mg Intravenous BID     polyethylene glycol  4,000 mL Oral or NG Tube Once     sodium chloride (PF)  3 mL Intracatheter Q8H

## 2020-01-28 NOTE — CONSULTS
[facility]  Gastroenterology Consultation         Katie Shannon  [id number]  [facility] [id number]  84 year old male    Admission Date/Time: 1/27/2020  Primary Care Provider: Deborah Gandhi  Referring / Attending Physician:  Dr. Ilan Rosa    We were asked to see the patient in consultation by Dr. Ilan Alanis for evaluation of hematochezia.      CC: bright red bleeding per rectum    HPI:  Katie Shannon is a 84 year old male who has a past medical history of CAD on Plavix, congestive heart failure with history of pacemaker insertions, heart disease and hypertension whom presented from [place], [place] for further evaluation of bright red blood per rectum. States had several bloody stools yesterday that have continued overnight. He states he has a history of hemorrhoids. Last colonoscopy done 7 years ago. He denies chest pain, vomiting, fever, chills, cough. Has had mild nausea and stools are loose, and has mild lower abdominal cramping. Patient denies use of NSAIDs. Does take Plavix.    He has been afebrile, normotensive, SpO2 95%, Labs noted hemoglobin decrease from baselin of 12.8 to 9.8. Platelets 228, WBC 8.6.CMP unremarkable,     ROS: A comprehensive ten point review of systems was negative aside from those in mentioned in the HPI.      PAST MED HX:  I have reviewed this patient's medical history and updated it with pertinent information if needed.   Past Medical History:   Diagnosis Date     CAD (coronary artery disease)      Congestive heart failure (H)      Heart disease     non-ischemic cardiomyopathy     HTN (hypertension)      Idiopathic cardiomyopathy (H)        MEDICATIONS:   Prior to Admission Medications   Prescriptions Last Dose Informant Patient Reported? Taking?   CYCLOBENZAPRINE HCL PO   Yes No   Sig: Take 5 mg by mouth At Bedtime Taking PRN   Famotidine (PEPCID PO)   Yes No   Sig: Take 20 mg by mouth 2 times daily   Hypromellose (RETAINE [facility] OP)   Yes No   Sig: Apply  to eye daily   Omeprazole (PRILOSEC PO)   Yes No   Sig: Take 20 mg by mouth daily   carvedilol (COREG) 12.5 MG tablet   No No   Sig: Take 1 tablet (12.5 mg) by mouth 2 times daily (with meals)   clopidogrel (PLAVIX) 75 MG tablet   Yes No   Sig: Take 75 mg by mouth daily   cycloSPORINE (RESTASIS) 0.05 % ophthalmic emulsion   Yes No   Sig: daily   diphenhydrAMINE-acetaminophen (TYLENOL PM)  MG tablet   Yes No   Sig: Take 1 tablet by mouth nightly as needed for sleep   isosorbide mononitrate (IMDUR) 60 MG 24 hr tablet   Yes No   Sig: Take 60 mg by mouth daily   polyethylene glycol 400 (BLINK TEARS) 0.25 % SOLN ophthalmic solution   Yes No   Si drop   prednisoLONE acetate (PRED FORTE) 1 % ophthalmic susp   Yes No   Si-2 drops 4 times daily   rosuvastatin (CRESTOR) 20 MG tablet   No No   Sig: Take 1 tablet (20 mg) by mouth daily   sacubitril-valsartan (ENTRESTO)  MG per tablet   No No   Sig: Take 1 tablet by mouth 2 times daily   sodium chloride (SHELL 128) 5 % ophthalmic solution   Yes No   Sig: Place 1 drop into both eyes At Bedtime   tamsulosin (FLOMAX) 0.4 MG 24 hr capsule   Yes No   Sig: Take 0.4 mg by mouth daily      Facility-Administered Medications: None       ALLERGIES:   Allergies   Allergen Reactions     Atorvastatin      Muscle aches     Diphenhydramine      Metoprolol Succinate [Metoprolol]      Strawberry      Sulfa Drugs        SOCIAL HISTORY:  Social History     Tobacco Use     Smoking status: Never Smoker     Smokeless tobacco: Never Used   Substance Use Topics     Alcohol use: No     Alcohol/week: 0.0 standard drinks     Drug use: No       FAMILY HISTORY:  Family History   Problem Relation Age of Onset     Heart Disease Mother 88        mi     Cerebrovascular Disease Father 71       PHYSICAL EXAM:   General  Alert, oriented and comfortable  Vital Signs with Ranges  Temp: 98.7  F (37.1  C) Temp src: Oral BP: 127/57 Pulse: 57   Resp: 18 SpO2: 95 % O2 Device: None (Room air)    No  intake/output data recorded.    Constitutional: healthy, alert, mild distress, cooperative and smiling   Cardiovascular: negative, PMI normal. No lifts, heaves, or thrills. RRR. No murmurs, clicks gallops or rub  Respiratory: negative, Percussion normal. Good diaphragmatic excursion. Lungs clear  Head: Normocephalic. No masses, lesions, tenderness or abnormalities  Neck: Neck supple. No adenopathy. Thyroid symmetric, normal size,, Carotids without bruits.  Abdomen: Abdomen soft, tender. BS normal. No masses, organomegaly, positive findings: tenderness mild RLQ          ADDITIONAL COMMENTS:   I reviewed the patient's new clinical lab test results.   Recent Labs   Lab Test 01/28/20 0623 01/28/20 0007 04/30/18  0845   WBC 8.6 9.5 5.5   HGB 9.8* 11.6* 12.8*   MCV 91 91 89    245 155   INR 1.19* 1.11  --      Recent Labs   Lab Test 01/28/20  0623 01/28/20 0007 12/04/19  1205   POTASSIUM 4.1 4.0 4.0   CHLORIDE 108 104 98   CO2 23 25 26   BUN 21 19 13   ANIONGAP 7 6 14     Recent Labs   Lab Test 01/28/20  0623 01/28/20 0007 12/04/19  1205   ALBUMIN 3.0* 3.4  --    BILITOTAL 0.7 0.7  --    ALT 18 21 <5*   AST 17 20  --        I reviewed the patient's new imaging results.        CONSULTATION ASSESSMENT AND PLAN:    Katie Shannon is a pleasant 84 year old male with hematochezia and decrease in hemoglobin. GI consulted to evaluated for lower GI bleed.    Active Problems:    Gastrointestinal bleeding    Hematochezia    Anemia   Patient has had bright red rectal bleeding per rectum over last 24 hours. Has had decrease in hemoglobin to 9.8 from baseline of 12.8. Has history of CAD on Plavix. Last colonoscopy done 7 years ago noted hemorrhoids. There is a concern for hemorrhoid bleed, vs solitary ulcer vs AVM vs neoplastic cause. Less likely upper GI bleed.     - Will schedule for EGD today and colonoscopy tomorrow  - NPO now and can have clear liquid diet post EGD until midnight will need to be NPO  -  Start colon prep  this evening  - Serial hemoglobin , transfuse at 8.0 or less  - Continue with IV pantoprazole          [facility].  Office: [phone]  Cell : [phone] (Dr. Boothe)  Cell: [phone] (Gracy Carbajal PA-C)

## 2020-01-28 NOTE — PLAN OF CARE
DATE & TIME: 1/28/2020                  Cognitive Concerns/ Orientation : Alert and Oriented x4   BEHAVIOR & AGGRESSION TOOL COLOR: Green   CIWA SCORE: NA    ABNL VS/O2: VSS on RA  MOBILITY: SBA   PAIN MANAGMENT: Tylenol x1   DIET: NPO   BOWEL/BLADDER: Continent   ABNL LAB/BG: VSS on RA, Hgb slowly trending downward.   DRAIN/DEVICES: PIV in right and left forearms  TELEMETRY RHYTHM: 100 V Paced with BBB and PVC.   SKIN: Intact   TESTS/PROCEDURES: NA  D/C DAY/GOALS/PLACE: 1-2 days pending test results   OTHER IMPORTANT INFO: Patient passing bloody stools.  Zofran given x1 for nausea.

## 2020-01-28 NOTE — PROGRESS NOTES
Admission    Patient arrives to room 619-2 via cart from Facility in [place], [place].  Care plan note:     Inpatient nursing criteria listed below were met:    PCD's Documented: No  Skin issues/needs documented :Yes  Isolation education started/completed NA  Patient allergies verified with patient: Yes  Verified completion of [place] Risk Assessment Tool:  Yes  Verified completion of Guardianship screening tool: Yes  Fall Prevention: Care plan updated, Education given and documented Yes  Care Plan initiated: Yes  Home medications documented in belongings flowsheet: NA  Patient belongings documented in belongings flowsheet: Yes  Reminder note (belongings/ medications) placed in discharge instructions:NA  Admission profile/ required documentation complete: Yes  Bedside Report Letter given and explained to patient No

## 2020-01-29 LAB
COLONOSCOPY: NORMAL
HGB BLD-MCNC: 8.6 G/DL (ref 13.3–17.7)
MAGNESIUM SERPL-MCNC: 1.9 MG/DL (ref 1.6–2.3)
POTASSIUM SERPL-SCNC: 3.6 MMOL/L (ref 3.4–5.3)

## 2020-01-29 PROCEDURE — C9113 INJ PANTOPRAZOLE SODIUM, VIA: HCPCS | Performed by: HOSPITALIST

## 2020-01-29 PROCEDURE — 25800030 ZZH RX IP 258 OP 636: Performed by: HOSPITALIST

## 2020-01-29 PROCEDURE — 45378 DIAGNOSTIC COLONOSCOPY: CPT | Performed by: INTERNAL MEDICINE

## 2020-01-29 PROCEDURE — 99153 MOD SED SAME PHYS/QHP EA: CPT | Performed by: INTERNAL MEDICINE

## 2020-01-29 PROCEDURE — G0500 MOD SEDAT ENDO SERVICE >5YRS: HCPCS | Performed by: INTERNAL MEDICINE

## 2020-01-29 PROCEDURE — 99232 SBSQ HOSP IP/OBS MODERATE 35: CPT | Performed by: HOSPITALIST

## 2020-01-29 PROCEDURE — 12000000 ZZH R&B MED SURG/OB

## 2020-01-29 PROCEDURE — 25000128 H RX IP 250 OP 636: Performed by: HOSPITALIST

## 2020-01-29 PROCEDURE — 25000128 H RX IP 250 OP 636: Performed by: INTERNAL MEDICINE

## 2020-01-29 PROCEDURE — 36415 COLL VENOUS BLD VENIPUNCTURE: CPT | Performed by: HOSPITALIST

## 2020-01-29 PROCEDURE — 84132 ASSAY OF SERUM POTASSIUM: CPT | Performed by: HOSPITALIST

## 2020-01-29 PROCEDURE — 25000132 ZZH RX MED GY IP 250 OP 250 PS 637: Mod: GY | Performed by: HOSPITALIST

## 2020-01-29 PROCEDURE — 85018 HEMOGLOBIN: CPT | Performed by: HOSPITALIST

## 2020-01-29 PROCEDURE — 83735 ASSAY OF MAGNESIUM: CPT | Performed by: HOSPITALIST

## 2020-01-29 PROCEDURE — 25000125 ZZHC RX 250: Performed by: INTERNAL MEDICINE

## 2020-01-29 RX ORDER — CARVEDILOL 12.5 MG/1
12.5 TABLET ORAL 2 TIMES DAILY WITH MEALS
Status: DISCONTINUED | OUTPATIENT
Start: 2020-01-29 | End: 2020-01-31 | Stop reason: HOSPADM

## 2020-01-29 RX ORDER — LIDOCAINE 40 MG/G
CREAM TOPICAL
Status: DISCONTINUED | OUTPATIENT
Start: 2020-01-29 | End: 2020-01-29

## 2020-01-29 RX ORDER — TAMSULOSIN HYDROCHLORIDE 0.4 MG/1
0.4 CAPSULE ORAL DAILY
Status: DISCONTINUED | OUTPATIENT
Start: 2020-01-29 | End: 2020-01-31 | Stop reason: HOSPADM

## 2020-01-29 RX ORDER — ISOSORBIDE MONONITRATE 60 MG/1
60 TABLET, EXTENDED RELEASE ORAL DAILY
Status: DISCONTINUED | OUTPATIENT
Start: 2020-01-29 | End: 2020-01-31 | Stop reason: HOSPADM

## 2020-01-29 RX ORDER — ROSUVASTATIN CALCIUM 20 MG/1
20 TABLET, COATED ORAL EVERY EVENING
Status: DISCONTINUED | OUTPATIENT
Start: 2020-01-29 | End: 2020-01-31 | Stop reason: HOSPADM

## 2020-01-29 RX ORDER — CLOPIDOGREL BISULFATE 75 MG/1
75 TABLET ORAL DAILY
Status: DISCONTINUED | OUTPATIENT
Start: 2020-01-29 | End: 2020-01-31

## 2020-01-29 RX ORDER — FENTANYL CITRATE 50 UG/ML
INJECTION, SOLUTION INTRAMUSCULAR; INTRAVENOUS PRN
Status: DISCONTINUED | OUTPATIENT
Start: 2020-01-29 | End: 2020-01-29 | Stop reason: HOSPADM

## 2020-01-29 RX ORDER — LIDOCAINE HYDROCHLORIDE 20 MG/ML
JELLY TOPICAL PRN
Status: DISCONTINUED | OUTPATIENT
Start: 2020-01-29 | End: 2020-01-29 | Stop reason: HOSPADM

## 2020-01-29 RX ADMIN — ISOSORBIDE MONONITRATE 60 MG: 60 TABLET, EXTENDED RELEASE ORAL at 14:44

## 2020-01-29 RX ADMIN — SACUBITRIL AND VALSARTAN 1 TABLET: 97; 103 TABLET, FILM COATED ORAL at 19:29

## 2020-01-29 RX ADMIN — TAMSULOSIN HYDROCHLORIDE 0.4 MG: 0.4 CAPSULE ORAL at 14:44

## 2020-01-29 RX ADMIN — ROSUVASTATIN CALCIUM 20 MG: 20 TABLET, FILM COATED ORAL at 19:29

## 2020-01-29 RX ADMIN — PANTOPRAZOLE SODIUM 40 MG: 40 INJECTION, POWDER, FOR SOLUTION INTRAVENOUS at 07:51

## 2020-01-29 RX ADMIN — SODIUM CHLORIDE, PRESERVATIVE FREE: 5 INJECTION INTRAVENOUS at 10:17

## 2020-01-29 RX ADMIN — CLOPIDOGREL BISULFATE 75 MG: 75 TABLET ORAL at 14:44

## 2020-01-29 RX ADMIN — CARVEDILOL 12.5 MG: 12.5 TABLET, FILM COATED ORAL at 17:51

## 2020-01-29 RX ADMIN — ACETAMINOPHEN 650 MG: 325 TABLET, FILM COATED ORAL at 07:50

## 2020-01-29 ASSESSMENT — ACTIVITIES OF DAILY LIVING (ADL)
ADLS_ACUITY_SCORE: 14
ADLS_ACUITY_SCORE: 17
ADLS_ACUITY_SCORE: 16

## 2020-01-29 NOTE — PLAN OF CARE
DATE & TIME: 1/29/2020 8495-1090                Cognitive Concerns/ Orientation : A&Ox4   BEHAVIOR & AGGRESSION TOOL COLOR: Green  CIWA SCORE: NA    ABNL VS/O2: VSS on RA  MOBILITY: up with SBA, uses gait belt and walker.   PAIN MANAGMENT: Pt had headache early am. PO Tylenol given, headache resolved.   DIET:  Tolerating clear liquids, regular adult diet. Lunch ordered.   BOWEL/BLADDER: Continent, clear yellow urine.   ABNL LAB/BG: Hgb 8.6.   DRAIN/DEVICES: PIV  TELEMETRY RHYTHM: 100% V-paced with bi-couplet PVC's.   SKIN: Pale, warm  TESTS/PROCEDURES: Colonoscopy done at 1145, Mg 1.9  D/C DAY/GOALS/PLACE: Possibly today/tomorrow?  OTHER IMPORTANT INFO: Colonoscopy prep was completed last evening, colonoscopy today at 1100. Recommended that pt resume regular diet, regular medications [including plavix], metamucil BID, H2 preparation to anal area BID. Recommended outpatient capsule endoscopy. Endoscopy did not provide any answers to cause for GI bleed.

## 2020-01-29 NOTE — PROGRESS NOTES
[facility]    Medicine Progress Note - Hospitalist Service       Date of Admission:  1/27/2020  Assessment & Plan     Katie Shannon is a 84 year old male who presents with Gastrointestinal bleeding.  Transferred from [facility] per report and admitted for further evaluation and treatment.     Acute gastrointestinal bleeding, bright red blood per rectum  Patient states that he has had several bowel movements with bright red blood.  Patient does state that he has history of hemorrhoids.  Patient denies chest pain, nausea, vomiting, dysuria, blood in the urine, lower extremity edema, rash, headache, vision changes, ear pain, eye pain, sore throat, fever, cough.  Patient states that he has had intermittent bleeding per rectum for the past day, however, intermittently with bleeding and most recently with a bowel movement without bleeding.  Patient states that he presented to the outside emergency department with concern for bleeding with last colonoscopy completed about 7 years ago without report from patient of remarkable findings.  Transferred from [facility].  He is no longer passing blood.  EGD was negative.  Today a colonoscopy showed a thrombosed hemorrhoid which could have been bleeding.    Continue to monitor hemoglobin daily    Stop proton pump inhibitor     Metamucil and prn HC for hemorrhoid     Outpatient capsule endoscopy     Cardiac, congestive heart failure, coronary artery disease, hypertension, hyperlipidemia  Patient with past medical history and the chart with coronary artery disease.  See chart for further details.  Patient states he has a history of pacemaker insertion.  His home cardiac medications are clopidogrel, carvedilol, rosuvastatin, Imdur and Entresto    Resume prior to admission medications including clopidogrel     GERD  Stable.    Stop intravenous proton pump inhibitor      BPH  Stable.    Continue prior to admission Flomax     Diet:  Regular Diet Adult    DVT Prophylaxis: Pneumatic Compression Devices  Torres Catheter: not present  Code Status: Full Code      Disposition Plan   Expected discharge: tomorrow, recommended to prior living arrangement once hemoglobin stable.  Entered: Sunday Dixon MD 01/29/2020, 3:47 PM       The patient's care was discussed with the Patient, Patient's Family and GI Consultant.    Sunday iDxon MD  Hospitalist Service  [facility]    ______________________________________________________________________    Interval History   No overt bleeding.  Awaiting colonoscopy.    Data reviewed today: I reviewed all medications, new labs and imaging results over the last 24 hours. I personally reviewed no images or EKG's today.    Physical Exam   Vital Signs: Temp: 97.6  F (36.4  C) Temp src: Oral BP: 111/52 Pulse: 106 Heart Rate: 85 Resp: 16 SpO2: 98 % O2 Device: None (Room air) Oxygen Delivery: 4 LPM  Weight: 165 lbs 9.05 oz  Constitutional: awake, alert, cooperative, no apparent distress, and appears stated age  Respiratory: No increased work of breathing, good air exchange, clear to auscultation bilaterally, no crackles or wheezing  Cardiovascular:  regular rate and rhythm, normal S1 and S2, no S3 or S4, and no murmur noted  GI:normal bowel sounds, soft, non-distended, non-tender, no masses palpated  Skin: no rashes and no jaundice  Neuropsychiatric: General: normal, calm and normal eye contact    Data   Recent Labs   Lab 01/29/20  0956 01/28/20  2214 01/28/20  1655  01/28/20  0623 01/28/20  0007   WBC  --   --   --   --  8.6 9.5   HGB 8.6* 9.2* 9.0*   < > 9.8* 11.6*   MCV  --   --   --   --  91 91   PLT  --   --   --   --  228 245   INR  --   --   --   --  1.19* 1.11   NA  --   --   --   --  138 135   POTASSIUM 3.6  --   --   --  4.1 4.0   CHLORIDE  --   --   --   --  108 104   CO2  --   --   --   --  23 25   BUN  --   --   --   --  21 19   CR  --   --   --   --  0.91 0.87   ANIONGAP  --    --   --   --  7 6   ELIZABETH  --   --   --   --  8.1* 8.3*   GLC  --   --   --   --  102* 98   ALBUMIN  --   --   --   --  3.0* 3.4   PROTTOTAL  --   --   --   --  5.6* 6.9   BILITOTAL  --   --   --   --  0.7 0.7   ALKPHOS  --   --   --   --  47 61   ALT  --   --   --   --  18 21   AST  --   --   --   --  17 20    < > = values in this interval not displayed.     No results found for this or any previous visit (from the past 24 hour(s)).  Medications       carvedilol  12.5 mg Oral BID w/meals     clopidogrel  75 mg Oral Daily     isosorbide mononitrate  60 mg Oral Daily     pantoprazole (PROTONIX) IV  40 mg Intravenous BID     psyllium  1 packet Oral Daily     rosuvastatin  20 mg Oral QPM     sacubitril-valsartan  1 tablet Oral BID     sodium chloride (PF)  3 mL Intracatheter Q8H     tamsulosin  0.4 mg Oral Daily

## 2020-01-29 NOTE — PROGRESS NOTES
Received report from CHRISTY Steele regarding endoscopy findings and treatments. Per report pt is recommended to resume regular diet, regular medications [including plavix], BID metamucil and BID H2 preparation to anal area [see procedure note]. Recommendation for pt to follow up outpatient with capsule endoscopy.

## 2020-01-29 NOTE — PLAN OF CARE
Cognitive Concerns/ Orientation : A&Ox4   BEHAVIOR & AGGRESSION TOOL COLOR: Green  CIWA SCORE: NA   ABNL VS/O2: VSS on RA  MOBILITY: Assist of 1  PAIN MANAGMENT: Denies  DIET:  NPO except meds since midnight  BOWEL/BLADDER: Continent; several stools ranging from red to green/yellow; clearing  ABNL LAB/BG: Hgb 9.2  DRAIN/DEVICES: PIV  TELEMETRY RHYTHM: V paced w/ BBB & PVC  SKIN: Pale  TESTS/PROCEDURES: Colonoscopy today at 1145am  D/C DAY/GOALS/PLACE: Pending  OTHER IMPORTANT INFO: Colonoscopy prep was completed lat evening

## 2020-01-29 NOTE — PROGRESS NOTES
[facility]  Gastroenterology Progress Note     Katie Shannon MRN# [id number]   YOB: 1935 Age: 84 year old          Assessment and Plan:   UPdate:  Patient underwent EGD that noted no cause for bleeding. Patient has prepped for colonoscopy and stools are light yellow in color with no sign of bright red bleeding. Hemoglobin stable aroun 9.0    Active Problems:    Gastrointestinal bleeding    Hematochezia    Anemia  Katie Shannon is a pleasant 84 year old male with hematochezia and decrease in hemoglobin.   Patient has had bright red rectal bleeding per rectum over last 48 hours. Has had decrease in hemoglobin to 9.8 from baseline of 12.8 and not stable around 9.0. Has history of CAD on Plavix. Last colonoscopy done 7 years ago noted hemorrhoids. There is a concern for hemorrhoid bleed, vs solitary ulcer vs AVM vs neoplastic cause.       - Scheduled colonoscopy today  - NPO   - Serial hemoglobin , transfuse at 8.0 or less  - Continue with IV pantoprazole           Interval History:   no new complaints, doing well, denies chest pain, denies shortness of breath, denies abdominal pain, alert, oriented to person, place and time, has had a bowel movement in the last 24 hours and doing well; no cp, sob, n/v/d, or abd pain.              Review of Systems:   C: NEGATIVE for fever, chills, change in weight  E/M: NEGATIVE for ear, mouth and throat problems  R: NEGATIVE for significant cough or SOB  CV: NEGATIVE for chest pain, palpitations or peripheral edema             Medications:   I have reviewed this patient's current medications    pantoprazole (PROTONIX) IV  40 mg Intravenous BID     sodium chloride (PF)  3 mL Intracatheter Q8H                  Physical Exam:   Vitals were reviewed  Vital Signs with Ranges  Temp:  [97.7  F (36.5  C)-98.7  F (37.1  C)] 98.7  F (37.1  C)  Pulse:  [] 104  Heart Rate:  [] 85  Resp:  [10-18] 16  BP: ()/() 134/60  SpO2:  [93 %-100 %] 96  %  I/O last 3 completed shifts:  In: 1526.67 [I.V.:1526.67]  Out: -   Constitutional: healthy, alert and no distress   Cardiovascular: negative, PMI normal. No lifts, heaves, or thrills. RRR. No murmurs, clicks gallops or rub  Respiratory: negative, Percussion normal. Good diaphragmatic excursion. Lungs clear  Head: Normocephalic. No masses, lesions, tenderness or abnormalities  Neck: Neck supple. No adenopathy. Thyroid symmetric, normal size,, Carotids without bruits.  Abdomen: Abdomen soft, non-tender. BS normal. No masses, organomegaly           Data:   I reviewed the patient's new clinical lab test results.   Recent Labs   Lab Test 01/28/20  2214 01/28/20  1655 01/28/20  1015 01/28/20  0623 01/28/20  0007 04/30/18  0845   WBC  --   --   --  8.6 9.5 5.5   HGB 9.2* 9.0* 10.0* 9.8* 11.6* 12.8*   MCV  --   --   --  91 91 89   PLT  --   --   --  228 245 155   INR  --   --   --  1.19* 1.11  --      Recent Labs   Lab Test 01/28/20  0623 01/28/20  0007 12/04/19  1205   POTASSIUM 4.1 4.0 4.0   CHLORIDE 108 104 98   CO2 23 25 26   BUN 21 19 13   ANIONGAP 7 6 14     Recent Labs   Lab Test 01/28/20  0623 01/28/20  0007 12/04/19  1205   ALBUMIN 3.0* 3.4  --    BILITOTAL 0.7 0.7  --    ALT 18 21 <5*   AST 17 20  --        I reviewed the patient's new imaging results.    All laboratory data reviewed  All imaging studies reviewed by me.    Gracy Carbajal PA-C,  1/29/2020  [facility]  Office : [phone]  Cell: [phone] (Dr. Boothe)  Cell: [phone] (Gracy Carbajal PA-C)

## 2020-01-30 ENCOUNTER — CARE COORDINATION (OUTPATIENT)
Dept: CARDIOLOGY | Facility: CLINIC | Age: 85
End: 2020-01-30

## 2020-01-30 LAB
HGB BLD-MCNC: 7.7 G/DL (ref 13.3–17.7)
HGB BLD-MCNC: 7.7 G/DL (ref 13.3–17.7)
HGB BLD-MCNC: 7.9 G/DL (ref 13.3–17.7)

## 2020-01-30 PROCEDURE — 99232 SBSQ HOSP IP/OBS MODERATE 35: CPT | Performed by: HOSPITALIST

## 2020-01-30 PROCEDURE — 25000132 ZZH RX MED GY IP 250 OP 250 PS 637: Mod: GY | Performed by: HOSPITALIST

## 2020-01-30 PROCEDURE — 85018 HEMOGLOBIN: CPT | Performed by: HOSPITALIST

## 2020-01-30 PROCEDURE — 12000000 ZZH R&B MED SURG/OB

## 2020-01-30 PROCEDURE — 36415 COLL VENOUS BLD VENIPUNCTURE: CPT | Performed by: HOSPITALIST

## 2020-01-30 PROCEDURE — 25000128 H RX IP 250 OP 636: Performed by: HOSPITALIST

## 2020-01-30 RX ADMIN — TAMSULOSIN HYDROCHLORIDE 0.4 MG: 0.4 CAPSULE ORAL at 11:12

## 2020-01-30 RX ADMIN — SACUBITRIL AND VALSARTAN 1 TABLET: 97; 103 TABLET, FILM COATED ORAL at 21:43

## 2020-01-30 RX ADMIN — SACUBITRIL AND VALSARTAN 1 TABLET: 97; 103 TABLET, FILM COATED ORAL at 11:12

## 2020-01-30 RX ADMIN — CARVEDILOL 12.5 MG: 12.5 TABLET, FILM COATED ORAL at 17:18

## 2020-01-30 RX ADMIN — ACETAMINOPHEN 650 MG: 325 TABLET, FILM COATED ORAL at 17:08

## 2020-01-30 RX ADMIN — ROSUVASTATIN CALCIUM 20 MG: 20 TABLET, FILM COATED ORAL at 21:43

## 2020-01-30 RX ADMIN — ISOSORBIDE MONONITRATE 60 MG: 60 TABLET, EXTENDED RELEASE ORAL at 11:12

## 2020-01-30 RX ADMIN — ONDANSETRON 4 MG: 2 INJECTION INTRAMUSCULAR; INTRAVENOUS at 06:26

## 2020-01-30 RX ADMIN — CARVEDILOL 12.5 MG: 12.5 TABLET, FILM COATED ORAL at 11:13

## 2020-01-30 RX ADMIN — TRAZODONE HYDROCHLORIDE 25 MG: 50 TABLET ORAL at 21:43

## 2020-01-30 RX ADMIN — ACETAMINOPHEN 650 MG: 325 TABLET, FILM COATED ORAL at 21:42

## 2020-01-30 RX ADMIN — PSYLLIUM HUSK 1 PACKET: 3.4 POWDER ORAL at 08:56

## 2020-01-30 ASSESSMENT — ACTIVITIES OF DAILY LIVING (ADL)
ADLS_ACUITY_SCORE: 16

## 2020-01-30 NOTE — PLAN OF CARE
DATE & TIME: 1/30/2020 2300-0730                        Cognitive Concerns/ Orientation : A&O x4  BEHAVIOR & AGGRESSION TOOL COLOR: Green  CIWA SCORE: NA    ABNL VS/O2: VSS on RA.  MOBILITY: Up with SBA with cane & gait belt    PAIN MANAGMENT: Denies pain.   DIET: Regular diet.   BOWEL/BLADDER: Continent of bladder, +flatus  no BM this shift.  ABNL LAB/BG: Hgb 8.6.  DRAIN/DEVICES: NA  TELEMETRY RHYTHM: 100% V-Paced with occasional PVC's.   SKIN: Scattered ecchymotic bruise and scabs.   TESTS/PROCEDURES: NA  D/C DAY/GOALS/PLACE: Possible discharge today 1/30 if Hgb stable.  OTHER IMPORTANT INFO: Nausea this am, gave zofran with relief. Plans for outpt capsule endoscopy

## 2020-01-30 NOTE — PROGRESS NOTES
Pt's wife called and said pt is currently admitted for a GI bleed. They are still trying to figure out the cause of his bleed. Pt's plavix has been on hold, but the nurse brought it for pt to take this morning, and said it has been restarted. Pt declined to take the plavix, and wants to know from  if he needs to take this long term. Pt's wife wants to know if there is something else pt can take that wouldn't make him bleed as easily. I explained that the doctor rounding on pt is managing his medications while he is inpatient, but I can message  to see if he would prefer pt stay on the plavix long term. Pt does not take ASA. He is on plavix for hx of moderately diffuse CAD per cath in 2011. I don't see that pt has ever had stents placed. I will message an update to  and call pt or his wife back with recommendations. Giovani HARRISON January 30, 2020, 9:49 AM

## 2020-01-30 NOTE — PLAN OF CARE
DATE & TIME: 1/29/2020 3168-3226                        Cognitive Concerns/ Orientation : A+Ox4   BEHAVIOR & AGGRESSION TOOL COLOR: Green  CIWA SCORE: NA    ABNL VS/O2: VSS, on RA.  MOBILITY: Up with SBA in room and halls.   PAIN MANAGMENT: Denies pain.   DIET: Tolerating regular diet.   BOWEL/BLADDER: Continent of bladder, no BM this shift.   ABNL LAB/BG: Hgb 8.6.  DRAIN/DEVICES: NA  TELEMETRY RHYTHM: 100% V-Paced with occasional PVC's.   SKIN: Scattered bruising and scabs.   TESTS/PROCEDURES: NA  D/C DAY/GOALS/PLACE: Anticipate discharge tomorrow 1/30 if Hgb stable.  OTHER IMPORTANT INFO: IVF discontinued. GI following for OP pill capsule study. Nursing will continue to monitor.

## 2020-01-30 NOTE — PROGRESS NOTES
[facility]  Gastroenterology Progress Note     Katie Shannon MRN# [id number]   YOB: 1935 Age: 84 year old          Assessment and Plan:   UPdate:  Patient underwent EGD that noted no cause for bleeding. Patient had a colonoscopy that noted no evidence of active bleeding but did note hemorrhoids and 2 non bleeding polyps. These were not resected. Hemoglobin has slowly decreased from 9.0 yesterday to 7.9 on last draw. Has no active signs of bleeding     Active Problems:    Gastrointestinal bleeding    Hematochezia    Anemia  Hemorrhoids  Katie Shannon is a pleasant 84 year old male with hematochezia and decrease in hemoglobin.   Patient has had bright red rectal bleeding per rectum. Has had decrease in hemoglobin to 7.9 from baseline of 12.8. Has history of CAD on Plavix. Cause of bleed is likely related to hemorrhoids. May be related to small intestinal bleed.       - Continue with regular diet  -  Will repeat hemoglobin today and if stable ok to continue Plavix  -  Patient will need outpatient capsule endoscopy in 1 week with f/u in clinic  - Will f/u in clinic to review symptoms in 1-2 weeks. [facility] will schedule with patient  -  Recommend to take metamucil BID and preparation H to anal are BID  - No need to continue pantoprazole- can d/c           Interval History:   no new complaints, doing well, denies chest pain, denies shortness of breath, denies abdominal pain, alert, oriented to person, place and time and doing well; no cp, sob, n/v/d, or abd pain.              Review of Systems:   C: NEGATIVE for fever, chills, change in weight  E/M: NEGATIVE for ear, mouth and throat problems  R: NEGATIVE for significant cough or SOB  CV: NEGATIVE for chest pain, palpitations or peripheral edema             Medications:   I have reviewed this patient's current medications    carvedilol  12.5 mg Oral BID w/meals     clopidogrel  75 mg Oral Daily     isosorbide mononitrate  60 mg Oral Daily      psyllium  1 packet Oral Daily     rosuvastatin  20 mg Oral QPM     sacubitril-valsartan  1 tablet Oral BID     sodium chloride (PF)  3 mL Intracatheter Q8H     tamsulosin  0.4 mg Oral Daily                  Physical Exam:   Vitals were reviewed  Vital Signs with Ranges  Temp:  [97.5  F (36.4  C)-98.6  F (37  C)] 97.5  F (36.4  C)  Pulse:  [] 106  Heart Rate:  [] 83  Resp:  [16-26] 16  BP: ()/(43-81) 109/55  SpO2:  [94 %-99 %] 97 %  I/O last 3 completed shifts:  In: 1890 [P.O.:450; I.V.:1440]  Out: -   Constitutional: healthy, alert and no distress   Cardiovascular: negative, PMI normal. No lifts, heaves, or thrills. RRR. No murmurs, clicks gallops or rub  Respiratory: negative, Percussion normal. Good diaphragmatic excursion. Lungs clear  Head: Normocephalic. No masses, lesions, tenderness or abnormalities  Neck: Neck supple. No adenopathy. Thyroid symmetric, normal size,, Carotids without bruits.  Abdomen: Abdomen soft, non-tender. BS normal. No masses, organomegaly           Data:   I reviewed the patient's new clinical lab test results.   Recent Labs   Lab Test 01/30/20  0845 01/29/20  0956 01/28/20  2214  01/28/20 0623 01/28/20 0007 04/30/18  0845   WBC  --   --   --   --  8.6 9.5 5.5   HGB 7.9* 8.6* 9.2*   < > 9.8* 11.6* 12.8*   MCV  --   --   --   --  91 91 89   PLT  --   --   --   --  228 245 155   INR  --   --   --   --  1.19* 1.11  --     < > = values in this interval not displayed.     Recent Labs   Lab Test 01/29/20  0956 01/28/20  0623 01/28/20  0007 12/04/19  1205   POTASSIUM 3.6 4.1 4.0 4.0   CHLORIDE  --  108 104 98   CO2  --  23 25 26   BUN  --  21 19 13   ANIONGAP  --  7 6 14     Recent Labs   Lab Test 01/28/20  0623 01/28/20  0007 12/04/19  1205   ALBUMIN 3.0* 3.4  --    BILITOTAL 0.7 0.7  --    ALT 18 21 <5*   AST 17 20  --        I reviewed the patient's new imaging results.    All laboratory data reviewed  All imaging studies reviewed by me.    Gracy Carbajal PA-C,   1/30/2020  [facility]  Office : [phone]  Cell: [phone] (Dr. Boothe)  Cell: [phone] (Gracy Carbajal PA-C)

## 2020-01-30 NOTE — PROGRESS NOTES
[facility]    Medicine Progress Note - Hospitalist Service       Date of Admission:  1/27/2020  Assessment & Plan     Katie Shannon is a 84 year old male who presents with Gastrointestinal bleeding.  Transferred from [facility] per report and admitted for further evaluation and treatment.     Acute gastrointestinal bleeding, bright red blood per rectum  Patient states that he has had several bowel movements with bright red blood.  Patient does state that he has history of hemorrhoids.  Patient denies chest pain, nausea, vomiting, dysuria, blood in the urine, lower extremity edema, rash, headache, vision changes, ear pain, eye pain, sore throat, fever, cough.  Patient states that he has had intermittent bleeding per rectum for the past day, however, intermittently with bleeding and most recently with a bowel movement without bleeding.  Patient states that he presented to the outside emergency department with concern for bleeding with last colonoscopy completed about 7 years ago without report from patient of remarkable findings.  Transferred from [facility].  He is no longer passing blood.  EGD was negative.  Colonoscopy showed a thrombosed hemorrhoid which could have been bleeding.  Today the patient 's hemoglobin is lower < 8g x 2.  No overt bleeding but there is a concern for occult bleeding.  Case discussed with with Gracy Carbajal.      Continue to monitor hemoglobin and transfuse for <7g    Resume clopidogrel and observe for bleeding     Continue Metamucil and prn HC for hemorrhoid     Outpatient capsule endoscopy next week if hemoglobin remains stable and he discharges home tomorrow     Cardiac, congestive heart failure, coronary artery disease, hypertension, hyperlipidemia  Patient with past medical history and the chart with coronary artery disease.  See chart for further details.  Patient states he has a history of pacemaker insertion.  His home cardiac  medications are clopidogrel, carvedilol, rosuvastatin, Imdur and Entresto    Resumed prior to admission medications including clopidogrel     GERD  Stable.    Stoppped intravenous proton pump inhibitor      BPH  Stable.    Continue prior to admission Flomax     Diet: Regular Diet Adult    DVT Prophylaxis: Pneumatic Compression Devices  Torres Catheter: not present  Code Status: Full Code      Disposition Plan   Expected discharge: tomorrow, recommended to prior living arrangement once hemoglobin stable.  Entered: Sunday Dixon MD 01/30/2020, 2:11 PM       The patient's care was discussed with the Patient, Patient's Family and GI Consultant and nurse.    Sunday Dixon MD  Hospitalist Service  [facility]    ______________________________________________________________________    Interval History   Feels well. No hematochezia or black stool. No recent bowel movement.    Data reviewed today: I reviewed all medications, new labs and imaging results over the last 24 hours. I personally reviewed no images or EKG's today.    Physical Exam   Vital Signs: Temp: 97.5  F (36.4  C) Temp src: Oral BP: 109/55   Heart Rate: 83 Resp: 16 SpO2: 97 % O2 Device: None (Room air)    Weight: 168 lbs 6.9 oz  Constitutional: awake, alert, cooperative, no apparent distress, and appears stated age  Respiratory: No increased work of breathing, good air exchange, clear to auscultation bilaterally, no crackles or wheezing  Cardiovascular:  regular rate and rhythm, normal S1 and S2, no S3 or S4, and no murmur noted  GI:normal bowel sounds, soft, non-distended, non-tender  Skin: no rashes and no jaundice  Neuropsychiatric: General: normal, calm and normal eye contact    Data   Recent Labs   Lab 01/30/20  1235 01/30/20  0845 01/29/20  0956  01/28/20  0623 01/28/20  0007   WBC  --   --   --   --  8.6 9.5   HGB 7.7* 7.9* 8.6*   < > 9.8* 11.6*   MCV  --   --   --   --  91 91   PLT  --   --   --   --  228 245   INR  --    --   --   --  1.19* 1.11   NA  --   --   --   --  138 135   POTASSIUM  --   --  3.6  --  4.1 4.0   CHLORIDE  --   --   --   --  108 104   CO2  --   --   --   --  23 25   BUN  --   --   --   --  21 19   CR  --   --   --   --  0.91 0.87   ANIONGAP  --   --   --   --  7 6   ELIZABETH  --   --   --   --  8.1* 8.3*   GLC  --   --   --   --  102* 98   ALBUMIN  --   --   --   --  3.0* 3.4   PROTTOTAL  --   --   --   --  5.6* 6.9   BILITOTAL  --   --   --   --  0.7 0.7   ALKPHOS  --   --   --   --  47 61   ALT  --   --   --   --  18 21   AST  --   --   --   --  17 20    < > = values in this interval not displayed.     No results found for this or any previous visit (from the past 24 hour(s)).  Medications       carvedilol  12.5 mg Oral BID w/meals     clopidogrel  75 mg Oral Daily     isosorbide mononitrate  60 mg Oral Daily     psyllium  1 packet Oral Daily     rosuvastatin  20 mg Oral QPM     sacubitril-valsartan  1 tablet Oral BID     sodium chloride (PF)  3 mL Intracatheter Q8H     tamsulosin  0.4 mg Oral Daily

## 2020-01-30 NOTE — TELEPHONE ENCOUNTER
"I would recommend antiplatelet therapy life-long. I don't remember why he is on plavix and not aspirin, but aspirin is associated with a higher rate of GI bleed than Plavix. He should not take either of those medications if he is actively bleeding, but if his doctor feels that it would be safe to restart Plavix, I think it would be helpful for him. If needed, a longer break from plavix would be ok with me. Sometimes we stop it for a few weeks to allow for a bleeding issue to heal before restarting it. It is not an \"essential\" medication, it is a risk vs benefit decision. EE  "

## 2020-01-30 NOTE — PLAN OF CARE
DATE & TIME: 01/30/2020 0700 - 1930                        Cognitive Concerns/ Orientation : A/O x4, frustrated at times   BEHAVIOR & AGGRESSION TOOL COLOR: Green  CIWA SCORE: NA    ABNL VS/O2: AVSS on RA.  MOBILITY: SBA w/ cane    PAIN MANAGMENT: c/o coccyx bone pain, tylenol given x2   DIET: Regular diet.   BOWEL/BLADDER: Continent of bladder, passing flatus, BM x1 this shift, no blood noted  ABNL LAB/BG: Hgb q6h, 7.9, 7.7, 7.7  DRAIN/DEVICES: NA  TELEMETRY RHYTHM: 100% V-Paced with occasional PVC's.   SKIN: ecchymosis, scabs.   TESTS/PROCEDURES: NA  D/C DAY/GOALS/PLACE: pending clinical course, plan for pill swallow study outpatient, holding plavix per patient - confusion on whether to resume  OTHER IMPORTANT INFO: Patient wanting to leave AMA this evening, patient waiting for 1900 HGB check to determine plan.

## 2020-01-31 VITALS
DIASTOLIC BLOOD PRESSURE: 64 MMHG | OXYGEN SATURATION: 95 % | HEART RATE: 69 BPM | TEMPERATURE: 97.2 F | WEIGHT: 168.43 LBS | RESPIRATION RATE: 16 BRPM | BODY MASS INDEX: 26.38 KG/M2 | SYSTOLIC BLOOD PRESSURE: 122 MMHG

## 2020-01-31 LAB
HGB BLD-MCNC: 7 G/DL (ref 13.3–17.7)
HGB BLD-MCNC: 7.4 G/DL (ref 13.3–17.7)
HGB BLD-MCNC: 7.9 G/DL (ref 13.3–17.7)

## 2020-01-31 PROCEDURE — 36415 COLL VENOUS BLD VENIPUNCTURE: CPT | Performed by: HOSPITALIST

## 2020-01-31 PROCEDURE — 85018 HEMOGLOBIN: CPT | Performed by: HOSPITALIST

## 2020-01-31 PROCEDURE — 99239 HOSP IP/OBS DSCHRG MGMT >30: CPT | Performed by: HOSPITALIST

## 2020-01-31 PROCEDURE — 25000132 ZZH RX MED GY IP 250 OP 250 PS 637: Mod: GY | Performed by: HOSPITALIST

## 2020-01-31 RX ORDER — CLOPIDOGREL BISULFATE 75 MG/1
75 TABLET ORAL DAILY
Status: DISCONTINUED | OUTPATIENT
Start: 2020-01-31 | End: 2020-01-31

## 2020-01-31 RX ADMIN — PSYLLIUM HUSK 1 PACKET: 3.4 POWDER ORAL at 09:04

## 2020-01-31 RX ADMIN — SACUBITRIL AND VALSARTAN 1 TABLET: 97; 103 TABLET, FILM COATED ORAL at 09:04

## 2020-01-31 RX ADMIN — CARVEDILOL 12.5 MG: 12.5 TABLET, FILM COATED ORAL at 09:04

## 2020-01-31 RX ADMIN — ISOSORBIDE MONONITRATE 60 MG: 60 TABLET, EXTENDED RELEASE ORAL at 09:04

## 2020-01-31 RX ADMIN — TAMSULOSIN HYDROCHLORIDE 0.4 MG: 0.4 CAPSULE ORAL at 09:04

## 2020-01-31 ASSESSMENT — ACTIVITIES OF DAILY LIVING (ADL)
ADLS_ACUITY_SCORE: 16

## 2020-01-31 NOTE — PROGRESS NOTES
SPIRITUAL HEALTH SERVICES Progress Note  [facility]    Attempted f/u visit due to pt request.  Pt had been discharged.  No f/u necessary.      Cameron Armenta  Chaplain Resident

## 2020-01-31 NOTE — PLAN OF CARE
DATE & TIME: 1/30 from 1900 to 0730    Cognitive Concerns/ Orientation : A&O x 4, Slightly [place]   BEHAVIOR & AGGRESSION TOOL COLOR: Green, anxious, flat affect and frustrated at times.   CIWA SCORE: N/A   ABNL VS/O2: VS on RA  MOBILITY: Up x SBA to bathroom with cane.   PAIN MANAGMENT: c/o coccyx bone pain, given Tylenol x 1  DIET: Regular  BOWEL/BLADDER: Continent, voiding well. Had medium BM. No blood observed  ABNL LAB/BG: Hgb 7.0, from 7.7 and 7.9 MD updated. No transfusion recommended Hgb level less than 7g/dL Latest Hgb 7.4  DRAIN/DEVICES: PIV saline lock  TELEMETRY RHYTHM: 100% V-paced  SKIN: Blanchable redness on coccyx area. scabs  TESTS/PROCEDURES: N/A  D/C DAY/GOALS/PLACE: Possible discharge to home today 1/31/20 if Hgb is stable and no overt bleeding noted.   OTHER IMPORTANT INFO:

## 2020-01-31 NOTE — PROVIDER NOTIFICATION
MD Notification    Notified Person: MD    Notified Person Name: Dr Rosa    Notification Date/Time: 1/31 at 0149    Notification Interaction:Spoke with Dr Rosa via phone    Purpose of Notification: Pt's Hbg level is 7.0 trended down from 7.7 No transfuse order in placed. Wondering Transfuse order with parameter?     Orders Received:Transfuse Hgb level less than 7g/dL check next hemoglobin  level at scheduled time.     Comments: No blood consent in placed. MD is aware. Pt care order in placed: No option for less than 7 for conditional transfusion. Discussed with nursing, may transfuse if LESS than 7, but not for hemoglobin of 7 or higher.

## 2020-01-31 NOTE — PLAN OF CARE
Discharge    Patient discharged to home via car with wife and daughter  Care plan note: Hgb stable 7.9 at noon. No bloody BMs. Discharged with GI appointment at Wednesday.     Listed belongings gathered and returned to patient. Yes  Care Plan and Patient education resolved: Yes  Prescriptions if needed, hard copies sent with patient  NA  Home and hospital acquired medications returned to patient: NA  Medication Bin checked and emptied on discharge Yes  Follow up appointment made for patient: No

## 2020-01-31 NOTE — PROGRESS NOTES
[facility]  Gastroenterology Progress Note     Katie Shannon MRN# [id number]   YOB: 1935 Age: 84 year old          Assessment and Plan:   Update:  Patient underwent EGD that noted no cause for bleeding. Patient had a colonoscopy that noted no evidence of active bleeding but did note hemorrhoids and 2 non bleeding polyps. These were not resected. Hemoglobin has stabilized and increased from 7.0 to 7.4 on last draw. Has no active signs of bleeding. Last bowel movement brown     Active Problems:    Gastrointestinal bleeding    Hematochezia    Anemia   Hemorrhoids  Katie Shannon is a pleasant 84 year old male with hematochezia and decrease in hemoglobin.   Patient has had bright red rectal bleeding per rectum. Has had decrease in hemoglobin to 7.9 from baseline of 12.8. Has history of CAD on Plavix. Cause of bleed is likely related to hemorrhoids. May be related to small intestinal bleed.       - Continue with regular diet  -  If repeat hemoglobin stable or improved ok with [facility] to discharge patient  -  Patient will need outpatient capsule endoscopy in 1 week with f/u in clinic  - Will f/u in clinic to review symptoms in 1-2 weeks. [facility] will schedule with patient  -  Recommend to take metamucil BID and preparation H to anal area BID           Data Unavailable      Interval History:   no new complaints, Intubated and sedated, denies chest pain, denies shortness of breath, denies abdominal pain, alert, oriented to person, place and time, has had a bowel movement in the last 24 hours and doing well; no cp, sob, n/v/d, or abd pain.              Review of Systems:   C: NEGATIVE for fever, chills, change in weight  E/M: NEGATIVE for ear, mouth and throat problems  R: NEGATIVE for significant cough or SOB  CV: NEGATIVE for chest pain, palpitations or peripheral edema             Medications:   I have reviewed this patient's current medications    carvedilol  12.5 mg Oral BID w/meals      isosorbide mononitrate  60 mg Oral Daily     psyllium  1 packet Oral Daily     rosuvastatin  20 mg Oral QPM     sacubitril-valsartan  1 tablet Oral BID     sodium chloride (PF)  3 mL Intracatheter Q8H     tamsulosin  0.4 mg Oral Daily                  Physical Exam:   Vitals were reviewed  Vital Signs with Ranges  Temp:  [97  F (36.1  C)-97.8  F (36.6  C)] 97.2  F (36.2  C)  Pulse:  [66-69] 69  Heart Rate:  [66-83] 66  Resp:  [16-20] 16  BP: (109-134)/(55-64) 122/64  SpO2:  [95 %-97 %] 95 %  I/O last 3 completed shifts:  In: 240 [P.O.:240]  Out: -   Constitutional: healthy, alert and no distress   Cardiovascular: negative, PMI normal. No lifts, heaves, or thrills. RRR. No murmurs, clicks gallops or rub  Respiratory: negative, Percussion normal. Good diaphragmatic excursion. Lungs clear  Head: Normocephalic. No masses, lesions, tenderness or abnormalities  Neck: Neck supple. No adenopathy. Thyroid symmetric, normal size,, Carotids without bruits.  Abdomen: Abdomen soft, non-tender. BS normal. No masses, organomegaly           Data:   I reviewed the patient's new clinical lab test results.   Recent Labs   Lab Test 01/31/20  0610 01/31/20  0016 01/30/20  1929  01/28/20  0623 01/28/20  0007 04/30/18  0845   WBC  --   --   --   --  8.6 9.5 5.5   HGB 7.4* 7.0* 7.7*   < > 9.8* 11.6* 12.8*   MCV  --   --   --   --  91 91 89   PLT  --   --   --   --  228 245 155   INR  --   --   --   --  1.19* 1.11  --     < > = values in this interval not displayed.     Recent Labs   Lab Test 01/29/20  0956 01/28/20  0623 01/28/20  0007 12/04/19  1205   POTASSIUM 3.6 4.1 4.0 4.0   CHLORIDE  --  108 104 98   CO2  --  23 25 26   BUN  --  21 19 13   ANIONGAP  --  7 6 14     Recent Labs   Lab Test 01/28/20  0623 01/28/20  0007 12/04/19  1205   ALBUMIN 3.0* 3.4  --    BILITOTAL 0.7 0.7  --    ALT 18 21 <5*   AST 17 20  --        I reviewed the patient's new imaging results.    All laboratory data reviewed  All imaging studies reviewed by  me.    Gracy Carbajal PA-C,  1/31/2020  [facility]  Office : [phone]  Cell: [phone] (Dr. Boothe)  Cell: [phone] (Gracy Carbajal PA-C)

## 2020-01-31 NOTE — DISCHARGE SUMMARY
[facility]    Discharge Summary  Hospitalist    Date of Admission:  1/27/2020  Date of Discharge:  1/31/2020  Discharging Provider: Ilan Rosa  Date of Service (when I saw the patient): 01/31/20    History of Present Illness   Katie Shannon is a 84 year old male who presents with Gastrointestinal bleeding.  Transferred from [facility] per report and admitted for further evaluation and treatment. Acute gastrointestinal bleeding, bright red blood per rectum: Patient states that he has had several bowel movements with bright red blood.  Patient does state that he has history of hemorrhoids.  Patient denies chest pain, nausea, vomiting, dysuria, blood in the urine, lower extremity edema, rash, headache, vision changes, ear pain, eye pain, sore throat, fever, cough.  Patient states that he has had intermittent bleeding per rectum for the past day, however, intermittently with bleeding and most recently with a bowel movement without bleeding.  Patient states that he presented to the outside emergency department with concern for bleeding with last colonoscopy completed about 7 years ago without report from patient of remarkable findings.  Transferred from [facility].    Hospital Course   Katie Shannon was admitted on 1/27/2020.  The following problems were addressed during his hospitalization:    Katie Shannon is a 84 year old male who presents with Gastrointestinal bleeding.  Transferred from [facility] per report and admitted for further evaluation and treatment.     Acute gastrointestinal bleeding, bright red blood per rectum Patient states that he has had several bowel movements with bright red blood.  Patient does state that he has history of hemorrhoids.  Patient denies chest pain, nausea, vomiting, dysuria, blood in the urine, lower extremity edema, rash, headache, vision changes, ear pain, eye pain, sore throat, fever, cough.  Patient states  that he has had intermittent bleeding per rectum for the past day, however, intermittently with bleeding and most recently with a bowel movement without bleeding.  Patient states that he presented to the outside emergency department with concern for bleeding with last colonoscopy completed about 7 years ago without report from patient of remarkable findings.  Transferred from [facility]. He is no longer passing blood.  EGD was negative.  Colonoscopy showed a thrombosed hemorrhoid which could have been bleeding.  - Hold plavix until capsule study completed, discussed with patient.   - Continue Metamucil and prn HC for hemorrhoid   - Outpatient capsule endoscopy next week     Cardiac, chronic systolic congestive heart failure, coronary artery disease, hypertension, hyperlipidemia Patient with past medical history and the chart with coronary artery disease.  See chart for further details.  Patient states he has a history of pacemaker insertion. His home cardiac medications are clopidogrel, carvedilol, rosuvastatin, Imdur and Entresto  - Resumed prior to admission medications     GERD Stable.     BPH Stable.  - Continue prior to admission Flomax      Code Status   Full Code       Primary Care Physician   Deborah Gandhi    Physical Exam   Temp: 97.2  F (36.2  C) Temp src: Oral BP: 122/64 Pulse: 69 Heart Rate: 66 Resp: 16 SpO2: 95 % O2 Device: None (Room air)    Vitals:    01/29/20 0401 01/30/20 0540   Weight: 75.1 kg (165 lb 9.1 oz) 76.4 kg (168 lb 6.9 oz)     Vital Signs with Ranges  Temp:  [97  F (36.1  C)-97.8  F (36.6  C)] 97.2  F (36.2  C)  Pulse:  [66-69] 69  Heart Rate:  [66-83] 66  Resp:  [16-20] 16  BP: (109-134)/(55-64) 122/64  SpO2:  [95 %-97 %] 95 %  I/O last 3 completed shifts:  In: 240 [P.O.:240]  Out: -     GENERAL: Alert and oriented. NAD. Conversational, appropriate.   HEENT: Normocephalic. EOMI. No icterus or injection. Nares normal.   LUNGS: Clear to auscultation. No dyspnea at rest.    HEART: Regular rate. Extremities perfused.   ABDOMEN: Soft, nontender, and nondistended. Positive bowel sounds.   EXTREMITIES: No LE edema noted.   NEUROLOGIC: Moves extremities x4 on command. No acute focal neurologic abnormalities noted.     Discharge Disposition   Discharged to home  Condition at discharge: Stable    Consultations This Hospital Stay   GASTROENTEROLOGY IP CONSULT    Time Spent on this Encounter   Ilan ROMO DO, personally saw the patient today and spent greater than 30 minutes discharging this patient.    Discharge Orders      Reason for your hospital stay    GI bleeding.     Follow-up and recommended labs and tests     Follow up with primary care provider, Deborah Gandhi, within 7 days for hospital follow- up.  The following labs/tests are recommended: cbc/bmp.    Follow up with Gastroenterology as directed.     Activity    Your activity upon discharge: activity as tolerated     Full Code     Diet    Follow this diet upon discharge: Orders Placed This Encounter      Regular Diet Adult     Discharge Medications   Current Discharge Medication List      CONTINUE these medications which have NOT CHANGED    Details   carvedilol (COREG) 12.5 MG tablet Take 1 tablet (12.5 mg) by mouth 2 times daily (with meals)  Qty: 180 tablet, Refills: 3    Associated Diagnoses: Primary cardiomyopathy (H)      CYCLOBENZAPRINE HCL PO Take 5 mg by mouth At Bedtime Taking PRN      cycloSPORINE (RESTASIS) 0.05 % ophthalmic emulsion Place 1 drop into both eyes 2 times daily       diphenhydrAMINE-acetaminophen (TYLENOL PM)  MG tablet Take 1 tablet by mouth nightly as needed for sleep      Famotidine (PEPCID PO) Take 40 mg by mouth daily       isosorbide mononitrate (IMDUR) 60 MG 24 hr tablet Take 60 mg by mouth daily      neomycin-polymixin-dexamethasone (MAXITROL) ophthalmic ointment Place into the right eye 2 times daily      polyethylene glycol 400 (BLINK TEARS) 0.25 % SOLN ophthalmic solution Place  1 drop into both eyes 2 times daily as needed       rosuvastatin (CRESTOR) 20 MG tablet Take 1 tablet (20 mg) by mouth daily  Qty: 90 tablet, Refills: 3    Associated Diagnoses: Hypercholesterolemia      sacubitril-valsartan (ENTRESTO)  MG per tablet Take 1 tablet by mouth 2 times daily  Qty: 180 tablet, Refills: 3    Associated Diagnoses: Chronic systolic heart failure (H)      tamsulosin (FLOMAX) 0.4 MG 24 hr capsule Take 0.4 mg by mouth daily      White Petrolatum-Mineral Oil (RETAINE PM) OINT Place into both eyes At Bedtime         STOP taking these medications       clopidogrel (PLAVIX) 75 MG tablet Comments:   Reason for Stopping:             Allergies   Allergies   Allergen Reactions     Atorvastatin      Muscle aches     Diphenhydramine Other (See Comments)     Lip swelling     Metoprolol Succinate [Metoprolol] Other (See Comments)     Blind in left half of face for 3 days     Wallace Other (See Comments)     swelling     Sulfa Drugs Other (See Comments)     swelling     Data   Most Recent 3 CBC's:  Recent Labs   Lab Test 01/31/20  0610 01/31/20  0016 01/30/20  1929  01/28/20 0623 01/28/20  0007 04/30/18  0845   WBC  --   --   --   --  8.6 9.5 5.5   HGB 7.4* 7.0* 7.7*   < > 9.8* 11.6* 12.8*   MCV  --   --   --   --  91 91 89   PLT  --   --   --   --  228 245 155    < > = values in this interval not displayed.      Most Recent 3 BMP's:  Recent Labs   Lab Test 01/29/20  0956 01/28/20  0623 01/28/20  0007 12/04/19  1205   NA  --  138 135 134*   POTASSIUM 3.6 4.1 4.0 4.0   CHLORIDE  --  108 104 98   CO2  --  23 25 26   BUN  --  21 19 13   CR  --  0.91 0.87 0.93   ANIONGAP  --  7 6 14   ELIZABETH  --  8.1* 8.3* 8.9   GLC  --  102* 98 93     Most Recent 2 LFT's:  Recent Labs   Lab Test 01/28/20  0623 01/28/20 0007   AST 17 20   ALT 18 21   ALKPHOS 47 61   BILITOTAL 0.7 0.7     Most Recent INR's and Anticoagulation Dosing History:  Anticoagulation Dose History     Recent Dosing and Labs Latest Ref Rng & Units  6/12/2011 1/28/2020 1/28/2020    INR 0.86 - 1.14 0.97 1.11 1.19(H)        Most Recent 3 Troponin's:No lab results found.  Most Recent Cholesterol Panel:  Recent Labs   Lab Test 12/04/19  1205   CHOL 145   LDL 77   HDL 42   TRIG 130     Most Recent 6 Bacteria Isolates From Any Culture (See EPIC Reports for Culture Details):No lab results found.  Most Recent TSH, T4 and A1c Labs:No lab results found.  Results for orders placed or performed in visit on 12/04/19   Cardiac Device Check - In Clinic     Value    Date Time Interrogation Session [id number]    Implantable Pulse Generator  [employer]    Implantable Pulse Generator Model 3357-40Q [employer]    Implantable Pulse Generator Serial Number [id number]    Type Interrogation Session In Clinic    Clinic Name [facility]    Implantable Pulse Generator Type Cardiac Resynchronization Therapy - Defibrillator    Implantable Pulse Generator Implant Date 20180430    Implantable Lead  Medtronic    Implantable Lead Model 3830 SelectSecure MRI SureScan    Implantable Lead Serial Number [id number]    Implantable Lead Implant Date 20180430    Implantable Lead Polarity Type Bipolar Lead    Implantable Lead Location Detail 1 UNKNOWN    Implantable Lead Special Function This is a HIS BUNDLE lead and is in the LV port    Implantable Lead Location Right Ventricle    Implantable Lead  [employer]    Implantable Lead Model [id number]    Implantable Lead Serial Number [id number]    Implantable Lead Implant Date 20180430    Implantable Lead Location Detail 1 UNKNOWN    Implantable Lead Location Right Ventricle    Implantable Lead  [employer]    Implantable Lead Model [id number] Tendril MRI    Implantable Lead Serial Number [id number]    Implantable Lead Implant Date 20180430    Implantable Lead Location Detail 1 UNKNOWN    Implantable Lead Location Right Atrium    Baljinder Setting Mode (NBG Code) DDD    Baljinder Setting Lower Rate Limit  60    Baljinder Setting Maximum Tracking Rate 120    Baljinder Setting Maximum Sensor Rate 120    Baljinder Setting Hysterisis Rate Off    Baljinder Setting Night Rate Off    Baljinder Setting JACINTA Delay Low 120    Baljinder Setting PAV Delay Low 150    Baljinder Setting PAV Delay High 100    Baljinder Setting JACINTA Delay High 100    Baljinder Setting AT Mode Switch Rate 170    Baljinder Setting AT Mode Switch Mode DDIR    Lead Channel Setting Sensing Polarity Bipolar    Lead Channel Setting Sensing Cathode Location Unknown    Lead Channel Setting Sensing Cathode Terminal Unknown    Lead Channel Setting Sensing Anode Location Unknown    Lead Channel Setting Sensing Anode Terminal Unknown    Lead Channel Setting Sensing Sensitivity 0.3    Lead Channel Setting Sensing Adaptation Mode Adaptive    Lead Channel Setting Sensing Polarity Bipolar    Lead Channel Setting Sensing Cathode Location Unknown    Lead Channel Setting Sensing Cathode Terminal Unknown    Lead Channel Setting Sensing Anode Location Unknown    Lead Channel Setting Sensing Anode Terminal Unknown    Lead Channel Setting Sensing Sensitivity 0.5    Ventricular chambers paced during CRT pacing. BiV    CRT LV-RV Delay 0    Lead Channel Setting Pacing Polarity Bipolar    Lead Channel Setting Pacing Anode Location Unknown    Lead Channel Setting Pacing Anode Terminal Unknown    Lead Channel Setting Sensing Cathode Location Unknown    Lead Channel Setting Sensing Cathode Terminal Unknown    Lead Channel Setting Pacing Pulse Width 0.5    Lead Channel Setting Pacing Amplitude 2.0    Lead Channel Setting Pacing Capture Mode Fixed Pacing    Lead Channel Setting Pacing Polarity Bipolar    Lead Channel Setting Pacing Anode Location Unknown    Lead Channel Setting Pacing Anode Terminal Unknown    Lead Channel Setting Sensing Cathode Location Unknown    Lead Channel Setting Sensing Cathode Terminal Unknown    Lead Channel Setting Pacing Pulse Width 0.5    Lead Channel Setting Pacing Amplitude 2.5    Lead  Channel Setting Pacing Capture Mode Fixed Pacing    Lead Channel Setting Pacing Polarity Bipolar    Lead Channel Setting Pacing Pulse Width 0.5    Lead Channel Setting Pacing Amplitude 2.0    Lead Channel Setting Pacing Capture Mode Fixed Pacing    Zone Setting Type Category VF    Zone Setting Vendor Type Category VF    Zone Setting Detection Interval 300    Zone Setting Type Category VT    Zone Setting Vendor Type Category VT2    Zone Setting Type Category VT    Zone Setting Vendor Type Category VT1    Zone Setting Detection Interval 350    Lead Channel Impedance Value 487.5    Lead Channel Sensing Intrinsic Amplitude 3.6    Lead Channel Pacing Threshold Amplitude 0.5    Lead Channel Pacing Threshold Pulse Width 0.5    Lead Channel Pacing Threshold Amplitude 0.5    Lead Channel Pacing Threshold Pulse Width 0.5    Lead Channel Impedance Value 400.0    Lead Channel Sensing Intrinsic Amplitude 12.0    Lead Channel Pacing Threshold Amplitude 1.25    Lead Channel Pacing Threshold Pulse Width 0.5    Lead Channel Pacing Threshold Amplitude 1.25    Lead Channel Pacing Threshold Pulse Width 0.5    Lead Channel Impedance Value 400.0    Lead Channel Pacing Threshold Amplitude 0.75    Lead Channel Pacing Threshold Pulse Width 0.5    Lead Channel Pacing Threshold Amplitude 0.75    Lead Channel Pacing Threshold Pulse Width 0.5    Battery Remaining Longevity 60    Capacitor Charge Type Shock    Capacitor Charge Time 9.125    Capacitor Charge Type Reformation    Capacitor Last Charge Date Time 22412116392489    Statistic Heart Rate Date Time Start 64627436894155    Statistic Heart Rate Date Time End 06567506738031    Baljinder Statistic Date Time Start 53916674310611    Baljinder Statistic Date Time End 48579759510555    Baljinder Statistic RA Percent Paced 32.0    Baljinder Statistic RV Percent Paced 98.0    Atrial Tachy Statistic Date Time Start 26036586692981    Atrial Tachy Statistic Date Time End 43007295212577    Atrial Tachy Statistic  Number Of Mode Switches 5.0    Therapy Statistic Recent Shocks Delivered 0    Therapy Statistic Recent Shocks Aborted 0    Therapy Statistic Recent ATP Delivered 0    Therapy Statistic Recent Date Time Start 32433994849149    Therapy Statistic Recent Date Time End 50714598323057    Episode Statistic Recent Count 0    Episode Statistic Type Category SVT    Episode Statistic Recent Count 0    Episode Statistic Type Category VF    Episode Statistic Vendor Type Category VF    Episode Statistic Recent Date Time Start 22135428867608    Episode Statistic Recent Date Time End 34276662809592    Episode Statistic Recent Date Time Start 06435319079235    Episode Statistic Recent Date Time End 20988619863729    Narrative    [employer] CRT-D Device Check  AP: 32 %    BiVP: 98 %    Mode: DDD     Underlying Rhythm: SR 60's.    Heart Rate: Stable with     Sensing: wnl    Pacing Threshold: stable    Impedance: stable    Battery Status: 5.9 years    Device Site: Healed.    Atrial Arrhythmia: 5 mode switch episodes logged. 1 EGM shows 1:1 PAT   lasting 12 seconds in the 150s. 4 EGM's show atrial arrhythmia lasting 6   to 14 seconds with average Vrates in the 70s-80s. AT/AF burden <1%.   Patient asymptomatic.   Ventricular Arrhythmia: 1 NSVT episode logged. EGM shows 11 beats of NSVT   in the 190's. Patient asymptomatic.   ATP: 0    Shocks: 0    Setting Change: RV amplitude changed from 0.25V @0.05ms to 2.5V @0.5ms per   clinic protocol while maintaining adequate safety margin based on today's   testing.    Care Plan: Follow-up in three months with remote device check. Patient   scheduled to see Dr. Ayala today.   KB, RN       I have reviewed and interpreted the device interrogation, settings,   programming and nurse's summary. The device is functioning within normal   device parameters. I agree with the current findings, assessment and plan.

## 2020-01-31 NOTE — PROGRESS NOTES
I called pt and his wife and spoke to both of them about 's response regarding holding the plavix. Pt's wife said the plavix has been held since Wedneday 1/29/20. Pt has a capsule study with GI in one week and has to hold his plavix for one week prior that study. Pt will also have his hgb rechecked at that time. Pt will call next week with an update on whether GI has told him it's safe to resume his plavix. I told pt that it first needs to be ruled out that he doesn't have any active bleeding before he resumes his plavix. I also explained that ASA has a higher risk of GI bleed, so plavix is what  prefers pt stay on long term. Pt will call next week with an update. Giovani HRARISON January 31, 2020, 9:21 AM

## 2020-01-31 NOTE — PROVIDER NOTIFICATION
Called regarding insomnia, patient requesting sleep aid. Refuses melatonin. Allergic to benadryl. Will try low dose trazodone.  Jake Deshpande MD

## 2020-02-04 ENCOUNTER — TRANSFERRED RECORDS (OUTPATIENT)
Dept: HEALTH INFORMATION MANAGEMENT | Facility: CLINIC | Age: 85
End: 2020-02-04

## 2020-02-04 LAB
ABS BASOPHILS: 0.1 CELLS/MM3 (ref 0–0.2)
ABS EOSINOPHILS: 0.4 CELLS/MM3 (ref 0.1–0.7)
ABS LYMPHOCYTES: 1.2 CELLS/MM3 (ref 1–4)
ABS NEUTROPHILS: 4.1 CELLS/MM3 (ref 2–7.8)
ALBUMIN SERPL-MCNC: 4 G/DL (ref 3.5–5)
ALP SERPL-CCNC: 54 U/L (ref 42–98)
ALT SERPL-CCNC: 13 U/L (ref 5–30)
ANION GAP SERPL CALCULATED.3IONS-SCNC: 11.9 MMOL/L
AST SERPL-CCNC: 22 U/L (ref 7–31)
BASOPHILS NFR BLD AUTO: 0.9 % (ref 0–2)
BILIRUB SERPL-MCNC: 0.5 MG/DL (ref 0–1)
BUN SERPL-MCNC: 10.6 MG/DL (ref 7–22)
CALCIUM SERPL-MCNC: 8.2 MG/DL (ref 8.5–10.5)
CHLORIDE SERPLBLD-SCNC: 101 MMOL/L (ref 96–114)
CO2 SERPL-SCNC: 26 MMOL/L (ref 23–30)
CREAT SERPL-MCNC: 1.03 MG/DL (ref 0.7–1.3)
CREATININE CLEARANCE: 51.7 ML/MIN
EOSINOPHIL NFR BLD AUTO: 6 % (ref 0–7)
ERYTHROCYTE [DISTWIDTH] IN BLOOD BY AUTOMATED COUNT: 14.4 % (ref 11.5–15.5)
GFR SERPL CREATININE-BSD FRML MDRD: >60 ML/MIN/1.73M2 (ref 59–?)
GLUCOSE SERPL-MCNC: 85 MG/DL (ref 70–105)
HCT VFR BLD AUTO: 25.4 % (ref 39–51)
HEMOGLOBIN: 9 G/DL (ref 13–17)
LYMPHOCYTES FLUID, %: 19.7 % (ref 15–50)
MCH RBC QN AUTO: 32.1 PG (ref 27–33)
MCHC RBC AUTO-ENTMCNC: 35.3 G/DL (ref 32–36)
MCV RBC AUTO: 91 FL (ref 81–99)
MONOCYTES # BLD AUTO: 7.4 % (ref 2–13)
MONOCYTES - ABS (DIFF) - HISTORICAL: 0.5 CELLS/MM3 (ref 0.1–1)
NEUTROPHILS NFR BLD AUTO: 66 % (ref 40–75)
PLATELET # BLD AUTO: 268 K/UL (ref 140–450)
PMV BLD: 7.6 FL (ref 6.5–11)
POTASSIUM SERPL-SCNC: 3.9 MMOL/L (ref 3.5–5.1)
PROT SERPL-MCNC: 6 G/DL (ref 6.4–8.3)
RBC # BLD AUTO: 2.8 M/UL (ref 4.3–5.9)
SODIUM SERPL-SCNC: 135 MMOL/L (ref 136–146)
WBC # BLD AUTO: 6.3 10^9/L (ref 4–11)

## 2020-02-04 NOTE — PROGRESS NOTES
Received call from Loli kirk/the lab at [facility] ([facility]) requesting call back. Returned the call. She said she got the lab orders straightened out but requested our fax number to send results. I inquired further what this is in regards to, as we have not sent lab orders for this pt. Loli proceeded to explain that the pt came there requesting Hgb and CMP labs per discharge instructions on his [facility] discharge AVS. I explained to Loli that either the pt's PCP or GI will need to send the orders, as pt was admitted for GI bleed and is to have f/u with GI this week. Loli confirmed she did get orders from one of the pt's providers, but that Katie was requesting that we get the results today for his appt. I reviewed that the pt has been confused and calling us on accident, but I think he is trying to get the records to his GI doc for his appt. Loli confirmed the pt did mention he had a GI appt - she will contact pt to figure out the fax number for them to get results sent. She will also fax us a copy of labs for our records. Jayne Kelly RN on 2/4/2020 at 11:04 AM

## 2020-02-06 ENCOUNTER — DOCUMENTATION ONLY (OUTPATIENT)
Dept: CARDIOLOGY | Facility: CLINIC | Age: 85
End: 2020-02-06

## 2020-02-06 NOTE — PROGRESS NOTES
CMP, CBC outside labs received. Entered in [facility], [facility]. Pt is following up with GI regarding recent GI bleed, see previous note by CHRISTY Dubose. Jayne Kelly RN on 2/6/2020 at 3:36 PM

## 2020-02-26 NOTE — PROGRESS NOTES
Pt was seen by Elena on 1/9/18, Entresto was increased to 49/51 mg bid. Call attempted to reach patient for blood pressure f/u, left message with call back number. Will try calling him later today. Nichol Lucas RN 11:52 AM 01/23/18      
Call attempted to reach patient with Elena's recommendations:Kidney function is normal, potassium is low. BP is reasonable. Given the recent fall l'll slowly uptitrate entresto. Please have him take 98/103 mg of Entresto at night, continue 49/51 mg int he morning. If he feels worse, please ask him to call. Would not uptitrate further prior to clinic visit. Please make sure fall was not a syncopal event due to hypotension before increasing meds. Tried reaching patient x 3, constant busy signal. Will try reaching patient tomorrow. Message sent to board for reminder. Nichol Lucas RN 3:59 PM 01/25/18      
Contacted patient's wife Joanna, pt is very [place].. I reviewed Elena's recommendations regarding increasing evening dose of Entresto to 98/103 mg and continuing with morning dose at 49/51 mg. Pt/wife is to call if he feels worse. Wife did confirm that pt's recent fall was not due to syncopal episode, his fall was due to hip bone cracking and then he fell because he couldn't stand up. Wife was able to repeat medication instructions back and verbalized understanding. Medication e-scripted to pt's pharmacy. Nichol Lucas RN 9:21 AM 01/26/18      
I am in [facility], please send a summary of bps as part of this note.  Oneyda Matt PA-C 1/25/2018 10:30 AM        
Kidney function is normal on labs, potassium is low.  BP is reasonable. Given recent fall I'll slowly uptitrate entresto.  pls have him take 98/103 mg of Entresto at night, continue 49/51 in the morning.  If he feels worse, please ask him to call.  Would not uptitrate further prior to clinic visit.  Please make sure fall was not a syncopal event due to hypotension before increasing meds.  Oneyda Matt PA-C 1/25/2018 3:29 PM        
Pt in clinic to see ALMA Hart with labs. Pt at scheduling with questions, he thought he was to have Echo today. Reviewed notes, pt saw Dr. Ayala 10/2017, added Entresto, pt to be seen 1/2018 by VANESSA with labs to see if can optimized meds, and then plan to repeat Echo 4/2017. Discussed this with pt, wife, and son. Pt wanting to know how we can tell how he is doing on Entresto if we do not get Echo. Reviewed with pt he is on low dose Entresto. Reviewed we will do physical assessment, check labs, BP, and see how he is feeling. Based upon those things, ALMA Hart will make determination if can increase meds or not, and then follow up plan. Pt states he is feeling well on Entresto. He read on label could cause diarrhea. He has had 2 episodes of diarrhea since being on Entresto, once with influenza A and another time after drinking a lot of cranberry juice. Reviewed with pt, usually if side effect from Entresto, would not come and go, would be more consistent. Pt does states he has had loose stools off and on prior to Entresto as well. Updated SMore. Pt agreeable to wait and see ALMA Hart today. CHRISTY Menon 1:32 PM 01/09/18   
Received a call from patient's wife with an update. After patient left appointment with Oneyda Matt on 1/9/18, he fell and broke his hip. He was transfer to [facility] ([facility]) for 5 days. Then he was transferred to [facility] for 2 weeks. He is home now. Wife (Joanna) does not have his most recent blood pressure readings. I told her I would contact the facility. Spoke with Cesia at [facility], she will fax over his most recent labs and blood pressure readings while patient was there. She will try and fax it tomorrow. Will update SMore. Message sent to core board to check for fax.  Nichol Lucas RN 3:35 PM 01/24/18      
Received blood pressure readings and lab work from [facility]. Copy made for Elena to review, original sent to scan. Nichol Lucas RN 4:28 PM 01/24/18      
Second attempt to reach patient to discuss blood pressure readings on increased dose of Entresto. Will try calling tomorrow. Message sent to core board for reminder. Nichol Lucas RN 2:26 PM 01/23/18      
Summary of bps as requested.    1/15 - 124/87  1/16 - 141/80  1/17 - 129/60  1/18 - 101/58  1/18 - 113/54  1/19 - 104/53  1/20 - 118/50  1/21 - 125/72  1/22 - 125/66  1/23 - 127/52  1/24 - 121/71    
Third attempt to reach patient to discuss blood pressure readings on increased dose of Entresto. Entresto was increased to 49/51mg on 1/9/18 ov with Elena.  No answer, left message with call back number. Will update Oneyda More. Nichol Lucas RN 1:48 PM 01/24/18      
Additional Safety/Bands:

## 2020-03-05 ENCOUNTER — ANCILLARY PROCEDURE (OUTPATIENT)
Dept: CARDIOLOGY | Facility: CLINIC | Age: 85
End: 2020-03-05
Attending: INTERNAL MEDICINE
Payer: MEDICARE

## 2020-03-05 DIAGNOSIS — I42.9 CARDIOMYOPATHY (H): ICD-10-CM

## 2020-03-05 DIAGNOSIS — Z95.810 ICD (IMPLANTABLE CARDIOVERTER-DEFIBRILLATOR), BIVENTRICULAR, IN SITU: ICD-10-CM

## 2020-03-05 PROCEDURE — 93296 REM INTERROG EVL PM/IDS: CPT | Performed by: INTERNAL MEDICINE

## 2020-03-05 PROCEDURE — 93294 REM INTERROG EVL PM/LDLS PM: CPT | Performed by: INTERNAL MEDICINE

## 2020-03-11 LAB
MDC_IDC_EPISODE_DTM: NORMAL
MDC_IDC_EPISODE_DURATION: 4 S
MDC_IDC_EPISODE_DURATION: 6 S
MDC_IDC_EPISODE_DURATION: 6 S
MDC_IDC_EPISODE_ID: NORMAL
MDC_IDC_EPISODE_TYPE: NORMAL
MDC_IDC_EPISODE_VENDOR_TYPE: NORMAL
MDC_IDC_LEAD_IMPLANT_DT: NORMAL
MDC_IDC_LEAD_LOCATION: NORMAL
MDC_IDC_LEAD_LOCATION_DETAIL_1: NORMAL
MDC_IDC_LEAD_MFG: NORMAL
MDC_IDC_LEAD_MODEL: NORMAL
MDC_IDC_LEAD_POLARITY_TYPE: NORMAL
MDC_IDC_LEAD_SERIAL: NORMAL
MDC_IDC_LEAD_SPECIAL_FUNCTION: NORMAL
MDC_IDC_MSMT_BATTERY_DTM: NORMAL
MDC_IDC_MSMT_BATTERY_REMAINING_LONGEVITY: 56 MO
MDC_IDC_MSMT_BATTERY_REMAINING_PERCENTAGE: 75 %
MDC_IDC_MSMT_BATTERY_RRT_TRIGGER: NORMAL
MDC_IDC_MSMT_BATTERY_STATUS: NORMAL
MDC_IDC_MSMT_BATTERY_VOLTAGE: 2.96 V
MDC_IDC_MSMT_CAP_CHARGE_DTM: NORMAL
MDC_IDC_MSMT_CAP_CHARGE_ENERGY: 40 J
MDC_IDC_MSMT_CAP_CHARGE_TIME: 9.4 S
MDC_IDC_MSMT_CAP_CHARGE_TYPE: NORMAL
MDC_IDC_MSMT_LEADCHNL_LV_IMPEDANCE_VALUE: 380 OHM
MDC_IDC_MSMT_LEADCHNL_LV_LEAD_CHANNEL_STATUS: NORMAL
MDC_IDC_MSMT_LEADCHNL_LV_PACING_THRESHOLD_AMPLITUDE: 0.75 V
MDC_IDC_MSMT_LEADCHNL_LV_PACING_THRESHOLD_PULSEWIDTH: 0.5 MS
MDC_IDC_MSMT_LEADCHNL_RA_IMPEDANCE_VALUE: 430 OHM
MDC_IDC_MSMT_LEADCHNL_RA_LEAD_CHANNEL_STATUS: NORMAL
MDC_IDC_MSMT_LEADCHNL_RA_PACING_THRESHOLD_AMPLITUDE: 0.5 V
MDC_IDC_MSMT_LEADCHNL_RA_PACING_THRESHOLD_PULSEWIDTH: 0.5 MS
MDC_IDC_MSMT_LEADCHNL_RA_SENSING_INTR_AMPL: 2.9 MV
MDC_IDC_MSMT_LEADCHNL_RV_IMPEDANCE_VALUE: 380 OHM
MDC_IDC_MSMT_LEADCHNL_RV_LEAD_CHANNEL_STATUS: NORMAL
MDC_IDC_MSMT_LEADCHNL_RV_PACING_THRESHOLD_AMPLITUDE: 1.25 V
MDC_IDC_MSMT_LEADCHNL_RV_PACING_THRESHOLD_PULSEWIDTH: 0.5 MS
MDC_IDC_MSMT_LEADCHNL_RV_SENSING_INTR_AMPL: 12 MV
MDC_IDC_PG_IMPLANT_DTM: NORMAL
MDC_IDC_PG_MFG: NORMAL
MDC_IDC_PG_MODEL: NORMAL
MDC_IDC_PG_SERIAL: NORMAL
MDC_IDC_PG_TYPE: NORMAL
MDC_IDC_SESS_CLINIC_NAME: NORMAL
MDC_IDC_SESS_DTM: NORMAL
MDC_IDC_SESS_REPROGRAMMED: NO
MDC_IDC_SESS_TYPE: NORMAL
MDC_IDC_SET_BRADY_AT_MODE_SWITCH_MODE: NORMAL
MDC_IDC_SET_BRADY_AT_MODE_SWITCH_RATE: 170 {BEATS}/MIN
MDC_IDC_SET_BRADY_LOWRATE: 60 {BEATS}/MIN
MDC_IDC_SET_BRADY_MAX_SENSOR_RATE: 120 {BEATS}/MIN
MDC_IDC_SET_BRADY_MAX_TRACKING_RATE: 120 {BEATS}/MIN
MDC_IDC_SET_BRADY_MODE: NORMAL
MDC_IDC_SET_BRADY_PAV_DELAY_HIGH: 100 MS
MDC_IDC_SET_BRADY_PAV_DELAY_LOW: 150 MS
MDC_IDC_SET_BRADY_SAV_DELAY_HIGH: 100 MS
MDC_IDC_SET_BRADY_SAV_DELAY_LOW: 120 MS
MDC_IDC_SET_CRT_LVRV_DELAY: 0 MS
MDC_IDC_SET_CRT_PACED_CHAMBERS: NORMAL
MDC_IDC_SET_LEADCHNL_LV_PACING_AMPLITUDE: 2 V
MDC_IDC_SET_LEADCHNL_LV_PACING_ANODE_ELECTRODE_1: NORMAL
MDC_IDC_SET_LEADCHNL_LV_PACING_ANODE_LOCATION_1: NORMAL
MDC_IDC_SET_LEADCHNL_LV_PACING_CAPTURE_MODE: NORMAL
MDC_IDC_SET_LEADCHNL_LV_PACING_CATHODE_ELECTRODE_1: NORMAL
MDC_IDC_SET_LEADCHNL_LV_PACING_CATHODE_LOCATION_1: NORMAL
MDC_IDC_SET_LEADCHNL_LV_PACING_POLARITY: NORMAL
MDC_IDC_SET_LEADCHNL_LV_PACING_PULSEWIDTH: 0.5 MS
MDC_IDC_SET_LEADCHNL_RA_PACING_AMPLITUDE: 2 V
MDC_IDC_SET_LEADCHNL_RA_PACING_ANODE_ELECTRODE_1: NORMAL
MDC_IDC_SET_LEADCHNL_RA_PACING_ANODE_LOCATION_1: NORMAL
MDC_IDC_SET_LEADCHNL_RA_PACING_CAPTURE_MODE: NORMAL
MDC_IDC_SET_LEADCHNL_RA_PACING_CATHODE_ELECTRODE_1: NORMAL
MDC_IDC_SET_LEADCHNL_RA_PACING_CATHODE_LOCATION_1: NORMAL
MDC_IDC_SET_LEADCHNL_RA_PACING_POLARITY: NORMAL
MDC_IDC_SET_LEADCHNL_RA_PACING_PULSEWIDTH: 0.5 MS
MDC_IDC_SET_LEADCHNL_RA_SENSING_ADAPTATION_MODE: NORMAL
MDC_IDC_SET_LEADCHNL_RA_SENSING_ANODE_ELECTRODE_1: NORMAL
MDC_IDC_SET_LEADCHNL_RA_SENSING_ANODE_LOCATION_1: NORMAL
MDC_IDC_SET_LEADCHNL_RA_SENSING_CATHODE_ELECTRODE_1: NORMAL
MDC_IDC_SET_LEADCHNL_RA_SENSING_CATHODE_LOCATION_1: NORMAL
MDC_IDC_SET_LEADCHNL_RA_SENSING_POLARITY: NORMAL
MDC_IDC_SET_LEADCHNL_RA_SENSING_SENSITIVITY: 0.3 MV
MDC_IDC_SET_LEADCHNL_RV_PACING_AMPLITUDE: 2.5 V
MDC_IDC_SET_LEADCHNL_RV_PACING_ANODE_ELECTRODE_1: NORMAL
MDC_IDC_SET_LEADCHNL_RV_PACING_ANODE_LOCATION_1: NORMAL
MDC_IDC_SET_LEADCHNL_RV_PACING_CAPTURE_MODE: NORMAL
MDC_IDC_SET_LEADCHNL_RV_PACING_CATHODE_ELECTRODE_1: NORMAL
MDC_IDC_SET_LEADCHNL_RV_PACING_CATHODE_LOCATION_1: NORMAL
MDC_IDC_SET_LEADCHNL_RV_PACING_POLARITY: NORMAL
MDC_IDC_SET_LEADCHNL_RV_PACING_PULSEWIDTH: 0.5 MS
MDC_IDC_SET_LEADCHNL_RV_SENSING_ADAPTATION_MODE: NORMAL
MDC_IDC_SET_LEADCHNL_RV_SENSING_ANODE_ELECTRODE_1: NORMAL
MDC_IDC_SET_LEADCHNL_RV_SENSING_ANODE_LOCATION_1: NORMAL
MDC_IDC_SET_LEADCHNL_RV_SENSING_CATHODE_ELECTRODE_1: NORMAL
MDC_IDC_SET_LEADCHNL_RV_SENSING_CATHODE_LOCATION_1: NORMAL
MDC_IDC_SET_LEADCHNL_RV_SENSING_POLARITY: NORMAL
MDC_IDC_SET_LEADCHNL_RV_SENSING_SENSITIVITY: 0.5 MV
MDC_IDC_SET_ZONE_DETECTION_INTERVAL: 300 MS
MDC_IDC_SET_ZONE_DETECTION_INTERVAL: 350 MS
MDC_IDC_SET_ZONE_TYPE: NORMAL
MDC_IDC_SET_ZONE_VENDOR_TYPE: NORMAL
MDC_IDC_STAT_AT_BURDEN_PERCENT: 0 %
MDC_IDC_STAT_AT_DTM_END: NORMAL
MDC_IDC_STAT_AT_DTM_START: NORMAL
MDC_IDC_STAT_AT_MODE_SW_COUNT: 3
MDC_IDC_STAT_AT_MODE_SW_COUNT_PER_DAY: 0
MDC_IDC_STAT_AT_MODE_SW_MAX_DURATION: 6 S
MDC_IDC_STAT_AT_MODE_SW_PERCENT_TIME: 1 %
MDC_IDC_STAT_BRADY_AP_VP_PERCENT: 35 %
MDC_IDC_STAT_BRADY_AP_VS_PERCENT: 1 %
MDC_IDC_STAT_BRADY_AS_VP_PERCENT: 61 %
MDC_IDC_STAT_BRADY_AS_VS_PERCENT: 1 %
MDC_IDC_STAT_BRADY_DTM_END: NORMAL
MDC_IDC_STAT_BRADY_DTM_START: NORMAL
MDC_IDC_STAT_BRADY_RA_PERCENT_PACED: 31 %
MDC_IDC_STAT_CRT_DTM_END: NORMAL
MDC_IDC_STAT_CRT_DTM_START: NORMAL
MDC_IDC_STAT_CRT_PERCENT_PACED: 95 %
MDC_IDC_STAT_HEART_RATE_ATRIAL_MAX: 260 {BEATS}/MIN
MDC_IDC_STAT_HEART_RATE_ATRIAL_MEAN: 74 {BEATS}/MIN
MDC_IDC_STAT_HEART_RATE_ATRIAL_MIN: 40 {BEATS}/MIN
MDC_IDC_STAT_HEART_RATE_DTM_END: NORMAL
MDC_IDC_STAT_HEART_RATE_DTM_START: NORMAL
MDC_IDC_STAT_HEART_RATE_VENTRICULAR_MAX: 330 {BEATS}/MIN
MDC_IDC_STAT_HEART_RATE_VENTRICULAR_MEAN: 76 {BEATS}/MIN
MDC_IDC_STAT_HEART_RATE_VENTRICULAR_MIN: 50 {BEATS}/MIN
MDC_IDC_STAT_TACHYTHERAPY_ATP_DELIVERED_RECENT: 0
MDC_IDC_STAT_TACHYTHERAPY_RECENT_DTM_END: NORMAL
MDC_IDC_STAT_TACHYTHERAPY_RECENT_DTM_START: NORMAL
MDC_IDC_STAT_TACHYTHERAPY_SHOCKS_ABORTED_RECENT: 0
MDC_IDC_STAT_TACHYTHERAPY_SHOCKS_DELIVERED_RECENT: 0

## 2020-05-04 ENCOUNTER — CARE COORDINATION (OUTPATIENT)
Dept: CARDIOLOGY | Facility: CLINIC | Age: 85
End: 2020-05-04

## 2020-05-04 NOTE — PROGRESS NOTES
Pt left a message stating he has felt more tired with activity lately, and wants to know if his number has gone down below 30.     It looks like pt ended up speaking to scheduling and setting up a phone visit with  tomorrow. I called pt back and he told me that he feels more wiped out with his normal activity level lately. He has to take more breaks to rest through out the day. Pt denied having any SOB or chest pain. He weighs himself daily and said his weight has been stable around 160#. Pt reports that he has a small amount of LE edema.Pt wanted to know if his pumping function has decreased further or if he will need any other testing to see his symptoms are cardiac related. Pt had issues with a GI bleed in 1/2020. No source was found and pt's hemoglobin has improved to 11.5. His next CBC check is in June. Pt denied having any blood in his stool.     I told pt that he should keep his visit with  tomorrow.  can decide if any further testing/labs are needed and can coordinate them to be done on the same day as pt lives far from the clinic. Giovani HARRISON May 4, 2020, 11:19 AM

## 2020-05-05 ENCOUNTER — VIRTUAL VISIT (OUTPATIENT)
Dept: CARDIOLOGY | Facility: CLINIC | Age: 85
End: 2020-05-05
Payer: MEDICARE

## 2020-05-05 VITALS
BODY MASS INDEX: 24.9 KG/M2 | DIASTOLIC BLOOD PRESSURE: 68 MMHG | WEIGHT: 159 LBS | TEMPERATURE: 96.8 F | HEART RATE: 60 BPM | SYSTOLIC BLOOD PRESSURE: 130 MMHG

## 2020-05-05 DIAGNOSIS — I42.0 DILATED CARDIOMYOPATHY (H): Primary | ICD-10-CM

## 2020-05-05 DIAGNOSIS — Z95.810 ICD (IMPLANTABLE CARDIOVERTER-DEFIBRILLATOR), BIVENTRICULAR, IN SITU: ICD-10-CM

## 2020-05-05 DIAGNOSIS — E78.00 HYPERCHOLESTEROLEMIA: ICD-10-CM

## 2020-05-05 DIAGNOSIS — I10 ESSENTIAL HYPERTENSION WITH GOAL BLOOD PRESSURE LESS THAN 140/90: ICD-10-CM

## 2020-05-05 DIAGNOSIS — I50.22 CHRONIC SYSTOLIC HEART FAILURE (H): ICD-10-CM

## 2020-05-05 PROCEDURE — 99443 ZZC PHYSICIAN TELEPHONE EVALUATION 21-30 MIN: CPT | Performed by: INTERNAL MEDICINE

## 2020-05-05 RX ORDER — CLOPIDOGREL BISULFATE 75 MG/1
75 TABLET ORAL DAILY
COMMUNITY

## 2020-05-05 NOTE — LETTER
5/5/2020    Deborah Gandhi MD   [facility]   [address] [place] [address]     RE: Katie Shannon  [address] [facility] [address]       Dear Colleague,    Thank you for the opportunity to participate in the care of your patient, Katie Shannon, at the [facility] at [facility]. Please see a copy of my visit note below.     HISTORY OF PRESENT ILLNESS:  I had the opportunity to speak with Mr. Katie Shannon in Cardiology Clinic today at the [facility] for reevaluation of nonischemic cardiomyopathy and recent symptoms of shortness of breath and fatigue with exertion.      I have seen Mr. Shannon for a number of years to help manage his cardiomyopathy and he has had mild congestive heart failure symptoms at most.  His last echocardiogram showed a stable ejection fraction of 30%-35%.  He does have a biventricular ICD in place, but that device did not seem to help improve his left ventricular function or symptoms as much as I had hoped.  He has had stress testing within the last year which did not show any myocardial ischemia.      Today he is on my schedule for an urgent visit to discuss these symptoms of increasing fatigue and shortness of breath with exertion.  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  He had a significant GI bleed in 01/2020 and had a hemoglobin drop to about 7.0.  He was studied with upper endoscopy, colonoscopy and pill cam study without identification of any clear bleeding source.  He did have a thrombosed hemorrhoid, which may have been the culprit.  Fortunately, it does not seem that he has had any more obvious bleeding occur and his hemoglobin most recently is up to 11.5.        Our last ICD check with him did not show any significant arrhythmias.  He did have a more prolonged episode of nonsustained ventricular  tachycardia last fall, but now his rhythm seems to be normal with 95% biventricular pacing and only a few brief episodes of PAT.  He has 4.7 years of battery life remaining, which was one of his concerns.  Technically, he does not have a biventricular device, but rather a CRT device with the left ventricular lead attached to the upper right ventricular septum, attempting to restore normal conduction.  Perhaps this is one reason why he did not respond better to this therapy.      VITAL SIGNS:  His blood pressure today was 130/68 and heart rate 60 with a stable weight of 159 pounds.      IMPRESSIONS:  Mr. Katie Shannon is an 85-year-old gentleman with a longstanding nonischemic cardiomyopathy, but fairly mild chronic systolic heart failure symptoms.  He has a cardiac resynchronization ICD device in place but does not have an LV lead.  Rather, he has an LV lead attached to the RV septum.  His ejection fraction is stable at 30%-35%.  His blood pressure and heart rate are good.  He is not having any significant arrhythmias.      He has been experiencing exertional fatigue recently.  I do not have a good explanation for him.  I do not think his ICD battery is declining.  He could have some recent arrhythmias that were not recorded on previous ICD checks and we will learn more about that in the near future when he does another remote ICD check.        In the meantime, I suggested that he recheck his hemoglobin to see if that is an issue again.  He recently suffered a significant GI bleed in January.  If his hemoglobin is still staying near normal and his remote ICD check looks okay, we may wish to have him come down for an echocardiogram to reevaluate his left ventricular function again.      I will see him again in the near future based on the results of these evaluations.     Please do not hesitate to contact me if you have any questions/concerns.     Sincerely,     ISIDORO MARI MD

## 2020-05-05 NOTE — PROGRESS NOTES
"Katie Shannon is a 85 year old male who is being evaluated via a billable telephone visit.      The patient has been notified of following:     \"This telephone visit will be conducted via a call between you and your physician/provider. We have found that certain health care needs can be provided without the need for a physical exam.  This service lets us provide the care you need with a short phone conversation.  If a prescription is necessary we can send it directly to your pharmacy.  If lab work is needed we can place an order for that and you can then stop by our lab to have the test done at a later time.    Telephone visits are billed at different rates depending on your insurance coverage. During this emergency period, for some insurers they may be billed the same as an in-person visit.  Please reach out to your insurance provider with any questions.    If during the course of the call the physician/provider feels a telephone visit is not appropriate, you will not be charged for this service.\"    Patient has given verbal consent for Telephone visit?  Yes    What phone number would you like to be contacted at? [phone]    How would you like to obtain your AVS? Mail a copy     Patient fell in the field last week and pain in his chest after the fall occurred. He did have x ray and patient said it was okay. Now about 2 inches from his pacemaker is very tender to the touch.     Blood pressure: 130/68  Pulse: 60  Weight: 159 #  Temp: 96.8 F      Phone call duration: 34 minutes    ISIDORO MARI MD    HPI and Plan:   See dictation    No orders of the defined types were placed in this encounter.    Orders Placed This Encounter   Medications     clopidogrel (PLAVIX) 75 MG tablet     Sig: Take 75 mg by mouth daily     Medications Discontinued During This Encounter   Medication Reason     neomycin-polymixin-dexamethasone (MAXITROL) ophthalmic ointment Therapy completed         No diagnosis found.    CURRENT " MEDICATIONS:  Current Outpatient Medications   Medication Sig Dispense Refill     carvedilol (COREG) 12.5 MG tablet Take 1 tablet (12.5 mg) by mouth 2 times daily (with meals) 180 tablet 3     clopidogrel (PLAVIX) 75 MG tablet Take 75 mg by mouth daily       CYCLOBENZAPRINE HCL PO Take 5 mg by mouth At Bedtime Taking PRN (half tab)       cycloSPORINE (RESTASIS) 0.05 % ophthalmic emulsion Place 1 drop into both eyes 2 times daily        Famotidine (PEPCID PO) Take 40 mg by mouth daily        isosorbide mononitrate (IMDUR) 60 MG 24 hr tablet Take 60 mg by mouth daily       polyethylene glycol 400 (BLINK TEARS) 0.25 % SOLN ophthalmic solution Place 1 drop into both eyes 2 times daily as needed        rosuvastatin (CRESTOR) 20 MG tablet Take 1 tablet (20 mg) by mouth daily 90 tablet 3     sacubitril-valsartan (ENTRESTO)  MG per tablet Take 1 tablet by mouth 2 times daily 180 tablet 3     tamsulosin (FLOMAX) 0.4 MG 24 hr capsule Take 0.4 mg by mouth daily       White Petrolatum-Mineral Oil (RETAINE PM) OINT Place into both eyes At Bedtime       diphenhydrAMINE-acetaminophen (TYLENOL PM)  MG tablet Take 1 tablet by mouth nightly as needed for sleep         ALLERGIES     Allergies   Allergen Reactions     Atorvastatin      Muscle aches     Diphenhydramine Other (See Comments)     Lip swelling     Metoprolol Succinate [Metoprolol] Other (See Comments)     Blind in left half of face for 3 days     [place] Other (See Comments)     swelling     Sulfa Drugs Other (See Comments)     swelling       PAST MEDICAL HISTORY:  Past Medical History:   Diagnosis Date     CAD (coronary artery disease)      Congestive heart failure (H)      Heart disease     non-ischemic cardiomyopathy     HTN (hypertension)      Idiopathic cardiomyopathy (H)        PAST SURGICAL HISTORY:  Past Surgical History:   Procedure Laterality Date     COLONOSCOPY N/A 1/29/2020    Procedure: COLONOSCOPY;  Surgeon: Erica Duff MD;  Location:   GI     ESOPHAGOSCOPY, GASTROSCOPY, DUODENOSCOPY (EGD), COMBINED N/A 1/28/2020    Procedure: ESOPHAGOGASTRODUODENOSCOPY (EGD);  Surgeon: Erica Duff MD;  Location:  GI     EYE SURGERY  2006    cataract     ORTHOPEDIC SURGERY  01/2018    pinning of right hip       FAMILY HISTORY:  Family History   Problem Relation Age of Onset     Heart Disease Mother 88        mi     Cerebrovascular Disease Father 71       SOCIAL HISTORY:  Social History     Socioeconomic History     Marital status:      Spouse name: Not on file     Number of children: Not on file     Years of education: Not on file     Highest education level: Not on file   Occupational History     Not on file   Social Needs     Financial resource strain: Not on file     Food insecurity     Worry: Not on file     Inability: Not on file     Transportation needs     Medical: Not on file     Non-medical: Not on file   Tobacco Use     Smoking status: Never Smoker     Smokeless tobacco: Never Used   Substance and Sexual Activity     Alcohol use: No     Alcohol/week: 0.0 standard drinks     Drug use: No     Sexual activity: Not on file   Lifestyle     Physical activity     Days per week: Not on file     Minutes per session: Not on file     Stress: Not on file   Relationships     Social connections     Talks on phone: Not on file     Gets together: Not on file     Attends [employer] service: Not on file     Active member of club or organization: Not on file     Attends meetings of clubs or organizations: Not on file     Relationship status: Not on file     Intimate partner violence     Fear of current or ex partner: Not on file     Emotionally abused: Not on file     Physically abused: Not on file     Forced sexual activity: Not on file   Other Topics Concern      Service Not Asked     Blood Transfusions Not Asked     Caffeine Concern No     Occupational Exposure Not Asked     Hobby Hazards Not Asked     Sleep Concern No     Stress Concern Not Asked      Weight Concern No     Special Diet No     Back Care Not Asked     Exercise Yes     Comment: active on farm     Bike Helmet Not Asked     Seat Belt Not Asked     Self-Exams Not Asked     Parent/sibling w/ CABG, MI or angioplasty before 65F 55M? No   Social History Narrative     Not on file       Physical Exam:  Vitals: /68   Pulse 60   Temp 96.8  F (36  C)   Wt 72.1 kg (159 lb)   BMI 24.90 kg/m      Constitutional:           Skin:             Head:           Eyes:           Lymph:      ENT:           Neck:           Respiratory:            Cardiac:                                                           GI:           Extremities and Muscular Skeletal:                 Neurological:           Psych:         Recent Lab Results:  LIPID RESULTS:  Lab Results   Component Value Date    CHOL 145 12/04/2019    HDL 42 12/04/2019    LDL 77 12/04/2019    TRIG 130 12/04/2019    CHOLHDLRATIO 3.8 07/28/2015       LIVER ENZYME RESULTS:  Lab Results   Component Value Date    AST 22 02/04/2020    ALT 13 02/04/2020       CBC RESULTS:  Lab Results   Component Value Date    WBC 6.3 02/04/2020    RBC 3.13 (L) 01/28/2020    HGB 9.0 (A) 02/04/2020    HCT 25.4 (A) 02/04/2020    MCV 91.0 02/04/2020    MCH 32.1 02/04/2020    MCHC 35.3 02/04/2020    RDW 14.4 02/04/2020     01/28/2020       BMP RESULTS:  Lab Results   Component Value Date     (A) 02/04/2020    POTASSIUM 3.9 02/04/2020    CHLORIDE 101 02/04/2020    CO2 26 02/04/2020    ANIONGAP 11.9 02/04/2020    GLC 85 02/04/2020    BUN 10.6 02/04/2020    CR 1.03 02/04/2020    GFRESTIMATED >60 02/04/2020    GFRESTBLACK 90 01/28/2020    ELIZABETH 8.2 (A) 02/04/2020        A1C RESULTS:  No results found for: A1C    INR RESULTS:  Lab Results   Component Value Date    INR 1.19 (H) 01/28/2020    INR 1.11 01/28/2020           CC  Deborah Gandhi MD  [address]

## 2020-05-05 NOTE — PROGRESS NOTES
Service Date: 05/05/2020      TELEPHONE VISIT      HISTORY OF PRESENT ILLNESS:  I had the opportunity to speak with Mr. Katie Shannon in Cardiology Clinic today at the [facility] for reevaluation of nonischemic cardiomyopathy and recent symptoms of shortness of breath and fatigue with exertion.      I have seen Mr. Shannon for a number of years to help manage his cardiomyopathy and he has had mild congestive heart failure symptoms at most.  His last echocardiogram showed a stable ejection fraction of 30%-35%.  He does have a biventricular ICD in place, but that device did not seem to help improve his left ventricular function or symptoms as much as I had hoped.  He has had stress testing within the last year which did not show any myocardial ischemia.      Today he is on my schedule for an urgent visit to discuss these symptoms of increasing fatigue and shortness of breath with exertion.  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  He had a significant GI bleed in 01/2020 and had a hemoglobin drop to about 7.0.  He was studied with upper endoscopy, colonoscopy and pill cam study without identification of any clear bleeding source.  He did have a thrombosed hemorrhoid, which may have been the culprit.  Fortunately, it does not seem that he has had any more obvious bleeding occur and his hemoglobin most recently is up to 11.5.        Our last ICD check with him did not show any significant arrhythmias.  He did have a more prolonged episode of nonsustained ventricular tachycardia last fall, but now his rhythm seems to be normal with 95% biventricular pacing and only a few brief episodes of PAT.  He has 4.7 years of battery life remaining, which was one of his concerns.  Technically, he does not have a biventricular device, but rather a CRT device with the left ventricular lead attached to the upper right ventricular septum, attempting  to restore normal conduction.  Perhaps this is one reason why he did not respond better to this therapy.      VITAL SIGNS:  His blood pressure today was 130/68 and heart rate 60 with a stable weight of 159 pounds.      IMPRESSIONS:  Mr. Rupert Shannon is an 85-year-old gentleman with a longstanding nonischemic cardiomyopathy, but fairly mild chronic systolic heart failure symptoms.  He has a cardiac resynchronization ICD device in place but does not have an LV lead.  Rather, he has an LV lead attached to the RV septum.  His ejection fraction is stable at 30%-35%.  His blood pressure and heart rate are good.  He is not having any significant arrhythmias.      He has been experiencing exertional fatigue recently.  I do not have a good explanation for him.  I do not think his ICD battery is declining.  He could have some recent arrhythmias that were not recorded on previous ICD checks and we will learn more about that in the near future when he does another remote ICD check.        In the meantime, I suggested that he recheck his hemoglobin to see if that is an issue again.  He recently suffered a significant GI bleed in January.  If his hemoglobin is still staying near normal and his remote ICD check looks okay, we may wish to have him come down for an echocardiogram to reevaluate his left ventricular function again.      I will see him again in the near future based on the results of these evaluations.      cc:      [place] [address]         ISIDORO MARI MD, [facility]             D: 2020   T: 2020   MT: [email or web address]      Name:     RUPERT SHANNON   MRN:      [id number]        Account:      [id number]   :      1935           Service Date: 2020      Document: [id number]

## 2020-05-06 ENCOUNTER — CARE COORDINATION (OUTPATIENT)
Dept: CARDIOLOGY | Facility: CLINIC | Age: 85
End: 2020-05-06

## 2020-05-06 DIAGNOSIS — I42.0 DILATED CARDIOMYOPATHY (H): Primary | ICD-10-CM

## 2020-05-06 DIAGNOSIS — I50.22 CHRONIC SYSTOLIC HEART FAILURE (H): ICD-10-CM

## 2020-05-06 NOTE — PROGRESS NOTES
Courtesy check per [facility] for fatigue.   St Shahid Unify Assura CRT ICD Remote Device Check  (HIS lead in the LV port, RV pacing sub-threshold)   A Paced: 41 %    HIS Paced: 96 %    Mode: DDD     Presenting Rhythm: AS/HIS pace with PACs and PVCs  Heart Rate: Stable with adequate variability     Sensing: Not obtained    Pacing Threshold: WNL    Impedance: WNL    Battery Status: 4.6 years     Atrial Arrhythmia: 2 mode switch episodes logged. EGMs show brief atrial arrythmias lasting 3-8 seconds with controlled ventricular rates in the 70s-80s.     Ventricular Arrhythmia: 2 NSVT episodes logged. EGMs show NSVT lasting 3-9 seconds with rates in the 180s-190s.     ATP: 0    Shocks: 0    Care Plan: Will route to [facility].         Patient's histogram is shown below.

## 2020-05-06 NOTE — PROGRESS NOTES
Pt called to report that he had his hgb checked at PCP office yesterday. Pt was just notified that is has improved to 12.5. Pt said the plan from  is that if hgb is normal, and remote device check is normal than pt can have an echo. Pt said he wants his echo next week. I told pt that he needs to have his remote device check done first. I will send a message to our device team to see if remote check can be done this week. Giovani HARRISON May 6, 2020, 12:04 PM

## 2020-05-06 NOTE — PROGRESS NOTES
I called pt and let him know  was update on hgb and device check results. He recommended pt have echo next week as nothing showed up on the results that would be contributing to his fatigue. I will have scheduling call pt tomorrow to set up the echo. Giovani HARRISON May 6, 2020, 5:49 PM

## 2020-05-07 ENCOUNTER — TELEPHONE (OUTPATIENT)
Dept: CARDIOLOGY | Facility: CLINIC | Age: 85
End: 2020-05-07

## 2020-05-08 ENCOUNTER — HOSPITAL ENCOUNTER (OUTPATIENT)
Dept: CARDIOLOGY | Facility: CLINIC | Age: 85
Discharge: HOME | End: 2020-05-08
Attending: INTERNAL MEDICINE | Admitting: INTERNAL MEDICINE
Payer: MEDICARE

## 2020-05-08 DIAGNOSIS — I50.22 CHRONIC SYSTOLIC HEART FAILURE (H): ICD-10-CM

## 2020-05-08 DIAGNOSIS — E78.00 HYPERCHOLESTEROLEMIA: ICD-10-CM

## 2020-05-08 DIAGNOSIS — I42.0 DILATED CARDIOMYOPATHY (H): ICD-10-CM

## 2020-05-08 LAB
ALT SERPL W P-5'-P-CCNC: <5 U/L (ref 5–30)
CHOLEST SERPL-MCNC: 125 MG/DL
HDLC SERPL-MCNC: 41 MG/DL
HGB BLD-MCNC: 12 G/DL (ref 13.3–17.7)
LDLC SERPL CALC-MCNC: 50 MG/DL
NONHDLC SERPL-MCNC: 84 MG/DL
TRIGL SERPL-MCNC: 168 MG/DL
TSH SERPL DL<=0.005 MIU/L-ACNC: 3.08 MU/L (ref 0.4–4)

## 2020-05-08 PROCEDURE — 80061 LIPID PANEL: CPT | Performed by: INTERNAL MEDICINE

## 2020-05-08 PROCEDURE — 93306 TTE W/DOPPLER COMPLETE: CPT | Mod: 26 | Performed by: INTERNAL MEDICINE

## 2020-05-08 PROCEDURE — 36415 COLL VENOUS BLD VENIPUNCTURE: CPT | Performed by: INTERNAL MEDICINE

## 2020-05-08 PROCEDURE — 85018 HEMOGLOBIN: CPT | Performed by: INTERNAL MEDICINE

## 2020-05-08 PROCEDURE — 93306 TTE W/DOPPLER COMPLETE: CPT

## 2020-05-08 PROCEDURE — 84443 ASSAY THYROID STIM HORMONE: CPT | Performed by: INTERNAL MEDICINE

## 2020-05-08 PROCEDURE — 84460 ALANINE AMINO (ALT) (SGPT): CPT | Performed by: INTERNAL MEDICINE

## 2020-05-15 ENCOUNTER — TELEPHONE (OUTPATIENT)
Dept: CARDIOLOGY | Facility: CLINIC | Age: 85
End: 2020-05-15

## 2020-05-15 NOTE — TELEPHONE ENCOUNTER
"Received message from patient requesting the \"output of his heart\" from the most recent echocardiogram.  Called and left a message for patient stating his EF was 30-35% on his echocardiogram on 5/8/20 and that this is the same that it was with his echocardiogram in 5/2019.  Requested a call back if he had further questions.    CHRISTY Camejo   "

## 2020-06-04 ENCOUNTER — DOCUMENTATION ONLY (OUTPATIENT)
Dept: CARDIOLOGY | Facility: CLINIC | Age: 85
End: 2020-06-04

## 2020-06-04 ENCOUNTER — ANCILLARY PROCEDURE (OUTPATIENT)
Dept: CARDIOLOGY | Facility: CLINIC | Age: 85
End: 2020-06-04
Attending: INTERNAL MEDICINE
Payer: MEDICARE

## 2020-06-04 DIAGNOSIS — I42.9 CARDIOMYOPATHY (H): ICD-10-CM

## 2020-06-04 DIAGNOSIS — Z95.810 ICD (IMPLANTABLE CARDIOVERTER-DEFIBRILLATOR), BIVENTRICULAR, IN SITU: ICD-10-CM

## 2020-06-04 DIAGNOSIS — Z95.810 ICD (IMPLANTABLE CARDIOVERTER-DEFIBRILLATOR), BIVENTRICULAR, IN SITU: Primary | ICD-10-CM

## 2020-06-04 PROCEDURE — 93295 DEV INTERROG REMOTE 1/2/MLT: CPT | Performed by: INTERNAL MEDICINE

## 2020-06-04 PROCEDURE — 93296 REM INTERROG EVL PM/IDS: CPT | Performed by: INTERNAL MEDICINE

## 2020-06-04 NOTE — PROGRESS NOTES
Pt has a St. Shahid CRT-D, implanted by Dr. Hardin in 4/2018. Instead of a regular LV lead, pt has a HIS bundle lead in the LV port due to unfavorable CS anatomy during implant. Pt's HIS lead dislodged the day of implant and pt had a lead revision.     Dr. Hardin last saw pt in 8/2018 and did a CXR to make sure the HIS lead was not dislodged again because HIS pacing did not improve the LBBB. Dr. Treviño commented on the CXR results in Dr. Hardin's absence and said lead placement looks good. (Mary RN's note from 8/15/2018)    Originally the device was set to LV pace only (RV pacing was set sub-threshold intentionally). RV output was increased in December 2019 to have a 2X safety margin, so now both the RV and HIS lead are pacing. Pt's underlying rhythm is SR.     Pt recently had another echo in 5/2020 showing EF remains stable at 30-35%.     I will review current settings with Dr. Hardin.

## 2020-06-05 ENCOUNTER — VIRTUAL VISIT (OUTPATIENT)
Dept: CARDIOLOGY | Facility: CLINIC | Age: 85
End: 2020-06-05
Attending: INTERNAL MEDICINE
Payer: MEDICARE

## 2020-06-05 VITALS
BODY MASS INDEX: 24.96 KG/M2 | DIASTOLIC BLOOD PRESSURE: 67 MMHG | TEMPERATURE: 97 F | WEIGHT: 159 LBS | SYSTOLIC BLOOD PRESSURE: 133 MMHG | HEIGHT: 67 IN

## 2020-06-05 DIAGNOSIS — Z95.810 ICD (IMPLANTABLE CARDIOVERTER-DEFIBRILLATOR), BIVENTRICULAR, IN SITU: ICD-10-CM

## 2020-06-05 DIAGNOSIS — I10 ESSENTIAL HYPERTENSION WITH GOAL BLOOD PRESSURE LESS THAN 140/90: ICD-10-CM

## 2020-06-05 DIAGNOSIS — E78.00 HYPERCHOLESTEROLEMIA: ICD-10-CM

## 2020-06-05 DIAGNOSIS — I42.0 DILATED CARDIOMYOPATHY (H): ICD-10-CM

## 2020-06-05 DIAGNOSIS — I50.22 CHRONIC SYSTOLIC HEART FAILURE (H): Primary | ICD-10-CM

## 2020-06-05 PROCEDURE — 99443 ZZC PHYSICIAN TELEPHONE EVALUATION 21-30 MIN: CPT | Performed by: INTERNAL MEDICINE

## 2020-06-05 ASSESSMENT — MIFFLIN-ST. JEOR: SCORE: 1374.85

## 2020-06-05 NOTE — PROGRESS NOTES
Service Date: 06/05/2020      CARDIOLOGY OFFICE TELEPHONE PROGRESS NOTE       HISTORY OF PRESENT ILLNESS:  [place] had the opportunity to see Mr. Katie Shannon in a phone visit today for reevaluation of cardiomyopathy and chronic systolic heart failure at [facility] in [place].  I saw Mr. Shannon recently on 05/05/2020 for a phone visit because of concerns that he had regarding worsening shortness of breath and fatigue.  We did some reevaluation including a hemoglobin level which had improved significantly since his GI bleeding problems in January.  His hemoglobin was up to 12.0.  His echocardiogram looked quite stable with an ejection fraction of 30%-35%.  His ICD check looked good.  He was pacing in both ventricles 92% of the time and had no arrhythmias identified.  His cholesterol numbers were excellent as well.      He now tells me that he is feeling fine again.  There is no clear reason that he had worsening shortness of breath and fatigue but he does not seem to really recall that being a significant problem.  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  He tells me that he can keep working for long periods of time and does not get tired or out of breath.  He has no chest pain or lightheadedness.      PHYSICAL EXAMINATION:     VITAL SIGNS:  Today, his blood pressure was 133/67 and his weight is stable at 159.      IMPRESSIONS:  Mr. Katie Shannon is an 85-year-old gentleman with a stable nonischemic cardiomyopathy with an ejection fraction of 30%-35%.  He has no real congestive heart failure symptoms.  He has a biventricular ICD in place that is working well.  His laboratory studies all look pretty good.  I think he is used to working very hard for many hours a day and now that he is having some balance problems and getting a bit older, he is worried that his heart is causing him problems.  I reassured  him that that was not the case.  I will not change his medications.  I will have him call me if he has worsening symptoms but at this point, I think we can see him again next fall for reevaluation.      Isidoro Ayala MD, FACC       cc:   [place] [address]         ISIDORO AYALA MD, FACC             D: 2020   T: 2020   MT: [email or web address]      Name:     RUPERT MAN   MRN:      [id number]        Account:      [id number]   :      1935           Service Date: 2020      Document: [id number]

## 2020-06-05 NOTE — PROGRESS NOTES
"Katie Shannon is a 85 year old male who is being evaluated via a billable telephone visit.      The patient has been notified of following:     \"This telephone visit will be conducted via a call between you and your physician/provider. We have found that certain health care needs can be provided without the need for a physical exam.  This service lets us provide the care you need with a short phone conversation.  If a prescription is necessary we can send it directly to your pharmacy.  If lab work is needed we can place an order for that and you can then stop by our lab to have the test done at a later time.    Telephone visits are billed at different rates depending on your insurance coverage. During this emergency period, for some insurers they may be billed the same as an in-person visit.  Please reach out to your insurance provider with any questions.    If during the course of the call the physician/provider feels a telephone visit is not appropriate, you will not be charged for this service.\"    Patient has given verbal consent for Telephone visit?  Yes    What phone number would you like to be contacted at? [phone]    How would you like to obtain your AVS? Mail a copy    Bp: 133/67  Pulse:N/A  Wt:159.0lb     Review Of Systems  Skin: NEGATIVE  Eyes:Ears/Nose/Throat:Glasses, Hearing loss  Respiratory: NEGATIVE  Cardiovascular:NEGATIVE  Gastrointestinal: NEGATIVE  Genitourinary:NEGATIVE   Musculoskeletal: Arthritis, joint pain  Neurologic: NEGATIVE  Psychiatric: NEGATIVE  Hematologic/Lymphatic/Immunologic: NEGATIVE  Endocrine:Sleep disturbance    Destiny Hogan [facility]    Phone call duration: 21 minutes    ISIDORO MARI MD    HPI and Plan:   See dictation    No orders of the defined types were placed in this encounter.    Orders Placed This Encounter   Medications     omeprazole (PRILOSEC) 20 MG DR capsule     Sig: Take 20 mg by mouth daily     There are no discontinued medications.      Encounter " Diagnoses   Name Primary?     Chronic systolic heart failure (H) Yes     Dilated cardiomyopathy (H)      ICD (implantable cardioverter-defibrillator), biventricular, in situ      Essential hypertension with goal blood pressure less than 140/90      Hypercholesterolemia        CURRENT MEDICATIONS:  Current Outpatient Medications   Medication Sig Dispense Refill     carvedilol (COREG) 12.5 MG tablet Take 1 tablet (12.5 mg) by mouth 2 times daily (with meals) 180 tablet 3     clopidogrel (PLAVIX) 75 MG tablet Take 75 mg by mouth daily       CYCLOBENZAPRINE HCL PO Take 5 mg by mouth At Bedtime Taking PRN (half tab)       diphenhydrAMINE-acetaminophen (TYLENOL PM)  MG tablet Take 1 tablet by mouth nightly as needed for sleep       Famotidine (PEPCID PO) Take 40 mg by mouth daily        isosorbide mononitrate (IMDUR) 60 MG 24 hr tablet Take 60 mg by mouth daily       omeprazole (PRILOSEC) 20 MG DR capsule Take 20 mg by mouth daily       polyethylene glycol 400 (BLINK TEARS) 0.25 % SOLN ophthalmic solution Place 1 drop into both eyes 2 times daily as needed        rosuvastatin (CRESTOR) 20 MG tablet Take 1 tablet (20 mg) by mouth daily 90 tablet 3     sacubitril-valsartan (ENTRESTO)  MG per tablet Take 1 tablet by mouth 2 times daily 180 tablet 3     tamsulosin (FLOMAX) 0.4 MG 24 hr capsule Take 0.4 mg by mouth daily       White Petrolatum-Mineral Oil (RETAINE PM) OINT Place into both eyes At Bedtime       cycloSPORINE (RESTASIS) 0.05 % ophthalmic emulsion Place 1 drop into both eyes 2 times daily          ALLERGIES     Allergies   Allergen Reactions     Atorvastatin      Muscle aches     Diphenhydramine Other (See Comments)     Lip swelling     Metoprolol Succinate [Metoprolol] Other (See Comments)     Blind in left half of face for 3 days     [place] Other (See Comments)     swelling     Sulfa Drugs Other (See Comments)     swelling       PAST MEDICAL HISTORY:  Past Medical History:   Diagnosis Date      CAD (coronary artery disease)      Congestive heart failure (H)      Heart disease     non-ischemic cardiomyopathy     HTN (hypertension)      Idiopathic cardiomyopathy (H)        PAST SURGICAL HISTORY:  Past Surgical History:   Procedure Laterality Date     COLONOSCOPY N/A 1/29/2020    Procedure: COLONOSCOPY;  Surgeon: Erica Duff MD;  Location:  GI     ESOPHAGOSCOPY, GASTROSCOPY, DUODENOSCOPY (EGD), COMBINED N/A 1/28/2020    Procedure: ESOPHAGOGASTRODUODENOSCOPY (EGD);  Surgeon: Erica Duff MD;  Location:  GI     EYE SURGERY  2006    cataract     ORTHOPEDIC SURGERY  01/2018    pinning of right hip       FAMILY HISTORY:  Family History   Problem Relation Age of Onset     Heart Disease Mother 88        mi     Cerebrovascular Disease Father 71       SOCIAL HISTORY:  Social History     Socioeconomic History     Marital status:      Spouse name: None     Number of children: None     Years of education: None     Highest education level: None   Occupational History     None   Social Needs     Financial resource strain: None     Food insecurity     Worry: None     Inability: None     Transportation needs     Medical: None     Non-medical: None   Tobacco Use     Smoking status: Never Smoker     Smokeless tobacco: Never Used   Substance and Sexual Activity     Alcohol use: No     Alcohol/week: 0.0 standard drinks     Drug use: No     Sexual activity: None   Lifestyle     Physical activity     Days per week: None     Minutes per session: None     Stress: None   Relationships     Social connections     Talks on phone: None     Gets together: None     Attends Evangelical service: None     Active member of club or organization: None     Attends meetings of clubs or organizations: None     Relationship status: None     Intimate partner violence     Fear of current or ex partner: None     Emotionally abused: None     Physically abused: None     Forced sexual activity: None   Other Topics Concern       "Service Not Asked     Blood Transfusions Not Asked     Caffeine Concern No     Occupational Exposure Not Asked     Hobby Hazards Not Asked     Sleep Concern No     Stress Concern Not Asked     Weight Concern No     Special Diet No     Back Care Not Asked     Exercise Yes     Comment: active on farm     Bike Helmet Not Asked     Seat Belt Not Asked     Self-Exams Not Asked     Parent/sibling w/ CABG, MI or angioplasty before 65F 55M? No   Social History Narrative     None       Physical Exam:  Vitals: /67   Temp 97  F (36.1  C)   Ht 1.702 m (5' 7\")   Wt 72.1 kg (159 lb)   BMI 24.90 kg/m      Constitutional:           Skin:             Head:           Eyes:           Lymph:      ENT:           Neck:           Respiratory:            Cardiac:                                                           GI:           Extremities and Muscular Skeletal:                 Neurological:           Psych:         Recent Lab Results:  LIPID RESULTS:  Lab Results   Component Value Date    CHOL 125 05/08/2020    HDL 41 05/08/2020    LDL 50 05/08/2020    TRIG 168 (H) 05/08/2020    CHOLHDLRATIO 3.8 07/28/2015       LIVER ENZYME RESULTS:  Lab Results   Component Value Date    AST 22 02/04/2020    ALT <5 (L) 05/08/2020       CBC RESULTS:  Lab Results   Component Value Date    WBC 6.3 02/04/2020    RBC 3.13 (L) 01/28/2020    HGB 12.0 (L) 05/08/2020    HCT 25.4 (A) 02/04/2020    MCV 91.0 02/04/2020    MCH 32.1 02/04/2020    MCHC 35.3 02/04/2020    RDW 14.4 02/04/2020     01/28/2020       BMP RESULTS:  Lab Results   Component Value Date     (A) 02/04/2020    POTASSIUM 3.9 02/04/2020    CHLORIDE 101 02/04/2020    CO2 26 02/04/2020    ANIONGAP 11.9 02/04/2020    GLC 85 02/04/2020    BUN 10.6 02/04/2020    CR 1.03 02/04/2020    GFRESTIMATED >60 02/04/2020    GFRESTBLACK 90 01/28/2020    ELIZABETH 8.2 (A) 02/04/2020        A1C RESULTS:  No results found for: A1C    INR RESULTS:  Lab Results   Component Value Date    INR 1.19 " (H) 01/28/2020    INR 1.11 01/28/2020           CC  Rah Ayala MD  [id number]  [place] [id number]

## 2020-06-05 NOTE — LETTER
6/5/2020    Deborah Gandhi MD  [facility] [address] [facility]    RE: Katie Shannon       Dear Colleague,    I had the pleasure of seeing Katie Shannon in the [facility].    Katie Shannon is a 85 year old male who is being evaluated via a billable telephone visit.      Service Date: 06/05/2020      CARDIOLOGY OFFICE TELEPHONE PROGRESS NOTE       HISTORY OF PRESENT ILLNESS:  [place] had the opportunity to see Mr. Katie Shannon in a phone visit today for reevaluation of cardiomyopathy and chronic systolic heart failure at [facility] in [place].  I saw Mr. Shannon recently on 05/05/2020 for a phone visit because of concerns that he had regarding worsening shortness of breath and fatigue.  We did some reevaluation including a hemoglobin level which had improved significantly since his GI bleeding problems in January.  His hemoglobin was up to 12.0.  His echocardiogram looked quite stable with an ejection fraction of 30%-35%.  His ICD check looked good.  He was pacing in both ventricles 92% of the time and had no arrhythmias identified.  His cholesterol numbers were excellent as well.      He now tells me that he is feeling fine again.  There is no clear reason that he had worsening shortness of breath and fatigue but he does not seem to really recall that being a significant problem.  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  He tells me that he can keep working for long periods of time and does not get tired or out of breath.  He has no chest pain or lightheadedness.      PHYSICAL EXAMINATION:     VITAL SIGNS:  Today, his blood pressure was 133/67 and his weight is stable at 159.      IMPRESSIONS:  Mr. Katie Shannon is an 85-year-old gentleman with a stable nonischemic cardiomyopathy with an ejection fraction of 30%-35%.  He has no real congestive heart failure  symptoms.  He has a biventricular ICD in place that is working well.  His laboratory studies all look pretty good.  I think he is used to working very hard for many hours a day and now that he is having some balance problems and getting a bit older, he is worried that his heart is causing him problems.  I reassured him that that was not the case.  I will not change his medications.  I will have him call me if he has worsening symptoms but at this point, I think we can see him again next fall for reevaluation.        Thank you for allowing me to participate in the care of your patient.    Sincerely,     ISIDORO MARI MD     [facility]

## 2020-06-09 NOTE — TELEPHONE ENCOUNTER
Hi, I reviewed ECG with His pacing and QRS is narrower a few years ago. Can we get another ECG with His pacing on and off? We can decide after that. Thanks, [email or web address]

## 2020-06-10 NOTE — PROGRESS NOTES
I spoke with Duyen (clinical support staff) and she said we can schedule pt for ICD check, and just let her know so she can have someone ready that day and time to run the EKG.     Pt should be scheduled a day that Dr. Hardin is in the clinic so he can look at the EKG and decide on programming changes.     I called pt and explained the plan. I told him it is not urgent and he does not have to do this right away. Pt said he'd think about it, but he thinks covid19 is more of a concern for him right now than changing his pacemaker/ICD settings. He wrote down the device clinic phone number and he will call us if he wants to set up appointment.

## 2020-06-16 ENCOUNTER — TELEPHONE (OUTPATIENT)
Dept: CARDIOLOGY | Facility: CLINIC | Age: 85
End: 2020-06-16

## 2020-06-16 NOTE — TELEPHONE ENCOUNTER
Patient called device clinic and left a voicemail stating that he has been having a new symptom of being SOB and feeling like he can't get enough air. Patient stated it has occurred three times now that he can pinpoint. Patient stated it occurred on 06/09/20 and 06/10/20 between 9:45pm-10:00pm and then again on 06/13/20 between 03:00-04:00AM.     Patient stated his chest felt uncomfortable and it did not matter how hard he tried to breathe, he felt like he could get enough air. Patient did state this happened on other time while he was playing basketball and he thought maybe he felt his way from an adrenaline rush.     Patient sent manual device transmission on 06/13/20 to see if anything was logged on his device at those times. Patient does not have any arrhythmia episodes logged during the times he stated he felt like he wasn't getting enough air.     Patient has one mode switch episode logged, occurring on 06/06/20 at 10:17AM and it shows an atrial arrhythmia lasting 8 seconds with average V rates in the 70's. Patient could not related any symptoms with this timeframe.     Patient requesting that Dr. Ayala is made aware of his symptoms. Will route message to Dr. Ayala as FYI. Patient encouraged to call again if he has additional symptoms or concerns.    Patient had complete remote device check done on 06/04/20 and is scheduled for next remote device check to occur on 09/03/20.

## 2020-06-22 DIAGNOSIS — I47.29 NSVT (NONSUSTAINED VENTRICULAR TACHYCARDIA) (H): Primary | ICD-10-CM

## 2020-06-22 LAB
MDC_IDC_LEAD_IMPLANT_DT: NORMAL
MDC_IDC_LEAD_LOCATION: NORMAL
MDC_IDC_LEAD_LOCATION_DETAIL_1: NORMAL
MDC_IDC_LEAD_MFG: NORMAL
MDC_IDC_LEAD_MODEL: NORMAL
MDC_IDC_LEAD_POLARITY_TYPE: NORMAL
MDC_IDC_LEAD_SERIAL: NORMAL
MDC_IDC_LEAD_SPECIAL_FUNCTION: NORMAL
MDC_IDC_MSMT_BATTERY_DTM: NORMAL
MDC_IDC_MSMT_BATTERY_REMAINING_LONGEVITY: 54 MO
MDC_IDC_MSMT_BATTERY_REMAINING_PERCENTAGE: 71 %
MDC_IDC_MSMT_BATTERY_RRT_TRIGGER: NORMAL
MDC_IDC_MSMT_BATTERY_STATUS: NORMAL
MDC_IDC_MSMT_BATTERY_VOLTAGE: 2.96 V
MDC_IDC_MSMT_CAP_CHARGE_DTM: NORMAL
MDC_IDC_MSMT_CAP_CHARGE_ENERGY: 40 J
MDC_IDC_MSMT_CAP_CHARGE_TIME: 9.3 S
MDC_IDC_MSMT_CAP_CHARGE_TYPE: NORMAL
MDC_IDC_MSMT_LEADCHNL_LV_IMPEDANCE_VALUE: 380 OHM
MDC_IDC_MSMT_LEADCHNL_LV_LEAD_CHANNEL_STATUS: NORMAL
MDC_IDC_MSMT_LEADCHNL_LV_PACING_THRESHOLD_AMPLITUDE: 0.75 V
MDC_IDC_MSMT_LEADCHNL_LV_PACING_THRESHOLD_PULSEWIDTH: 0.5 MS
MDC_IDC_MSMT_LEADCHNL_RA_IMPEDANCE_VALUE: 430 OHM
MDC_IDC_MSMT_LEADCHNL_RA_LEAD_CHANNEL_STATUS: NORMAL
MDC_IDC_MSMT_LEADCHNL_RA_PACING_THRESHOLD_AMPLITUDE: 0.5 V
MDC_IDC_MSMT_LEADCHNL_RA_PACING_THRESHOLD_PULSEWIDTH: 0.5 MS
MDC_IDC_MSMT_LEADCHNL_RA_SENSING_INTR_AMPL: 2.9 MV
MDC_IDC_MSMT_LEADCHNL_RV_IMPEDANCE_VALUE: 350 OHM
MDC_IDC_MSMT_LEADCHNL_RV_LEAD_CHANNEL_STATUS: NORMAL
MDC_IDC_MSMT_LEADCHNL_RV_PACING_THRESHOLD_AMPLITUDE: 1.25 V
MDC_IDC_MSMT_LEADCHNL_RV_PACING_THRESHOLD_PULSEWIDTH: 0.5 MS
MDC_IDC_MSMT_LEADCHNL_RV_SENSING_INTR_AMPL: 12 MV
MDC_IDC_PG_IMPLANT_DTM: NORMAL
MDC_IDC_PG_MFG: NORMAL
MDC_IDC_PG_MODEL: NORMAL
MDC_IDC_PG_SERIAL: NORMAL
MDC_IDC_PG_TYPE: NORMAL
MDC_IDC_SESS_CLINIC_NAME: NORMAL
MDC_IDC_SESS_DTM: NORMAL
MDC_IDC_SESS_REPROGRAMMED: NO
MDC_IDC_SESS_TYPE: NORMAL
MDC_IDC_SET_BRADY_AT_MODE_SWITCH_MODE: NORMAL
MDC_IDC_SET_BRADY_AT_MODE_SWITCH_RATE: 170 {BEATS}/MIN
MDC_IDC_SET_BRADY_LOWRATE: 60 {BEATS}/MIN
MDC_IDC_SET_BRADY_MAX_SENSOR_RATE: 120 {BEATS}/MIN
MDC_IDC_SET_BRADY_MAX_TRACKING_RATE: 120 {BEATS}/MIN
MDC_IDC_SET_BRADY_MODE: NORMAL
MDC_IDC_SET_BRADY_PAV_DELAY_HIGH: 100 MS
MDC_IDC_SET_BRADY_PAV_DELAY_LOW: 150 MS
MDC_IDC_SET_BRADY_SAV_DELAY_HIGH: 100 MS
MDC_IDC_SET_BRADY_SAV_DELAY_LOW: 120 MS
MDC_IDC_SET_CRT_LVRV_DELAY: 0 MS
MDC_IDC_SET_CRT_PACED_CHAMBERS: NORMAL
MDC_IDC_SET_LEADCHNL_LV_PACING_AMPLITUDE: 2 V
MDC_IDC_SET_LEADCHNL_LV_PACING_ANODE_ELECTRODE_1: NORMAL
MDC_IDC_SET_LEADCHNL_LV_PACING_ANODE_LOCATION_1: NORMAL
MDC_IDC_SET_LEADCHNL_LV_PACING_CAPTURE_MODE: NORMAL
MDC_IDC_SET_LEADCHNL_LV_PACING_CATHODE_ELECTRODE_1: NORMAL
MDC_IDC_SET_LEADCHNL_LV_PACING_CATHODE_LOCATION_1: NORMAL
MDC_IDC_SET_LEADCHNL_LV_PACING_POLARITY: NORMAL
MDC_IDC_SET_LEADCHNL_LV_PACING_PULSEWIDTH: 0.5 MS
MDC_IDC_SET_LEADCHNL_RA_PACING_AMPLITUDE: 2 V
MDC_IDC_SET_LEADCHNL_RA_PACING_ANODE_ELECTRODE_1: NORMAL
MDC_IDC_SET_LEADCHNL_RA_PACING_ANODE_LOCATION_1: NORMAL
MDC_IDC_SET_LEADCHNL_RA_PACING_CAPTURE_MODE: NORMAL
MDC_IDC_SET_LEADCHNL_RA_PACING_CATHODE_ELECTRODE_1: NORMAL
MDC_IDC_SET_LEADCHNL_RA_PACING_CATHODE_LOCATION_1: NORMAL
MDC_IDC_SET_LEADCHNL_RA_PACING_POLARITY: NORMAL
MDC_IDC_SET_LEADCHNL_RA_PACING_PULSEWIDTH: 0.5 MS
MDC_IDC_SET_LEADCHNL_RA_SENSING_ADAPTATION_MODE: NORMAL
MDC_IDC_SET_LEADCHNL_RA_SENSING_ANODE_ELECTRODE_1: NORMAL
MDC_IDC_SET_LEADCHNL_RA_SENSING_ANODE_LOCATION_1: NORMAL
MDC_IDC_SET_LEADCHNL_RA_SENSING_CATHODE_ELECTRODE_1: NORMAL
MDC_IDC_SET_LEADCHNL_RA_SENSING_CATHODE_LOCATION_1: NORMAL
MDC_IDC_SET_LEADCHNL_RA_SENSING_POLARITY: NORMAL
MDC_IDC_SET_LEADCHNL_RA_SENSING_SENSITIVITY: 0.3 MV
MDC_IDC_SET_LEADCHNL_RV_PACING_AMPLITUDE: 2.5 V
MDC_IDC_SET_LEADCHNL_RV_PACING_ANODE_ELECTRODE_1: NORMAL
MDC_IDC_SET_LEADCHNL_RV_PACING_ANODE_LOCATION_1: NORMAL
MDC_IDC_SET_LEADCHNL_RV_PACING_CAPTURE_MODE: NORMAL
MDC_IDC_SET_LEADCHNL_RV_PACING_CATHODE_ELECTRODE_1: NORMAL
MDC_IDC_SET_LEADCHNL_RV_PACING_CATHODE_LOCATION_1: NORMAL
MDC_IDC_SET_LEADCHNL_RV_PACING_POLARITY: NORMAL
MDC_IDC_SET_LEADCHNL_RV_PACING_PULSEWIDTH: 0.5 MS
MDC_IDC_SET_LEADCHNL_RV_SENSING_ADAPTATION_MODE: NORMAL
MDC_IDC_SET_LEADCHNL_RV_SENSING_ANODE_ELECTRODE_1: NORMAL
MDC_IDC_SET_LEADCHNL_RV_SENSING_ANODE_LOCATION_1: NORMAL
MDC_IDC_SET_LEADCHNL_RV_SENSING_CATHODE_ELECTRODE_1: NORMAL
MDC_IDC_SET_LEADCHNL_RV_SENSING_CATHODE_LOCATION_1: NORMAL
MDC_IDC_SET_LEADCHNL_RV_SENSING_POLARITY: NORMAL
MDC_IDC_SET_LEADCHNL_RV_SENSING_SENSITIVITY: 0.5 MV
MDC_IDC_SET_ZONE_DETECTION_INTERVAL: 300 MS
MDC_IDC_SET_ZONE_DETECTION_INTERVAL: 350 MS
MDC_IDC_SET_ZONE_TYPE: NORMAL
MDC_IDC_SET_ZONE_VENDOR_TYPE: NORMAL
MDC_IDC_STAT_AT_BURDEN_PERCENT: 0 %
MDC_IDC_STAT_AT_DTM_END: NORMAL
MDC_IDC_STAT_AT_DTM_START: NORMAL
MDC_IDC_STAT_AT_MODE_SW_COUNT: 0
MDC_IDC_STAT_AT_MODE_SW_COUNT_PER_DAY: 0
MDC_IDC_STAT_AT_MODE_SW_PERCENT_TIME: 0 %
MDC_IDC_STAT_BRADY_AP_VP_PERCENT: 50 %
MDC_IDC_STAT_BRADY_AP_VS_PERCENT: 1 %
MDC_IDC_STAT_BRADY_AS_VP_PERCENT: 43 %
MDC_IDC_STAT_BRADY_AS_VS_PERCENT: 1.6 %
MDC_IDC_STAT_BRADY_DTM_END: NORMAL
MDC_IDC_STAT_BRADY_DTM_START: NORMAL
MDC_IDC_STAT_BRADY_RA_PERCENT_PACED: 45 %
MDC_IDC_STAT_CRT_DTM_END: NORMAL
MDC_IDC_STAT_CRT_DTM_START: NORMAL
MDC_IDC_STAT_CRT_PERCENT_PACED: 92 %
MDC_IDC_STAT_HEART_RATE_ATRIAL_MAX: 240 {BEATS}/MIN
MDC_IDC_STAT_HEART_RATE_ATRIAL_MEAN: 71 {BEATS}/MIN
MDC_IDC_STAT_HEART_RATE_ATRIAL_MIN: 50 {BEATS}/MIN
MDC_IDC_STAT_HEART_RATE_DTM_END: NORMAL
MDC_IDC_STAT_HEART_RATE_DTM_START: NORMAL
MDC_IDC_STAT_HEART_RATE_VENTRICULAR_MAX: 330 {BEATS}/MIN
MDC_IDC_STAT_HEART_RATE_VENTRICULAR_MEAN: 76 {BEATS}/MIN
MDC_IDC_STAT_HEART_RATE_VENTRICULAR_MIN: 50 {BEATS}/MIN
MDC_IDC_STAT_TACHYTHERAPY_ATP_DELIVERED_RECENT: 0
MDC_IDC_STAT_TACHYTHERAPY_RECENT_DTM_END: NORMAL
MDC_IDC_STAT_TACHYTHERAPY_RECENT_DTM_START: NORMAL
MDC_IDC_STAT_TACHYTHERAPY_SHOCKS_ABORTED_RECENT: 0
MDC_IDC_STAT_TACHYTHERAPY_SHOCKS_DELIVERED_RECENT: 0

## 2020-06-22 NOTE — TELEPHONE ENCOUNTER
"Received call from patient stating that yesterday he had been driving in town and had what he calls a \"heat episode\" where it feels \"like someone turned on a furnace next to my head\".  He stated it felt like \"something grabbed me and I almost lost my vision\", stating that his vision started blacking out for a few seconds.  He stated the episode lasted less than 10 seconds and he was wondering if he had any arrhythmias show up on his device.      No alerts were sent in by his device on 6/22.  Patient instructed to send a transmission to see if any arrhythmias were recorded.      Remote transmission shows an NSVT event on 6/21/20 at 1233.  This episode fell in his VT monitor zone and lasted 23 beats, or 7.5 seconds, with rates in the 170's.  Patient does not receive therapy from his ICD until he reaches 200bpm (see below for settings).  Patient is on Carvedilol 12.5mg BID.      Patient is currently feeling well and only has some minor indigestion today.  Instructed patient to refrain from driving until we receive further direction from the cardiologist. Patient instructed to call clinic with any other questions.      Patient follows with Dr. Ayala and Dr. Hardin.  Dr. Hardin is out of the office this week, will route message to Dr. Ayala and Dr. Orellana regarding symptomatic NSVT episode.       CHRISTY Camejo                 "

## 2020-06-23 RX ORDER — SOTALOL HYDROCHLORIDE 80 MG/1
80 TABLET ORAL 2 TIMES DAILY
Qty: 60 TABLET | Refills: 11 | Status: SHIPPED | OUTPATIENT
Start: 2020-06-23 | End: 2023-10-09

## 2020-06-23 NOTE — TELEPHONE ENCOUNTER
"Received following message from Dr. Orellana \"Yes, I think we should try Sotalol, in addition to Coreg.  Hopefully, no issues with this.. will have to wait and see.  Other option is Amiodarone, but that has potentially a lot more side effects.\"    Called and spoke with patient regarding Dr. Ignacio's response and recommendations.  Patient is a little nervous about taking the Sotalol, but is willing to try it.  He was instructed to call the clinic if he does develop any symptoms (I.e. lightheadedness, dizziness, or vision problems like with Metoprolol).     Prescription for Sotalol eprescribed to [facility] in [place] per patient request.  Request sent to staff in clinic to fax EKG order to [facility] in [place] at [phone] with diagnosis (NSVT, Sotalol loading) and return fax number for Dr. Orellana.    Patient verbalized understanding of plan and will call the clinic with any questions.      CHRISTY Camejo          "

## 2020-06-23 NOTE — TELEPHONE ENCOUNTER
Called and reviewed Dr. Orellana recommendations with patient and his wife, Radha.      When entering order for Sotalol, alert for an allergy to Metoprolol triggered.  Patient stated that when he started on Metoprolol he lost the peripheral vision in his left eye.  Once he stopped the medication, it took 3 days for this peripheral vision to come back.  Patient has been on Carvedilol 12.5mg BID and tolerating this well.  Will route message to Dr. Orellana for notification and review.      Patient also lives in [place] and is wondering if the EKG can be performed in [place].  Called patient's primary clinic, [facility] in [place], and they are able to do this.  Order with diagnosis and return fax number will need to be faxed to [phone].  Their phone number is [phone] for any questions.    CHRISTY Camejo

## 2020-06-23 NOTE — TELEPHONE ENCOUNTER
There was clear correlation of his near syncopal event with the episode of NSVT on 06/21.      Plan:  - start sotalol 80 mg every 12 hrs  - 12 lead ECG 3 days later.  Please send to me for review.    CITLALLI Osorio

## 2020-06-24 ENCOUNTER — TELEPHONE (OUTPATIENT)
Dept: CARDIOLOGY | Facility: CLINIC | Age: 85
End: 2020-06-24

## 2020-06-24 NOTE — TELEPHONE ENCOUNTER
Patient called today to report that after taking sotalol last night his stomach got upset and he had trouble sleeping. I asked that he try to take his sotalol with food today instead of on an empty stomach and see if this is helpful for him.   He reports that his BP this morning was 117/61 and his HR was 61. I reassured him that these are good numbers.     He also reports some soreness around his right rib cage. I asked that he call back tomorrow if his symptoms become worse. We discussed that rib soreness is not likely related to sotalol.   Patient verbalized understanding and agreement.

## 2020-06-29 ENCOUNTER — TELEPHONE (OUTPATIENT)
Dept: CARDIOLOGY | Facility: CLINIC | Age: 85
End: 2020-06-29

## 2020-06-29 NOTE — TELEPHONE ENCOUNTER
Patient called and left voicemail in the evening on 06/26/2020 with questions and concerns regarding his ICD device.    Called patient back during clinic hours this AM and reviewed questions with patient.  Reviewed patient's programmed settings and therapy zone. Reviewed why patient did not received treatment for episode logged on 06/22/2020.     Reviewed with patient the purpose of his multi-leads and why he has three. Reviewed with patient that his leads are all connected appropriately to safely treat him and pace him if/when needed.    Patient stated he has been feeling good since starting Sotalol and has no complaints at this time.  Encouraged patient to call if he had any additional questions or concerns.

## 2020-09-03 ENCOUNTER — ANCILLARY PROCEDURE (OUTPATIENT)
Dept: CARDIOLOGY | Facility: CLINIC | Age: 85
End: 2020-09-03
Attending: INTERNAL MEDICINE
Payer: MEDICARE

## 2020-09-03 DIAGNOSIS — Z95.810 ICD (IMPLANTABLE CARDIOVERTER-DEFIBRILLATOR), BIVENTRICULAR, IN SITU: ICD-10-CM

## 2020-09-03 DIAGNOSIS — I42.0 DILATED CARDIOMYOPATHY (H): Primary | ICD-10-CM

## 2020-09-03 PROCEDURE — 93296 REM INTERROG EVL PM/IDS: CPT | Performed by: INTERNAL MEDICINE

## 2020-09-03 PROCEDURE — 93295 DEV INTERROG REMOTE 1/2/MLT: CPT | Performed by: INTERNAL MEDICINE

## 2020-09-07 LAB
MDC_IDC_EPISODE_DTM: NORMAL
MDC_IDC_EPISODE_DURATION: 8 S
MDC_IDC_EPISODE_ID: NORMAL
MDC_IDC_EPISODE_TYPE: NORMAL
MDC_IDC_EPISODE_VENDOR_TYPE: NORMAL
MDC_IDC_LEAD_IMPLANT_DT: NORMAL
MDC_IDC_LEAD_LOCATION: NORMAL
MDC_IDC_LEAD_LOCATION_DETAIL_1: NORMAL
MDC_IDC_LEAD_MFG: NORMAL
MDC_IDC_LEAD_MODEL: NORMAL
MDC_IDC_LEAD_POLARITY_TYPE: NORMAL
MDC_IDC_LEAD_SERIAL: NORMAL
MDC_IDC_LEAD_SPECIAL_FUNCTION: NORMAL
MDC_IDC_MSMT_BATTERY_DTM: NORMAL
MDC_IDC_MSMT_BATTERY_REMAINING_LONGEVITY: 52 MO
MDC_IDC_MSMT_BATTERY_REMAINING_PERCENTAGE: 67 %
MDC_IDC_MSMT_BATTERY_RRT_TRIGGER: NORMAL
MDC_IDC_MSMT_BATTERY_STATUS: NORMAL
MDC_IDC_MSMT_BATTERY_VOLTAGE: 2.96 V
MDC_IDC_MSMT_CAP_CHARGE_DTM: NORMAL
MDC_IDC_MSMT_CAP_CHARGE_ENERGY: 40 J
MDC_IDC_MSMT_CAP_CHARGE_TIME: 9.3 S
MDC_IDC_MSMT_CAP_CHARGE_TYPE: NORMAL
MDC_IDC_MSMT_LEADCHNL_LV_IMPEDANCE_VALUE: 380 OHM
MDC_IDC_MSMT_LEADCHNL_LV_LEAD_CHANNEL_STATUS: NORMAL
MDC_IDC_MSMT_LEADCHNL_LV_PACING_THRESHOLD_AMPLITUDE: 0.75 V
MDC_IDC_MSMT_LEADCHNL_LV_PACING_THRESHOLD_PULSEWIDTH: 0.5 MS
MDC_IDC_MSMT_LEADCHNL_RA_IMPEDANCE_VALUE: 440 OHM
MDC_IDC_MSMT_LEADCHNL_RA_LEAD_CHANNEL_STATUS: NORMAL
MDC_IDC_MSMT_LEADCHNL_RA_PACING_THRESHOLD_AMPLITUDE: 0.5 V
MDC_IDC_MSMT_LEADCHNL_RA_PACING_THRESHOLD_PULSEWIDTH: 0.5 MS
MDC_IDC_MSMT_LEADCHNL_RA_SENSING_INTR_AMPL: 3 MV
MDC_IDC_MSMT_LEADCHNL_RV_IMPEDANCE_VALUE: 340 OHM
MDC_IDC_MSMT_LEADCHNL_RV_LEAD_CHANNEL_STATUS: NORMAL
MDC_IDC_MSMT_LEADCHNL_RV_PACING_THRESHOLD_AMPLITUDE: 1.25 V
MDC_IDC_MSMT_LEADCHNL_RV_PACING_THRESHOLD_PULSEWIDTH: 0.5 MS
MDC_IDC_MSMT_LEADCHNL_RV_SENSING_INTR_AMPL: 12 MV
MDC_IDC_PG_IMPLANT_DTM: NORMAL
MDC_IDC_PG_MFG: NORMAL
MDC_IDC_PG_MODEL: NORMAL
MDC_IDC_PG_SERIAL: NORMAL
MDC_IDC_PG_TYPE: NORMAL
MDC_IDC_SESS_CLINIC_NAME: NORMAL
MDC_IDC_SESS_DTM: NORMAL
MDC_IDC_SESS_REPROGRAMMED: NO
MDC_IDC_SESS_TYPE: NORMAL
MDC_IDC_SET_BRADY_AT_MODE_SWITCH_MODE: NORMAL
MDC_IDC_SET_BRADY_AT_MODE_SWITCH_RATE: 170 {BEATS}/MIN
MDC_IDC_SET_BRADY_LOWRATE: 60 {BEATS}/MIN
MDC_IDC_SET_BRADY_MAX_SENSOR_RATE: 120 {BEATS}/MIN
MDC_IDC_SET_BRADY_MAX_TRACKING_RATE: 120 {BEATS}/MIN
MDC_IDC_SET_BRADY_MODE: NORMAL
MDC_IDC_SET_BRADY_PAV_DELAY_HIGH: 100 MS
MDC_IDC_SET_BRADY_PAV_DELAY_LOW: 150 MS
MDC_IDC_SET_BRADY_SAV_DELAY_HIGH: 100 MS
MDC_IDC_SET_BRADY_SAV_DELAY_LOW: 120 MS
MDC_IDC_SET_CRT_LVRV_DELAY: 0 MS
MDC_IDC_SET_CRT_PACED_CHAMBERS: NORMAL
MDC_IDC_SET_LEADCHNL_LV_PACING_AMPLITUDE: 2 V
MDC_IDC_SET_LEADCHNL_LV_PACING_ANODE_ELECTRODE_1: NORMAL
MDC_IDC_SET_LEADCHNL_LV_PACING_ANODE_LOCATION_1: NORMAL
MDC_IDC_SET_LEADCHNL_LV_PACING_CAPTURE_MODE: NORMAL
MDC_IDC_SET_LEADCHNL_LV_PACING_CATHODE_ELECTRODE_1: NORMAL
MDC_IDC_SET_LEADCHNL_LV_PACING_CATHODE_LOCATION_1: NORMAL
MDC_IDC_SET_LEADCHNL_LV_PACING_POLARITY: NORMAL
MDC_IDC_SET_LEADCHNL_LV_PACING_PULSEWIDTH: 0.5 MS
MDC_IDC_SET_LEADCHNL_RA_PACING_AMPLITUDE: 2 V
MDC_IDC_SET_LEADCHNL_RA_PACING_ANODE_ELECTRODE_1: NORMAL
MDC_IDC_SET_LEADCHNL_RA_PACING_ANODE_LOCATION_1: NORMAL
MDC_IDC_SET_LEADCHNL_RA_PACING_CAPTURE_MODE: NORMAL
MDC_IDC_SET_LEADCHNL_RA_PACING_CATHODE_ELECTRODE_1: NORMAL
MDC_IDC_SET_LEADCHNL_RA_PACING_CATHODE_LOCATION_1: NORMAL
MDC_IDC_SET_LEADCHNL_RA_PACING_POLARITY: NORMAL
MDC_IDC_SET_LEADCHNL_RA_PACING_PULSEWIDTH: 0.5 MS
MDC_IDC_SET_LEADCHNL_RA_SENSING_ADAPTATION_MODE: NORMAL
MDC_IDC_SET_LEADCHNL_RA_SENSING_ANODE_ELECTRODE_1: NORMAL
MDC_IDC_SET_LEADCHNL_RA_SENSING_ANODE_LOCATION_1: NORMAL
MDC_IDC_SET_LEADCHNL_RA_SENSING_CATHODE_ELECTRODE_1: NORMAL
MDC_IDC_SET_LEADCHNL_RA_SENSING_CATHODE_LOCATION_1: NORMAL
MDC_IDC_SET_LEADCHNL_RA_SENSING_POLARITY: NORMAL
MDC_IDC_SET_LEADCHNL_RA_SENSING_SENSITIVITY: 0.3 MV
MDC_IDC_SET_LEADCHNL_RV_PACING_AMPLITUDE: 2.5 V
MDC_IDC_SET_LEADCHNL_RV_PACING_ANODE_ELECTRODE_1: NORMAL
MDC_IDC_SET_LEADCHNL_RV_PACING_ANODE_LOCATION_1: NORMAL
MDC_IDC_SET_LEADCHNL_RV_PACING_CAPTURE_MODE: NORMAL
MDC_IDC_SET_LEADCHNL_RV_PACING_CATHODE_ELECTRODE_1: NORMAL
MDC_IDC_SET_LEADCHNL_RV_PACING_CATHODE_LOCATION_1: NORMAL
MDC_IDC_SET_LEADCHNL_RV_PACING_POLARITY: NORMAL
MDC_IDC_SET_LEADCHNL_RV_PACING_PULSEWIDTH: 0.5 MS
MDC_IDC_SET_LEADCHNL_RV_SENSING_ADAPTATION_MODE: NORMAL
MDC_IDC_SET_LEADCHNL_RV_SENSING_ANODE_ELECTRODE_1: NORMAL
MDC_IDC_SET_LEADCHNL_RV_SENSING_ANODE_LOCATION_1: NORMAL
MDC_IDC_SET_LEADCHNL_RV_SENSING_CATHODE_ELECTRODE_1: NORMAL
MDC_IDC_SET_LEADCHNL_RV_SENSING_CATHODE_LOCATION_1: NORMAL
MDC_IDC_SET_LEADCHNL_RV_SENSING_POLARITY: NORMAL
MDC_IDC_SET_LEADCHNL_RV_SENSING_SENSITIVITY: 0.5 MV
MDC_IDC_SET_ZONE_DETECTION_INTERVAL: 300 MS
MDC_IDC_SET_ZONE_DETECTION_INTERVAL: 350 MS
MDC_IDC_SET_ZONE_TYPE: NORMAL
MDC_IDC_SET_ZONE_VENDOR_TYPE: NORMAL
MDC_IDC_STAT_AT_BURDEN_PERCENT: 0 %
MDC_IDC_STAT_AT_DTM_END: NORMAL
MDC_IDC_STAT_AT_DTM_START: NORMAL
MDC_IDC_STAT_AT_MODE_SW_COUNT: 2
MDC_IDC_STAT_AT_MODE_SW_COUNT_PER_DAY: 0
MDC_IDC_STAT_AT_MODE_SW_MAX_DURATION: 8 S
MDC_IDC_STAT_AT_MODE_SW_PERCENT_TIME: 1 %
MDC_IDC_STAT_BRADY_AP_VP_PERCENT: 42 %
MDC_IDC_STAT_BRADY_AP_VS_PERCENT: 1 %
MDC_IDC_STAT_BRADY_AS_VP_PERCENT: 46 %
MDC_IDC_STAT_BRADY_AS_VS_PERCENT: 2.5 %
MDC_IDC_STAT_BRADY_DTM_END: NORMAL
MDC_IDC_STAT_BRADY_DTM_START: NORMAL
MDC_IDC_STAT_BRADY_RA_PERCENT_PACED: 36 %
MDC_IDC_STAT_CRT_DTM_END: NORMAL
MDC_IDC_STAT_CRT_DTM_START: NORMAL
MDC_IDC_STAT_CRT_PERCENT_PACED: 89 %
MDC_IDC_STAT_EPISODE_RECENT_COUNT: 0
MDC_IDC_STAT_EPISODE_RECENT_COUNT: 1
MDC_IDC_STAT_EPISODE_RECENT_COUNT_DTM_END: NORMAL
MDC_IDC_STAT_EPISODE_RECENT_COUNT_DTM_START: NORMAL
MDC_IDC_STAT_EPISODE_TYPE: NORMAL
MDC_IDC_STAT_EPISODE_VENDOR_TYPE: NORMAL
MDC_IDC_STAT_HEART_RATE_ATRIAL_MAX: 260 {BEATS}/MIN
MDC_IDC_STAT_HEART_RATE_ATRIAL_MEAN: 73 {BEATS}/MIN
MDC_IDC_STAT_HEART_RATE_ATRIAL_MIN: 50 {BEATS}/MIN
MDC_IDC_STAT_HEART_RATE_DTM_END: NORMAL
MDC_IDC_STAT_HEART_RATE_DTM_START: NORMAL
MDC_IDC_STAT_HEART_RATE_VENTRICULAR_MAX: 330 {BEATS}/MIN
MDC_IDC_STAT_HEART_RATE_VENTRICULAR_MEAN: 78 {BEATS}/MIN
MDC_IDC_STAT_HEART_RATE_VENTRICULAR_MIN: 50 {BEATS}/MIN
MDC_IDC_STAT_TACHYTHERAPY_ATP_DELIVERED_RECENT: 0
MDC_IDC_STAT_TACHYTHERAPY_RECENT_DTM_END: NORMAL
MDC_IDC_STAT_TACHYTHERAPY_RECENT_DTM_START: NORMAL
MDC_IDC_STAT_TACHYTHERAPY_SHOCKS_ABORTED_RECENT: 0
MDC_IDC_STAT_TACHYTHERAPY_SHOCKS_DELIVERED_RECENT: 0

## 2020-09-25 DIAGNOSIS — E78.00 HYPERCHOLESTEROLEMIA: ICD-10-CM

## 2020-09-25 RX ORDER — ROSUVASTATIN CALCIUM 20 MG/1
20 TABLET, COATED ORAL DAILY
Qty: 90 TABLET | Refills: 2 | Status: SHIPPED | OUTPATIENT
Start: 2020-09-25 | End: 2021-08-24

## 2020-10-13 ENCOUNTER — TELEPHONE (OUTPATIENT)
Dept: CARDIOLOGY | Facility: CLINIC | Age: 85
End: 2020-10-13

## 2020-10-13 NOTE — TELEPHONE ENCOUNTER
Pt left  yesterday after clinic hours. He said last night (10/12/2020) between midnight and 2am he woke up twice with shortness of breath. He got up, felt nauseated but was unable to vomit, then eventually was able to go back to sleep. He thinks he may have missed his 12 hr pill that prevents his HR from going too fast. He's wondering if anything showed up on his remote. He's also wondering if we can check his remote to make sure it's working because it was recently unhooked a couple times for electrical repairs in his house.     Chart reviewed. Pt takes sotalol 80mg BID for VT. Called pt back. Asked pt to send a courtesy remote check and explained how to do it.    Remote check came in. Last remote was 9/3/2020. Since then there were 2 brief episode of PAT lasting <5 seconds, both of these were in September and do not correlate with symptoms the other night. All available lead measurements are stable. Mode is DDD , A paced 36% and BVP 89% (stable, due to PVCs).     Called pt back and told him the remote check was normal and there were no arrhythmias. He was happy to hear this. We discussed pt's upcoming appointments on 10/9/2020 for echo and Dr. Ayala, he is aware of them.

## 2020-11-09 ENCOUNTER — HOSPITAL ENCOUNTER (OUTPATIENT)
Dept: CARDIOLOGY | Facility: CLINIC | Age: 85
Discharge: HOME | End: 2020-11-09
Attending: INTERNAL MEDICINE | Admitting: INTERNAL MEDICINE
Payer: MEDICARE

## 2020-11-09 ENCOUNTER — ANCILLARY PROCEDURE (OUTPATIENT)
Dept: CARDIOLOGY | Facility: CLINIC | Age: 85
End: 2020-11-09
Attending: INTERNAL MEDICINE
Payer: MEDICARE

## 2020-11-09 ENCOUNTER — OFFICE VISIT (OUTPATIENT)
Dept: DERMATOLOGY | Facility: CLINIC | Age: 85
End: 2020-11-09
Payer: MEDICARE

## 2020-11-09 VITALS
OXYGEN SATURATION: 97 % | WEIGHT: 170 LBS | SYSTOLIC BLOOD PRESSURE: 168 MMHG | DIASTOLIC BLOOD PRESSURE: 78 MMHG | BODY MASS INDEX: 26.63 KG/M2 | HEART RATE: 65 BPM

## 2020-11-09 VITALS — SYSTOLIC BLOOD PRESSURE: 139 MMHG | HEART RATE: 60 BPM | OXYGEN SATURATION: 98 % | DIASTOLIC BLOOD PRESSURE: 66 MMHG

## 2020-11-09 DIAGNOSIS — I42.0 DILATED CARDIOMYOPATHY (H): ICD-10-CM

## 2020-11-09 DIAGNOSIS — I10 ESSENTIAL HYPERTENSION WITH GOAL BLOOD PRESSURE LESS THAN 140/90: ICD-10-CM

## 2020-11-09 DIAGNOSIS — I50.22 CHRONIC SYSTOLIC HEART FAILURE (H): ICD-10-CM

## 2020-11-09 DIAGNOSIS — E78.00 HYPERCHOLESTEROLEMIA: ICD-10-CM

## 2020-11-09 DIAGNOSIS — L82.1 SEBORRHEIC KERATOSES: ICD-10-CM

## 2020-11-09 DIAGNOSIS — L57.8 SOLAR ELASTOSIS: ICD-10-CM

## 2020-11-09 DIAGNOSIS — R20.2 NOTALGIA PARESTHETICA: ICD-10-CM

## 2020-11-09 DIAGNOSIS — I49.3 PVC'S (PREMATURE VENTRICULAR CONTRACTIONS): ICD-10-CM

## 2020-11-09 DIAGNOSIS — Z95.810 ICD (IMPLANTABLE CARDIOVERTER-DEFIBRILLATOR), BIVENTRICULAR, IN SITU: ICD-10-CM

## 2020-11-09 DIAGNOSIS — L57.0 ACTINIC KERATOSES: ICD-10-CM

## 2020-11-09 DIAGNOSIS — Z85.828 HISTORY OF NONMELANOMA SKIN CANCER: ICD-10-CM

## 2020-11-09 DIAGNOSIS — L82.0 INFLAMED SEBORRHEIC KERATOSIS: ICD-10-CM

## 2020-11-09 DIAGNOSIS — D22.9 MULTIPLE BENIGN NEVI: Primary | ICD-10-CM

## 2020-11-09 DIAGNOSIS — Z95.810 ICD (IMPLANTABLE CARDIOVERTER-DEFIBRILLATOR), BIVENTRICULAR, IN SITU: Primary | ICD-10-CM

## 2020-11-09 DIAGNOSIS — I50.22 CHRONIC SYSTOLIC HEART FAILURE (H): Primary | ICD-10-CM

## 2020-11-09 DIAGNOSIS — D18.01 CHERRY ANGIOMA: ICD-10-CM

## 2020-11-09 DIAGNOSIS — L81.4 LENTIGINES: ICD-10-CM

## 2020-11-09 DIAGNOSIS — I47.29 NSVT (NONSUSTAINED VENTRICULAR TACHYCARDIA) (H): ICD-10-CM

## 2020-11-09 DIAGNOSIS — L81.8 POST INFLAMMATORY HYPOPIGMENTATION: ICD-10-CM

## 2020-11-09 LAB
ANION GAP SERPL CALCULATED.3IONS-SCNC: 9.4 MMOL/L (ref 6–17)
BUN SERPL-MCNC: 15 MG/DL (ref 7–30)
CALCIUM SERPL-MCNC: 8.8 MG/DL (ref 8.5–10.5)
CHLORIDE SERPL-SCNC: 104 MMOL/L (ref 98–107)
CO2 SERPL-SCNC: 26 MMOL/L (ref 23–29)
CREAT SERPL-MCNC: 1.02 MG/DL (ref 0.7–1.3)
GFR SERPL CREATININE-BSD FRML MDRD: 70 ML/MIN/{1.73_M2}
GLUCOSE SERPL-MCNC: 131 MG/DL (ref 70–105)
HGB BLD-MCNC: 13.7 G/DL (ref 13.3–17.7)
POTASSIUM SERPL-SCNC: 4.4 MMOL/L (ref 3.5–5.1)
SODIUM SERPL-SCNC: 135 MMOL/L (ref 136–145)

## 2020-11-09 PROCEDURE — 17003 DESTRUCT PREMALG LES 2-14: CPT | Mod: 59 | Performed by: PHYSICIAN ASSISTANT

## 2020-11-09 PROCEDURE — 80048 BASIC METABOLIC PNL TOTAL CA: CPT | Performed by: INTERNAL MEDICINE

## 2020-11-09 PROCEDURE — 85018 HEMOGLOBIN: CPT | Performed by: INTERNAL MEDICINE

## 2020-11-09 PROCEDURE — 93306 TTE W/DOPPLER COMPLETE: CPT

## 2020-11-09 PROCEDURE — 17110 DESTRUCTION B9 LES UP TO 14: CPT | Performed by: PHYSICIAN ASSISTANT

## 2020-11-09 PROCEDURE — 99214 OFFICE O/P EST MOD 30 MIN: CPT | Mod: 25 | Performed by: PHYSICIAN ASSISTANT

## 2020-11-09 PROCEDURE — 99214 OFFICE O/P EST MOD 30 MIN: CPT | Mod: 25 | Performed by: INTERNAL MEDICINE

## 2020-11-09 PROCEDURE — 93284 PRGRMG EVAL IMPLANTABLE DFB: CPT | Performed by: INTERNAL MEDICINE

## 2020-11-09 PROCEDURE — 93000 ELECTROCARDIOGRAM COMPLETE: CPT | Performed by: INTERNAL MEDICINE

## 2020-11-09 PROCEDURE — 255N000002 HC RX 255 OP 636: Performed by: INTERNAL MEDICINE

## 2020-11-09 PROCEDURE — 93306 TTE W/DOPPLER COMPLETE: CPT | Mod: 26 | Performed by: INTERNAL MEDICINE

## 2020-11-09 PROCEDURE — 36415 COLL VENOUS BLD VENIPUNCTURE: CPT | Performed by: INTERNAL MEDICINE

## 2020-11-09 PROCEDURE — 17000 DESTRUCT PREMALG LESION: CPT | Mod: 59 | Performed by: PHYSICIAN ASSISTANT

## 2020-11-09 RX ADMIN — HUMAN ALBUMIN MICROSPHERES AND PERFLUTREN 9 ML: 10; .22 INJECTION, SOLUTION INTRAVENOUS at 12:08

## 2020-11-09 NOTE — PROGRESS NOTES
HPI:  Katie Shannon is a 85 year old male patient here today for FBE .  Has a few spots on back. Patient states this has been present for months.  Patient reports the following symptoms: itch .  Patient reports the following previous treatments: none.  Patient reports the following modifying factors: none.  Associated symptoms: none.  Patient has no other skin complaints today.  Remainder of the HPI, Meds, PMH, Allergies, FH, and SH was reviewed in chart.    Pertinent Hx:   History of NMSC  Past Medical History:   Diagnosis Date     CAD (coronary artery disease)      Congestive heart failure (H)      Heart disease     non-ischemic cardiomyopathy     HTN (hypertension)      Idiopathic cardiomyopathy (H)        Past Surgical History:   Procedure Laterality Date     COLONOSCOPY N/A 1/29/2020    Procedure: COLONOSCOPY;  Surgeon: Erica Duff MD;  Location:  GI     ESOPHAGOSCOPY, GASTROSCOPY, DUODENOSCOPY (EGD), COMBINED N/A 1/28/2020    Procedure: ESOPHAGOGASTRODUODENOSCOPY (EGD);  Surgeon: Erica Duff MD;  Location:  GI     EYE SURGERY  2006    cataract     ORTHOPEDIC SURGERY  01/2018    pinning of right hip        Family History   Problem Relation Age of Onset     Heart Disease Mother 88        mi     Cerebrovascular Disease Father 71       Social History     Socioeconomic History     Marital status:      Spouse name: Not on file     Number of children: Not on file     Years of education: Not on file     Highest education level: Not on file   Occupational History     Not on file   Social Needs     Financial resource strain: Not on file     Food insecurity     Worry: Not on file     Inability: Not on file     Transportation needs     Medical: Not on file     Non-medical: Not on file   Tobacco Use     Smoking status: Never Smoker     Smokeless tobacco: Never Used   Substance and Sexual Activity     Alcohol use: No     Alcohol/week: 0.0 standard drinks     Drug use: No     Sexual activity: Not on file    Lifestyle     Physical activity     Days per week: Not on file     Minutes per session: Not on file     Stress: Not on file   Relationships     Social connections     Talks on phone: Not on file     Gets together: Not on file     Attends Uats[employer] service: Not on file     Active member of club or organization: Not on file     Attends meetings of clubs or organizations: Not on file     Relationship status: Not on file     Intimate partner violence     Fear of current or ex partner: Not on file     Emotionally abused: Not on file     Physically abused: Not on file     Forced sexual activity: Not on file   Other Topics Concern      Service Not Asked     Blood Transfusions Not Asked     Caffeine Concern No     Occupational Exposure Not Asked     Hobby Hazards Not Asked     Sleep Concern No     Stress Concern Not Asked     Weight Concern No     Special Diet No     Back Care Not Asked     Exercise Yes     Comment: active on farm     Bike Helmet Not Asked     Seat Belt Not Asked     Self-Exams Not Asked     Parent/sibling w/ CABG, MI or angioplasty before 65F 55M? No   Social History Narrative     Not on file       Outpatient Encounter Medications as of 11/9/2020   Medication Sig Dispense Refill     carvedilol (COREG) 12.5 MG tablet Take 1 tablet (12.5 mg) by mouth 2 times daily (with meals) 180 tablet 3     clopidogrel (PLAVIX) 75 MG tablet Take 75 mg by mouth daily       CYCLOBENZAPRINE HCL PO Take 5 mg by mouth At Bedtime Taking PRN (half tab)       cycloSPORINE (RESTASIS) 0.05 % ophthalmic emulsion Place 1 drop into both eyes 2 times daily        diphenhydrAMINE-acetaminophen (TYLENOL PM)  MG tablet Take 1 tablet by mouth nightly as needed for sleep       Famotidine (PEPCID PO) Take 40 mg by mouth daily        isosorbide mononitrate (IMDUR) 60 MG 24 hr tablet Take 60 mg by mouth daily       omeprazole (PRILOSEC) 20 MG DR capsule Take 20 mg by mouth daily       polyethylene glycol 400 (BLINK TEARS) 0.25  % SOLN ophthalmic solution Place 1 drop into both eyes 2 times daily as needed        rosuvastatin (CRESTOR) 20 MG tablet Take 1 tablet (20 mg) by mouth daily 90 tablet 2     sacubitril-valsartan (ENTRESTO)  MG per tablet Take 1 tablet by mouth 2 times daily 180 tablet 3     sotalol (BETAPACE) 80 MG tablet Take 1 tablet (80 mg) by mouth 2 times daily 60 tablet 11     tamsulosin (FLOMAX) 0.4 MG 24 hr capsule Take 0.4 mg by mouth daily       White Petrolatum-Mineral Oil (RETAINE PM) OINT Place into both eyes At Bedtime       Facility-Administered Encounter Medications as of 11/9/2020   Medication Dose Route Frequency Provider Last Rate Last Dose     [COMPLETED] perflutren diluted 1mL to 2mL with saline (OPTISON) diluted injection 9 mL  9 mL Intravenous Once Rah Ayala MD   9 mL at 11/09/20 1208     [COMPLETED] sodium chloride (PF) 0.9% PF flush 10 mL  10 mL Intravenous Once Rah Ayala MD   10 mL at 11/09/20 1208       Review Of Systems:  Skin: spts on back  Eyes: negative  Ears/Nose/Throat: negative  Respiratory: No shortness of breath, dyspnea on exertion, cough, or hemoptysis  Cardiovascular: negative  Gastrointestinal: negative  Genitourinary: negative  Musculoskeletal: negative  Neurologic: negative  Psychiatric: negative  Hematologic/Lymphatic/Immunologic: negative  Endocrine: negative      Objective:     /66   Pulse 60   SpO2 98%   Eyes: Conjunctivae/lids: Normal   ENT: Lips:  Normal  MSK: Normal  Cardiovascular: Peripheral edema none  Pulm: Breathing Normal  Neuro/Psych: Orientation: A/O x 3. Normal; Mood/Affect: Normal, NAD, WDWN  Pt accompanied by: self  Following areas examined: Scalp, face, eyelids, lips, neck, chest, buttock, abdomen, back, and R&L upper and lower extremities. Pt defers exam of  hips, groin and genitals.   Whitney skin type:i   Findings:  Red smooth well-defined macules on trunk and extremities.  Brown, stuck-on scaly appearing papules on trunk and  extremities.  Well circumscribed macules with symmetric color distribution on trunk and extremities.  Tan WD smooth macules on face, neck, trunk, and extremities.  Inflamed brown, stuck-on scaly appearing papules on right and left scapula  Pink gritty macule/s on left and right post auricular neck  Depigmentation of right upper back    Assessment and Plan:     1) Cherry angiomas, Seborrheic keratoses, Benign nevi, Lentigines     I discussed the specifics of tumor, prognosis, and genetics of benign lesions.  I explained that treatment of these lesions would be purely cosmetic and not medically neccessary.  I discussed with patient different removal options including excision, cryotherapy, cautery and /or laser.  Lesion may recur and/or may not completely resolve. May need additional treatment.     2) ISK x 2  Benign etiology and course of lesion.  LN2: Treated with LN2 for 5s for 1-2 cycles. Warned risks of blistering, pain, pigment change, scarring, and incomplete resolution.  Advised patient to return if lesions do not completely resolve within 2-3 months.  Wound care sheet given.  3) Actinic keratoses x 4 and solar elastosis    LN2 for 5 seconds x 2. Discussed AE include hypopigmentation (white spot) and recurrence. Follow up in 2-3 months to recheck lesions. There is a risk of AKs developing into a SCC.   Treatment options include LN2 vs PDT vs Efudex. Pt elected LN2    3) notalgia paresthetic vs localized itch and PIHo  Color change most likely due to excessive scratching of area  Disc topical steroid, moisturizer, amitriptyline, gabapentin  Pt defers.     4) history of NMSC  L chest basal cell carcinoma infiltrative mohs performed by Dr. Villalobos 5/4/17  Scalp basal cell carcinoma infiltrative mohs performed by Dr. Villalobos 5/4/17  No evidence of recurrence  Signs and Symptoms of non-melanoma skin cancer and ABCDEs of melanoma reviewed with patient. Patient encouraged to perform monthly self skin exams and  educated on how to perform them. UV precautions reviewed with patient. Patient was asked about new or changing moles/lesions on body.   Wear a sunscreen with at least SPF 30 on your face, ears, neck and V of the chest daily. Wear sunscreen on other areas of the body if those areas are exposed to the sun throughout the day. Sunscreens can contain physical and/or chemical blockers. Physical blockers are less likely to clog pores, these include zinc oxide and titanium dioxide. Reapply every two hour and after swimming. Sunscreen examples include Neutrogena, CeraVe, Blue Lizard, Elta MD and many others.    Proper skin care from [facility]:    -Eliminate harsh soaps as they strip the natural oils from the skin, often resulting in dry itchy skin ( i.e. Dial, Zest, [place])  -Use mild soaps such as Cetaphil or Dove Sensitive Skin in the shower. You do not need to use soap on arms, legs, and trunk every time you shower unless visibly soiled.   -Avoid hot or cold showers.  -After showering, lightly dry off and apply moisturizing within 2-3 minutes. This will help trap moisture in the skin.   -Aggressive use of a moisturizer at least 1-2 times a day to the entire body (including -Vanicream, Cetaphil, Aquaphor or Cerave) and moisturize hands after every washing.  -We recommend using moisturizers that come in a tub that needs to be scooped out, not a pump. This has more of an oil base. It will hold moisture in your skin much better than a water base moisturizer. The above recommended are non-pore clogging.               Follow up in yearly FBE. 2 months if aks not resolved.

## 2020-11-09 NOTE — PROGRESS NOTES
HPI and Plan:   See dictation    Orders Placed This Encounter   Procedures     Basic metabolic panel     Hemoglobin     Follow-Up with [facility] - Return MD visit       No orders of the defined types were placed in this encounter.      There are no discontinued medications.      Encounter Diagnoses   Name Primary?     Chronic systolic heart failure (H) Yes     Dilated cardiomyopathy (H)      Essential hypertension with goal blood pressure less than 140/90      Hypercholesterolemia      ICD (implantable cardioverter-defibrillator), biventricular, in situ      NSVT (nonsustained ventricular tachycardia) (H)      PVC's (premature ventricular contractions)        CURRENT MEDICATIONS:  Current Outpatient Medications   Medication Sig Dispense Refill     carvedilol (COREG) 12.5 MG tablet Take 1 tablet (12.5 mg) by mouth 2 times daily (with meals) 180 tablet 3     clopidogrel (PLAVIX) 75 MG tablet Take 75 mg by mouth daily       CYCLOBENZAPRINE HCL PO Take 5 mg by mouth At Bedtime Taking PRN (half tab)       cycloSPORINE (RESTASIS) 0.05 % ophthalmic emulsion Place 1 drop into both eyes 2 times daily        diphenhydrAMINE-acetaminophen (TYLENOL PM)  MG tablet Take 1 tablet by mouth nightly as needed for sleep       Famotidine (PEPCID PO) Take 40 mg by mouth daily        isosorbide mononitrate (IMDUR) 60 MG 24 hr tablet Take 60 mg by mouth daily       omeprazole (PRILOSEC) 20 MG DR capsule Take 20 mg by mouth daily       polyethylene glycol 400 (BLINK TEARS) 0.25 % SOLN ophthalmic solution Place 1 drop into both eyes 2 times daily as needed        rosuvastatin (CRESTOR) 20 MG tablet Take 1 tablet (20 mg) by mouth daily 90 tablet 2     sacubitril-valsartan (ENTRESTO)  MG per tablet Take 1 tablet by mouth 2 times daily 180 tablet 3     sotalol (BETAPACE) 80 MG tablet Take 1 tablet (80 mg) by mouth 2 times daily 60 tablet 11     tamsulosin (FLOMAX) 0.4 MG 24 hr capsule Take 0.4 mg by mouth daily       White  Petrolatum-Mineral Oil (RETAINE PM) OINT Place into both eyes At Bedtime         ALLERGIES     Allergies   Allergen Reactions     Atorvastatin      Muscle aches     Diphenhydramine Other (See Comments)     Lip swelling     Metoprolol Succinate [Metoprolol] Other (See Comments)     Blind in left half of face for 3 days     Flynn Other (See Comments)     swelling     Sulfa Drugs Other (See Comments)     swelling       PAST MEDICAL HISTORY:  Past Medical History:   Diagnosis Date     CAD (coronary artery disease)      Congestive heart failure (H)      Heart disease     non-ischemic cardiomyopathy     HTN (hypertension)      Idiopathic cardiomyopathy (H)        PAST SURGICAL HISTORY:  Past Surgical History:   Procedure Laterality Date     COLONOSCOPY N/A 1/29/2020    Procedure: COLONOSCOPY;  Surgeon: Erica Duff MD;  Location:  GI     ESOPHAGOSCOPY, GASTROSCOPY, DUODENOSCOPY (EGD), COMBINED N/A 1/28/2020    Procedure: ESOPHAGOGASTRODUODENOSCOPY (EGD);  Surgeon: Erica Duff MD;  Location:  GI     EYE SURGERY  2006    cataract     ORTHOPEDIC SURGERY  01/2018    pinning of right hip       FAMILY HISTORY:  Family History   Problem Relation Age of Onset     Heart Disease Mother 88        mi     Cerebrovascular Disease Father 71       SOCIAL HISTORY:  Social History     Socioeconomic History     Marital status:      Spouse name: None     Number of children: None     Years of education: None     Highest education level: None   Occupational History     None   Social Needs     Financial resource strain: None     Food insecurity     Worry: None     Inability: None     Transportation needs     Medical: None     Non-medical: None   Tobacco Use     Smoking status: Never Smoker     Smokeless tobacco: Never Used   Substance and Sexual Activity     Alcohol use: No     Alcohol/week: 0.0 standard drinks     Drug use: No     Sexual activity: None   Lifestyle     Physical activity     Days per week: None      Minutes per session: None     Stress: None   Relationships     Social connections     Talks on phone: None     Gets together: None     Attends Uats[employer] service: None     Active member of club or organization: None     Attends meetings of clubs or organizations: None     Relationship status: None     Intimate partner violence     Fear of current or ex partner: None     Emotionally abused: None     Physically abused: None     Forced sexual activity: None   Other Topics Concern      Service Not Asked     Blood Transfusions Not Asked     Caffeine Concern No     Occupational Exposure Not Asked     Hobby Hazards Not Asked     Sleep Concern No     Stress Concern Not Asked     Weight Concern No     Special Diet No     Back Care Not Asked     Exercise Yes     Comment: active on farm     Bike Helmet Not Asked     Seat Belt Not Asked     Self-Exams Not Asked     Parent/sibling w/ CABG, MI or angioplasty before 65F 55M? No   Social History Narrative     None       Review of Systems:  Skin:  Negative       Eyes:  Positive for glasses    ENT:  Negative      Respiratory:  Positive for shortness of breath 1 episode of hard to catch breath while lying down   Cardiovascular:  Negative      Gastroenterology: Negative      Genitourinary:  Negative      Musculoskeletal:  Positive for arthritis very little  Neurologic:  Positive for numbness or tingling of hands left hand  Psychiatric:  Negative      Heme/Lymph/Imm:  Positive for allergies    Endocrine:  Negative        Physical Exam:  Vitals: BP (!) 168/78   Pulse 65   Wt 77.1 kg (170 lb)   SpO2 97%   BMI 26.63 kg/m      Constitutional:  cooperative, alert and oriented, well developed, well nourished, in no acute distress        Skin:  warm and dry to the touch, no apparent skin lesions or masses noted          Head:  normocephalic, no masses or lesions        Eyes:  pupils equal and round, conjunctivae and lids unremarkable, sclera white, no xanthalasma, EOMS intact, no  nystagmus        Lymph:      ENT:  no pallor or cyanosis, dentition good        Neck:  carotid pulses are full and equal bilaterally, JVP normal, no carotid bruit        Respiratory:  normal breath sounds, clear to auscultation, normal A-P diameter, normal symmetry, normal respiratory excursion, no use of accessory muscles         Cardiac: regular rhythm, normal S1/S2, no S3 or S4, apical impulse not displaced, no murmurs, gallops or rubs             PVCs  pulses full and equal, no bruits auscultated;pulses full and equal                                        GI:  abdomen soft;BS normoactive        Extremities and Muscular Skeletal:  no deformities, clubbing, cyanosis, erythema observed;no edema              Neurological:  no gross motor deficits;affect appropriate        Psych:  Alert and Oriented x 3        CC  Rah Ayala MD  [id number]  [place] [id number]

## 2020-11-09 NOTE — LETTER
11/9/2020    Reba Gilmore MD  [facility] [address]  [facility] [address]    RE: Katie Rasconkerri       Dear Colleague,    I had the pleasure of seeing Katie Yasmanyparag in the [facility].    HPI and Plan:   See dictation    Orders Placed This Encounter   Procedures     Basic metabolic panel     Hemoglobin     Follow-Up with [facility] - Return MD visit       No orders of the defined types were placed in this encounter.      There are no discontinued medications.      Encounter Diagnoses   Name Primary?     Chronic systolic heart failure (H) Yes     Dilated cardiomyopathy (H)      Essential hypertension with goal blood pressure less than 140/90      Hypercholesterolemia      ICD (implantable cardioverter-defibrillator), biventricular, in situ      NSVT (nonsustained ventricular tachycardia) (H)      PVC's (premature ventricular contractions)        CURRENT MEDICATIONS:  Current Outpatient Medications   Medication Sig Dispense Refill     carvedilol (COREG) 12.5 MG tablet Take 1 tablet (12.5 mg) by mouth 2 times daily (with meals) 180 tablet 3     clopidogrel (PLAVIX) 75 MG tablet Take 75 mg by mouth daily       CYCLOBENZAPRINE HCL PO Take 5 mg by mouth At Bedtime Taking PRN (half tab)       cycloSPORINE (RESTASIS) 0.05 % ophthalmic emulsion Place 1 drop into both eyes 2 times daily        diphenhydrAMINE-acetaminophen (TYLENOL PM)  MG tablet Take 1 tablet by mouth nightly as needed for sleep       Famotidine (PEPCID PO) Take 40 mg by mouth daily        isosorbide mononitrate (IMDUR) 60 MG 24 hr tablet Take 60 mg by mouth daily       omeprazole (PRILOSEC) 20 MG DR capsule Take 20 mg by mouth daily       polyethylene glycol 400 (BLINK TEARS) 0.25 % SOLN ophthalmic solution Place 1 drop into both eyes 2 times daily as needed        rosuvastatin (CRESTOR) 20 MG tablet Take 1 tablet (20 mg) by mouth daily 90 tablet 2     sacubitril-valsartan (ENTRESTO)  MG per tablet Take 1  tablet by mouth 2 times daily 180 tablet 3     sotalol (BETAPACE) 80 MG tablet Take 1 tablet (80 mg) by mouth 2 times daily 60 tablet 11     tamsulosin (FLOMAX) 0.4 MG 24 hr capsule Take 0.4 mg by mouth daily       White Petrolatum-Mineral Oil (RETAINE PM) OINT Place into both eyes At Bedtime         ALLERGIES     Allergies   Allergen Reactions     Atorvastatin      Muscle aches     Diphenhydramine Other (See Comments)     Lip swelling     Metoprolol Succinate [Metoprolol] Other (See Comments)     Blind in left half of face for 3 days     [place] Other (See Comments)     swelling     Sulfa Drugs Other (See Comments)     swelling       PAST MEDICAL HISTORY:  Past Medical History:   Diagnosis Date     CAD (coronary artery disease)      Congestive heart failure (H)      Heart disease     non-ischemic cardiomyopathy     HTN (hypertension)      Idiopathic cardiomyopathy (H)        PAST SURGICAL HISTORY:  Past Surgical History:   Procedure Laterality Date     COLONOSCOPY N/A 1/29/2020    Procedure: COLONOSCOPY;  Surgeon: Erica Duff MD;  Location:  GI     ESOPHAGOSCOPY, GASTROSCOPY, DUODENOSCOPY (EGD), COMBINED N/A 1/28/2020    Procedure: ESOPHAGOGASTRODUODENOSCOPY (EGD);  Surgeon: Erica Duff MD;  Location:  GI     EYE SURGERY  2006    cataract     ORTHOPEDIC SURGERY  01/2018    pinning of right hip       FAMILY HISTORY:  Family History   Problem Relation Age of Onset     Heart Disease Mother 88        mi     Cerebrovascular Disease Father 71       SOCIAL HISTORY:  Social History     Socioeconomic History     Marital status:      Spouse name: None     Number of children: None     Years of education: None     Highest education level: None   Occupational History     None   Social Needs     Financial resource strain: None     Food insecurity     Worry: None     Inability: None     Transportation needs     Medical: None     Non-medical: None   Tobacco Use     Smoking status: Never Smoker     Smokeless  tobacco: Never Used   Substance and Sexual Activity     Alcohol use: No     Alcohol/week: 0.0 standard drinks     Drug use: No     Sexual activity: None   Lifestyle     Physical activity     Days per week: None     Minutes per session: None     Stress: None   Relationships     Social connections     Talks on phone: None     Gets together: None     Attends [employer] service: None     Active member of club or organization: None     Attends meetings of clubs or organizations: None     Relationship status: None     Intimate partner violence     Fear of current or ex partner: None     Emotionally abused: None     Physically abused: None     Forced sexual activity: None   Other Topics Concern      Service Not Asked     Blood Transfusions Not Asked     Caffeine Concern No     Occupational Exposure Not Asked     Hobby Hazards Not Asked     Sleep Concern No     Stress Concern Not Asked     Weight Concern No     Special Diet No     Back Care Not Asked     Exercise Yes     Comment: active on farm     Bike Helmet Not Asked     Seat Belt Not Asked     Self-Exams Not Asked     Parent/sibling w/ CABG, MI or angioplasty before 65F 55M? No   Social History Narrative     None       Review of Systems:  Skin:  Negative       Eyes:  Positive for glasses    ENT:  Negative      Respiratory:  Positive for shortness of breath 1 episode of hard to catch breath while lying down   Cardiovascular:  Negative      Gastroenterology: Negative      Genitourinary:  Negative      Musculoskeletal:  Positive for arthritis very little  Neurologic:  Positive for numbness or tingling of hands left hand  Psychiatric:  Negative      Heme/Lymph/Imm:  Positive for allergies    Endocrine:  Negative        Physical Exam:  Vitals: BP (!) 168/78   Pulse 65   Wt 77.1 kg (170 lb)   SpO2 97%   BMI 26.63 kg/m      Constitutional:  cooperative, alert and oriented, well developed, well nourished, in no acute distress        Skin:  warm and dry to the touch,  no apparent skin lesions or masses noted          Head:  normocephalic, no masses or lesions        Eyes:  pupils equal and round, conjunctivae and lids unremarkable, sclera white, no xanthalasma, EOMS intact, no nystagmus        Lymph:      ENT:  no pallor or cyanosis, dentition good        Neck:  carotid pulses are full and equal bilaterally, JVP normal, no carotid bruit        Respiratory:  normal breath sounds, clear to auscultation, normal A-P diameter, normal symmetry, normal respiratory excursion, no use of accessory muscles         Cardiac: regular rhythm, normal S1/S2, no S3 or S4, apical impulse not displaced, no murmurs, gallops or rubs             PVCs  pulses full and equal, no bruits auscultated;pulses full and equal                                        GI:  abdomen soft;BS normoactive        Extremities and Muscular Skeletal:  no deformities, clubbing, cyanosis, erythema observed;no edema              Neurological:  no gross motor deficits;affect appropriate        Psych:  Alert and Oriented x 3        CC  Rah Ayala MD  [id number]  [place] [address]                Service Date: 11/09/2020      HISTORY OF PRESENT ILLNESS:  I had the opportunity to see, Katie Shannon, in Cardiology Clinic today at [facility] in [place] for reevaluation of nonischemic cardiomyopathy.        Mr. 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  Lately, he has been cutting down trees again.  He tells me that he can work for about 4 hours and takes breaks to rest.  He does not have any chest pain, lightheadedness, palpitations or even shortness of breath with normal activity.  His echocardiogram this year looks the same.  His ejection fraction is 30%-35% without any significant valvular disease.  His coronary angiography studies in the past have demonstrated nonocclusive coronary artery disease.  He has an ICD in place for primary prevention of  sudden cardiac arrest and we recently recorded an episode of nonsustained ventricular tachycardia that was associated with sudden lightheadedness episode and burning sensation in his left chest.  Sotalol was started.  We have not seen any recurrence of those episodes of ventricular arrhythmia.  He has not received any ICD shocks.      He had a basic metabolic panel performed today which looked normal.  His hemoglobin was normal as well.      PHYSICAL EXAMINATION:  Today, his blood pressure was unusually high at 168/78.  Typically, his blood pressures in the range of 100-120 systolic.  At the time of his echocardiogram earlier today, his blood pressure was high at 152/82 as well.  Lungs are clear.  Heart rhythm is regular.  He has no significant murmurs.  He has no edema.      IMPRESSIONS:  Mr. Rupert Shannon is an 85-year-old gentleman with a nonischemic cardiomyopathy with a stable ejection fraction of 30%-35% and excellent exercise capacity for his age.  He has no congestive heart failure symptoms, but recently had an episode of sudden lightheadedness associated with an episode of prolonged nonsustained ventricular tachycardia.  He is now on sotalol for prevention of recurrent VT.      He seems to be very stable now and doing well.  His blood pressure is elevated.  I am not sure if that is the consistent issue.  I will have him report some blood pressures to us from home.  If it remains high, I would increase his carvedilol to 25 mg twice daily.      I will plan to see him back again in 6 months for reevaluation.      cc:     MD ISIDORO Sebastian MD, [facility]             D: 2020   T: 2020   MT:       Name:     RUPERT SHANNON   MRN:      [id number]        Account:      [id number]   :      1935           Service Date: 2020      Document: [id number]        Thank you for allowing me to participate in the care of your patient.      Sincerely,     ISIDORO MARI MD      [facility] [place] [facility]    cc:   Rah Ayala MD  [address]  [place] [id number]

## 2020-11-09 NOTE — LETTER
11/9/2020      Reba Gilmore MD  [facility] [address]  [facility] [address]      RE: Katie Shannon       Dear Colleague,    I had the pleasure of seeing Katie Shannon in the [facility].    Service Date: 11/09/2020      HISTORY OF PRESENT ILLNESS:  I had the opportunity to see, Katie Shannon, in Cardiology Clinic today at [facility] in [place] for reevaluation of nonischemic cardiomyopathy.        Mr. 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  Lately, he has been cutting down trees again.  He tells me that he can work for about 4 hours and takes breaks to rest.  He does not have any chest pain, lightheadedness, palpitations or even shortness of breath with normal activity.  His echocardiogram this year looks the same.  His ejection fraction is 30%-35% without any significant valvular disease.  His coronary angiography studies in the past have demonstrated nonocclusive coronary artery disease.  He has an ICD in place for primary prevention of sudden cardiac arrest and we recently recorded an episode of nonsustained ventricular tachycardia that was associated with sudden lightheadedness episode and burning sensation in his left chest.  Sotalol was started.  We have not seen any recurrence of those episodes of ventricular arrhythmia.  He has not received any ICD shocks.      He had a basic metabolic panel performed today which looked normal.  His hemoglobin was normal as well.      PHYSICAL EXAMINATION:  Today, his blood pressure was unusually high at 168/78.  Typically, his blood pressures in the range of 100-120 systolic.  At the time of his echocardiogram earlier today, his blood pressure was high at 152/82 as well.  Lungs are clear.  Heart rhythm is regular.  He has no significant murmurs.  He has no edema.      IMPRESSIONS:  Mr. Katie Shannon is an 85-year-old gentleman with a nonischemic cardiomyopathy with a  stable ejection fraction of 30%-35% and excellent exercise capacity for his age.  He has no congestive heart failure symptoms, but recently had an episode of sudden lightheadedness associated with an episode of prolonged nonsustained ventricular tachycardia.  He is now on sotalol for prevention of recurrent VT.      He seems to be very stable now and doing well.  His blood pressure is elevated.  I am not sure if that is the consistent issue.  I will have him report some blood pressures to us from home.  If it remains high, I would increase his carvedilol to 25 mg twice daily.      I will plan to see him back again in 6 months for reevaluation.      cc:     MD ISIDORO Sebastian MD, [facility]             D: 2020   T: 2020   MT:       Name:     RUPERT MAN   MRN:      [id number]        Account:      [id number]   :      1935           Service Date: 2020      Document: [id number]          Outpatient Encounter Medications as of 2020   Medication Sig Dispense Refill     carvedilol (COREG) 12.5 MG tablet Take 1 tablet (12.5 mg) by mouth 2 times daily (with meals) 180 tablet 3     clopidogrel (PLAVIX) 75 MG tablet Take 75 mg by mouth daily       CYCLOBENZAPRINE HCL PO Take 5 mg by mouth At Bedtime Taking PRN (half tab)       cycloSPORINE (RESTASIS) 0.05 % ophthalmic emulsion Place 1 drop into both eyes 2 times daily        diphenhydrAMINE-acetaminophen (TYLENOL PM)  MG tablet Take 1 tablet by mouth nightly as needed for sleep       Famotidine (PEPCID PO) Take 40 mg by mouth daily        isosorbide mononitrate (IMDUR) 60 MG 24 hr tablet Take 60 mg by mouth daily       omeprazole (PRILOSEC) 20 MG DR capsule Take 20 mg by mouth daily       polyethylene glycol 400 (BLINK TEARS) 0.25 % SOLN ophthalmic solution Place 1 drop into both eyes 2 times daily as needed        rosuvastatin (CRESTOR) 20 MG tablet Take 1 tablet (20 mg) by mouth daily 90 tablet 2      sacubitril-valsartan (ENTRESTO)  MG per tablet Take 1 tablet by mouth 2 times daily 180 tablet 3     sotalol (BETAPACE) 80 MG tablet Take 1 tablet (80 mg) by mouth 2 times daily 60 tablet 11     tamsulosin (FLOMAX) 0.4 MG 24 hr capsule Take 0.4 mg by mouth daily       White Petrolatum-Mineral Oil (RETAINE PM) OINT Place into both eyes At Bedtime       [] perflutren diluted 1mL to 2mL with saline (OPTISON) diluted injection 9 mL        [] sodium chloride (PF) 0.9% PF flush 10 mL        No facility-administered encounter medications on file as of 2020.        Again, thank you for allowing me to participate in the care of your patient.      Sincerely,    ISIDORO MARI MD     [facility]

## 2020-11-09 NOTE — PROGRESS NOTES
[employer] (HIS) ICD Device Check  HIS lead in the LV port   Patient seen in clinic for device evaluation and iterative programming.   AP: *** %    : *** %    Mode: ***    Underlying Rhythm: ***    Heart Rate: ***    Sensing: ***    Pacing Threshold: ***    Impedance: ***    Battery Status: ***    Device Site: ***    Atrial Arrhythmia: ***    Ventricular Arrhythmia: ***    ATP: ***    Shocks: ***    Setting Change: ***    Care Plan: Follow up in February with remote device check. He is seeing Dr. Ayala today.  BRYAN, RN       I have reviewed and interpreted the device interrogation, settings, programming and nurse's summary. The device is functioning within normal device parameters. I agree with the current findings, assessment and plan.

## 2020-11-09 NOTE — PATIENT INSTRUCTIONS
Proper skin care from [facility]:    -Eliminate harsh soaps as they strip the natural oils from the skin, often resulting in dry itchy skin ( i.e. Dial, Zest, [place])  -Use mild soaps such as Cetaphil or Dove Sensitive Skin in the shower. You do not need to use soap on arms, legs, and trunk every time you shower unless visibly soiled.   -Avoid hot or cold showers.  -After showering, lightly dry off and apply moisturizing within 2-3 minutes. This will help trap moisture in the skin.   -Aggressive use of a moisturizer at least 1-2 times a day to the entire body (including -Vanicream, Cetaphil, Aquaphor or Cerave) and moisturize hands after every washing.  -We recommend using moisturizers that come in a tub that needs to be scooped out, not a pump. This has more of an oil base. It will hold moisture in your skin much better than a water base moisturizer. The above recommended are non-pore clogging.      Wear a sunscreen with at least SPF 30 on your face, ears, neck and V of the chest daily. Wear sunscreen on other areas of the body if those areas are exposed to the sun throughout the day. Sunscreens can contain physical and/or chemical blockers. Physical blockers are less likely to clog pores, these include zinc oxide and titanium dioxide. Reapply every two hour and after swimming. Sunscreen examples include Neutrogena, CeraVe, Blue Lizard, Elta MD and many others.    UV radiation  UVA radiation remains constant throughout the day and throughout the year. It is a longer wavelength than UVB and therefore penetrates deeper into the skin leading to immediate and delayed tanning, photoaging, and skin cancer. 70-80% of UVA and UVB radiation occurs between the hours of 10am-2pm.  UVB radiation  UVB radiation causes the most harmful effects and is more significant during the summer months. However, snow and ice can reflect UVB radiation leading to skin damage during the winter months as well. UVB radiation is  responsible for tanning, burning, inflammation, delayed erythema (pinkness), pigmentation (brown spots), and skin cancer.     I recommend self monthly full body exams and yearly full body exams with a dermatology provider. If you develop a new or changing lesion please follow up for examination. Most skin cancers are pink and scaly or pink and pearly. However, we do see blue/brown/black skin cancers.  Consider the ABCDEs of melanoma when giving yourself your monthly full body exam ( don't forget the groin, buttocks, feet, toes, etc). A-asymmetry, B-borders, C-color, D-diameter, E-elevation or evolving. If you see any of these changes please follow up in clinic. If you cannot see your back I recommend purchasing a hand held mirror to use with a larger wall mirror.          WOUND CARE INSTRUCTIONS  FOR CRYOSURGERY        This area treated with liquid nitrogen will form a blister. You do not need to bandage the area until after the blister forms and breaks (which may be a few days).  When the blister breaks, begin daily dressing changes as follows:    1) Clean and dry the area with tap water using clean Q-tip or sterile gauze pad.    2) Apply Polysporin ointment or Bacitracin ointment over entire wound.  Do NOT use Neosporin ointment.    3) Cover the wound with a band-aid or sterile non-stick gauze pad and micropore paper tape.      REPEAT THESE INSTRUCTIONS AT LEAST ONCE A DAY UNTIL THE WOUND HAS COMPLETELY HEALED.        It is an old wives tale that a wound heals better when it is exposed to air and allowed to dry out. The wound will heal faster with a better cosmetic result if it is kept moist with ointment and covered with a bandage.  Do not let the wound dry out.      Supplies Needed:     *Cotton tipped applicators (Q-tips)   *Polysporin ointment or Bacitracin ointment (NOT NEOSPORIN)   *Band-aids, or non stick gauze pads and micropore paper tape    PATIENT INFORMATION    During the healing process you will notice  a number of changes. All wounds develop a small halo of redness surrounding the wound.  This means healing is occurring. Severe itching with extensive redness usually indicates sensitivity to the ointment or bandage tape used to dress the wound.  You should call our office if this develops.      Swelling and/or discoloration around your surgical site is common, particularly when performed around the eye.    All wounds normally drain.  The larger the wound the more drainage there will be.  After 7-10 days, you will notice the wound beginning to shrink and new skin will begin to grow.  The wound is healed when you can see skin has formed over the entire area.  A healed wound has a healthy, shiny look to the surface and is red to dark pink in color to normalize.  Wounds may take approximately 4-6 weeks to heal.  Larger wounds may take 6-8 weeks.  After the wound is healed you may discontinue dressing changes.    You may experience a sensation of tightness as your wound heals. This is normal and will gradually subside.    Your healed wound may be sensitive to temperature changes. This sensitivity improves with time, but if you re having a lot of discomfort, try to avoid temperature extremes.    Patients frequently experience itching after their wound appears to have healed because of the continue healing under the skin.  Plain Vaseline will help relieve the itching.         Start sarna sensitive for the itch on your back.       17-Aug-2018 10:30

## 2020-11-09 NOTE — PROGRESS NOTES
Service Date: 11/09/2020      HISTORY OF PRESENT ILLNESS:  I had the opportunity to see, Katie Shannon, in Cardiology Clinic today at [facility] in [place] for reevaluation of nonischemic cardiomyopathy.        Mr. 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  Lately, he has been cutting down trees again.  He tells me that he can work for about 4 hours and takes breaks to rest.  He does not have any chest pain, lightheadedness, palpitations or even shortness of breath with normal activity.  His echocardiogram this year looks the same.  His ejection fraction is 30%-35% without any significant valvular disease.  His coronary angiography studies in the past have demonstrated nonocclusive coronary artery disease.  He has an ICD in place for primary prevention of sudden cardiac arrest and we recently recorded an episode of nonsustained ventricular tachycardia that was associated with sudden lightheadedness episode and burning sensation in his left chest.  Sotalol was started.  We have not seen any recurrence of those episodes of ventricular arrhythmia.  He has not received any ICD shocks.      He had a basic metabolic panel performed today which looked normal.  His hemoglobin was normal as well.      PHYSICAL EXAMINATION:  Today, his blood pressure was unusually high at 168/78.  Typically, his blood pressures in the range of 100-120 systolic.  At the time of his echocardiogram earlier today, his blood pressure was high at 152/82 as well.  Lungs are clear.  Heart rhythm is regular.  He has no significant murmurs.  He has no edema.      IMPRESSIONS:  Mr. Katie Shannon is an 85-year-old gentleman with a nonischemic cardiomyopathy with a stable ejection fraction of 30%-35% and excellent exercise capacity for his age.  He has no congestive heart failure symptoms, but recently had an episode of sudden lightheadedness associated with an episode of prolonged  nonsustained ventricular tachycardia.  He is now on sotalol for prevention of recurrent VT.      He seems to be very stable now and doing well.  His blood pressure is elevated.  I am not sure if that is the consistent issue.  I will have him report some blood pressures to us from home.  If it remains high, I would increase his carvedilol to 25 mg twice daily.      I will plan to see him back again in 6 months for reevaluation.      cc:     MD ISIDORO Sebastian MD, [facility]             D: 2020   T: 2020   MT:       Name:     RUPERT MAN   MRN:      [id number]        Account:      [id number]   :      1935           Service Date: 2020      Document: [id number]

## 2020-11-09 NOTE — LETTER
11/9/2020         RE: Katie Shannon  [address]  [facility] [address]        Dear Colleague,    Thank you for referring your patient, Katie Shannon, to the [facility]. Please see a copy of my visit note below.    HPI:  Katie Shannon is a 85 year old male patient here today for FBE .  Has a few spots on back. Patient states this has been present for months.  Patient reports the following symptoms: itch .  Patient reports the following previous treatments: none.  Patient reports the following modifying factors: none.  Associated symptoms: none.  Patient has no other skin complaints today.  Remainder of the HPI, Meds, PMH, Allergies, FH, and SH was reviewed in chart.    Pertinent Hx:   History of NMSC  Past Medical History:   Diagnosis Date     CAD (coronary artery disease)      Congestive heart failure (H)      Heart disease     non-ischemic cardiomyopathy     HTN (hypertension)      Idiopathic cardiomyopathy (H)        Past Surgical History:   Procedure Laterality Date     COLONOSCOPY N/A 1/29/2020    Procedure: COLONOSCOPY;  Surgeon: Erica Duff MD;  Location:  GI     ESOPHAGOSCOPY, GASTROSCOPY, DUODENOSCOPY (EGD), COMBINED N/A 1/28/2020    Procedure: ESOPHAGOGASTRODUODENOSCOPY (EGD);  Surgeon: Erica Duff MD;  Location:  GI     EYE SURGERY  2006    cataract     ORTHOPEDIC SURGERY  01/2018    pinning of right hip        Family History   Problem Relation Age of Onset     Heart Disease Mother 88        mi     Cerebrovascular Disease Father 71       Social History     Socioeconomic History     Marital status:      Spouse name: Not on file     Number of children: Not on file     Years of education: Not on file     Highest education level: Not on file   Occupational History     Not on file   Social Needs     Financial resource strain: Not on file     Food insecurity     Worry: Not on file     Inability: Not on file     Transportation needs     Medical: Not on  file     Non-medical: Not on file   Tobacco Use     Smoking status: Never Smoker     Smokeless tobacco: Never Used   Substance and Sexual Activity     Alcohol use: No     Alcohol/week: 0.0 standard drinks     Drug use: No     Sexual activity: Not on file   Lifestyle     Physical activity     Days per week: Not on file     Minutes per session: Not on file     Stress: Not on file   Relationships     Social connections     Talks on phone: Not on file     Gets together: Not on file     Attends Synagogue service: Not on file     Active member of club or organization: Not on file     Attends meetings of clubs or organizations: Not on file     Relationship status: Not on file     Intimate partner violence     Fear of current or ex partner: Not on file     Emotionally abused: Not on file     Physically abused: Not on file     Forced sexual activity: Not on file   Other Topics Concern      Service Not Asked     Blood Transfusions Not Asked     Caffeine Concern No     Occupational Exposure Not Asked     Hobby Hazards Not Asked     Sleep Concern No     Stress Concern Not Asked     Weight Concern No     Special Diet No     Back Care Not Asked     Exercise Yes     Comment: active on farm     Bike Helmet Not Asked     Seat Belt Not Asked     Self-Exams Not Asked     Parent/sibling w/ CABG, MI or angioplasty before 65F 55M? No   Social History Narrative     Not on file       Outpatient Encounter Medications as of 11/9/2020   Medication Sig Dispense Refill     carvedilol (COREG) 12.5 MG tablet Take 1 tablet (12.5 mg) by mouth 2 times daily (with meals) 180 tablet 3     clopidogrel (PLAVIX) 75 MG tablet Take 75 mg by mouth daily       CYCLOBENZAPRINE HCL PO Take 5 mg by mouth At Bedtime Taking PRN (half tab)       cycloSPORINE (RESTASIS) 0.05 % ophthalmic emulsion Place 1 drop into both eyes 2 times daily        diphenhydrAMINE-acetaminophen (TYLENOL PM)  MG tablet Take 1 tablet by mouth nightly as needed for sleep        Famotidine (PEPCID PO) Take 40 mg by mouth daily        isosorbide mononitrate (IMDUR) 60 MG 24 hr tablet Take 60 mg by mouth daily       omeprazole (PRILOSEC) 20 MG DR capsule Take 20 mg by mouth daily       polyethylene glycol 400 (BLINK TEARS) 0.25 % SOLN ophthalmic solution Place 1 drop into both eyes 2 times daily as needed        rosuvastatin (CRESTOR) 20 MG tablet Take 1 tablet (20 mg) by mouth daily 90 tablet 2     sacubitril-valsartan (ENTRESTO)  MG per tablet Take 1 tablet by mouth 2 times daily 180 tablet 3     sotalol (BETAPACE) 80 MG tablet Take 1 tablet (80 mg) by mouth 2 times daily 60 tablet 11     tamsulosin (FLOMAX) 0.4 MG 24 hr capsule Take 0.4 mg by mouth daily       White Petrolatum-Mineral Oil (RETAINE PM) OINT Place into both eyes At Bedtime       Facility-Administered Encounter Medications as of 11/9/2020   Medication Dose Route Frequency Provider Last Rate Last Dose     [COMPLETED] perflutren diluted 1mL to 2mL with saline (OPTISON) diluted injection 9 mL  9 mL Intravenous Once Rah Ayala MD   9 mL at 11/09/20 1208     [COMPLETED] sodium chloride (PF) 0.9% PF flush 10 mL  10 mL Intravenous Once Rah Ayala MD   10 mL at 11/09/20 1208       Review Of Systems:  Skin: spts on back  Eyes: negative  Ears/Nose/Throat: negative  Respiratory: No shortness of breath, dyspnea on exertion, cough, or hemoptysis  Cardiovascular: negative  Gastrointestinal: negative  Genitourinary: negative  Musculoskeletal: negative  Neurologic: negative  Psychiatric: negative  Hematologic/Lymphatic/Immunologic: negative  Endocrine: negative      Objective:     /66   Pulse 60   SpO2 98%   Eyes: Conjunctivae/lids: Normal   ENT: Lips:  Normal  MSK: Normal  Cardiovascular: Peripheral edema none  Pulm: Breathing Normal  Neuro/Psych: Orientation: A/O x 3. Normal; Mood/Affect: Normal, NAD, WDWN  Pt accompanied by: self  Following areas examined: Scalp, face, eyelids, lips, neck, chest, buttock,  abdomen, back, and R&L upper and lower extremities. Pt defers exam of  hips, groin and genitals.   Whitney skin type:i   Findings:  Red smooth well-defined macules on trunk and extremities.  Brown, stuck-on scaly appearing papules on trunk and extremities.  Well circumscribed macules with symmetric color distribution on trunk and extremities.  Tan WD smooth macules on face, neck, trunk, and extremities.  Inflamed brown, stuck-on scaly appearing papules on right and left scapula  Pink gritty macule/s on left and right post auricular neck  Depigmentation of right upper back    Assessment and Plan:     1) Cherry angiomas, Seborrheic keratoses, Benign nevi, Lentigines     I discussed the specifics of tumor, prognosis, and genetics of benign lesions.  I explained that treatment of these lesions would be purely cosmetic and not medically neccessary.  I discussed with patient different removal options including excision, cryotherapy, cautery and /or laser.  Lesion may recur and/or may not completely resolve. May need additional treatment.     2) ISK x 2  Benign etiology and course of lesion.  LN2: Treated with LN2 for 5s for 1-2 cycles. Warned risks of blistering, pain, pigment change, scarring, and incomplete resolution.  Advised patient to return if lesions do not completely resolve within 2-3 months.  Wound care sheet given.  3) Actinic keratoses x 4 and solar elastosis    LN2 for 5 seconds x 2. Discussed AE include hypopigmentation (white spot) and recurrence. Follow up in 2-3 months to recheck lesions. There is a risk of AKs developing into a SCC.   Treatment options include LN2 vs PDT vs Efudex. Pt elected LN2    3) notalgia paresthetic  Disc topical steroid, moisturizer, amitriptyline, gabapentin  Pt defers.     4) history of NMSC  L chest basal cell carcinoma infiltrative mohs performed by Dr. Villalobos 5/4/17  Scalp basal cell carcinoma infiltrative mohs performed by Dr. Villalobos 5/4/17  No evidence of  recurrence  Signs and Symptoms of non-melanoma skin cancer and ABCDEs of melanoma reviewed with patient. Patient encouraged to perform monthly self skin exams and educated on how to perform them. UV precautions reviewed with patient. Patient was asked about new or changing moles/lesions on body.   Wear a sunscreen with at least SPF 30 on your face, ears, neck and V of the chest daily. Wear sunscreen on other areas of the body if those areas are exposed to the sun throughout the day. Sunscreens can contain physical and/or chemical blockers. Physical blockers are less likely to clog pores, these include zinc oxide and titanium dioxide. Reapply every two hour and after swimming. Sunscreen examples include Neutrogena, CeraVe, Blue Lizard, Elta MD and many others.    Proper skin care from [facility]:    -Eliminate harsh soaps as they strip the natural oils from the skin, often resulting in dry itchy skin ( i.e. Dial, Zest, [place])  -Use mild soaps such as Cetaphil or Dove Sensitive Skin in the shower. You do not need to use soap on arms, legs, and trunk every time you shower unless visibly soiled.   -Avoid hot or cold showers.  -After showering, lightly dry off and apply moisturizing within 2-3 minutes. This will help trap moisture in the skin.   -Aggressive use of a moisturizer at least 1-2 times a day to the entire body (including -Vanicream, Cetaphil, Aquaphor or Cerave) and moisturize hands after every washing.  -We recommend using moisturizers that come in a tub that needs to be scooped out, not a pump. This has more of an oil base. It will hold moisture in your skin much better than a water base moisturizer. The above recommended are non-pore clogging.               Follow up in yearly FBE. 2 months if aks not resolved.         Again, thank you for allowing me to participate in the care of your patient.        Sincerely,        Arielle Yougner PA-C

## 2020-11-10 LAB
MDC_IDC_LEAD_IMPLANT_DT: NORMAL
MDC_IDC_LEAD_LOCATION: NORMAL
MDC_IDC_LEAD_LOCATION_DETAIL_1: NORMAL
MDC_IDC_LEAD_MFG: NORMAL
MDC_IDC_LEAD_MODEL: NORMAL
MDC_IDC_LEAD_POLARITY_TYPE: NORMAL
MDC_IDC_LEAD_SERIAL: NORMAL
MDC_IDC_LEAD_SPECIAL_FUNCTION: NORMAL
MDC_IDC_MSMT_BATTERY_REMAINING_LONGEVITY: 51 MO
MDC_IDC_MSMT_CAP_CHARGE_DTM: NORMAL
MDC_IDC_MSMT_CAP_CHARGE_TIME: 9.25 S
MDC_IDC_MSMT_CAP_CHARGE_TYPE: NORMAL
MDC_IDC_MSMT_CAP_CHARGE_TYPE: NORMAL
MDC_IDC_MSMT_LEADCHNL_RA_SENSING_INTR_AMPL: 2.7 MV
MDC_IDC_MSMT_LEADCHNL_RV_SENSING_INTR_AMPL: 11.7 MV
MDC_IDC_PG_IMPLANT_DTM: NORMAL
MDC_IDC_PG_MFG: NORMAL
MDC_IDC_PG_MODEL: NORMAL
MDC_IDC_PG_SERIAL: NORMAL
MDC_IDC_PG_TYPE: NORMAL
MDC_IDC_SESS_CLINIC_NAME: NORMAL
MDC_IDC_SESS_DTM: NORMAL
MDC_IDC_SESS_TYPE: NORMAL
MDC_IDC_SET_BRADY_AT_MODE_SWITCH_MODE: NORMAL
MDC_IDC_SET_BRADY_AT_MODE_SWITCH_RATE: 170 {BEATS}/MIN
MDC_IDC_SET_BRADY_HYSTRATE: NORMAL
MDC_IDC_SET_BRADY_LOWRATE: 60 {BEATS}/MIN
MDC_IDC_SET_BRADY_MAX_SENSOR_RATE: 120 {BEATS}/MIN
MDC_IDC_SET_BRADY_MAX_TRACKING_RATE: 120 {BEATS}/MIN
MDC_IDC_SET_BRADY_MODE: NORMAL
MDC_IDC_SET_BRADY_NIGHT_RATE: NORMAL
MDC_IDC_SET_BRADY_PAV_DELAY_HIGH: 100 MS
MDC_IDC_SET_BRADY_PAV_DELAY_LOW: 150 MS
MDC_IDC_SET_BRADY_SAV_DELAY_HIGH: 100 MS
MDC_IDC_SET_BRADY_SAV_DELAY_LOW: 120 MS
MDC_IDC_SET_CRT_LVRV_DELAY: 0 MS
MDC_IDC_SET_CRT_PACED_CHAMBERS: NORMAL
MDC_IDC_SET_LEADCHNL_LV_PACING_AMPLITUDE: 2 V
MDC_IDC_SET_LEADCHNL_LV_PACING_CAPTURE_MODE: NORMAL
MDC_IDC_SET_LEADCHNL_LV_PACING_POLARITY: NORMAL
MDC_IDC_SET_LEADCHNL_LV_PACING_PULSEWIDTH: 0.5 MS
MDC_IDC_SET_LEADCHNL_RA_PACING_AMPLITUDE: 2 V
MDC_IDC_SET_LEADCHNL_RA_PACING_CAPTURE_MODE: NORMAL
MDC_IDC_SET_LEADCHNL_RA_PACING_POLARITY: NORMAL
MDC_IDC_SET_LEADCHNL_RA_PACING_PULSEWIDTH: 0.5 MS
MDC_IDC_SET_LEADCHNL_RA_SENSING_ADAPTATION_MODE: NORMAL
MDC_IDC_SET_LEADCHNL_RA_SENSING_POLARITY: NORMAL
MDC_IDC_SET_LEADCHNL_RA_SENSING_SENSITIVITY: 0.3 MV
MDC_IDC_SET_LEADCHNL_RV_PACING_AMPLITUDE: 2.5 V
MDC_IDC_SET_LEADCHNL_RV_PACING_CAPTURE_MODE: NORMAL
MDC_IDC_SET_LEADCHNL_RV_PACING_POLARITY: NORMAL
MDC_IDC_SET_LEADCHNL_RV_PACING_PULSEWIDTH: 0.5 MS
MDC_IDC_SET_LEADCHNL_RV_SENSING_POLARITY: NORMAL
MDC_IDC_SET_LEADCHNL_RV_SENSING_SENSITIVITY: 0.5 MV
MDC_IDC_SET_ZONE_DETECTION_INTERVAL: 300 MS
MDC_IDC_SET_ZONE_DETECTION_INTERVAL: 350 MS
MDC_IDC_SET_ZONE_TYPE: NORMAL
MDC_IDC_SET_ZONE_VENDOR_TYPE: NORMAL
MDC_IDC_STAT_AT_DTM_END: NORMAL
MDC_IDC_STAT_AT_DTM_START: NORMAL
MDC_IDC_STAT_AT_MODE_SW_COUNT: 0
MDC_IDC_STAT_BRADY_DTM_END: NORMAL
MDC_IDC_STAT_BRADY_DTM_START: NORMAL
MDC_IDC_STAT_BRADY_RA_PERCENT_PACED: 32 %
MDC_IDC_STAT_BRADY_RV_PERCENT_PACED: 88 %
MDC_IDC_STAT_EPISODE_RECENT_COUNT: 0
MDC_IDC_STAT_EPISODE_RECENT_COUNT: 0
MDC_IDC_STAT_EPISODE_RECENT_COUNT_DTM_END: NORMAL
MDC_IDC_STAT_EPISODE_RECENT_COUNT_DTM_END: NORMAL
MDC_IDC_STAT_EPISODE_RECENT_COUNT_DTM_START: NORMAL
MDC_IDC_STAT_EPISODE_RECENT_COUNT_DTM_START: NORMAL
MDC_IDC_STAT_EPISODE_TYPE: NORMAL
MDC_IDC_STAT_EPISODE_TYPE: NORMAL
MDC_IDC_STAT_EPISODE_VENDOR_TYPE: NORMAL
MDC_IDC_STAT_HEART_RATE_DTM_END: NORMAL
MDC_IDC_STAT_HEART_RATE_DTM_START: NORMAL
MDC_IDC_STAT_TACHYTHERAPY_ATP_DELIVERED_RECENT: 0
MDC_IDC_STAT_TACHYTHERAPY_RECENT_DTM_END: NORMAL
MDC_IDC_STAT_TACHYTHERAPY_RECENT_DTM_START: NORMAL
MDC_IDC_STAT_TACHYTHERAPY_SHOCKS_ABORTED_RECENT: 0
MDC_IDC_STAT_TACHYTHERAPY_SHOCKS_DELIVERED_RECENT: 0

## 2020-12-02 DIAGNOSIS — I50.22 CHRONIC SYSTOLIC HEART FAILURE (H): ICD-10-CM

## 2020-12-02 RX ORDER — SACUBITRIL AND VALSARTAN 97; 103 MG/1; MG/1
1 TABLET, FILM COATED ORAL 2 TIMES DAILY
Qty: 180 TABLET | Refills: 1 | Status: SHIPPED | OUTPATIENT
Start: 2020-12-02 | End: 2022-10-24

## 2021-02-08 ENCOUNTER — ANCILLARY PROCEDURE (OUTPATIENT)
Dept: CARDIOLOGY | Facility: CLINIC | Age: 86
End: 2021-02-08
Attending: INTERNAL MEDICINE
Payer: MEDICARE

## 2021-02-08 DIAGNOSIS — I42.0 DILATED CARDIOMYOPATHY (H): ICD-10-CM

## 2021-02-08 DIAGNOSIS — I42.9 PRIMARY CARDIOMYOPATHY (H): Primary | ICD-10-CM

## 2021-02-08 DIAGNOSIS — Z95.810 ICD (IMPLANTABLE CARDIOVERTER-DEFIBRILLATOR) IN PLACE: ICD-10-CM

## 2021-02-08 DIAGNOSIS — Z95.810 ICD (IMPLANTABLE CARDIOVERTER-DEFIBRILLATOR), BIVENTRICULAR, IN SITU: ICD-10-CM

## 2021-02-08 PROCEDURE — 93295 DEV INTERROG REMOTE 1/2/MLT: CPT | Performed by: INTERNAL MEDICINE

## 2021-02-08 PROCEDURE — 93296 REM INTERROG EVL PM/IDS: CPT | Performed by: INTERNAL MEDICINE

## 2021-02-22 LAB
MDC_IDC_EPISODE_DTM: NORMAL
MDC_IDC_EPISODE_DURATION: 10 S
MDC_IDC_EPISODE_ID: NORMAL
MDC_IDC_EPISODE_TYPE: NORMAL
MDC_IDC_EPISODE_VENDOR_TYPE: NORMAL
MDC_IDC_LEAD_IMPLANT_DT: NORMAL
MDC_IDC_LEAD_LOCATION: NORMAL
MDC_IDC_LEAD_LOCATION_DETAIL_1: NORMAL
MDC_IDC_LEAD_MFG: NORMAL
MDC_IDC_LEAD_MODEL: NORMAL
MDC_IDC_LEAD_POLARITY_TYPE: NORMAL
MDC_IDC_LEAD_SERIAL: NORMAL
MDC_IDC_LEAD_SPECIAL_FUNCTION: NORMAL
MDC_IDC_MSMT_BATTERY_DTM: NORMAL
MDC_IDC_MSMT_BATTERY_REMAINING_LONGEVITY: 48 MO
MDC_IDC_MSMT_BATTERY_REMAINING_PERCENTAGE: 62 %
MDC_IDC_MSMT_BATTERY_RRT_TRIGGER: NORMAL
MDC_IDC_MSMT_BATTERY_STATUS: NORMAL
MDC_IDC_MSMT_BATTERY_VOLTAGE: 2.96 V
MDC_IDC_MSMT_CAP_CHARGE_DTM: NORMAL
MDC_IDC_MSMT_CAP_CHARGE_ENERGY: 40 J
MDC_IDC_MSMT_CAP_CHARGE_TIME: 9.4 S
MDC_IDC_MSMT_CAP_CHARGE_TYPE: NORMAL
MDC_IDC_MSMT_LEADCHNL_LV_IMPEDANCE_VALUE: 390 OHM
MDC_IDC_MSMT_LEADCHNL_LV_LEAD_CHANNEL_STATUS: NORMAL
MDC_IDC_MSMT_LEADCHNL_LV_PACING_THRESHOLD_AMPLITUDE: 0.75 V
MDC_IDC_MSMT_LEADCHNL_LV_PACING_THRESHOLD_PULSEWIDTH: 0.5 MS
MDC_IDC_MSMT_LEADCHNL_RA_IMPEDANCE_VALUE: 440 OHM
MDC_IDC_MSMT_LEADCHNL_RA_LEAD_CHANNEL_STATUS: NORMAL
MDC_IDC_MSMT_LEADCHNL_RA_PACING_THRESHOLD_AMPLITUDE: 0.5 V
MDC_IDC_MSMT_LEADCHNL_RA_PACING_THRESHOLD_PULSEWIDTH: 0.5 MS
MDC_IDC_MSMT_LEADCHNL_RA_SENSING_INTR_AMPL: 3.8 MV
MDC_IDC_MSMT_LEADCHNL_RV_IMPEDANCE_VALUE: 410 OHM
MDC_IDC_MSMT_LEADCHNL_RV_LEAD_CHANNEL_STATUS: NORMAL
MDC_IDC_MSMT_LEADCHNL_RV_PACING_THRESHOLD_AMPLITUDE: 1.25 V
MDC_IDC_MSMT_LEADCHNL_RV_PACING_THRESHOLD_PULSEWIDTH: 0.5 MS
MDC_IDC_MSMT_LEADCHNL_RV_SENSING_INTR_AMPL: 12 MV
MDC_IDC_PG_IMPLANT_DTM: NORMAL
MDC_IDC_PG_MFG: NORMAL
MDC_IDC_PG_MODEL: NORMAL
MDC_IDC_PG_SERIAL: NORMAL
MDC_IDC_PG_TYPE: NORMAL
MDC_IDC_SESS_CLINIC_NAME: NORMAL
MDC_IDC_SESS_DTM: NORMAL
MDC_IDC_SESS_REPROGRAMMED: NO
MDC_IDC_SESS_TYPE: NORMAL
MDC_IDC_SET_BRADY_AT_MODE_SWITCH_MODE: NORMAL
MDC_IDC_SET_BRADY_AT_MODE_SWITCH_RATE: 170 {BEATS}/MIN
MDC_IDC_SET_BRADY_LOWRATE: 60 {BEATS}/MIN
MDC_IDC_SET_BRADY_MAX_SENSOR_RATE: 120 {BEATS}/MIN
MDC_IDC_SET_BRADY_MAX_TRACKING_RATE: 120 {BEATS}/MIN
MDC_IDC_SET_BRADY_MODE: NORMAL
MDC_IDC_SET_BRADY_PAV_DELAY_HIGH: 100 MS
MDC_IDC_SET_BRADY_PAV_DELAY_LOW: 150 MS
MDC_IDC_SET_BRADY_SAV_DELAY_HIGH: 100 MS
MDC_IDC_SET_BRADY_SAV_DELAY_LOW: 120 MS
MDC_IDC_SET_CRT_LVRV_DELAY: 0 MS
MDC_IDC_SET_CRT_PACED_CHAMBERS: NORMAL
MDC_IDC_SET_LEADCHNL_LV_PACING_AMPLITUDE: 2 V
MDC_IDC_SET_LEADCHNL_LV_PACING_ANODE_ELECTRODE_1: NORMAL
MDC_IDC_SET_LEADCHNL_LV_PACING_ANODE_LOCATION_1: NORMAL
MDC_IDC_SET_LEADCHNL_LV_PACING_CAPTURE_MODE: NORMAL
MDC_IDC_SET_LEADCHNL_LV_PACING_CATHODE_ELECTRODE_1: NORMAL
MDC_IDC_SET_LEADCHNL_LV_PACING_CATHODE_LOCATION_1: NORMAL
MDC_IDC_SET_LEADCHNL_LV_PACING_POLARITY: NORMAL
MDC_IDC_SET_LEADCHNL_LV_PACING_PULSEWIDTH: 0.5 MS
MDC_IDC_SET_LEADCHNL_RA_PACING_AMPLITUDE: 2 V
MDC_IDC_SET_LEADCHNL_RA_PACING_ANODE_ELECTRODE_1: NORMAL
MDC_IDC_SET_LEADCHNL_RA_PACING_ANODE_LOCATION_1: NORMAL
MDC_IDC_SET_LEADCHNL_RA_PACING_CAPTURE_MODE: NORMAL
MDC_IDC_SET_LEADCHNL_RA_PACING_CATHODE_ELECTRODE_1: NORMAL
MDC_IDC_SET_LEADCHNL_RA_PACING_CATHODE_LOCATION_1: NORMAL
MDC_IDC_SET_LEADCHNL_RA_PACING_POLARITY: NORMAL
MDC_IDC_SET_LEADCHNL_RA_PACING_PULSEWIDTH: 0.5 MS
MDC_IDC_SET_LEADCHNL_RA_SENSING_ADAPTATION_MODE: NORMAL
MDC_IDC_SET_LEADCHNL_RA_SENSING_ANODE_ELECTRODE_1: NORMAL
MDC_IDC_SET_LEADCHNL_RA_SENSING_ANODE_LOCATION_1: NORMAL
MDC_IDC_SET_LEADCHNL_RA_SENSING_CATHODE_ELECTRODE_1: NORMAL
MDC_IDC_SET_LEADCHNL_RA_SENSING_CATHODE_LOCATION_1: NORMAL
MDC_IDC_SET_LEADCHNL_RA_SENSING_POLARITY: NORMAL
MDC_IDC_SET_LEADCHNL_RA_SENSING_SENSITIVITY: 0.3 MV
MDC_IDC_SET_LEADCHNL_RV_PACING_AMPLITUDE: 2.5 V
MDC_IDC_SET_LEADCHNL_RV_PACING_ANODE_ELECTRODE_1: NORMAL
MDC_IDC_SET_LEADCHNL_RV_PACING_ANODE_LOCATION_1: NORMAL
MDC_IDC_SET_LEADCHNL_RV_PACING_CAPTURE_MODE: NORMAL
MDC_IDC_SET_LEADCHNL_RV_PACING_CATHODE_ELECTRODE_1: NORMAL
MDC_IDC_SET_LEADCHNL_RV_PACING_CATHODE_LOCATION_1: NORMAL
MDC_IDC_SET_LEADCHNL_RV_PACING_POLARITY: NORMAL
MDC_IDC_SET_LEADCHNL_RV_PACING_PULSEWIDTH: 0.5 MS
MDC_IDC_SET_LEADCHNL_RV_SENSING_ADAPTATION_MODE: NORMAL
MDC_IDC_SET_LEADCHNL_RV_SENSING_ANODE_ELECTRODE_1: NORMAL
MDC_IDC_SET_LEADCHNL_RV_SENSING_ANODE_LOCATION_1: NORMAL
MDC_IDC_SET_LEADCHNL_RV_SENSING_CATHODE_ELECTRODE_1: NORMAL
MDC_IDC_SET_LEADCHNL_RV_SENSING_CATHODE_LOCATION_1: NORMAL
MDC_IDC_SET_LEADCHNL_RV_SENSING_POLARITY: NORMAL
MDC_IDC_SET_LEADCHNL_RV_SENSING_SENSITIVITY: 0.5 MV
MDC_IDC_SET_ZONE_DETECTION_INTERVAL: 300 MS
MDC_IDC_SET_ZONE_DETECTION_INTERVAL: 350 MS
MDC_IDC_SET_ZONE_TYPE: NORMAL
MDC_IDC_SET_ZONE_VENDOR_TYPE: NORMAL
MDC_IDC_STAT_AT_BURDEN_PERCENT: 1 %
MDC_IDC_STAT_AT_DTM_END: NORMAL
MDC_IDC_STAT_AT_DTM_START: NORMAL
MDC_IDC_STAT_AT_MODE_SW_COUNT: 1
MDC_IDC_STAT_AT_MODE_SW_COUNT_PER_DAY: 0
MDC_IDC_STAT_AT_MODE_SW_MAX_DURATION: 10 S
MDC_IDC_STAT_AT_MODE_SW_PERCENT_TIME: 1 %
MDC_IDC_STAT_BRADY_AP_VP_PERCENT: 47 %
MDC_IDC_STAT_BRADY_AP_VS_PERCENT: 1 %
MDC_IDC_STAT_BRADY_AS_VP_PERCENT: 51 %
MDC_IDC_STAT_BRADY_AS_VS_PERCENT: 1 %
MDC_IDC_STAT_BRADY_DTM_END: NORMAL
MDC_IDC_STAT_BRADY_DTM_START: NORMAL
MDC_IDC_STAT_BRADY_RA_PERCENT_PACED: 43 %
MDC_IDC_STAT_CRT_DTM_END: NORMAL
MDC_IDC_STAT_CRT_DTM_START: NORMAL
MDC_IDC_STAT_CRT_PERCENT_PACED: 98 %
MDC_IDC_STAT_HEART_RATE_ATRIAL_MAX: 250 {BEATS}/MIN
MDC_IDC_STAT_HEART_RATE_ATRIAL_MEAN: 71 {BEATS}/MIN
MDC_IDC_STAT_HEART_RATE_ATRIAL_MIN: 50 {BEATS}/MIN
MDC_IDC_STAT_HEART_RATE_DTM_END: NORMAL
MDC_IDC_STAT_HEART_RATE_DTM_START: NORMAL
MDC_IDC_STAT_HEART_RATE_VENTRICULAR_MAX: 330 {BEATS}/MIN
MDC_IDC_STAT_HEART_RATE_VENTRICULAR_MEAN: 71 {BEATS}/MIN
MDC_IDC_STAT_HEART_RATE_VENTRICULAR_MIN: 50 {BEATS}/MIN
MDC_IDC_STAT_TACHYTHERAPY_ATP_DELIVERED_RECENT: 0
MDC_IDC_STAT_TACHYTHERAPY_RECENT_DTM_END: NORMAL
MDC_IDC_STAT_TACHYTHERAPY_RECENT_DTM_START: NORMAL
MDC_IDC_STAT_TACHYTHERAPY_SHOCKS_ABORTED_RECENT: 0
MDC_IDC_STAT_TACHYTHERAPY_SHOCKS_DELIVERED_RECENT: 0

## 2021-03-15 ENCOUNTER — CARE COORDINATION (OUTPATIENT)
Dept: CARDIOLOGY | Facility: CLINIC | Age: 86
End: 2021-03-15

## 2021-03-15 NOTE — PROGRESS NOTES
Received vm from patient stating he had an episode on 3/7 or 3/8 which concerned him. Patient asking if we received any notification on his device. Patient states he was walking around and doing some work on his property, but was nothing overly strenuous, when he realized he had a surge that felt like an adrenaline spike with weakness. Patient denies syncope or pre-syncope, chest pain or shortness of breath at that time. Patient states it was sometime in the afternoon on either 3/7 or 3/8. Patient states since that time he has not had any further symptoms. He said he's feeling fine now but is curious if his device might have recorded anything abnormal and would like a courtesy check. I told him if he was shocked the Device team would've been alerted. Routed to EP to reach out to the patient to setup a remote courtesy check. Jayne Kelly RN on 3/15/2021 at 4:03 PM          March 19, 2021  11:44 AM  Addendum    See 3/16/21 EP RN note - courtesy device check completed. No concerning findings reported. Patient reported no further episodes to Glen LLANOS RN. Jayne Kelly RN on 3/19/2021 at 11:45 AM

## 2021-03-16 ENCOUNTER — TELEPHONE (OUTPATIENT)
Dept: CARDIOLOGY | Facility: CLINIC | Age: 86
End: 2021-03-16

## 2021-03-16 NOTE — TELEPHONE ENCOUNTER
"Called patient to complete courtesy check to assess for episodes on 3/7 and 3/8. Per patient is feeling well today with no chest pain or shortness of breath. Contacted us to ensure no concerning episodes were logged on those dates.     Presenting rhythm shows AS/BiVP rhythm in the 60's with PAC's. AP 43%, BiVP 98%. Alert notification states PMT has occurred but there are no EGMs available for review. Lead measurements remain stable. No arrhythmias logged. Estimated battery longevity 3.9 years.     Discussed transmission findings with patient. Stated that there have been no concerning episodes logged that correlate with patients symptoms of \"adrenaline rush\" on 3/7 and 3/8. Reviewed with patient how we receive alerts at the device clinic and stated that we will call him if anything concerning is saved on his device. Patient verbalized understanding. Encouraged patient to call with any further questions or concerns.     Patient is scheduled for next remote device check on 5/10/2021.   "

## 2021-05-10 ENCOUNTER — TELEPHONE (OUTPATIENT)
Dept: CARDIOLOGY | Facility: CLINIC | Age: 86
End: 2021-05-10

## 2021-05-10 ENCOUNTER — ANCILLARY PROCEDURE (OUTPATIENT)
Dept: CARDIOLOGY | Facility: CLINIC | Age: 86
End: 2021-05-10
Attending: INTERNAL MEDICINE
Payer: MEDICARE

## 2021-05-10 DIAGNOSIS — I42.9 PRIMARY CARDIOMYOPATHY (H): ICD-10-CM

## 2021-05-10 DIAGNOSIS — Z95.810 ICD (IMPLANTABLE CARDIOVERTER-DEFIBRILLATOR) IN PLACE: ICD-10-CM

## 2021-05-10 PROCEDURE — 93296 REM INTERROG EVL PM/IDS: CPT | Performed by: INTERNAL MEDICINE

## 2021-05-10 PROCEDURE — 93295 DEV INTERROG REMOTE 1/2/MLT: CPT | Performed by: INTERNAL MEDICINE

## 2021-05-11 LAB
MDC_IDC_EPISODE_DTM: NORMAL
MDC_IDC_EPISODE_DURATION: 8 S
MDC_IDC_EPISODE_ID: NORMAL
MDC_IDC_EPISODE_TYPE: NORMAL
MDC_IDC_EPISODE_VENDOR_TYPE: NORMAL
MDC_IDC_LEAD_IMPLANT_DT: NORMAL
MDC_IDC_LEAD_LOCATION: NORMAL
MDC_IDC_LEAD_LOCATION_DETAIL_1: NORMAL
MDC_IDC_LEAD_MFG: NORMAL
MDC_IDC_LEAD_MODEL: NORMAL
MDC_IDC_LEAD_POLARITY_TYPE: NORMAL
MDC_IDC_LEAD_POLARITY_TYPE: NORMAL
MDC_IDC_LEAD_SERIAL: NORMAL
MDC_IDC_LEAD_SPECIAL_FUNCTION: NORMAL
MDC_IDC_MSMT_BATTERY_DTM: NORMAL
MDC_IDC_MSMT_BATTERY_REMAINING_LONGEVITY: 46 MO
MDC_IDC_MSMT_BATTERY_REMAINING_PERCENTAGE: 58 %
MDC_IDC_MSMT_BATTERY_RRT_TRIGGER: NORMAL
MDC_IDC_MSMT_BATTERY_STATUS: NORMAL
MDC_IDC_MSMT_BATTERY_VOLTAGE: 2.95 V
MDC_IDC_MSMT_CAP_CHARGE_DTM: NORMAL
MDC_IDC_MSMT_CAP_CHARGE_ENERGY: 40 J
MDC_IDC_MSMT_CAP_CHARGE_TIME: 9.3 S
MDC_IDC_MSMT_CAP_CHARGE_TYPE: NORMAL
MDC_IDC_MSMT_LEADCHNL_LV_IMPEDANCE_VALUE: 380 OHM
MDC_IDC_MSMT_LEADCHNL_LV_LEAD_CHANNEL_STATUS: NORMAL
MDC_IDC_MSMT_LEADCHNL_LV_PACING_THRESHOLD_AMPLITUDE: 0.75 V
MDC_IDC_MSMT_LEADCHNL_LV_PACING_THRESHOLD_PULSEWIDTH: 0.5 MS
MDC_IDC_MSMT_LEADCHNL_RA_IMPEDANCE_VALUE: 430 OHM
MDC_IDC_MSMT_LEADCHNL_RA_LEAD_CHANNEL_STATUS: NORMAL
MDC_IDC_MSMT_LEADCHNL_RA_PACING_THRESHOLD_AMPLITUDE: 0.5 V
MDC_IDC_MSMT_LEADCHNL_RA_PACING_THRESHOLD_PULSEWIDTH: 0.5 MS
MDC_IDC_MSMT_LEADCHNL_RA_SENSING_INTR_AMPL: 3.8 MV
MDC_IDC_MSMT_LEADCHNL_RV_IMPEDANCE_VALUE: 410 OHM
MDC_IDC_MSMT_LEADCHNL_RV_LEAD_CHANNEL_STATUS: NORMAL
MDC_IDC_MSMT_LEADCHNL_RV_PACING_THRESHOLD_AMPLITUDE: 1.25 V
MDC_IDC_MSMT_LEADCHNL_RV_PACING_THRESHOLD_PULSEWIDTH: 0.5 MS
MDC_IDC_MSMT_LEADCHNL_RV_SENSING_INTR_AMPL: 12 MV
MDC_IDC_PG_IMPLANT_DTM: NORMAL
MDC_IDC_PG_MFG: NORMAL
MDC_IDC_PG_MODEL: NORMAL
MDC_IDC_PG_SERIAL: NORMAL
MDC_IDC_PG_TYPE: NORMAL
MDC_IDC_SESS_CLINIC_NAME: NORMAL
MDC_IDC_SESS_DTM: NORMAL
MDC_IDC_SESS_REPROGRAMMED: NO
MDC_IDC_SESS_TYPE: NORMAL
MDC_IDC_SET_BRADY_AT_MODE_SWITCH_MODE: NORMAL
MDC_IDC_SET_BRADY_AT_MODE_SWITCH_RATE: 170 {BEATS}/MIN
MDC_IDC_SET_BRADY_LOWRATE: 60 {BEATS}/MIN
MDC_IDC_SET_BRADY_MAX_SENSOR_RATE: 120 {BEATS}/MIN
MDC_IDC_SET_BRADY_MAX_TRACKING_RATE: 120 {BEATS}/MIN
MDC_IDC_SET_BRADY_MODE: NORMAL
MDC_IDC_SET_BRADY_PAV_DELAY_HIGH: 100 MS
MDC_IDC_SET_BRADY_PAV_DELAY_LOW: 150 MS
MDC_IDC_SET_BRADY_SAV_DELAY_HIGH: 100 MS
MDC_IDC_SET_BRADY_SAV_DELAY_LOW: 120 MS
MDC_IDC_SET_CRT_LVRV_DELAY: 0 MS
MDC_IDC_SET_CRT_PACED_CHAMBERS: NORMAL
MDC_IDC_SET_LEADCHNL_LV_PACING_AMPLITUDE: 2 V
MDC_IDC_SET_LEADCHNL_LV_PACING_ANODE_ELECTRODE_1: NORMAL
MDC_IDC_SET_LEADCHNL_LV_PACING_ANODE_LOCATION_1: NORMAL
MDC_IDC_SET_LEADCHNL_LV_PACING_CAPTURE_MODE: NORMAL
MDC_IDC_SET_LEADCHNL_LV_PACING_CATHODE_ELECTRODE_1: NORMAL
MDC_IDC_SET_LEADCHNL_LV_PACING_CATHODE_LOCATION_1: NORMAL
MDC_IDC_SET_LEADCHNL_LV_PACING_POLARITY: NORMAL
MDC_IDC_SET_LEADCHNL_LV_PACING_PULSEWIDTH: 0.5 MS
MDC_IDC_SET_LEADCHNL_RA_PACING_AMPLITUDE: 2 V
MDC_IDC_SET_LEADCHNL_RA_PACING_ANODE_ELECTRODE_1: NORMAL
MDC_IDC_SET_LEADCHNL_RA_PACING_ANODE_LOCATION_1: NORMAL
MDC_IDC_SET_LEADCHNL_RA_PACING_CAPTURE_MODE: NORMAL
MDC_IDC_SET_LEADCHNL_RA_PACING_CATHODE_ELECTRODE_1: NORMAL
MDC_IDC_SET_LEADCHNL_RA_PACING_CATHODE_LOCATION_1: NORMAL
MDC_IDC_SET_LEADCHNL_RA_PACING_POLARITY: NORMAL
MDC_IDC_SET_LEADCHNL_RA_PACING_PULSEWIDTH: 0.5 MS
MDC_IDC_SET_LEADCHNL_RA_SENSING_ADAPTATION_MODE: NORMAL
MDC_IDC_SET_LEADCHNL_RA_SENSING_ANODE_ELECTRODE_1: NORMAL
MDC_IDC_SET_LEADCHNL_RA_SENSING_ANODE_LOCATION_1: NORMAL
MDC_IDC_SET_LEADCHNL_RA_SENSING_CATHODE_ELECTRODE_1: NORMAL
MDC_IDC_SET_LEADCHNL_RA_SENSING_CATHODE_LOCATION_1: NORMAL
MDC_IDC_SET_LEADCHNL_RA_SENSING_POLARITY: NORMAL
MDC_IDC_SET_LEADCHNL_RA_SENSING_SENSITIVITY: 0.3 MV
MDC_IDC_SET_LEADCHNL_RV_PACING_AMPLITUDE: 2.5 V
MDC_IDC_SET_LEADCHNL_RV_PACING_ANODE_ELECTRODE_1: NORMAL
MDC_IDC_SET_LEADCHNL_RV_PACING_ANODE_LOCATION_1: NORMAL
MDC_IDC_SET_LEADCHNL_RV_PACING_CAPTURE_MODE: NORMAL
MDC_IDC_SET_LEADCHNL_RV_PACING_CATHODE_ELECTRODE_1: NORMAL
MDC_IDC_SET_LEADCHNL_RV_PACING_CATHODE_LOCATION_1: NORMAL
MDC_IDC_SET_LEADCHNL_RV_PACING_POLARITY: NORMAL
MDC_IDC_SET_LEADCHNL_RV_PACING_PULSEWIDTH: 0.5 MS
MDC_IDC_SET_LEADCHNL_RV_SENSING_ADAPTATION_MODE: NORMAL
MDC_IDC_SET_LEADCHNL_RV_SENSING_ANODE_ELECTRODE_1: NORMAL
MDC_IDC_SET_LEADCHNL_RV_SENSING_ANODE_LOCATION_1: NORMAL
MDC_IDC_SET_LEADCHNL_RV_SENSING_CATHODE_ELECTRODE_1: NORMAL
MDC_IDC_SET_LEADCHNL_RV_SENSING_CATHODE_LOCATION_1: NORMAL
MDC_IDC_SET_LEADCHNL_RV_SENSING_POLARITY: NORMAL
MDC_IDC_SET_LEADCHNL_RV_SENSING_SENSITIVITY: 0.5 MV
MDC_IDC_SET_ZONE_DETECTION_INTERVAL: 300 MS
MDC_IDC_SET_ZONE_DETECTION_INTERVAL: 350 MS
MDC_IDC_SET_ZONE_TYPE: NORMAL
MDC_IDC_SET_ZONE_VENDOR_TYPE: NORMAL
MDC_IDC_STAT_AT_BURDEN_PERCENT: 0 %
MDC_IDC_STAT_AT_DTM_END: NORMAL
MDC_IDC_STAT_AT_DTM_START: NORMAL
MDC_IDC_STAT_AT_MODE_SW_COUNT: 3
MDC_IDC_STAT_AT_MODE_SW_COUNT_PER_DAY: 0
MDC_IDC_STAT_AT_MODE_SW_MAX_DURATION: 8 S
MDC_IDC_STAT_AT_MODE_SW_PERCENT_TIME: 1 %
MDC_IDC_STAT_BRADY_AP_VP_PERCENT: 40 %
MDC_IDC_STAT_BRADY_AP_VS_PERCENT: 1 %
MDC_IDC_STAT_BRADY_AS_VP_PERCENT: 57 %
MDC_IDC_STAT_BRADY_AS_VS_PERCENT: 1 %
MDC_IDC_STAT_BRADY_DTM_END: NORMAL
MDC_IDC_STAT_BRADY_DTM_START: NORMAL
MDC_IDC_STAT_BRADY_RA_PERCENT_PACED: 36 %
MDC_IDC_STAT_CRT_DTM_END: NORMAL
MDC_IDC_STAT_CRT_DTM_START: NORMAL
MDC_IDC_STAT_CRT_PERCENT_PACED: 97 %
MDC_IDC_STAT_HEART_RATE_ATRIAL_MAX: 220 {BEATS}/MIN
MDC_IDC_STAT_HEART_RATE_ATRIAL_MEAN: 73 {BEATS}/MIN
MDC_IDC_STAT_HEART_RATE_ATRIAL_MIN: 50 {BEATS}/MIN
MDC_IDC_STAT_HEART_RATE_DTM_END: NORMAL
MDC_IDC_STAT_HEART_RATE_DTM_START: NORMAL
MDC_IDC_STAT_HEART_RATE_VENTRICULAR_MAX: 330 {BEATS}/MIN
MDC_IDC_STAT_HEART_RATE_VENTRICULAR_MEAN: 73 {BEATS}/MIN
MDC_IDC_STAT_HEART_RATE_VENTRICULAR_MIN: 50 {BEATS}/MIN
MDC_IDC_STAT_TACHYTHERAPY_ATP_DELIVERED_RECENT: 0
MDC_IDC_STAT_TACHYTHERAPY_RECENT_DTM_END: NORMAL
MDC_IDC_STAT_TACHYTHERAPY_RECENT_DTM_START: NORMAL
MDC_IDC_STAT_TACHYTHERAPY_SHOCKS_ABORTED_RECENT: 0
MDC_IDC_STAT_TACHYTHERAPY_SHOCKS_DELIVERED_RECENT: 0

## 2021-07-06 NOTE — PROGRESS NOTES
HPI and Plan:   See dictation    Orders Placed This Encounter   Procedures     Basic metabolic panel     Lipid Profile     ALT     Follow-Up with [facility] - Return MD visit     Echocardiogram Complete       Orders Placed This Encounter   Medications     clopidogrel (PLAVIX) 75 MG tablet     Sig: Take 75 mg by mouth daily     DISCONTD: simvastatin (ZOCOR) 20 MG tablet     Sig: Take 20 mg by mouth daily     Hypromellose (RETAINE [facility] OP)     Sig: Apply to eye daily     cycloSPORINE (RESTASIS) 0.05 % ophthalmic emulsion     Sig: daily     rosuvastatin (CRESTOR) 20 MG tablet     Sig: Take 1 tablet (20 mg) by mouth daily     Dispense:  90 tablet     Refill:  3       Medications Discontinued During This Encounter   Medication Reason     Clopidogrel Bisulfate (PLAVIX PO) Medication Reconciliation Clean Up     simvastatin (ZOCOR) 10 MG tablet Medication Reconciliation Clean Up     simvastatin (ZOCOR) 20 MG tablet          Encounter Diagnoses   Name Primary?     Chronic systolic heart failure (H) Yes     Chest pain, unspecified type      ICD (implantable cardioverter-defibrillator), biventricular, in situ      Dilated cardiomyopathy (H)      Essential hypertension with goal blood pressure less than 140/90      Hypercholesterolemia        CURRENT MEDICATIONS:  Current Outpatient Medications   Medication Sig Dispense Refill     carvedilol (COREG) 12.5 MG tablet Take 1 tablet (12.5 mg) by mouth 2 times daily (with meals) 180 tablet 3     clopidogrel (PLAVIX) 75 MG tablet Take 75 mg by mouth daily       CYCLOBENZAPRINE HCL PO Take 5 mg by mouth At Bedtime Taking PRN       cycloSPORINE (RESTASIS) 0.05 % ophthalmic emulsion daily       diphenhydrAMINE-acetaminophen (TYLENOL PM)  MG tablet Take 1 tablet by mouth nightly as needed for sleep       Famotidine (PEPCID PO) Take 20 mg by mouth 2 times daily       Hypromellose (RETAINE [facility] OP) Apply to eye daily       isosorbide mononitrate (IMDUR) 60 MG 24 hr tablet Take 60 mg  by mouth daily       Omeprazole (PRILOSEC PO) Take 20 mg by mouth daily       polyethylene glycol 400 (BLINK TEARS) 0.25 % SOLN ophthalmic solution 1 drop       rosuvastatin (CRESTOR) 20 MG tablet Take 1 tablet (20 mg) by mouth daily 90 tablet 3     sacubitril-valsartan (ENTRESTO)  MG per tablet Take 1 tablet by mouth 2 times daily 180 tablet 3     tamsulosin (FLOMAX) 0.4 MG 24 hr capsule Take 0.4 mg by mouth daily       prednisoLONE acetate (PRED FORTE) 1 % ophthalmic susp 1-2 drops 4 times daily       sodium chloride (SHELL 128) 5 % ophthalmic solution Place 1 drop into both eyes At Bedtime         ALLERGIES     Allergies   Allergen Reactions     Atorvastatin      Muscle aches     Diphenhydramine      Metoprolol Succinate [Metoprolol]      Strawberry      Sulfa Drugs        PAST MEDICAL HISTORY:  Past Medical History:   Diagnosis Date     CAD (coronary artery disease)      Congestive heart failure (H)      Heart disease     non-ischemic cardiomyopathy     HTN (hypertension)      Idiopathic cardiomyopathy (H)        PAST SURGICAL HISTORY:  Past Surgical History:   Procedure Laterality Date     EYE SURGERY  2006    cataract     ORTHOPEDIC SURGERY  01/2018    pinning of right hip       FAMILY HISTORY:  Family History   Problem Relation Age of Onset     Heart Disease Mother 88        mi     Cerebrovascular Disease Father 71       SOCIAL HISTORY:  Social History     Socioeconomic History     Marital status:      Spouse name: None     Number of children: None     Years of education: None     Highest education level: None   Occupational History     None   Social Needs     Financial resource strain: None     Food insecurity:     Worry: None     Inability: None     Transportation needs:     Medical: None     Non-medical: None   Tobacco Use     Smoking status: Never Smoker     Smokeless tobacco: Never Used   Substance and Sexual Activity     Alcohol use: No     Alcohol/week: 0.0 standard drinks     Drug use: No  "    Sexual activity: None   Lifestyle     Physical activity:     Days per week: None     Minutes per session: None     Stress: None   Relationships     Social connections:     Talks on phone: None     Gets together: None     Attends Restorationism service: None     Active member of club or organization: None     Attends meetings of clubs or organizations: None     Relationship status: None     Intimate partner violence:     Fear of current or ex partner: None     Emotionally abused: None     Physically abused: None     Forced sexual activity: None   Other Topics Concern      Service Not Asked     Blood Transfusions Not Asked     Caffeine Concern No     Occupational Exposure Not Asked     Hobby Hazards Not Asked     Sleep Concern No     Stress Concern Not Asked     Weight Concern No     Special Diet No     Back Care Not Asked     Exercise Yes     Comment: active on farm     Bike Helmet Not Asked     Seat Belt Not Asked     Self-Exams Not Asked     Parent/sibling w/ CABG, MI or angioplasty before 65F 55M? No   Social History Narrative     None       Review of Systems:  Skin:  Negative       Eyes:  Positive for glasses    ENT:  Negative      Respiratory:  Negative       Cardiovascular:  Negative      Gastroenterology: Negative      Genitourinary:  Negative      Musculoskeletal:  Positive for arthritis very little  Neurologic:  Positive for numbness or tingling of hands left hand  Psychiatric:  Negative      Heme/Lymph/Imm:  Positive for allergies    Endocrine:  Negative        Physical Exam:  Vitals: BP (!) 151/80   Pulse 60   Ht 1.702 m (5' 7\")   Wt 76.1 kg (167 lb 11.2 oz)   BMI 26.27 kg/m      Constitutional:  cooperative, alert and oriented, well developed, well nourished, in no acute distress        Skin:  warm and dry to the touch, no apparent skin lesions or masses noted          Head:  normocephalic, no masses or lesions        Eyes:  pupils equal and round, conjunctivae and lids unremarkable, sclera " white, no xanthalasma, EOMS intact, no nystagmus        Lymph:      ENT:  no pallor or cyanosis, dentition good        Neck:  carotid pulses are full and equal bilaterally, JVP normal, no carotid bruit        Respiratory:  normal breath sounds, clear to auscultation, normal A-P diameter, normal symmetry, normal respiratory excursion, no use of accessory muscles         Cardiac: regular rhythm, normal S1/S2, no S3 or S4, apical impulse not displaced, no murmurs, gallops or rubs             PVCs  pulses full and equal, no bruits auscultated;pulses full and equal                                        GI:  abdomen soft;BS normoactive        Extremities and Muscular Skeletal:  no deformities, clubbing, cyanosis, erythema observed;no edema              Neurological:  no gross motor deficits;affect appropriate        Psych:  Alert and Oriented x 3        CC  Rah Ayala MD  [address]  [place] [id number]               No bruits; no thyromegaly or nodules

## 2021-08-17 ENCOUNTER — ANCILLARY PROCEDURE (OUTPATIENT)
Dept: CARDIOLOGY | Facility: CLINIC | Age: 86
End: 2021-08-17
Attending: INTERNAL MEDICINE
Payer: MEDICARE

## 2021-08-17 DIAGNOSIS — I42.9 PRIMARY CARDIOMYOPATHY (H): ICD-10-CM

## 2021-08-17 DIAGNOSIS — Z95.810 ICD (IMPLANTABLE CARDIOVERTER-DEFIBRILLATOR) IN PLACE: ICD-10-CM

## 2021-08-17 PROCEDURE — 93295 DEV INTERROG REMOTE 1/2/MLT: CPT | Performed by: INTERNAL MEDICINE

## 2021-08-17 PROCEDURE — 93296 REM INTERROG EVL PM/IDS: CPT | Performed by: INTERNAL MEDICINE

## 2021-08-24 DIAGNOSIS — E78.00 HYPERCHOLESTEROLEMIA: ICD-10-CM

## 2021-08-24 RX ORDER — ROSUVASTATIN CALCIUM 20 MG/1
20 TABLET, COATED ORAL DAILY
Qty: 90 TABLET | Refills: 0 | Status: SHIPPED | OUTPATIENT
Start: 2021-08-24 | End: 2021-11-23

## 2021-08-25 ENCOUNTER — TELEPHONE (OUTPATIENT)
Dept: CARDIOLOGY | Facility: CLINIC | Age: 86
End: 2021-08-25

## 2021-08-25 DIAGNOSIS — I50.22 CHRONIC SYSTOLIC HEART FAILURE (H): Primary | ICD-10-CM

## 2021-08-25 NOTE — TELEPHONE ENCOUNTER
----- Message from Rah Ayala MD sent at 8/25/2021  3:44 PM CDT -----  Regarding: RE: Please review  Yes. He should have an echo on day of visit. Thanks. EE  ----- Message -----  From: Gracy Zepeda RN  Sent: 8/20/2021   1:47 PM CDT  To: Rah Ayala MD  Subject: FW: Please review                                Do you want this pt to have an echo with his visit? Chronic systolic HF. Last echo was 11/2020.   ----- Message -----  From: Rufino Royal  Sent: 8/18/2021  11:28 AM CDT  To: [facility] 7  Subject: Please review                                    Hi,    This pt called states he usually do additional testing when he see Jamie - since he drives so far to come he would like us to review his testing to make sure we do not miss anything.     Currently he is scheduled for Lab, Jamie, Device check in clinic.    Thank you,  Karthikeyan

## 2021-08-26 LAB
MDC_IDC_EPISODE_DTM: NORMAL
MDC_IDC_EPISODE_DURATION: 6 S
MDC_IDC_EPISODE_DURATION: 6 S
MDC_IDC_EPISODE_DURATION: 8 S
MDC_IDC_EPISODE_ID: NORMAL
MDC_IDC_EPISODE_TYPE: NORMAL
MDC_IDC_EPISODE_VENDOR_TYPE: NORMAL
MDC_IDC_LEAD_IMPLANT_DT: NORMAL
MDC_IDC_LEAD_LOCATION: NORMAL
MDC_IDC_LEAD_LOCATION_DETAIL_1: NORMAL
MDC_IDC_LEAD_MFG: NORMAL
MDC_IDC_LEAD_MODEL: NORMAL
MDC_IDC_LEAD_POLARITY_TYPE: NORMAL
MDC_IDC_LEAD_POLARITY_TYPE: NORMAL
MDC_IDC_LEAD_SERIAL: NORMAL
MDC_IDC_LEAD_SPECIAL_FUNCTION: NORMAL
MDC_IDC_MSMT_BATTERY_DTM: NORMAL
MDC_IDC_MSMT_BATTERY_REMAINING_LONGEVITY: 43 MO
MDC_IDC_MSMT_BATTERY_REMAINING_PERCENTAGE: 56 %
MDC_IDC_MSMT_BATTERY_RRT_TRIGGER: NORMAL
MDC_IDC_MSMT_BATTERY_STATUS: NORMAL
MDC_IDC_MSMT_BATTERY_VOLTAGE: 2.95 V
MDC_IDC_MSMT_CAP_CHARGE_DTM: NORMAL
MDC_IDC_MSMT_CAP_CHARGE_ENERGY: 40 J
MDC_IDC_MSMT_CAP_CHARGE_TIME: 9.3 S
MDC_IDC_MSMT_CAP_CHARGE_TYPE: NORMAL
MDC_IDC_MSMT_LEADCHNL_LV_IMPEDANCE_VALUE: 380 OHM
MDC_IDC_MSMT_LEADCHNL_LV_LEAD_CHANNEL_STATUS: NORMAL
MDC_IDC_MSMT_LEADCHNL_LV_PACING_THRESHOLD_AMPLITUDE: 0.75 V
MDC_IDC_MSMT_LEADCHNL_LV_PACING_THRESHOLD_PULSEWIDTH: 0.5 MS
MDC_IDC_MSMT_LEADCHNL_RA_IMPEDANCE_VALUE: 450 OHM
MDC_IDC_MSMT_LEADCHNL_RA_LEAD_CHANNEL_STATUS: NORMAL
MDC_IDC_MSMT_LEADCHNL_RA_PACING_THRESHOLD_AMPLITUDE: 0.5 V
MDC_IDC_MSMT_LEADCHNL_RA_PACING_THRESHOLD_PULSEWIDTH: 0.5 MS
MDC_IDC_MSMT_LEADCHNL_RA_SENSING_INTR_AMPL: 2.4 MV
MDC_IDC_MSMT_LEADCHNL_RV_IMPEDANCE_VALUE: 430 OHM
MDC_IDC_MSMT_LEADCHNL_RV_LEAD_CHANNEL_STATUS: NORMAL
MDC_IDC_MSMT_LEADCHNL_RV_PACING_THRESHOLD_AMPLITUDE: 1.25 V
MDC_IDC_MSMT_LEADCHNL_RV_PACING_THRESHOLD_PULSEWIDTH: 0.5 MS
MDC_IDC_MSMT_LEADCHNL_RV_SENSING_INTR_AMPL: 12 MV
MDC_IDC_PG_IMPLANT_DTM: NORMAL
MDC_IDC_PG_MFG: NORMAL
MDC_IDC_PG_MODEL: NORMAL
MDC_IDC_PG_SERIAL: NORMAL
MDC_IDC_PG_TYPE: NORMAL
MDC_IDC_SESS_CLINIC_NAME: NORMAL
MDC_IDC_SESS_DTM: NORMAL
MDC_IDC_SESS_REPROGRAMMED: NO
MDC_IDC_SESS_TYPE: NORMAL
MDC_IDC_SET_BRADY_AT_MODE_SWITCH_MODE: NORMAL
MDC_IDC_SET_BRADY_AT_MODE_SWITCH_RATE: 170 {BEATS}/MIN
MDC_IDC_SET_BRADY_LOWRATE: 60 {BEATS}/MIN
MDC_IDC_SET_BRADY_MAX_SENSOR_RATE: 120 {BEATS}/MIN
MDC_IDC_SET_BRADY_MAX_TRACKING_RATE: 120 {BEATS}/MIN
MDC_IDC_SET_BRADY_MODE: NORMAL
MDC_IDC_SET_BRADY_PAV_DELAY_HIGH: 100 MS
MDC_IDC_SET_BRADY_PAV_DELAY_LOW: 150 MS
MDC_IDC_SET_BRADY_SAV_DELAY_HIGH: 100 MS
MDC_IDC_SET_BRADY_SAV_DELAY_LOW: 120 MS
MDC_IDC_SET_CRT_LVRV_DELAY: 0 MS
MDC_IDC_SET_CRT_PACED_CHAMBERS: NORMAL
MDC_IDC_SET_LEADCHNL_LV_PACING_AMPLITUDE: 2 V
MDC_IDC_SET_LEADCHNL_LV_PACING_ANODE_ELECTRODE_1: NORMAL
MDC_IDC_SET_LEADCHNL_LV_PACING_ANODE_LOCATION_1: NORMAL
MDC_IDC_SET_LEADCHNL_LV_PACING_CAPTURE_MODE: NORMAL
MDC_IDC_SET_LEADCHNL_LV_PACING_CATHODE_ELECTRODE_1: NORMAL
MDC_IDC_SET_LEADCHNL_LV_PACING_CATHODE_LOCATION_1: NORMAL
MDC_IDC_SET_LEADCHNL_LV_PACING_POLARITY: NORMAL
MDC_IDC_SET_LEADCHNL_LV_PACING_PULSEWIDTH: 0.5 MS
MDC_IDC_SET_LEADCHNL_RA_PACING_AMPLITUDE: 2 V
MDC_IDC_SET_LEADCHNL_RA_PACING_ANODE_ELECTRODE_1: NORMAL
MDC_IDC_SET_LEADCHNL_RA_PACING_ANODE_LOCATION_1: NORMAL
MDC_IDC_SET_LEADCHNL_RA_PACING_CAPTURE_MODE: NORMAL
MDC_IDC_SET_LEADCHNL_RA_PACING_CATHODE_ELECTRODE_1: NORMAL
MDC_IDC_SET_LEADCHNL_RA_PACING_CATHODE_LOCATION_1: NORMAL
MDC_IDC_SET_LEADCHNL_RA_PACING_POLARITY: NORMAL
MDC_IDC_SET_LEADCHNL_RA_PACING_PULSEWIDTH: 0.5 MS
MDC_IDC_SET_LEADCHNL_RA_SENSING_ADAPTATION_MODE: NORMAL
MDC_IDC_SET_LEADCHNL_RA_SENSING_ANODE_ELECTRODE_1: NORMAL
MDC_IDC_SET_LEADCHNL_RA_SENSING_ANODE_LOCATION_1: NORMAL
MDC_IDC_SET_LEADCHNL_RA_SENSING_CATHODE_ELECTRODE_1: NORMAL
MDC_IDC_SET_LEADCHNL_RA_SENSING_CATHODE_LOCATION_1: NORMAL
MDC_IDC_SET_LEADCHNL_RA_SENSING_POLARITY: NORMAL
MDC_IDC_SET_LEADCHNL_RA_SENSING_SENSITIVITY: 0.3 MV
MDC_IDC_SET_LEADCHNL_RV_PACING_AMPLITUDE: 2.5 V
MDC_IDC_SET_LEADCHNL_RV_PACING_ANODE_ELECTRODE_1: NORMAL
MDC_IDC_SET_LEADCHNL_RV_PACING_ANODE_LOCATION_1: NORMAL
MDC_IDC_SET_LEADCHNL_RV_PACING_CAPTURE_MODE: NORMAL
MDC_IDC_SET_LEADCHNL_RV_PACING_CATHODE_ELECTRODE_1: NORMAL
MDC_IDC_SET_LEADCHNL_RV_PACING_CATHODE_LOCATION_1: NORMAL
MDC_IDC_SET_LEADCHNL_RV_PACING_POLARITY: NORMAL
MDC_IDC_SET_LEADCHNL_RV_PACING_PULSEWIDTH: 0.5 MS
MDC_IDC_SET_LEADCHNL_RV_SENSING_ADAPTATION_MODE: NORMAL
MDC_IDC_SET_LEADCHNL_RV_SENSING_ANODE_ELECTRODE_1: NORMAL
MDC_IDC_SET_LEADCHNL_RV_SENSING_ANODE_LOCATION_1: NORMAL
MDC_IDC_SET_LEADCHNL_RV_SENSING_CATHODE_ELECTRODE_1: NORMAL
MDC_IDC_SET_LEADCHNL_RV_SENSING_CATHODE_LOCATION_1: NORMAL
MDC_IDC_SET_LEADCHNL_RV_SENSING_POLARITY: NORMAL
MDC_IDC_SET_LEADCHNL_RV_SENSING_SENSITIVITY: 0.5 MV
MDC_IDC_SET_ZONE_DETECTION_INTERVAL: 300 MS
MDC_IDC_SET_ZONE_DETECTION_INTERVAL: 350 MS
MDC_IDC_SET_ZONE_TYPE: NORMAL
MDC_IDC_SET_ZONE_VENDOR_TYPE: NORMAL
MDC_IDC_STAT_AT_BURDEN_PERCENT: 0 %
MDC_IDC_STAT_AT_DTM_END: NORMAL
MDC_IDC_STAT_AT_DTM_START: NORMAL
MDC_IDC_STAT_AT_MODE_SW_COUNT: 3
MDC_IDC_STAT_AT_MODE_SW_COUNT_PER_DAY: 0
MDC_IDC_STAT_AT_MODE_SW_MAX_DURATION: 8 S
MDC_IDC_STAT_AT_MODE_SW_PERCENT_TIME: 1 %
MDC_IDC_STAT_BRADY_AP_VP_PERCENT: 35 %
MDC_IDC_STAT_BRADY_AP_VS_PERCENT: 1 %
MDC_IDC_STAT_BRADY_AS_VP_PERCENT: 62 %
MDC_IDC_STAT_BRADY_AS_VS_PERCENT: 1 %
MDC_IDC_STAT_BRADY_DTM_END: NORMAL
MDC_IDC_STAT_BRADY_DTM_START: NORMAL
MDC_IDC_STAT_BRADY_RA_PERCENT_PACED: 32 %
MDC_IDC_STAT_CRT_DTM_END: NORMAL
MDC_IDC_STAT_CRT_DTM_START: NORMAL
MDC_IDC_STAT_CRT_PERCENT_PACED: 97 %
MDC_IDC_STAT_HEART_RATE_ATRIAL_MAX: 250 {BEATS}/MIN
MDC_IDC_STAT_HEART_RATE_ATRIAL_MEAN: 75 {BEATS}/MIN
MDC_IDC_STAT_HEART_RATE_ATRIAL_MIN: 50 {BEATS}/MIN
MDC_IDC_STAT_HEART_RATE_DTM_END: NORMAL
MDC_IDC_STAT_HEART_RATE_DTM_START: NORMAL
MDC_IDC_STAT_HEART_RATE_VENTRICULAR_MAX: 330 {BEATS}/MIN
MDC_IDC_STAT_HEART_RATE_VENTRICULAR_MEAN: 75 {BEATS}/MIN
MDC_IDC_STAT_HEART_RATE_VENTRICULAR_MIN: 50 {BEATS}/MIN
MDC_IDC_STAT_TACHYTHERAPY_ATP_DELIVERED_RECENT: 0
MDC_IDC_STAT_TACHYTHERAPY_RECENT_DTM_END: NORMAL
MDC_IDC_STAT_TACHYTHERAPY_RECENT_DTM_START: NORMAL
MDC_IDC_STAT_TACHYTHERAPY_SHOCKS_ABORTED_RECENT: 0
MDC_IDC_STAT_TACHYTHERAPY_SHOCKS_DELIVERED_RECENT: 0

## 2021-08-27 NOTE — TELEPHONE ENCOUNTER
"Spoke with Maya HARRISON for Dr. Ayala.  Forwarded Team 3 and Dr. Ayala the stress clint and Dr. Edwards's result. Informed Maya writer will update patient and infrom him he will hear from Dr. Ayala's team once Dr. Ayala reviews the result.    Called patient to inform him Dr. Edwards reviewed the stress test \"Normal nuclear stress test without ischemia or infarction.  Likely a non-ischemic cardiomyopathy with LVEF=34%.  BP was 140/70.  Perhaps this is a hypertensive CM.  I would ask Dr. Ayala's opinion on placing an AICD.  No other intervention necessary at this time for his atypical chest pain.  Likely musculoskeletal or other source other than ischemic.  Robles Edwards MD, FACC\" Patient asked what to do for muscular pain.  Informed him some light stretching could help or to speak with PMD about PT.  Also informed him Dr. Ayala will review the result and he should hear from Team 3 within the next week.  No further questions.   "
I reviewed the stress test images. I agree. No evidence for ischemia. Probably not infarction. LV is dilated and EF was reported to be 30% on those images. Tylenol or occasional doses of ibuprofen for musculoskeletal chest pain. I will meet with him to discuss his cardiomyopathy. We should repeat the echo to see if the EF is really lower now. EE  
Left voice message for him to return a call to review Dr. Ayala's recommendations after reviewing stress test images.  
Spouse returned call. I reviewed with her that the nuclear images are normal but EF is low and we do not know why this is. Therefore, Dr. Ayala will repeat echo in June to re evaluate EF and sit down with them to review and discuss cardiomyopathy. I also advised that he can take wither Tylenol or Ibuprofen occasionally for what he thinks is musculoskeletal pain. They wanted a number for a [facility] and I provided them with this. All questions were answered and I encouraged them to call us if any concerns come up along the way. Wilbert  
none

## 2021-09-09 ENCOUNTER — ANCILLARY PROCEDURE (OUTPATIENT)
Dept: CARDIOLOGY | Facility: CLINIC | Age: 86
End: 2021-09-09
Attending: INTERNAL MEDICINE
Payer: MEDICARE

## 2021-09-09 ENCOUNTER — LAB (OUTPATIENT)
Dept: LAB | Facility: CLINIC | Age: 86
End: 2021-09-09
Payer: MEDICARE

## 2021-09-09 ENCOUNTER — CARE COORDINATION (OUTPATIENT)
Dept: CARDIOLOGY | Facility: CLINIC | Age: 86
End: 2021-09-09

## 2021-09-09 ENCOUNTER — HOSPITAL ENCOUNTER (OUTPATIENT)
Dept: CARDIOLOGY | Facility: CLINIC | Age: 86
Discharge: HOME | End: 2021-09-09
Attending: INTERNAL MEDICINE | Admitting: INTERNAL MEDICINE
Payer: MEDICARE

## 2021-09-09 VITALS
OXYGEN SATURATION: 96 % | WEIGHT: 168 LBS | DIASTOLIC BLOOD PRESSURE: 68 MMHG | HEIGHT: 67 IN | SYSTOLIC BLOOD PRESSURE: 148 MMHG | BODY MASS INDEX: 26.37 KG/M2 | HEART RATE: 63 BPM

## 2021-09-09 DIAGNOSIS — I10 ESSENTIAL HYPERTENSION WITH GOAL BLOOD PRESSURE LESS THAN 140/90: ICD-10-CM

## 2021-09-09 DIAGNOSIS — Z95.810 ICD (IMPLANTABLE CARDIOVERTER-DEFIBRILLATOR) IN PLACE: ICD-10-CM

## 2021-09-09 DIAGNOSIS — I50.22 CHRONIC SYSTOLIC HEART FAILURE (H): Primary | ICD-10-CM

## 2021-09-09 DIAGNOSIS — E78.00 HYPERCHOLESTEROLEMIA: ICD-10-CM

## 2021-09-09 DIAGNOSIS — I42.9 PRIMARY CARDIOMYOPATHY (H): ICD-10-CM

## 2021-09-09 DIAGNOSIS — I50.22 CHRONIC SYSTOLIC HEART FAILURE (H): ICD-10-CM

## 2021-09-09 DIAGNOSIS — I42.0 DILATED CARDIOMYOPATHY (H): ICD-10-CM

## 2021-09-09 LAB
ANION GAP SERPL CALCULATED.3IONS-SCNC: 4 MMOL/L (ref 3–14)
BUN SERPL-MCNC: 16 MG/DL (ref 7–30)
CALCIUM SERPL-MCNC: 8.5 MG/DL (ref 8.5–10.1)
CHLORIDE BLD-SCNC: 104 MMOL/L (ref 94–109)
CO2 SERPL-SCNC: 28 MMOL/L (ref 20–32)
CREAT SERPL-MCNC: 1.04 MG/DL (ref 0.66–1.25)
GFR SERPL CREATININE-BSD FRML MDRD: 66 ML/MIN/1.73M2
GLUCOSE BLD-MCNC: 96 MG/DL (ref 70–99)
HGB BLD-MCNC: 13.7 G/DL (ref 13.3–17.7)
LVEF ECHO: NORMAL
POTASSIUM BLD-SCNC: 4.1 MMOL/L (ref 3.4–5.3)
SODIUM SERPL-SCNC: 136 MMOL/L (ref 133–144)

## 2021-09-09 PROCEDURE — 93306 TTE W/DOPPLER COMPLETE: CPT | Mod: 26 | Performed by: INTERNAL MEDICINE

## 2021-09-09 PROCEDURE — 99214 OFFICE O/P EST MOD 30 MIN: CPT | Mod: 25 | Performed by: INTERNAL MEDICINE

## 2021-09-09 PROCEDURE — 36415 COLL VENOUS BLD VENIPUNCTURE: CPT | Performed by: INTERNAL MEDICINE

## 2021-09-09 PROCEDURE — 80048 BASIC METABOLIC PNL TOTAL CA: CPT | Performed by: INTERNAL MEDICINE

## 2021-09-09 PROCEDURE — 85018 HEMOGLOBIN: CPT | Performed by: INTERNAL MEDICINE

## 2021-09-09 PROCEDURE — 93306 TTE W/DOPPLER COMPLETE: CPT

## 2021-09-09 ASSESSMENT — MIFFLIN-ST. JEOR: SCORE: 1410.67

## 2021-09-09 NOTE — PROGRESS NOTES
HPI and Plan:   See dictation    Orders Placed This Encounter   Procedures     Basic metabolic panel     Hemoglobin     Follow-Up with [facility] - Return MD visit       No orders of the defined types were placed in this encounter.      Medications Discontinued During This Encounter   Medication Reason     diphenhydrAMINE-acetaminophen (TYLENOL PM)  MG tablet      cycloSPORINE (RESTASIS) 0.05 % ophthalmic emulsion          Encounter Diagnoses   Name Primary?     Chronic systolic heart failure (H) Yes     Hypercholesterolemia      Primary cardiomyopathy (H)      ICD (implantable cardioverter-defibrillator) in place      Dilated cardiomyopathy (H)      Essential hypertension with goal blood pressure less than 140/90        CURRENT MEDICATIONS:  Current Outpatient Medications   Medication Sig Dispense Refill     carvedilol (COREG) 12.5 MG tablet Take 1 tablet (12.5 mg) by mouth 2 times daily (with meals) 180 tablet 3     clopidogrel (PLAVIX) 75 MG tablet Take 75 mg by mouth daily       CYCLOBENZAPRINE HCL PO Take 5 mg by mouth At Bedtime Taking PRN (half tab)       Famotidine (PEPCID PO) Take 40 mg by mouth daily        isosorbide mononitrate (IMDUR) 60 MG 24 hr tablet Take 60 mg by mouth daily       omeprazole (PRILOSEC) 20 MG DR capsule Take 20 mg by mouth daily       polyethylene glycol 400 (BLINK TEARS) 0.25 % SOLN ophthalmic solution Place 1 drop into both eyes 2 times daily as needed        rosuvastatin (CRESTOR) 20 MG tablet Take 1 tablet (20 mg) by mouth daily 90 tablet 0     sacubitril-valsartan (ENTRESTO)  MG per tablet Take 1 tablet by mouth 2 times daily 180 tablet 1     sotalol (BETAPACE) 80 MG tablet Take 1 tablet (80 mg) by mouth 2 times daily 60 tablet 11     tamsulosin (FLOMAX) 0.4 MG 24 hr capsule Take 0.4 mg by mouth daily       White Petrolatum-Mineral Oil (RETAINE PM) OINT Place into both eyes At Bedtime         ALLERGIES     Allergies   Allergen Reactions     Atorvastatin      Muscle  aches     Diphenhydramine Other (See Comments)     Lip swelling     Metoprolol Succinate [Metoprolol] Other (See Comments)     Blind in left half of face for 3 days     [place] Other (See Comments)     swelling     Sulfa Drugs Other (See Comments)     swelling       PAST MEDICAL HISTORY:  Past Medical History:   Diagnosis Date     CAD (coronary artery disease)      Congestive heart failure (H)      Heart disease     non-ischemic cardiomyopathy     HTN (hypertension)      Idiopathic cardiomyopathy (H)        PAST SURGICAL HISTORY:  Past Surgical History:   Procedure Laterality Date     COLONOSCOPY N/A 1/29/2020    Procedure: COLONOSCOPY;  Surgeon: Erica Duff MD;  Location:  GI     ESOPHAGOSCOPY, GASTROSCOPY, DUODENOSCOPY (EGD), COMBINED N/A 1/28/2020    Procedure: ESOPHAGOGASTRODUODENOSCOPY (EGD);  Surgeon: Erica Duff MD;  Location:  GI     EYE SURGERY  2006    cataract     ORTHOPEDIC SURGERY  01/2018    pinning of right hip       FAMILY HISTORY:  Family History   Problem Relation Age of Onset     Heart Disease Mother 88        mi     Cerebrovascular Disease Father 71       SOCIAL HISTORY:  Social History     Socioeconomic History     Marital status:      Spouse name: None     Number of children: None     Years of education: None     Highest education level: None   Occupational History     None   Tobacco Use     Smoking status: Never Smoker     Smokeless tobacco: Never Used   Substance and Sexual Activity     Alcohol use: No     Alcohol/week: 0.0 standard drinks     Drug use: No     Sexual activity: None   Other Topics Concern      Service Not Asked     Blood Transfusions Not Asked     Caffeine Concern No     Occupational Exposure Not Asked     Hobby Hazards Not Asked     Sleep Concern No     Stress Concern Not Asked     Weight Concern No     Special Diet No     Back Care Not Asked     Exercise Yes     Comment: active on farm     Bike Helmet Not Asked     Seat Belt Not Asked      "Self-Exams Not Asked     Parent/sibling w/ CABG, MI or angioplasty before 65F 55M? No   Social History Narrative     None     Social Determinants of Health     Financial Resource Strain:      Difficulty of Paying Living Expenses:    Food Insecurity:      Worried About Running Out of Food in the Last Year:      Ran Out of Food in the Last Year:    Transportation Needs:      Lack of Transportation (Medical):      Lack of Transportation (Non-Medical):    Physical Activity:      Days of Exercise per Week:      Minutes of Exercise per Session:    Stress:      Feeling of Stress :    Social Connections:      Frequency of Communication with Friends and Family:      Frequency of Social Gatherings with Friends and Family:      Attends [employer] Services:      Active Member of Clubs or Organizations:      Attends Club or Organization Meetings:      Marital Status:    Intimate Partner Violence:      Fear of Current or Ex-Partner:      Emotionally Abused:      Physically Abused:      Sexually Abused:        Review of Systems:  Skin:  Negative       Eyes:  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  ENT:  Positive for hearing loss    Respiratory:  Negative       Cardiovascular:  Negative      Gastroenterology: Positive for heartburn    Genitourinary:  Negative      Musculoskeletal:  Positive for arthritis    Neurologic:  Negative      Psychiatric:  Negative      Heme/Lymph/Imm:  Positive for allergies    Endocrine:  Negative        Physical Exam:  Vitals: BP (!) 148/68 (BP Location: Right arm, Cuff Size: Adult Regular)   Pulse 63   Ht 1.702 m (5' 7\")   Wt 76.2 kg (168 lb)   SpO2 96%   BMI 26.31 kg/m      Constitutional:           Skin:             Head:           Eyes:           Lymph:      ENT:           Neck:           Respiratory:            Cardiac:                                                           GI:           Extremities and Muscular Skeletal:                 Neurological:           Psych:           CC  Rah " Geovany Ayala MD  [id number]  [place] [id number]

## 2021-09-09 NOTE — PROGRESS NOTES
QUEENIE to Dr. Ayala, as Echo was completed after their OV today. Jayne Kelly RN on 9/9/2021 at 4:27 PM        Echocardiogram Complete      BSA: 1.9 m2  Height: 67 in  Weight: 168 lb  HR: 72  BP: 147/74 mmHg  ______________________________________________________________________________  Procedure  Complete Echo Adult.     ______________________________________________________________________________  Interpretation Summary     The left ventricle is normal in size.  There is mod-severe global hypokinesia of the left ventricle.  The visual ejection fraction is 30-35%.  There is mild concentric left ventricular hypertrophy.  There is mild (1+) mitral regurgitation.  Compared to prior study, there is no significant change.

## 2021-09-09 NOTE — LETTER
9/9/2021    Reba Gilmore MD  [facility] [address]  [facility] [address]    RE: Katie Rasconkerri       Dear Colleague,    I had the pleasure of seeing Almaliha Rasconkerri in the [facility].    HPI and Plan:   See dictation    Orders Placed This Encounter   Procedures     Basic metabolic panel     Hemoglobin     [facility] with [facility] - Return MD visit       No orders of the defined types were placed in this encounter.      Medications Discontinued During This Encounter   Medication Reason     diphenhydrAMINE-acetaminophen (TYLENOL PM)  MG tablet      cycloSPORINE (RESTASIS) 0.05 % ophthalmic emulsion          Encounter Diagnoses   Name Primary?     Chronic systolic heart failure (H) Yes     Hypercholesterolemia      Primary cardiomyopathy (H)      ICD (implantable cardioverter-defibrillator) in place      Dilated cardiomyopathy (H)      Essential hypertension with goal blood pressure less than 140/90        CURRENT MEDICATIONS:  Current Outpatient Medications   Medication Sig Dispense Refill     carvedilol (COREG) 12.5 MG tablet Take 1 tablet (12.5 mg) by mouth 2 times daily (with meals) 180 tablet 3     clopidogrel (PLAVIX) 75 MG tablet Take 75 mg by mouth daily       CYCLOBENZAPRINE HCL PO Take 5 mg by mouth At Bedtime Taking PRN (half tab)       Famotidine (PEPCID PO) Take 40 mg by mouth daily        isosorbide mononitrate (IMDUR) 60 MG 24 hr tablet Take 60 mg by mouth daily       omeprazole (PRILOSEC) 20 MG DR capsule Take 20 mg by mouth daily       polyethylene glycol 400 (BLINK TEARS) 0.25 % SOLN ophthalmic solution Place 1 drop into both eyes 2 times daily as needed        rosuvastatin (CRESTOR) 20 MG tablet Take 1 tablet (20 mg) by mouth daily 90 tablet 0     sacubitril-valsartan (ENTRESTO)  MG per tablet Take 1 tablet by mouth 2 times daily 180 tablet 1     sotalol (BETAPACE) 80 MG tablet Take 1 tablet (80 mg) by mouth 2 times daily 60  tablet 11     tamsulosin (FLOMAX) 0.4 MG 24 hr capsule Take 0.4 mg by mouth daily       White Petrolatum-Mineral Oil (RETAINE PM) OINT Place into both eyes At Bedtime         ALLERGIES     Allergies   Allergen Reactions     Atorvastatin      Muscle aches     Diphenhydramine Other (See Comments)     Lip swelling     Metoprolol Succinate [Metoprolol] Other (See Comments)     Blind in left half of face for 3 days     [place] Other (See Comments)     swelling     Sulfa Drugs Other (See Comments)     swelling       PAST MEDICAL HISTORY:  Past Medical History:   Diagnosis Date     CAD (coronary artery disease)      Congestive heart failure (H)      Heart disease     non-ischemic cardiomyopathy     HTN (hypertension)      Idiopathic cardiomyopathy (H)        PAST SURGICAL HISTORY:  Past Surgical History:   Procedure Laterality Date     COLONOSCOPY N/A 1/29/2020    Procedure: COLONOSCOPY;  Surgeon: Erica Duff MD;  Location:  GI     ESOPHAGOSCOPY, GASTROSCOPY, DUODENOSCOPY (EGD), COMBINED N/A 1/28/2020    Procedure: ESOPHAGOGASTRODUODENOSCOPY (EGD);  Surgeon: Erica Duff MD;  Location:  GI     EYE SURGERY  2006    cataract     ORTHOPEDIC SURGERY  01/2018    pinning of right hip       FAMILY HISTORY:  Family History   Problem Relation Age of Onset     Heart Disease Mother 88        mi     Cerebrovascular Disease Father 71       SOCIAL HISTORY:  Social History     Socioeconomic History     Marital status:      Spouse name: None     Number of children: None     Years of education: None     Highest education level: None   Occupational History     None   Tobacco Use     Smoking status: Never Smoker     Smokeless tobacco: Never Used   Substance and Sexual Activity     Alcohol use: No     Alcohol/week: 0.0 standard drinks     Drug use: No     Sexual activity: None   Other Topics Concern      Service Not Asked     Blood Transfusions Not Asked     Caffeine Concern No     Occupational Exposure Not  "Asked     Hobby Hazards Not Asked     Sleep Concern No     Stress Concern Not Asked     Weight Concern No     Special Diet No     Back Care Not Asked     Exercise Yes     Comment: active on farm     Bike Helmet Not Asked     Seat Belt Not Asked     Self-Exams Not Asked     Parent/sibling w/ CABG, MI or angioplasty before 65F 55M? No   Social History Narrative     None     Social Determinants of Health     Financial Resource Strain:      Difficulty of Paying Living Expenses:    Food Insecurity:      Worried About Running Out of Food in the Last Year:      Ran Out of Food in the Last Year:    Transportation Needs:      Lack of Transportation (Medical):      Lack of Transportation (Non-Medical):    Physical Activity:      Days of Exercise per Week:      Minutes of Exercise per Session:    Stress:      Feeling of Stress :    Social Connections:      Frequency of Communication with Friends and Family:      Frequency of Social Gatherings with Friends and Family:      Attends Denominational Services:      Active Member of Clubs or Organizations:      Attends Club or Organization Meetings:      Marital Status:    Intimate Partner Violence:      Fear of Current or Ex-Partner:      Emotionally Abused:      Physically Abused:      Sexually Abused:        Review of Systems:  Skin:  Negative       Eyes:  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  ENT:  Positive for hearing loss    Respiratory:  Negative       Cardiovascular:  Negative      Gastroenterology: Positive for heartburn    Genitourinary:  Negative      Musculoskeletal:  Positive for arthritis    Neurologic:  Negative      Psychiatric:  Negative      Heme/Lymph/Imm:  Positive for allergies    Endocrine:  Negative        Physical Exam:  Vitals: BP (!) 148/68 (BP Location: Right arm, Cuff Size: Adult Regular)   Pulse 63   Ht 1.702 m (5' 7\")   Wt 76.2 kg (168 lb)   SpO2 96%   BMI 26.31 kg/m      Constitutional:           Skin:             Head:           Eyes:       "     Lymph:      ENT:           Neck:           Respiratory:            Cardiac:                                                           GI:           Extremities and Muscular Skeletal:                 Neurological:           Psych:           CC  Rah Ayala MD  [address]  [place] [id number]                  Thank you for allowing me to participate in the care of your patient.      Sincerely,     RAH AYALA MD     [facility]  cc:   Rah Ayala MD  [address]  [place] [id number]

## 2021-09-09 NOTE — PROGRESS NOTES
Service Date: 09/09/2021    HISTORY OF PRESENT ILLNESS:  I had the opportunity to see Mr. Katie Shannon in Cardiology Clinic today at [facility] in [place] for reevaluation of chronic systolic heart failure with idiopathic nonischemic cardiomyopathy.  His last echocardiogram showed an ejection fraction which was stable at 30%-35% without significant valvular disease.  His past coronary angiographies have demonstrated nonocclusive coronary artery disease.  He has an ICD in place for primary prevention of sudden cardiac arrest and was started on sotalol about a year ago in 06/2020 for an episode of prolonged ventricular tachycardia associated with symptoms of lightheadedness and a burning sensation in his left chest.  Since starting sotalol, he has not had any recurrent episodes of lightheadedness and tunnel vision.  We have not seen any recurrent ventricular tachycardia on his ICD checks.  He has not received any ICD shocks.    In fact, he tells me he is feeling very well.  He plays golf regularly and is able to play for 18 holes without getting too tired.  He feels a little stiffness the following day.  He is able to work around his yard doing all kinds of activities such as chopping trees without significant shortness of breath or fatigue.  He describes a mild chest burning sensation which he gets on very rare occasions such as walking too far.  He thinks that he would get that discomfort if he walked more than a quarter mile; however, he does not remember the last time he had that sensation.  He believes he thinks it might have been when he was shoveling snow.    PHYSICAL EXAMINATION:  His blood pressure has been a little high.  It was 148/68 here manually with a heart rate of 63 and weight of 168 pounds.  He tells me that at home it is usually in the 130s over 70s.  His lungs are clear.  Heart rhythm is regular.  I do not hear any murmurs, and he has no edema.    IMPRESSIONS:  Mr. Katie Shannon is  an 86-year-old gentleman with nonischemic cardiomyopathy with chronic stable degree of moderate to severe left ventricular dysfunction.  His ejection fraction is 30%-35%.  He has an ICD in place for primary prevention of sudden cardiac arrest and recently had an episode of prolonged ventricular tachycardia with lightheadedness and near syncope.  Since then, he has been on sotalol with no recurrence of that arrhythmia.    His exercise tolerance remains excellent, and he is feeling well without any specific concerns today.    He will get an echocardiogram done after my visit with him today as well as a device check.  His labs today are excellent with a normal creatinine of 1.04, BUN 16 and normal hemoglobin of 13.7.    I will plan to see him back again in 6 months for reevaluation, assuming that his echocardiogram continues to look stable.    cc:  [place] [address]    Rah Ayala MD, [facility]        D: 2021   T: 2021   MT: christianne    Name:     RUPERT MAN  MRN:      [id number]        Account:      [id number]   :      1935           Service Date: 2021       Document: [id number]

## 2021-09-14 LAB
MDC_IDC_LEAD_IMPLANT_DT: NORMAL
MDC_IDC_LEAD_LOCATION: NORMAL
MDC_IDC_LEAD_LOCATION_DETAIL_1: NORMAL
MDC_IDC_LEAD_MFG: NORMAL
MDC_IDC_LEAD_MODEL: NORMAL
MDC_IDC_LEAD_POLARITY_TYPE: NORMAL
MDC_IDC_LEAD_POLARITY_TYPE: NORMAL
MDC_IDC_LEAD_SERIAL: NORMAL
MDC_IDC_LEAD_SPECIAL_FUNCTION: NORMAL
MDC_IDC_MSMT_CAP_CHARGE_TYPE: NORMAL
MDC_IDC_MSMT_LEADCHNL_LV_IMPEDANCE_VALUE: 390 OHM
MDC_IDC_MSMT_LEADCHNL_LV_PACING_THRESHOLD_AMPLITUDE: 0.5 V
MDC_IDC_MSMT_LEADCHNL_LV_PACING_THRESHOLD_PULSEWIDTH: 0.5 MS
MDC_IDC_MSMT_LEADCHNL_RA_IMPEDANCE_VALUE: 480 OHM
MDC_IDC_MSMT_LEADCHNL_RA_PACING_THRESHOLD_AMPLITUDE: 0.5 V
MDC_IDC_MSMT_LEADCHNL_RA_PACING_THRESHOLD_PULSEWIDTH: 0.5 MS
MDC_IDC_MSMT_LEADCHNL_RA_SENSING_INTR_AMPL: 4.7 MV
MDC_IDC_MSMT_LEADCHNL_RV_IMPEDANCE_VALUE: 430 OHM
MDC_IDC_MSMT_LEADCHNL_RV_PACING_THRESHOLD_AMPLITUDE: 1 V
MDC_IDC_MSMT_LEADCHNL_RV_PACING_THRESHOLD_PULSEWIDTH: 0.5 MS
MDC_IDC_MSMT_LEADCHNL_RV_SENSING_INTR_AMPL: 11.7 MV
MDC_IDC_PG_IMPLANT_DTM: NORMAL
MDC_IDC_PG_MFG: NORMAL
MDC_IDC_PG_MODEL: NORMAL
MDC_IDC_PG_SERIAL: NORMAL
MDC_IDC_PG_TYPE: NORMAL
MDC_IDC_SESS_CLINIC_NAME: NORMAL
MDC_IDC_SESS_DTM: NORMAL
MDC_IDC_SESS_TYPE: NORMAL
MDC_IDC_SET_BRADY_LOWRATE: 60 {BEATS}/MIN
MDC_IDC_SET_BRADY_MAX_TRACKING_RATE: 120 {BEATS}/MIN
MDC_IDC_SET_BRADY_MODE: NORMAL
MDC_IDC_SET_ZONE_TYPE: NORMAL
MDC_IDC_SET_ZONE_VENDOR_TYPE: NORMAL
MDC_IDC_STAT_BRADY_RA_PERCENT_PACED: 33 %
MDC_IDC_STAT_BRADY_RV_PERCENT_PACED: 97 %

## 2021-09-14 NOTE — PROGRESS NOTES
Call out to patient and review Dr. Ayala's thoughts along with his Echo result. Katie will call us with any concerns as needed, otherwise he plans to call us in November when Dr. Ayala's March schedule opens to make his 6 month follow up. Jayne Kelly RN on 9/14/2021 at 2:57 PM      Rah Ayala MD  [facility] 7 13 minutes ago (2:36 PM)   EE     Please notify the patient that his echo looks stable. Thanks. EE         Documentation

## 2021-11-23 DIAGNOSIS — E78.00 HYPERCHOLESTEROLEMIA: ICD-10-CM

## 2021-11-23 RX ORDER — ROSUVASTATIN CALCIUM 20 MG/1
20 TABLET, COATED ORAL DAILY
Qty: 90 TABLET | Refills: 3 | Status: SHIPPED | OUTPATIENT
Start: 2021-11-23 | End: 2022-10-24

## 2021-12-09 ENCOUNTER — ANCILLARY PROCEDURE (OUTPATIENT)
Dept: CARDIOLOGY | Facility: CLINIC | Age: 86
End: 2021-12-09
Attending: INTERNAL MEDICINE
Payer: MEDICARE

## 2021-12-09 DIAGNOSIS — I42.9 PRIMARY CARDIOMYOPATHY (H): ICD-10-CM

## 2021-12-09 DIAGNOSIS — Z95.810 ICD (IMPLANTABLE CARDIOVERTER-DEFIBRILLATOR) IN PLACE: ICD-10-CM

## 2021-12-09 PROCEDURE — 93296 REM INTERROG EVL PM/IDS: CPT | Performed by: INTERNAL MEDICINE

## 2021-12-09 PROCEDURE — 93295 DEV INTERROG REMOTE 1/2/MLT: CPT | Performed by: INTERNAL MEDICINE

## 2021-12-15 LAB
MDC_IDC_EPISODE_DTM: NORMAL
MDC_IDC_EPISODE_DTM: NORMAL
MDC_IDC_EPISODE_DURATION: 6 S
MDC_IDC_EPISODE_DURATION: 6 S
MDC_IDC_EPISODE_ID: NORMAL
MDC_IDC_EPISODE_ID: NORMAL
MDC_IDC_EPISODE_TYPE: NORMAL
MDC_IDC_EPISODE_TYPE: NORMAL
MDC_IDC_EPISODE_VENDOR_TYPE: NORMAL
MDC_IDC_EPISODE_VENDOR_TYPE: NORMAL
MDC_IDC_LEAD_IMPLANT_DT: NORMAL
MDC_IDC_LEAD_LOCATION: NORMAL
MDC_IDC_LEAD_LOCATION_DETAIL_1: NORMAL
MDC_IDC_LEAD_MFG: NORMAL
MDC_IDC_LEAD_MODEL: NORMAL
MDC_IDC_LEAD_POLARITY_TYPE: NORMAL
MDC_IDC_LEAD_POLARITY_TYPE: NORMAL
MDC_IDC_LEAD_SERIAL: NORMAL
MDC_IDC_LEAD_SPECIAL_FUNCTION: NORMAL
MDC_IDC_MSMT_BATTERY_DTM: NORMAL
MDC_IDC_MSMT_BATTERY_REMAINING_LONGEVITY: 41 MO
MDC_IDC_MSMT_BATTERY_REMAINING_PERCENTAGE: 51 %
MDC_IDC_MSMT_BATTERY_RRT_TRIGGER: NORMAL
MDC_IDC_MSMT_BATTERY_STATUS: NORMAL
MDC_IDC_MSMT_BATTERY_VOLTAGE: 2.95 V
MDC_IDC_MSMT_CAP_CHARGE_DTM: NORMAL
MDC_IDC_MSMT_CAP_CHARGE_ENERGY: 40 J
MDC_IDC_MSMT_CAP_CHARGE_TIME: 9.3 S
MDC_IDC_MSMT_CAP_CHARGE_TYPE: NORMAL
MDC_IDC_MSMT_LEADCHNL_LV_IMPEDANCE_VALUE: 400 OHM
MDC_IDC_MSMT_LEADCHNL_LV_LEAD_CHANNEL_STATUS: NORMAL
MDC_IDC_MSMT_LEADCHNL_LV_PACING_THRESHOLD_AMPLITUDE: 0.5 V
MDC_IDC_MSMT_LEADCHNL_LV_PACING_THRESHOLD_PULSEWIDTH: 0.5 MS
MDC_IDC_MSMT_LEADCHNL_RA_IMPEDANCE_VALUE: 450 OHM
MDC_IDC_MSMT_LEADCHNL_RA_LEAD_CHANNEL_STATUS: NORMAL
MDC_IDC_MSMT_LEADCHNL_RA_PACING_THRESHOLD_AMPLITUDE: 0.5 V
MDC_IDC_MSMT_LEADCHNL_RA_PACING_THRESHOLD_PULSEWIDTH: 0.5 MS
MDC_IDC_MSMT_LEADCHNL_RA_SENSING_INTR_AMPL: 2.6 MV
MDC_IDC_MSMT_LEADCHNL_RV_IMPEDANCE_VALUE: 440 OHM
MDC_IDC_MSMT_LEADCHNL_RV_LEAD_CHANNEL_STATUS: NORMAL
MDC_IDC_MSMT_LEADCHNL_RV_PACING_THRESHOLD_AMPLITUDE: 1.25 V
MDC_IDC_MSMT_LEADCHNL_RV_PACING_THRESHOLD_PULSEWIDTH: 0.5 MS
MDC_IDC_MSMT_LEADCHNL_RV_SENSING_INTR_AMPL: 12 MV
MDC_IDC_PG_IMPLANT_DTM: NORMAL
MDC_IDC_PG_MFG: NORMAL
MDC_IDC_PG_MODEL: NORMAL
MDC_IDC_PG_SERIAL: NORMAL
MDC_IDC_PG_TYPE: NORMAL
MDC_IDC_SESS_CLINIC_NAME: NORMAL
MDC_IDC_SESS_DTM: NORMAL
MDC_IDC_SESS_REPROGRAMMED: NO
MDC_IDC_SESS_TYPE: NORMAL
MDC_IDC_SET_BRADY_AT_MODE_SWITCH_MODE: NORMAL
MDC_IDC_SET_BRADY_AT_MODE_SWITCH_RATE: 170 {BEATS}/MIN
MDC_IDC_SET_BRADY_LOWRATE: 60 {BEATS}/MIN
MDC_IDC_SET_BRADY_MAX_SENSOR_RATE: 120 {BEATS}/MIN
MDC_IDC_SET_BRADY_MAX_TRACKING_RATE: 120 {BEATS}/MIN
MDC_IDC_SET_BRADY_MODE: NORMAL
MDC_IDC_SET_BRADY_PAV_DELAY_HIGH: 100 MS
MDC_IDC_SET_BRADY_PAV_DELAY_LOW: 150 MS
MDC_IDC_SET_BRADY_SAV_DELAY_HIGH: 100 MS
MDC_IDC_SET_BRADY_SAV_DELAY_LOW: 120 MS
MDC_IDC_SET_CRT_LVRV_DELAY: 0 MS
MDC_IDC_SET_CRT_PACED_CHAMBERS: NORMAL
MDC_IDC_SET_LEADCHNL_LV_PACING_AMPLITUDE: 2 V
MDC_IDC_SET_LEADCHNL_LV_PACING_ANODE_ELECTRODE_1: NORMAL
MDC_IDC_SET_LEADCHNL_LV_PACING_ANODE_LOCATION_1: NORMAL
MDC_IDC_SET_LEADCHNL_LV_PACING_CAPTURE_MODE: NORMAL
MDC_IDC_SET_LEADCHNL_LV_PACING_CATHODE_ELECTRODE_1: NORMAL
MDC_IDC_SET_LEADCHNL_LV_PACING_CATHODE_LOCATION_1: NORMAL
MDC_IDC_SET_LEADCHNL_LV_PACING_POLARITY: NORMAL
MDC_IDC_SET_LEADCHNL_LV_PACING_PULSEWIDTH: 0.5 MS
MDC_IDC_SET_LEADCHNL_RA_PACING_AMPLITUDE: 1.5 V
MDC_IDC_SET_LEADCHNL_RA_PACING_ANODE_ELECTRODE_1: NORMAL
MDC_IDC_SET_LEADCHNL_RA_PACING_ANODE_LOCATION_1: NORMAL
MDC_IDC_SET_LEADCHNL_RA_PACING_CAPTURE_MODE: NORMAL
MDC_IDC_SET_LEADCHNL_RA_PACING_CATHODE_ELECTRODE_1: NORMAL
MDC_IDC_SET_LEADCHNL_RA_PACING_CATHODE_LOCATION_1: NORMAL
MDC_IDC_SET_LEADCHNL_RA_PACING_POLARITY: NORMAL
MDC_IDC_SET_LEADCHNL_RA_PACING_PULSEWIDTH: 0.5 MS
MDC_IDC_SET_LEADCHNL_RA_SENSING_ADAPTATION_MODE: NORMAL
MDC_IDC_SET_LEADCHNL_RA_SENSING_ANODE_ELECTRODE_1: NORMAL
MDC_IDC_SET_LEADCHNL_RA_SENSING_ANODE_LOCATION_1: NORMAL
MDC_IDC_SET_LEADCHNL_RA_SENSING_CATHODE_ELECTRODE_1: NORMAL
MDC_IDC_SET_LEADCHNL_RA_SENSING_CATHODE_LOCATION_1: NORMAL
MDC_IDC_SET_LEADCHNL_RA_SENSING_POLARITY: NORMAL
MDC_IDC_SET_LEADCHNL_RA_SENSING_SENSITIVITY: 0.3 MV
MDC_IDC_SET_LEADCHNL_RV_PACING_AMPLITUDE: 2.25 V
MDC_IDC_SET_LEADCHNL_RV_PACING_ANODE_ELECTRODE_1: NORMAL
MDC_IDC_SET_LEADCHNL_RV_PACING_ANODE_LOCATION_1: NORMAL
MDC_IDC_SET_LEADCHNL_RV_PACING_CAPTURE_MODE: NORMAL
MDC_IDC_SET_LEADCHNL_RV_PACING_CATHODE_ELECTRODE_1: NORMAL
MDC_IDC_SET_LEADCHNL_RV_PACING_CATHODE_LOCATION_1: NORMAL
MDC_IDC_SET_LEADCHNL_RV_PACING_POLARITY: NORMAL
MDC_IDC_SET_LEADCHNL_RV_PACING_PULSEWIDTH: 0.5 MS
MDC_IDC_SET_LEADCHNL_RV_SENSING_ADAPTATION_MODE: NORMAL
MDC_IDC_SET_LEADCHNL_RV_SENSING_ANODE_ELECTRODE_1: NORMAL
MDC_IDC_SET_LEADCHNL_RV_SENSING_ANODE_LOCATION_1: NORMAL
MDC_IDC_SET_LEADCHNL_RV_SENSING_CATHODE_ELECTRODE_1: NORMAL
MDC_IDC_SET_LEADCHNL_RV_SENSING_CATHODE_LOCATION_1: NORMAL
MDC_IDC_SET_LEADCHNL_RV_SENSING_POLARITY: NORMAL
MDC_IDC_SET_LEADCHNL_RV_SENSING_SENSITIVITY: 0.5 MV
MDC_IDC_SET_ZONE_DETECTION_INTERVAL: 300 MS
MDC_IDC_SET_ZONE_DETECTION_INTERVAL: 350 MS
MDC_IDC_SET_ZONE_TYPE: NORMAL
MDC_IDC_SET_ZONE_VENDOR_TYPE: NORMAL
MDC_IDC_STAT_AT_BURDEN_PERCENT: 0 %
MDC_IDC_STAT_AT_DTM_END: NORMAL
MDC_IDC_STAT_AT_DTM_START: NORMAL
MDC_IDC_STAT_AT_MODE_SW_COUNT: 0
MDC_IDC_STAT_AT_MODE_SW_COUNT_PER_DAY: 0
MDC_IDC_STAT_AT_MODE_SW_PERCENT_TIME: 0 %
MDC_IDC_STAT_BRADY_AP_VP_PERCENT: 40 %
MDC_IDC_STAT_BRADY_AP_VS_PERCENT: 1 %
MDC_IDC_STAT_BRADY_AS_VP_PERCENT: 57 %
MDC_IDC_STAT_BRADY_AS_VS_PERCENT: 1 %
MDC_IDC_STAT_BRADY_DTM_END: NORMAL
MDC_IDC_STAT_BRADY_DTM_START: NORMAL
MDC_IDC_STAT_BRADY_RA_PERCENT_PACED: 36 %
MDC_IDC_STAT_CRT_DTM_END: NORMAL
MDC_IDC_STAT_CRT_DTM_START: NORMAL
MDC_IDC_STAT_CRT_PERCENT_PACED: 97 %
MDC_IDC_STAT_EPISODE_RECENT_COUNT: 0
MDC_IDC_STAT_EPISODE_RECENT_COUNT: 0
MDC_IDC_STAT_EPISODE_RECENT_COUNT: 2
MDC_IDC_STAT_EPISODE_RECENT_COUNT_DTM_END: NORMAL
MDC_IDC_STAT_EPISODE_RECENT_COUNT_DTM_START: NORMAL
MDC_IDC_STAT_EPISODE_TYPE: NORMAL
MDC_IDC_STAT_EPISODE_VENDOR_TYPE: NORMAL
MDC_IDC_STAT_HEART_RATE_ATRIAL_MAX: 240 {BEATS}/MIN
MDC_IDC_STAT_HEART_RATE_ATRIAL_MEAN: 73 {BEATS}/MIN
MDC_IDC_STAT_HEART_RATE_ATRIAL_MIN: 50 {BEATS}/MIN
MDC_IDC_STAT_HEART_RATE_DTM_END: NORMAL
MDC_IDC_STAT_HEART_RATE_DTM_START: NORMAL
MDC_IDC_STAT_HEART_RATE_VENTRICULAR_MAX: 330 {BEATS}/MIN
MDC_IDC_STAT_HEART_RATE_VENTRICULAR_MEAN: 73 {BEATS}/MIN
MDC_IDC_STAT_HEART_RATE_VENTRICULAR_MIN: 50 {BEATS}/MIN
MDC_IDC_STAT_TACHYTHERAPY_ATP_DELIVERED_RECENT: 0
MDC_IDC_STAT_TACHYTHERAPY_RECENT_DTM_END: NORMAL
MDC_IDC_STAT_TACHYTHERAPY_RECENT_DTM_START: NORMAL
MDC_IDC_STAT_TACHYTHERAPY_SHOCKS_ABORTED_RECENT: 0
MDC_IDC_STAT_TACHYTHERAPY_SHOCKS_DELIVERED_RECENT: 0

## 2022-03-09 ENCOUNTER — OFFICE VISIT (OUTPATIENT)
Dept: CARDIOLOGY | Facility: CLINIC | Age: 87
End: 2022-03-09
Attending: INTERNAL MEDICINE
Payer: MEDICARE

## 2022-03-09 ENCOUNTER — LAB (OUTPATIENT)
Dept: LAB | Facility: CLINIC | Age: 87
End: 2022-03-09
Payer: MEDICARE

## 2022-03-09 VITALS
HEART RATE: 63 BPM | OXYGEN SATURATION: 95 % | WEIGHT: 169.8 LBS | DIASTOLIC BLOOD PRESSURE: 75 MMHG | SYSTOLIC BLOOD PRESSURE: 149 MMHG | HEIGHT: 67 IN | BODY MASS INDEX: 26.65 KG/M2

## 2022-03-09 DIAGNOSIS — I42.0 DILATED CARDIOMYOPATHY (H): ICD-10-CM

## 2022-03-09 DIAGNOSIS — I10 ESSENTIAL HYPERTENSION WITH GOAL BLOOD PRESSURE LESS THAN 140/90: ICD-10-CM

## 2022-03-09 DIAGNOSIS — E78.00 HYPERCHOLESTEROLEMIA: ICD-10-CM

## 2022-03-09 DIAGNOSIS — I49.3 PVC'S (PREMATURE VENTRICULAR CONTRACTIONS): ICD-10-CM

## 2022-03-09 DIAGNOSIS — I50.22 CHRONIC SYSTOLIC HEART FAILURE (H): ICD-10-CM

## 2022-03-09 DIAGNOSIS — I47.29 NSVT (NONSUSTAINED VENTRICULAR TACHYCARDIA) (H): ICD-10-CM

## 2022-03-09 DIAGNOSIS — Z95.810 ICD (IMPLANTABLE CARDIOVERTER-DEFIBRILLATOR) IN PLACE: ICD-10-CM

## 2022-03-09 DIAGNOSIS — I50.22 CHRONIC SYSTOLIC HEART FAILURE (H): Primary | ICD-10-CM

## 2022-03-09 LAB
ANION GAP SERPL CALCULATED.3IONS-SCNC: 6 MMOL/L (ref 3–14)
BUN SERPL-MCNC: 15 MG/DL (ref 7–30)
CALCIUM SERPL-MCNC: 8.3 MG/DL (ref 8.5–10.1)
CHLORIDE BLD-SCNC: 100 MMOL/L (ref 94–109)
CO2 SERPL-SCNC: 26 MMOL/L (ref 20–32)
CREAT SERPL-MCNC: 0.98 MG/DL (ref 0.66–1.25)
GFR SERPL CREATININE-BSD FRML MDRD: 76 ML/MIN/1.73M2
GLUCOSE BLD-MCNC: 96 MG/DL (ref 70–99)
HGB BLD-MCNC: 13.4 G/DL (ref 13.3–17.7)
POTASSIUM BLD-SCNC: 3.8 MMOL/L (ref 3.4–5.3)
SODIUM SERPL-SCNC: 132 MMOL/L (ref 133–144)

## 2022-03-09 PROCEDURE — 36415 COLL VENOUS BLD VENIPUNCTURE: CPT | Performed by: INTERNAL MEDICINE

## 2022-03-09 PROCEDURE — 99214 OFFICE O/P EST MOD 30 MIN: CPT | Performed by: INTERNAL MEDICINE

## 2022-03-09 PROCEDURE — 85018 HEMOGLOBIN: CPT | Performed by: INTERNAL MEDICINE

## 2022-03-09 PROCEDURE — 80048 BASIC METABOLIC PNL TOTAL CA: CPT | Performed by: INTERNAL MEDICINE

## 2022-03-09 NOTE — PATIENT INSTRUCTIONS
It was a pleasure seeing you today and thank you for allowing me to be a part of your health care team.  Should you have any questions regarding your visit or future needs please feel free to reach out to my care team for assistance.      Thank you, Dr. Rah Ayala        **Nursing: [phone]       **Scheduling: [phone]

## 2022-03-09 NOTE — PROGRESS NOTES
HPI and Plan:   See dictation    Today's clinic visit entailed:  Review of the result(s) of each unique test - echo, bmp, flp, ICD interrogation  Ordering of each unique test  Prescription drug management  40 minutes spent on the date of the encounter doing chart review, review of test results, patient visit and documentation   Provider  Link to [facility] Help Grid     The level of medical decision making during this visit was of high complexity.      Orders Placed This Encounter   Procedures     Basic metabolic panel     Lipid Profile     ALT     Follow-Up with [facility]     Echocardiogram Complete       No orders of the defined types were placed in this encounter.      There are no discontinued medications.      Encounter Diagnoses   Name Primary?     Chronic systolic heart failure (H) Yes     ICD (implantable cardioverter-defibrillator) in place      Hypercholesterolemia      Dilated cardiomyopathy (H)      PVC's (premature ventricular contractions)      NSVT (nonsustained ventricular tachycardia) (H)      Essential hypertension with goal blood pressure less than 140/90        CURRENT MEDICATIONS:  Current Outpatient Medications   Medication Sig Dispense Refill     carvedilol (COREG) 12.5 MG tablet Take 1 tablet (12.5 mg) by mouth 2 times daily (with meals) 180 tablet 3     clopidogrel (PLAVIX) 75 MG tablet Take 75 mg by mouth daily       CYCLOBENZAPRINE HCL PO Take 5 mg by mouth At Bedtime Taking PRN (half tab)       Famotidine (PEPCID PO) Take 40 mg by mouth daily        isosorbide mononitrate (IMDUR) 60 MG 24 hr tablet Take 60 mg by mouth daily       omeprazole (PRILOSEC) 20 MG DR capsule Take 20 mg by mouth daily       polyethylene glycol 400 (BLINK TEARS) 0.25 % SOLN ophthalmic solution Place 1 drop into both eyes 2 times daily as needed        rosuvastatin (CRESTOR) 20 MG tablet Take 1 tablet (20 mg) by mouth daily 90 tablet 3     sacubitril-valsartan (ENTRESTO)  MG per tablet Take 1 tablet by mouth  2 times daily 180 tablet 1     sotalol (BETAPACE) 80 MG tablet Take 1 tablet (80 mg) by mouth 2 times daily 60 tablet 11     tamsulosin (FLOMAX) 0.4 MG 24 hr capsule Take 0.4 mg by mouth daily       White Petrolatum-Mineral Oil (RETAINE PM) OINT Place into both eyes At Bedtime         ALLERGIES     Allergies   Allergen Reactions     Atorvastatin      Muscle aches     Diphenhydramine Other (See Comments)     Lip swelling     Metoprolol Succinate [Metoprolol] Other (See Comments)     Blind in left half of face for 3 days     [place] Other (See Comments)     swelling     Sulfa Drugs Other (See Comments)     swelling       PAST MEDICAL HISTORY:  Past Medical History:   Diagnosis Date     CAD (coronary artery disease)      Congestive heart failure (H)      Heart disease     non-ischemic cardiomyopathy     HTN (hypertension)      Idiopathic cardiomyopathy (H)        PAST SURGICAL HISTORY:  Past Surgical History:   Procedure Laterality Date     COLONOSCOPY N/A 1/29/2020    Procedure: COLONOSCOPY;  Surgeon: Erica Duff MD;  Location:  GI     ESOPHAGOSCOPY, GASTROSCOPY, DUODENOSCOPY (EGD), COMBINED N/A 1/28/2020    Procedure: ESOPHAGOGASTRODUODENOSCOPY (EGD);  Surgeon: Erica Duff MD;  Location:  GI     EYE SURGERY  2006    cataract     ORTHOPEDIC SURGERY  01/2018    pinning of right hip       FAMILY HISTORY:  Family History   Problem Relation Age of Onset     Heart Disease Mother 88        mi     Cerebrovascular Disease Father 71       SOCIAL HISTORY:  Social History     Socioeconomic History     Marital status:      Spouse name: None     Number of children: None     Years of education: None     Highest education level: None   Occupational History     None   Tobacco Use     Smoking status: Never Smoker     Smokeless tobacco: Never Used   Substance and Sexual Activity     Alcohol use: No     Alcohol/week: 0.0 standard drinks     Drug use: No     Sexual activity: None   Other Topics Concern      " Service Not Asked     Blood Transfusions Not Asked     Caffeine Concern No     Occupational Exposure Not Asked     Hobby Hazards Not Asked     Sleep Concern No     Stress Concern Not Asked     Weight Concern No     Special Diet No     Back Care Not Asked     Exercise Yes     Comment: active on farm     Bike Helmet Not Asked     Seat Belt Not Asked     Self-Exams Not Asked     Parent/sibling w/ CABG, MI or angioplasty before 65F 55M? No   Social History Narrative     None     Social Determinants of Health     Financial Resource Strain: Not on file   Food Insecurity: Not on file   Transportation Needs: Not on file   Physical Activity: Not on file   Stress: Not on file   Social Connections: Not on file   Intimate Partner Violence: Not on file   Housing Stability: Not on file       Review of Systems:  Skin:  Negative       Eyes:  Negative      ENT:  Negative      Respiratory:  Negative shortness of breath;dyspnea on exertion     Cardiovascular:  chest pain;Negative;palpitations edema;Positive for    Gastroenterology: not assessed      Genitourinary:  not assessed      Musculoskeletal:         Neurologic:         Psychiatric:  not assessed      Heme/Lymph/Imm:  Positive for allergies    Endocrine:  Negative        Physical Exam:  Vitals: BP (!) 149/75 (BP Location: Right arm, Patient Position: Sitting)   Pulse 63   Ht 1.702 m (5' 7\")   Wt 77 kg (169 lb 12.8 oz)   SpO2 95%   BMI 26.59 kg/m      Constitutional:           Skin:             Head:           Eyes:           Lymph:      ENT:           Neck:           Respiratory:            Cardiac:                                                           GI:           Extremities and Muscular Skeletal:                 Neurological:           Psych:           CC  Rah Ayala MD  [id number]  [place] [id number]              "

## 2022-03-09 NOTE — PROGRESS NOTES
Service Date: 03/09/2022    HISTORY OF PRESENT ILLNESS:  I had the opportunity to see Mr. Katie Shannon in Cardiology Clinic today at [facility] in [place], [place] for reevaluation of idiopathic cardiomyopathy and chronic systolic heart failure.      Mr. Shannon is an 87-year-old gentleman with a longstanding cardiomyopathy with a stable ejection fraction of 30%-35%, confirmed by echocardiogram again recently in 09/2021.  He has an ICD in place, which is also a resynchronization device, but we did not see improvement in left ventricular function after device implantation.  He did have an episode recorded on his device of prolonged ventricular tachycardia associated with symptoms of lightheadedness and burning chest discomfort in 06/2020.  At that time, we started him on sotalol to prevent recurrences of ventricular tachycardia and that has worked quite well.  He still has a few episodes of nonsustained ventricular tachycardia, but nothing prolonged and he has not required any shocks.    He still does a lot of work around his property and in his home.  He denies having any shortness of breath or chest discomfort.  He has no shortness of breath or lightheadedness.    He has had several falling episodes, which seem to be related to loss of balance and not associated with lightheadedness or syncope.    PHYSICAL EXAMINATION:  On examination here today, his blood pressure was a bit high at 149/75.  His blood pressure has been similarly high several times here in the past.  I have attributed that to white coat hypertension.  His blood pressures at home have been much better.  His heart rate was 63 beats per minute today and weight 169 pounds and stable.  His lungs are clear.  Heart rhythm is regular.  I do not hear any significant murmurs and he has no carotid bruits and just a trace of lower extremity edema.    IMPRESSIONS:  Mr. Katie Shannon is an 87-year-old gentleman with idiopathic cardiomyopathy and  moderately severe left ventricular dysfunction with a stable ejection fraction of 30%-35%, but minimal congestive heart failure symptoms.  He is on carvedilol and Entresto for management of his cardiomyopathy.  He has had some ventricular tachycardia and at least 1 episode that was prolonged and symptomatic requiring initiation of sotalol in 2020.  Fortunately, we are not seeing any continued episodes of prolonged ventricular tachycardia now.    He is on Crestor for dyslipidemia.  He does have mild to moderate diffuse nonocclusive coronary artery disease.  His cholesterol numbers are excellent with an LDL of 50.  He remains on Plavix for antiplatelet therapy.    He seems to be doing reasonably well.  I encouraged him to try to be more careful not fall down.  I also recommended that he take his blood pressure at home and let us know if it continues to run above 140 systolic, in which case I would double his carvedilol.      I will plan to see him back again in 1 year for reevaluation of these issues.    cc:   [place] [address]    [facility], [facility]        D: 2022   T: 2022   MT: [id number]    Name:     RUPERT MAN  MRN:      [id number]        Account:      [id number]   :      1935           Service Date: 2022       Document: [id number]

## 2022-03-14 ENCOUNTER — ANCILLARY PROCEDURE (OUTPATIENT)
Dept: CARDIOLOGY | Facility: CLINIC | Age: 87
End: 2022-03-14
Attending: INTERNAL MEDICINE
Payer: MEDICARE

## 2022-03-14 DIAGNOSIS — Z95.810 ICD (IMPLANTABLE CARDIOVERTER-DEFIBRILLATOR) IN PLACE: ICD-10-CM

## 2022-03-14 DIAGNOSIS — I42.9 PRIMARY CARDIOMYOPATHY (H): ICD-10-CM

## 2022-03-14 PROCEDURE — 93296 REM INTERROG EVL PM/IDS: CPT | Performed by: INTERNAL MEDICINE

## 2022-03-14 PROCEDURE — 93295 DEV INTERROG REMOTE 1/2/MLT: CPT | Performed by: INTERNAL MEDICINE

## 2022-03-15 ENCOUNTER — CARE COORDINATION (OUTPATIENT)
Dept: CARDIOLOGY | Facility: CLINIC | Age: 87
End: 2022-03-15
Payer: MEDICARE

## 2022-03-15 VITALS — DIASTOLIC BLOOD PRESSURE: 75 MMHG | SYSTOLIC BLOOD PRESSURE: 131 MMHG | HEART RATE: 65 BPM

## 2022-03-15 NOTE — PROGRESS NOTES
Received VM from pt stating he is calling with an update on his BP. Call out to pt for update.    Patient's home BP readings:    3/12:  131/75, HR 65; 131/68, HR 67  3/14: 112/72, HR 68; 124/64, HR 61    Katie checks his BP in the middle of the day, between doses of his medications. Katie said he has only gotten systolic BP's in the high 140s after being really active shopping in the store or going on a walk. Pt said his niece is a pharmacist and she got him an [employer] BP cuff which has been working well.    Katie also said his ankles look the same as when he saw Dr. Ayala. He said he has not noticed any increased swelling. Advised Al to keep checking his BP about 2-3 times a week and to notify us if his systolic BP becomes 140 or higher regularly, or if he develops worsening edema or shortness of breath, chest pain etc. Update sent to Dr. Ayala. Jayne Kelly RN on 3/15/2022 at 10:46 AM

## 2022-03-31 LAB
MDC_IDC_EPISODE_DTM: NORMAL
MDC_IDC_EPISODE_DTM: NORMAL
MDC_IDC_EPISODE_DURATION: 4 S
MDC_IDC_EPISODE_DURATION: 8 S
MDC_IDC_EPISODE_ID: NORMAL
MDC_IDC_EPISODE_ID: NORMAL
MDC_IDC_EPISODE_TYPE: NORMAL
MDC_IDC_EPISODE_TYPE: NORMAL
MDC_IDC_EPISODE_VENDOR_TYPE: NORMAL
MDC_IDC_EPISODE_VENDOR_TYPE: NORMAL
MDC_IDC_LEAD_IMPLANT_DT: NORMAL
MDC_IDC_LEAD_LOCATION: NORMAL
MDC_IDC_LEAD_LOCATION_DETAIL_1: NORMAL
MDC_IDC_LEAD_MFG: NORMAL
MDC_IDC_LEAD_MODEL: NORMAL
MDC_IDC_LEAD_POLARITY_TYPE: NORMAL
MDC_IDC_LEAD_POLARITY_TYPE: NORMAL
MDC_IDC_LEAD_SERIAL: NORMAL
MDC_IDC_LEAD_SPECIAL_FUNCTION: NORMAL
MDC_IDC_MSMT_BATTERY_DTM: NORMAL
MDC_IDC_MSMT_BATTERY_REMAINING_LONGEVITY: 38 MO
MDC_IDC_MSMT_BATTERY_REMAINING_PERCENTAGE: 48 %
MDC_IDC_MSMT_BATTERY_RRT_TRIGGER: NORMAL
MDC_IDC_MSMT_BATTERY_STATUS: NORMAL
MDC_IDC_MSMT_BATTERY_VOLTAGE: 2.93 V
MDC_IDC_MSMT_CAP_CHARGE_DTM: NORMAL
MDC_IDC_MSMT_CAP_CHARGE_ENERGY: 40 J
MDC_IDC_MSMT_CAP_CHARGE_TIME: 9.3 S
MDC_IDC_MSMT_CAP_CHARGE_TYPE: NORMAL
MDC_IDC_MSMT_LEADCHNL_LV_IMPEDANCE_VALUE: 390 OHM
MDC_IDC_MSMT_LEADCHNL_LV_LEAD_CHANNEL_STATUS: NORMAL
MDC_IDC_MSMT_LEADCHNL_LV_PACING_THRESHOLD_AMPLITUDE: 0.5 V
MDC_IDC_MSMT_LEADCHNL_LV_PACING_THRESHOLD_PULSEWIDTH: 0.5 MS
MDC_IDC_MSMT_LEADCHNL_RA_IMPEDANCE_VALUE: 480 OHM
MDC_IDC_MSMT_LEADCHNL_RA_LEAD_CHANNEL_STATUS: NORMAL
MDC_IDC_MSMT_LEADCHNL_RA_PACING_THRESHOLD_AMPLITUDE: 0.5 V
MDC_IDC_MSMT_LEADCHNL_RA_PACING_THRESHOLD_PULSEWIDTH: 0.5 MS
MDC_IDC_MSMT_LEADCHNL_RA_SENSING_INTR_AMPL: 2.1 MV
MDC_IDC_MSMT_LEADCHNL_RV_IMPEDANCE_VALUE: 430 OHM
MDC_IDC_MSMT_LEADCHNL_RV_LEAD_CHANNEL_STATUS: NORMAL
MDC_IDC_MSMT_LEADCHNL_RV_PACING_THRESHOLD_AMPLITUDE: 1.5 V
MDC_IDC_MSMT_LEADCHNL_RV_PACING_THRESHOLD_PULSEWIDTH: 0.5 MS
MDC_IDC_MSMT_LEADCHNL_RV_SENSING_INTR_AMPL: 12 MV
MDC_IDC_PG_IMPLANT_DTM: NORMAL
MDC_IDC_PG_MFG: NORMAL
MDC_IDC_PG_MODEL: NORMAL
MDC_IDC_PG_SERIAL: NORMAL
MDC_IDC_PG_TYPE: NORMAL
MDC_IDC_SESS_CLINIC_NAME: NORMAL
MDC_IDC_SESS_DTM: NORMAL
MDC_IDC_SESS_REPROGRAMMED: NO
MDC_IDC_SESS_TYPE: NORMAL
MDC_IDC_SET_BRADY_AT_MODE_SWITCH_MODE: NORMAL
MDC_IDC_SET_BRADY_AT_MODE_SWITCH_RATE: 170 {BEATS}/MIN
MDC_IDC_SET_BRADY_LOWRATE: 60 {BEATS}/MIN
MDC_IDC_SET_BRADY_MAX_SENSOR_RATE: 120 {BEATS}/MIN
MDC_IDC_SET_BRADY_MAX_TRACKING_RATE: 120 {BEATS}/MIN
MDC_IDC_SET_BRADY_MODE: NORMAL
MDC_IDC_SET_BRADY_PAV_DELAY_HIGH: 100 MS
MDC_IDC_SET_BRADY_PAV_DELAY_LOW: 150 MS
MDC_IDC_SET_BRADY_SAV_DELAY_HIGH: 100 MS
MDC_IDC_SET_BRADY_SAV_DELAY_LOW: 120 MS
MDC_IDC_SET_CRT_LVRV_DELAY: 0 MS
MDC_IDC_SET_CRT_PACED_CHAMBERS: NORMAL
MDC_IDC_SET_LEADCHNL_LV_PACING_AMPLITUDE: 2 V
MDC_IDC_SET_LEADCHNL_LV_PACING_ANODE_ELECTRODE_1: NORMAL
MDC_IDC_SET_LEADCHNL_LV_PACING_ANODE_LOCATION_1: NORMAL
MDC_IDC_SET_LEADCHNL_LV_PACING_CAPTURE_MODE: NORMAL
MDC_IDC_SET_LEADCHNL_LV_PACING_CATHODE_ELECTRODE_1: NORMAL
MDC_IDC_SET_LEADCHNL_LV_PACING_CATHODE_LOCATION_1: NORMAL
MDC_IDC_SET_LEADCHNL_LV_PACING_POLARITY: NORMAL
MDC_IDC_SET_LEADCHNL_LV_PACING_PULSEWIDTH: 0.5 MS
MDC_IDC_SET_LEADCHNL_RA_PACING_AMPLITUDE: 1.5 V
MDC_IDC_SET_LEADCHNL_RA_PACING_ANODE_ELECTRODE_1: NORMAL
MDC_IDC_SET_LEADCHNL_RA_PACING_ANODE_LOCATION_1: NORMAL
MDC_IDC_SET_LEADCHNL_RA_PACING_CAPTURE_MODE: NORMAL
MDC_IDC_SET_LEADCHNL_RA_PACING_CATHODE_ELECTRODE_1: NORMAL
MDC_IDC_SET_LEADCHNL_RA_PACING_CATHODE_LOCATION_1: NORMAL
MDC_IDC_SET_LEADCHNL_RA_PACING_POLARITY: NORMAL
MDC_IDC_SET_LEADCHNL_RA_PACING_PULSEWIDTH: 0.5 MS
MDC_IDC_SET_LEADCHNL_RA_SENSING_ADAPTATION_MODE: NORMAL
MDC_IDC_SET_LEADCHNL_RA_SENSING_ANODE_ELECTRODE_1: NORMAL
MDC_IDC_SET_LEADCHNL_RA_SENSING_ANODE_LOCATION_1: NORMAL
MDC_IDC_SET_LEADCHNL_RA_SENSING_CATHODE_ELECTRODE_1: NORMAL
MDC_IDC_SET_LEADCHNL_RA_SENSING_CATHODE_LOCATION_1: NORMAL
MDC_IDC_SET_LEADCHNL_RA_SENSING_POLARITY: NORMAL
MDC_IDC_SET_LEADCHNL_RA_SENSING_SENSITIVITY: 0.3 MV
MDC_IDC_SET_LEADCHNL_RV_PACING_AMPLITUDE: 2.5 V
MDC_IDC_SET_LEADCHNL_RV_PACING_ANODE_ELECTRODE_1: NORMAL
MDC_IDC_SET_LEADCHNL_RV_PACING_ANODE_LOCATION_1: NORMAL
MDC_IDC_SET_LEADCHNL_RV_PACING_CAPTURE_MODE: NORMAL
MDC_IDC_SET_LEADCHNL_RV_PACING_CATHODE_ELECTRODE_1: NORMAL
MDC_IDC_SET_LEADCHNL_RV_PACING_CATHODE_LOCATION_1: NORMAL
MDC_IDC_SET_LEADCHNL_RV_PACING_POLARITY: NORMAL
MDC_IDC_SET_LEADCHNL_RV_PACING_PULSEWIDTH: 0.5 MS
MDC_IDC_SET_LEADCHNL_RV_SENSING_ADAPTATION_MODE: NORMAL
MDC_IDC_SET_LEADCHNL_RV_SENSING_ANODE_ELECTRODE_1: NORMAL
MDC_IDC_SET_LEADCHNL_RV_SENSING_ANODE_LOCATION_1: NORMAL
MDC_IDC_SET_LEADCHNL_RV_SENSING_CATHODE_ELECTRODE_1: NORMAL
MDC_IDC_SET_LEADCHNL_RV_SENSING_CATHODE_LOCATION_1: NORMAL
MDC_IDC_SET_LEADCHNL_RV_SENSING_POLARITY: NORMAL
MDC_IDC_SET_LEADCHNL_RV_SENSING_SENSITIVITY: 0.5 MV
MDC_IDC_SET_ZONE_DETECTION_INTERVAL: 300 MS
MDC_IDC_SET_ZONE_DETECTION_INTERVAL: 350 MS
MDC_IDC_SET_ZONE_TYPE: NORMAL
MDC_IDC_SET_ZONE_VENDOR_TYPE: NORMAL
MDC_IDC_STAT_AT_BURDEN_PERCENT: 0 %
MDC_IDC_STAT_AT_DTM_END: NORMAL
MDC_IDC_STAT_AT_DTM_START: NORMAL
MDC_IDC_STAT_AT_MODE_SW_COUNT: 1
MDC_IDC_STAT_AT_MODE_SW_COUNT_PER_DAY: 0
MDC_IDC_STAT_AT_MODE_SW_MAX_DURATION: 4 S
MDC_IDC_STAT_AT_MODE_SW_PERCENT_TIME: 1 %
MDC_IDC_STAT_BRADY_AP_VP_PERCENT: 46 %
MDC_IDC_STAT_BRADY_AP_VS_PERCENT: 1 %
MDC_IDC_STAT_BRADY_AS_VP_PERCENT: 51 %
MDC_IDC_STAT_BRADY_AS_VS_PERCENT: 1 %
MDC_IDC_STAT_BRADY_DTM_END: NORMAL
MDC_IDC_STAT_BRADY_DTM_START: NORMAL
MDC_IDC_STAT_BRADY_RA_PERCENT_PACED: 42 %
MDC_IDC_STAT_CRT_DTM_END: NORMAL
MDC_IDC_STAT_CRT_DTM_START: NORMAL
MDC_IDC_STAT_CRT_PERCENT_PACED: 97 %
MDC_IDC_STAT_EPISODE_RECENT_COUNT: 0
MDC_IDC_STAT_EPISODE_RECENT_COUNT: 0
MDC_IDC_STAT_EPISODE_RECENT_COUNT: 1
MDC_IDC_STAT_EPISODE_RECENT_COUNT_DTM_END: NORMAL
MDC_IDC_STAT_EPISODE_RECENT_COUNT_DTM_START: NORMAL
MDC_IDC_STAT_EPISODE_TYPE: NORMAL
MDC_IDC_STAT_EPISODE_VENDOR_TYPE: NORMAL
MDC_IDC_STAT_HEART_RATE_ATRIAL_MAX: 230 {BEATS}/MIN
MDC_IDC_STAT_HEART_RATE_ATRIAL_MEAN: 72 {BEATS}/MIN
MDC_IDC_STAT_HEART_RATE_ATRIAL_MIN: 40 {BEATS}/MIN
MDC_IDC_STAT_HEART_RATE_DTM_END: NORMAL
MDC_IDC_STAT_HEART_RATE_DTM_START: NORMAL
MDC_IDC_STAT_HEART_RATE_VENTRICULAR_MAX: 330 {BEATS}/MIN
MDC_IDC_STAT_HEART_RATE_VENTRICULAR_MEAN: 72 {BEATS}/MIN
MDC_IDC_STAT_HEART_RATE_VENTRICULAR_MIN: 50 {BEATS}/MIN
MDC_IDC_STAT_TACHYTHERAPY_ATP_DELIVERED_RECENT: 0
MDC_IDC_STAT_TACHYTHERAPY_RECENT_DTM_END: NORMAL
MDC_IDC_STAT_TACHYTHERAPY_RECENT_DTM_START: NORMAL
MDC_IDC_STAT_TACHYTHERAPY_SHOCKS_ABORTED_RECENT: 0
MDC_IDC_STAT_TACHYTHERAPY_SHOCKS_DELIVERED_RECENT: 0

## 2022-04-25 ENCOUNTER — TELEPHONE (OUTPATIENT)
Dept: CARDIOLOGY | Facility: CLINIC | Age: 87
End: 2022-04-25
Payer: MEDICARE

## 2022-04-25 NOTE — TELEPHONE ENCOUNTER
Patient called stating he fell over the weekend and hit pretty hard fall on cement. Patient called to ensure patients device is working correctly. Per patient has follow up scheduled later this afternoon for x-rays and further assessment post fall.     Returned patients call. Patient stated he had a mechanical fall on Saturday, it was windy and when he was holding the door open he was knocked to the ground. Since that time patients states he has been having pain at ICD site and wants to make sure leads remain intact.     Patient has a [employer] CRT-D device, HIS lead is in LV port. Patients underlying rhythm at last in clinic device check on 9/9/2021 was SB/SR 55-60. Asked if patient could come into the clinic, patient stated he is unable to come to the clinic since he lives 150 miles away. Reassured patient that we did not receive any alerts from his device over the weekend, but asked patient to send a manual transmission to ensure device is working properly. Patient denies any dizziness or episodes of pre syncope.    Will have patient send remote transmission for further review.     Presenting EGM shows AS/BiVP, AP/BiVP and PVCs in the 60-80s. Battery longevity is 3.1 years. 1 AMS episode logged from 4/12/2022 at 10:14AM, shows short burst of PAT lasting 6 seconds. RA and RV leads are on Auto capture, auto thresholds completed today 4/25 remain stable. LV lead in HIS port is not on Auto however, lead impedance is stable.     Called patient to discuss results. Reassured patient that presenting rhythm shows normal function of pacemaker/ICD and lead measurements that were obtained this morning are stable. Asked patient if he is concerned about incision site. Patient denied any redness or swelling at site but does note pain in his back just below where pacemaker/ICD is. Asked that patient continue to monitor site for any changes and if pain does not improve or if swelling/redness occurs to give us call,  also asked that doctor his see's this afternoon assess in the clinic today.     Encouraged patient to call device clinic if symptoms start over the next several days. If doctor he see's this afternoon is concerned about x-ray results or function of device I asked that patient have provider call us and we can come up with a plan together. Reassured that based on the information we have from patients remote transmission device is working properly following fall. Patient/spouse in agreement with plan.

## 2022-05-12 ENCOUNTER — TELEPHONE (OUTPATIENT)
Dept: CARDIOLOGY | Facility: CLINIC | Age: 87
End: 2022-05-12
Payer: MEDICARE

## 2022-05-12 NOTE — TELEPHONE ENCOUNTER
Received VM from patient's wife. Pt is in the [facility] because he wasn't feeling well around 2:00am. She wants to know if his ICD remote monitor picked anything up.     We have not received any remote alerts today.     Called wife back and explained we have not received anything, but he may have left for the hospital before an alert would have come through. Pt's wife said also that pt has not been sleeping in his bed recently because he had some falls recently and hurt his arm and back so he hasn't been comfortable in bed, so he has been far away from his monitor.     Pt's symptoms this morning were feeling dizzy and he felt like he couldn't speak as well as normal, but his wife said she couldn't tell a difference in his speech. Pt has been on tramadol for the back pain and wife wonders if this could have caused his symptoms. He is being monitored and having tests in the ER today and will likely be sent home later today. I offered pt's wife to have pt send a manual transmission when he gets home to make sure he did not have any arrhythmias. She said she would check with pt to see what he wants, and if he wants to send a transmission they will call us back for assistance.

## 2022-05-17 ENCOUNTER — ANCILLARY PROCEDURE (OUTPATIENT)
Dept: CARDIOLOGY | Facility: CLINIC | Age: 87
End: 2022-05-17
Attending: INTERNAL MEDICINE
Payer: MEDICARE

## 2022-05-17 DIAGNOSIS — I42.9 PRIMARY CARDIOMYOPATHY (H): ICD-10-CM

## 2022-05-17 DIAGNOSIS — Z95.810 ICD (IMPLANTABLE CARDIOVERTER-DEFIBRILLATOR) IN PLACE: ICD-10-CM

## 2022-05-18 LAB
MDC_IDC_LEAD_IMPLANT_DT: NORMAL
MDC_IDC_LEAD_LOCATION: NORMAL
MDC_IDC_LEAD_LOCATION_DETAIL_1: NORMAL
MDC_IDC_LEAD_MFG: NORMAL
MDC_IDC_LEAD_MODEL: NORMAL
MDC_IDC_LEAD_POLARITY_TYPE: NORMAL
MDC_IDC_LEAD_POLARITY_TYPE: NORMAL
MDC_IDC_LEAD_SERIAL: NORMAL
MDC_IDC_LEAD_SPECIAL_FUNCTION: NORMAL
MDC_IDC_MSMT_BATTERY_DTM: NORMAL
MDC_IDC_MSMT_BATTERY_REMAINING_LONGEVITY: 36 MO
MDC_IDC_MSMT_BATTERY_REMAINING_PERCENTAGE: 45 %
MDC_IDC_MSMT_BATTERY_RRT_TRIGGER: NORMAL
MDC_IDC_MSMT_BATTERY_STATUS: NORMAL
MDC_IDC_MSMT_BATTERY_VOLTAGE: 2.93 V
MDC_IDC_MSMT_CAP_CHARGE_DTM: NORMAL
MDC_IDC_MSMT_CAP_CHARGE_ENERGY: 40 J
MDC_IDC_MSMT_CAP_CHARGE_TIME: 9.3 S
MDC_IDC_MSMT_CAP_CHARGE_TYPE: NORMAL
MDC_IDC_MSMT_LEADCHNL_LV_IMPEDANCE_VALUE: 380 OHM
MDC_IDC_MSMT_LEADCHNL_LV_LEAD_CHANNEL_STATUS: NORMAL
MDC_IDC_MSMT_LEADCHNL_LV_PACING_THRESHOLD_AMPLITUDE: 0.5 V
MDC_IDC_MSMT_LEADCHNL_LV_PACING_THRESHOLD_PULSEWIDTH: 0.5 MS
MDC_IDC_MSMT_LEADCHNL_RA_IMPEDANCE_VALUE: 430 OHM
MDC_IDC_MSMT_LEADCHNL_RA_LEAD_CHANNEL_STATUS: NORMAL
MDC_IDC_MSMT_LEADCHNL_RA_PACING_THRESHOLD_AMPLITUDE: 0.5 V
MDC_IDC_MSMT_LEADCHNL_RA_PACING_THRESHOLD_PULSEWIDTH: 0.5 MS
MDC_IDC_MSMT_LEADCHNL_RA_SENSING_INTR_AMPL: 2.1 MV
MDC_IDC_MSMT_LEADCHNL_RV_IMPEDANCE_VALUE: 410 OHM
MDC_IDC_MSMT_LEADCHNL_RV_LEAD_CHANNEL_STATUS: NORMAL
MDC_IDC_MSMT_LEADCHNL_RV_PACING_THRESHOLD_AMPLITUDE: 1.25 V
MDC_IDC_MSMT_LEADCHNL_RV_PACING_THRESHOLD_PULSEWIDTH: 0.5 MS
MDC_IDC_MSMT_LEADCHNL_RV_SENSING_INTR_AMPL: 12 MV
MDC_IDC_PG_IMPLANT_DTM: NORMAL
MDC_IDC_PG_MFG: NORMAL
MDC_IDC_PG_MODEL: NORMAL
MDC_IDC_PG_SERIAL: NORMAL
MDC_IDC_PG_TYPE: NORMAL
MDC_IDC_SESS_CLINIC_NAME: NORMAL
MDC_IDC_SESS_DTM: NORMAL
MDC_IDC_SESS_REPROGRAMMED: NO
MDC_IDC_SESS_TYPE: NORMAL
MDC_IDC_SET_BRADY_AT_MODE_SWITCH_MODE: NORMAL
MDC_IDC_SET_BRADY_AT_MODE_SWITCH_RATE: 170 {BEATS}/MIN
MDC_IDC_SET_BRADY_LOWRATE: 60 {BEATS}/MIN
MDC_IDC_SET_BRADY_MAX_SENSOR_RATE: 120 {BEATS}/MIN
MDC_IDC_SET_BRADY_MAX_TRACKING_RATE: 120 {BEATS}/MIN
MDC_IDC_SET_BRADY_MODE: NORMAL
MDC_IDC_SET_BRADY_PAV_DELAY_HIGH: 100 MS
MDC_IDC_SET_BRADY_PAV_DELAY_LOW: 150 MS
MDC_IDC_SET_BRADY_SAV_DELAY_HIGH: 100 MS
MDC_IDC_SET_BRADY_SAV_DELAY_LOW: 120 MS
MDC_IDC_SET_CRT_LVRV_DELAY: 0 MS
MDC_IDC_SET_CRT_PACED_CHAMBERS: NORMAL
MDC_IDC_SET_LEADCHNL_LV_PACING_AMPLITUDE: 2 V
MDC_IDC_SET_LEADCHNL_LV_PACING_ANODE_ELECTRODE_1: NORMAL
MDC_IDC_SET_LEADCHNL_LV_PACING_ANODE_LOCATION_1: NORMAL
MDC_IDC_SET_LEADCHNL_LV_PACING_CAPTURE_MODE: NORMAL
MDC_IDC_SET_LEADCHNL_LV_PACING_CATHODE_ELECTRODE_1: NORMAL
MDC_IDC_SET_LEADCHNL_LV_PACING_CATHODE_LOCATION_1: NORMAL
MDC_IDC_SET_LEADCHNL_LV_PACING_POLARITY: NORMAL
MDC_IDC_SET_LEADCHNL_LV_PACING_PULSEWIDTH: 0.5 MS
MDC_IDC_SET_LEADCHNL_RA_PACING_AMPLITUDE: 1.5 V
MDC_IDC_SET_LEADCHNL_RA_PACING_ANODE_ELECTRODE_1: NORMAL
MDC_IDC_SET_LEADCHNL_RA_PACING_ANODE_LOCATION_1: NORMAL
MDC_IDC_SET_LEADCHNL_RA_PACING_CAPTURE_MODE: NORMAL
MDC_IDC_SET_LEADCHNL_RA_PACING_CATHODE_ELECTRODE_1: NORMAL
MDC_IDC_SET_LEADCHNL_RA_PACING_CATHODE_LOCATION_1: NORMAL
MDC_IDC_SET_LEADCHNL_RA_PACING_POLARITY: NORMAL
MDC_IDC_SET_LEADCHNL_RA_PACING_PULSEWIDTH: 0.5 MS
MDC_IDC_SET_LEADCHNL_RA_SENSING_ADAPTATION_MODE: NORMAL
MDC_IDC_SET_LEADCHNL_RA_SENSING_ANODE_ELECTRODE_1: NORMAL
MDC_IDC_SET_LEADCHNL_RA_SENSING_ANODE_LOCATION_1: NORMAL
MDC_IDC_SET_LEADCHNL_RA_SENSING_CATHODE_ELECTRODE_1: NORMAL
MDC_IDC_SET_LEADCHNL_RA_SENSING_CATHODE_LOCATION_1: NORMAL
MDC_IDC_SET_LEADCHNL_RA_SENSING_POLARITY: NORMAL
MDC_IDC_SET_LEADCHNL_RA_SENSING_SENSITIVITY: 0.3 MV
MDC_IDC_SET_LEADCHNL_RV_PACING_AMPLITUDE: 2.25 V
MDC_IDC_SET_LEADCHNL_RV_PACING_ANODE_ELECTRODE_1: NORMAL
MDC_IDC_SET_LEADCHNL_RV_PACING_ANODE_LOCATION_1: NORMAL
MDC_IDC_SET_LEADCHNL_RV_PACING_CAPTURE_MODE: NORMAL
MDC_IDC_SET_LEADCHNL_RV_PACING_CATHODE_ELECTRODE_1: NORMAL
MDC_IDC_SET_LEADCHNL_RV_PACING_CATHODE_LOCATION_1: NORMAL
MDC_IDC_SET_LEADCHNL_RV_PACING_POLARITY: NORMAL
MDC_IDC_SET_LEADCHNL_RV_PACING_PULSEWIDTH: 0.5 MS
MDC_IDC_SET_LEADCHNL_RV_SENSING_ADAPTATION_MODE: NORMAL
MDC_IDC_SET_LEADCHNL_RV_SENSING_ANODE_ELECTRODE_1: NORMAL
MDC_IDC_SET_LEADCHNL_RV_SENSING_ANODE_LOCATION_1: NORMAL
MDC_IDC_SET_LEADCHNL_RV_SENSING_CATHODE_ELECTRODE_1: NORMAL
MDC_IDC_SET_LEADCHNL_RV_SENSING_CATHODE_LOCATION_1: NORMAL
MDC_IDC_SET_LEADCHNL_RV_SENSING_POLARITY: NORMAL
MDC_IDC_SET_LEADCHNL_RV_SENSING_SENSITIVITY: 0.5 MV
MDC_IDC_SET_ZONE_DETECTION_INTERVAL: 300 MS
MDC_IDC_SET_ZONE_DETECTION_INTERVAL: 350 MS
MDC_IDC_SET_ZONE_TYPE: NORMAL
MDC_IDC_SET_ZONE_VENDOR_TYPE: NORMAL
MDC_IDC_STAT_AT_BURDEN_PERCENT: 0 %
MDC_IDC_STAT_AT_DTM_END: NORMAL
MDC_IDC_STAT_AT_DTM_START: NORMAL
MDC_IDC_STAT_AT_MODE_SW_COUNT: 0
MDC_IDC_STAT_AT_MODE_SW_COUNT_PER_DAY: 0
MDC_IDC_STAT_AT_MODE_SW_PERCENT_TIME: 0 %
MDC_IDC_STAT_BRADY_AP_VP_PERCENT: 48 %
MDC_IDC_STAT_BRADY_AP_VS_PERCENT: 1 %
MDC_IDC_STAT_BRADY_AS_VP_PERCENT: 50 %
MDC_IDC_STAT_BRADY_AS_VS_PERCENT: 1 %
MDC_IDC_STAT_BRADY_DTM_END: NORMAL
MDC_IDC_STAT_BRADY_DTM_START: NORMAL
MDC_IDC_STAT_BRADY_RA_PERCENT_PACED: 45 %
MDC_IDC_STAT_CRT_DTM_END: NORMAL
MDC_IDC_STAT_CRT_DTM_START: NORMAL
MDC_IDC_STAT_CRT_PERCENT_PACED: 98 %
MDC_IDC_STAT_HEART_RATE_ATRIAL_MAX: 210 {BEATS}/MIN
MDC_IDC_STAT_HEART_RATE_ATRIAL_MEAN: 71 {BEATS}/MIN
MDC_IDC_STAT_HEART_RATE_ATRIAL_MIN: 50 {BEATS}/MIN
MDC_IDC_STAT_HEART_RATE_DTM_END: NORMAL
MDC_IDC_STAT_HEART_RATE_DTM_START: NORMAL
MDC_IDC_STAT_HEART_RATE_VENTRICULAR_MAX: 330 {BEATS}/MIN
MDC_IDC_STAT_HEART_RATE_VENTRICULAR_MEAN: 70 {BEATS}/MIN
MDC_IDC_STAT_HEART_RATE_VENTRICULAR_MIN: 50 {BEATS}/MIN
MDC_IDC_STAT_TACHYTHERAPY_ATP_DELIVERED_RECENT: 0
MDC_IDC_STAT_TACHYTHERAPY_RECENT_DTM_END: NORMAL
MDC_IDC_STAT_TACHYTHERAPY_RECENT_DTM_START: NORMAL
MDC_IDC_STAT_TACHYTHERAPY_SHOCKS_ABORTED_RECENT: 0
MDC_IDC_STAT_TACHYTHERAPY_SHOCKS_DELIVERED_RECENT: 0

## 2022-06-15 ENCOUNTER — ANCILLARY PROCEDURE (OUTPATIENT)
Dept: CARDIOLOGY | Facility: CLINIC | Age: 87
End: 2022-06-15
Attending: INTERNAL MEDICINE
Payer: MEDICARE

## 2022-06-15 DIAGNOSIS — Z95.810 ICD (IMPLANTABLE CARDIOVERTER-DEFIBRILLATOR) IN PLACE: ICD-10-CM

## 2022-06-15 DIAGNOSIS — I42.9 PRIMARY CARDIOMYOPATHY (H): ICD-10-CM

## 2022-06-15 PROCEDURE — 93296 REM INTERROG EVL PM/IDS: CPT | Performed by: INTERNAL MEDICINE

## 2022-06-15 PROCEDURE — 93295 DEV INTERROG REMOTE 1/2/MLT: CPT | Performed by: INTERNAL MEDICINE

## 2022-07-01 LAB
MDC_IDC_EPISODE_DTM: NORMAL
MDC_IDC_EPISODE_DURATION: 6 S
MDC_IDC_EPISODE_DURATION: 8 S
MDC_IDC_EPISODE_DURATION: 8 S
MDC_IDC_EPISODE_ID: NORMAL
MDC_IDC_EPISODE_TYPE: NORMAL
MDC_IDC_EPISODE_VENDOR_TYPE: NORMAL
MDC_IDC_LEAD_IMPLANT_DT: NORMAL
MDC_IDC_LEAD_LOCATION: NORMAL
MDC_IDC_LEAD_LOCATION_DETAIL_1: NORMAL
MDC_IDC_LEAD_MFG: NORMAL
MDC_IDC_LEAD_MODEL: NORMAL
MDC_IDC_LEAD_POLARITY_TYPE: NORMAL
MDC_IDC_LEAD_POLARITY_TYPE: NORMAL
MDC_IDC_LEAD_SERIAL: NORMAL
MDC_IDC_LEAD_SPECIAL_FUNCTION: NORMAL
MDC_IDC_MSMT_BATTERY_DTM: NORMAL
MDC_IDC_MSMT_BATTERY_REMAINING_LONGEVITY: 35 MO
MDC_IDC_MSMT_BATTERY_REMAINING_PERCENTAGE: 44 %
MDC_IDC_MSMT_BATTERY_RRT_TRIGGER: NORMAL
MDC_IDC_MSMT_BATTERY_STATUS: NORMAL
MDC_IDC_MSMT_BATTERY_VOLTAGE: 2.93 V
MDC_IDC_MSMT_CAP_CHARGE_DTM: NORMAL
MDC_IDC_MSMT_CAP_CHARGE_ENERGY: 40 J
MDC_IDC_MSMT_CAP_CHARGE_TIME: 9.3 S
MDC_IDC_MSMT_CAP_CHARGE_TYPE: NORMAL
MDC_IDC_MSMT_LEADCHNL_LV_IMPEDANCE_VALUE: 380 OHM
MDC_IDC_MSMT_LEADCHNL_LV_LEAD_CHANNEL_STATUS: NORMAL
MDC_IDC_MSMT_LEADCHNL_LV_PACING_THRESHOLD_AMPLITUDE: 0.5 V
MDC_IDC_MSMT_LEADCHNL_LV_PACING_THRESHOLD_PULSEWIDTH: 0.5 MS
MDC_IDC_MSMT_LEADCHNL_RA_IMPEDANCE_VALUE: 430 OHM
MDC_IDC_MSMT_LEADCHNL_RA_LEAD_CHANNEL_STATUS: NORMAL
MDC_IDC_MSMT_LEADCHNL_RA_PACING_THRESHOLD_AMPLITUDE: 0.5 V
MDC_IDC_MSMT_LEADCHNL_RA_PACING_THRESHOLD_PULSEWIDTH: 0.5 MS
MDC_IDC_MSMT_LEADCHNL_RA_SENSING_INTR_AMPL: 4.2 MV
MDC_IDC_MSMT_LEADCHNL_RV_IMPEDANCE_VALUE: 390 OHM
MDC_IDC_MSMT_LEADCHNL_RV_LEAD_CHANNEL_STATUS: NORMAL
MDC_IDC_MSMT_LEADCHNL_RV_PACING_THRESHOLD_AMPLITUDE: 1.12 V
MDC_IDC_MSMT_LEADCHNL_RV_PACING_THRESHOLD_PULSEWIDTH: 0.5 MS
MDC_IDC_MSMT_LEADCHNL_RV_SENSING_INTR_AMPL: 12 MV
MDC_IDC_PG_IMPLANT_DTM: NORMAL
MDC_IDC_PG_MFG: NORMAL
MDC_IDC_PG_MODEL: NORMAL
MDC_IDC_PG_SERIAL: NORMAL
MDC_IDC_PG_TYPE: NORMAL
MDC_IDC_SESS_CLINIC_NAME: NORMAL
MDC_IDC_SESS_DTM: NORMAL
MDC_IDC_SESS_REPROGRAMMED: NO
MDC_IDC_SESS_TYPE: NORMAL
MDC_IDC_SET_BRADY_AT_MODE_SWITCH_MODE: NORMAL
MDC_IDC_SET_BRADY_AT_MODE_SWITCH_RATE: 170 {BEATS}/MIN
MDC_IDC_SET_BRADY_LOWRATE: 60 {BEATS}/MIN
MDC_IDC_SET_BRADY_MAX_SENSOR_RATE: 120 {BEATS}/MIN
MDC_IDC_SET_BRADY_MAX_TRACKING_RATE: 120 {BEATS}/MIN
MDC_IDC_SET_BRADY_MODE: NORMAL
MDC_IDC_SET_BRADY_PAV_DELAY_HIGH: 100 MS
MDC_IDC_SET_BRADY_PAV_DELAY_LOW: 150 MS
MDC_IDC_SET_BRADY_SAV_DELAY_HIGH: 100 MS
MDC_IDC_SET_BRADY_SAV_DELAY_LOW: 120 MS
MDC_IDC_SET_CRT_LVRV_DELAY: 0 MS
MDC_IDC_SET_CRT_PACED_CHAMBERS: NORMAL
MDC_IDC_SET_LEADCHNL_LV_PACING_AMPLITUDE: 2 V
MDC_IDC_SET_LEADCHNL_LV_PACING_ANODE_ELECTRODE_1: NORMAL
MDC_IDC_SET_LEADCHNL_LV_PACING_ANODE_LOCATION_1: NORMAL
MDC_IDC_SET_LEADCHNL_LV_PACING_CAPTURE_MODE: NORMAL
MDC_IDC_SET_LEADCHNL_LV_PACING_CATHODE_ELECTRODE_1: NORMAL
MDC_IDC_SET_LEADCHNL_LV_PACING_CATHODE_LOCATION_1: NORMAL
MDC_IDC_SET_LEADCHNL_LV_PACING_POLARITY: NORMAL
MDC_IDC_SET_LEADCHNL_LV_PACING_PULSEWIDTH: 0.5 MS
MDC_IDC_SET_LEADCHNL_RA_PACING_AMPLITUDE: 1.5 V
MDC_IDC_SET_LEADCHNL_RA_PACING_ANODE_ELECTRODE_1: NORMAL
MDC_IDC_SET_LEADCHNL_RA_PACING_ANODE_LOCATION_1: NORMAL
MDC_IDC_SET_LEADCHNL_RA_PACING_CAPTURE_MODE: NORMAL
MDC_IDC_SET_LEADCHNL_RA_PACING_CATHODE_ELECTRODE_1: NORMAL
MDC_IDC_SET_LEADCHNL_RA_PACING_CATHODE_LOCATION_1: NORMAL
MDC_IDC_SET_LEADCHNL_RA_PACING_POLARITY: NORMAL
MDC_IDC_SET_LEADCHNL_RA_PACING_PULSEWIDTH: 0.5 MS
MDC_IDC_SET_LEADCHNL_RA_SENSING_ADAPTATION_MODE: NORMAL
MDC_IDC_SET_LEADCHNL_RA_SENSING_ANODE_ELECTRODE_1: NORMAL
MDC_IDC_SET_LEADCHNL_RA_SENSING_ANODE_LOCATION_1: NORMAL
MDC_IDC_SET_LEADCHNL_RA_SENSING_CATHODE_ELECTRODE_1: NORMAL
MDC_IDC_SET_LEADCHNL_RA_SENSING_CATHODE_LOCATION_1: NORMAL
MDC_IDC_SET_LEADCHNL_RA_SENSING_POLARITY: NORMAL
MDC_IDC_SET_LEADCHNL_RA_SENSING_SENSITIVITY: 0.3 MV
MDC_IDC_SET_LEADCHNL_RV_PACING_AMPLITUDE: 2.12
MDC_IDC_SET_LEADCHNL_RV_PACING_ANODE_ELECTRODE_1: NORMAL
MDC_IDC_SET_LEADCHNL_RV_PACING_ANODE_LOCATION_1: NORMAL
MDC_IDC_SET_LEADCHNL_RV_PACING_CAPTURE_MODE: NORMAL
MDC_IDC_SET_LEADCHNL_RV_PACING_CATHODE_ELECTRODE_1: NORMAL
MDC_IDC_SET_LEADCHNL_RV_PACING_CATHODE_LOCATION_1: NORMAL
MDC_IDC_SET_LEADCHNL_RV_PACING_POLARITY: NORMAL
MDC_IDC_SET_LEADCHNL_RV_PACING_PULSEWIDTH: 0.5 MS
MDC_IDC_SET_LEADCHNL_RV_SENSING_ADAPTATION_MODE: NORMAL
MDC_IDC_SET_LEADCHNL_RV_SENSING_ANODE_ELECTRODE_1: NORMAL
MDC_IDC_SET_LEADCHNL_RV_SENSING_ANODE_LOCATION_1: NORMAL
MDC_IDC_SET_LEADCHNL_RV_SENSING_CATHODE_ELECTRODE_1: NORMAL
MDC_IDC_SET_LEADCHNL_RV_SENSING_CATHODE_LOCATION_1: NORMAL
MDC_IDC_SET_LEADCHNL_RV_SENSING_POLARITY: NORMAL
MDC_IDC_SET_LEADCHNL_RV_SENSING_SENSITIVITY: 0.5 MV
MDC_IDC_SET_ZONE_DETECTION_INTERVAL: 300 MS
MDC_IDC_SET_ZONE_DETECTION_INTERVAL: 350 MS
MDC_IDC_SET_ZONE_TYPE: NORMAL
MDC_IDC_SET_ZONE_VENDOR_TYPE: NORMAL
MDC_IDC_STAT_AT_BURDEN_PERCENT: 1 %
MDC_IDC_STAT_AT_DTM_END: NORMAL
MDC_IDC_STAT_AT_DTM_START: NORMAL
MDC_IDC_STAT_AT_MODE_SW_COUNT: 2
MDC_IDC_STAT_AT_MODE_SW_COUNT_PER_DAY: 0
MDC_IDC_STAT_AT_MODE_SW_MAX_DURATION: 8 S
MDC_IDC_STAT_AT_MODE_SW_PERCENT_TIME: 1 %
MDC_IDC_STAT_BRADY_AP_VP_PERCENT: 24 %
MDC_IDC_STAT_BRADY_AP_VS_PERCENT: 1 %
MDC_IDC_STAT_BRADY_AS_VP_PERCENT: 71 %
MDC_IDC_STAT_BRADY_AS_VS_PERCENT: 1.9 %
MDC_IDC_STAT_BRADY_DTM_END: NORMAL
MDC_IDC_STAT_BRADY_DTM_START: NORMAL
MDC_IDC_STAT_BRADY_RA_PERCENT_PACED: 20 %
MDC_IDC_STAT_CRT_DTM_END: NORMAL
MDC_IDC_STAT_CRT_DTM_START: NORMAL
MDC_IDC_STAT_CRT_PERCENT_PACED: 95 %
MDC_IDC_STAT_EPISODE_RECENT_COUNT: 0
MDC_IDC_STAT_EPISODE_RECENT_COUNT: 0
MDC_IDC_STAT_EPISODE_RECENT_COUNT: 1
MDC_IDC_STAT_EPISODE_RECENT_COUNT_DTM_END: NORMAL
MDC_IDC_STAT_EPISODE_RECENT_COUNT_DTM_START: NORMAL
MDC_IDC_STAT_EPISODE_TYPE: NORMAL
MDC_IDC_STAT_EPISODE_VENDOR_TYPE: NORMAL
MDC_IDC_STAT_HEART_RATE_ATRIAL_MAX: 230 {BEATS}/MIN
MDC_IDC_STAT_HEART_RATE_ATRIAL_MEAN: 77 {BEATS}/MIN
MDC_IDC_STAT_HEART_RATE_ATRIAL_MIN: 50 {BEATS}/MIN
MDC_IDC_STAT_HEART_RATE_DTM_END: NORMAL
MDC_IDC_STAT_HEART_RATE_DTM_START: NORMAL
MDC_IDC_STAT_HEART_RATE_VENTRICULAR_MAX: 330 {BEATS}/MIN
MDC_IDC_STAT_HEART_RATE_VENTRICULAR_MEAN: 77 {BEATS}/MIN
MDC_IDC_STAT_HEART_RATE_VENTRICULAR_MIN: 50 {BEATS}/MIN
MDC_IDC_STAT_TACHYTHERAPY_ATP_DELIVERED_RECENT: 0
MDC_IDC_STAT_TACHYTHERAPY_RECENT_DTM_END: NORMAL
MDC_IDC_STAT_TACHYTHERAPY_RECENT_DTM_START: NORMAL
MDC_IDC_STAT_TACHYTHERAPY_SHOCKS_ABORTED_RECENT: 0
MDC_IDC_STAT_TACHYTHERAPY_SHOCKS_DELIVERED_RECENT: 0

## 2022-08-31 NOTE — LETTER
Date:September 6, 2022      Provider requested that no letter be sent. Do not send.       [facility]       3/9/2022    Reba Gilmore MD  [facility] [address] [facility] [address]    RE: Katie Shannon       Dear Colleague,     I had the pleasure of seeing Katie Shannon in the [facility].  HPI and Plan:   See dictation    Today's clinic visit entailed:  Review of the result(s) of each unique test - echo, bmp, flp, ICD interrogation  Ordering of each unique test  Prescription drug management  40 minutes spent on the date of the encounter doing chart review, review of test results, patient visit and documentation   Provider  Link to [facility] Help Grid     The level of medical decision making during this visit was of high complexity.      Orders Placed This Encounter   Procedures     Basic metabolic panel     Lipid Profile     ALT     Follow-Up with Cardiology Core     Echocardiogram Complete       No orders of the defined types were placed in this encounter.      There are no discontinued medications.      Encounter Diagnoses   Name Primary?     Chronic systolic heart failure (H) Yes     ICD (implantable cardioverter-defibrillator) in place      Hypercholesterolemia      Dilated cardiomyopathy (H)      PVC's (premature ventricular contractions)      NSVT (nonsustained ventricular tachycardia) (H)      Essential hypertension with goal blood pressure less than 140/90        CURRENT MEDICATIONS:  Current Outpatient Medications   Medication Sig Dispense Refill     carvedilol (COREG) 12.5 MG tablet Take 1 tablet (12.5 mg) by mouth 2 times daily (with meals) 180 tablet 3     clopidogrel (PLAVIX) 75 MG tablet Take 75 mg by mouth daily       CYCLOBENZAPRINE HCL PO Take 5 mg by mouth At Bedtime Taking PRN (half tab)       Famotidine (PEPCID PO) Take 40 mg by mouth daily        isosorbide mononitrate (IMDUR) 60 MG 24 hr tablet Take 60 mg by mouth daily       omeprazole (PRILOSEC) 20 MG DR capsule Take 20 mg by mouth daily       polyethylene glycol 400 (BLINK TEARS) 0.25 % SOLN ophthalmic solution Place 1 drop  into both eyes 2 times daily as needed        rosuvastatin (CRESTOR) 20 MG tablet Take 1 tablet (20 mg) by mouth daily 90 tablet 3     sacubitril-valsartan (ENTRESTO)  MG per tablet Take 1 tablet by mouth 2 times daily 180 tablet 1     sotalol (BETAPACE) 80 MG tablet Take 1 tablet (80 mg) by mouth 2 times daily 60 tablet 11     tamsulosin (FLOMAX) 0.4 MG 24 hr capsule Take 0.4 mg by mouth daily       White Petrolatum-Mineral Oil (RETAINE PM) OINT Place into both eyes At Bedtime         ALLERGIES     Allergies   Allergen Reactions     Atorvastatin      Muscle aches     Diphenhydramine Other (See Comments)     Lip swelling     Metoprolol Succinate [Metoprolol] Other (See Comments)     Blind in left half of face for 3 days     [place] Other (See Comments)     swelling     Sulfa Drugs Other (See Comments)     swelling       PAST MEDICAL HISTORY:  Past Medical History:   Diagnosis Date     CAD (coronary artery disease)      Congestive heart failure (H)      Heart disease     non-ischemic cardiomyopathy     HTN (hypertension)      Idiopathic cardiomyopathy (H)        PAST SURGICAL HISTORY:  Past Surgical History:   Procedure Laterality Date     COLONOSCOPY N/A 1/29/2020    Procedure: COLONOSCOPY;  Surgeon: Erica Duff MD;  Location:  GI     ESOPHAGOSCOPY, GASTROSCOPY, DUODENOSCOPY (EGD), COMBINED N/A 1/28/2020    Procedure: ESOPHAGOGASTRODUODENOSCOPY (EGD);  Surgeon: Erica Duff MD;  Location:  GI     EYE SURGERY  2006    cataract     ORTHOPEDIC SURGERY  01/2018    pinning of right hip       FAMILY HISTORY:  Family History   Problem Relation Age of Onset     Heart Disease Mother 88        mi     Cerebrovascular Disease Father 71       SOCIAL HISTORY:  Social History     Socioeconomic History     Marital status:      Spouse name: None     Number of children: None     Years of education: None     Highest education level: None   Occupational History     None   Tobacco Use     Smoking status:  "Never Smoker     Smokeless tobacco: Never Used   Substance and Sexual Activity     Alcohol use: No     Alcohol/week: 0.0 standard drinks     Drug use: No     Sexual activity: None   Other Topics Concern      Service Not Asked     Blood Transfusions Not Asked     Caffeine Concern No     Occupational Exposure Not Asked     Hobby Hazards Not Asked     Sleep Concern No     Stress Concern Not Asked     Weight Concern No     Special Diet No     Back Care Not Asked     Exercise Yes     Comment: active on farm     Bike Helmet Not Asked     Seat Belt Not Asked     Self-Exams Not Asked     Parent/sibling w/ CABG, MI or angioplasty before 65F 55M? No   Social History Narrative     None     Social Determinants of Health     Financial Resource Strain: Not on file   Food Insecurity: Not on file   Transportation Needs: Not on file   Physical Activity: Not on file   Stress: Not on file   Social Connections: Not on file   Intimate Partner Violence: Not on file   Housing Stability: Not on file       Review of Systems:  Skin:  Negative       Eyes:  Negative      ENT:  Negative      Respiratory:  Negative shortness of breath;dyspnea on exertion     Cardiovascular:  chest pain;Negative;palpitations edema;Positive for    Gastroenterology: not assessed      Genitourinary:  not assessed      Musculoskeletal:         Neurologic:         Psychiatric:  not assessed      Heme/Lymph/Imm:  Positive for allergies    Endocrine:  Negative        Physical Exam:  Vitals: BP (!) 149/75 (BP Location: Right arm, Patient Position: Sitting)   Pulse 63   Ht 1.702 m (5' 7\")   Wt 77 kg (169 lb 12.8 oz)   SpO2 95%   BMI 26.59 kg/m      Constitutional:           Skin:             Head:           Eyes:           Lymph:      ENT:           Neck:           Respiratory:            Cardiac:                                                           GI:           Extremities and Muscular Skeletal:                 Neurological:           Psych:     "       CC  Rah Mari MD  [address]  [place] [id number]    Thank you for allowing me to participate in the care of your patient.      Sincerely,   RAH MARI MD   [facility]  cc:   Rah Mari MD  [address]  [place] [id number]

## 2022-09-12 ENCOUNTER — TELEPHONE (OUTPATIENT)
Dept: CARDIOLOGY | Facility: CLINIC | Age: 87
End: 2022-09-12

## 2022-09-12 DIAGNOSIS — I50.22 CHRONIC SYSTOLIC HEART FAILURE (H): Primary | ICD-10-CM

## 2022-09-12 DIAGNOSIS — I47.29 NSVT (NONSUSTAINED VENTRICULAR TACHYCARDIA) (H): ICD-10-CM

## 2022-09-12 DIAGNOSIS — R42 LIGHTHEADEDNESS: ICD-10-CM

## 2022-09-12 DIAGNOSIS — I50.23 ACUTE ON CHRONIC SYSTOLIC (CONGESTIVE) HEART FAILURE (H): ICD-10-CM

## 2022-09-12 DIAGNOSIS — I42.8 OTHER CARDIOMYOPATHIES (H): ICD-10-CM

## 2022-09-12 DIAGNOSIS — I42.9 PRIMARY CARDIOMYOPATHY (H): ICD-10-CM

## 2022-09-12 NOTE — TELEPHONE ENCOUNTER
Health Call Center    Phone Message    May a detailed message be left on voicemail: yes     Reason for Call: Other: Spouse called on behalf of the patient looking to speak with a member of his care team. Spouse would like to know if  would like to see Katie again this year instead of next year. Please call spouse back to further discuss, thank you.    Action Taken: Message routed to:  Other: Cardiology    Travel Screening: Not Applicable

## 2022-09-13 NOTE — TELEPHONE ENCOUNTER
"Call out to pt/wife. Spoke w/wife discussed Dr. Ayala placed follow up orders for 1 year, due 3/2023. Pt updated me via wife that he was wondering how much battery life is left on his ICD. Reviewed battery longevity reported as 2.9 years remaining per last device check 6/15/22. Pt also wants to know when Dr. Ayala needs to recheck his heart function. Reason pt is wondering when he is to follow up is because he thought his appt scheduled 9/21/22 was to see Dr. Ayala but just recently realized this appt is not to see Dr. Ayala, but is only a Q3 month device check. Pt then came on the phone to speak with me directly. Pt states he is concerned that his pumping function never improved after starting Entresto a couple years back, which causes him some anxiousness that due to his age and low ejection fraction, that maybe he should be seen sooner rather just annually. Pt and wife state no concerns over symptoms today. Pt reports he was at the local hospital in [place], [place] a couple months ago. He said they had \"no major concerns\" but thought he could use a small dose diuretic. Pt wife reports he takes furosemide 20 mg daily. They do not think labs have been rechecked in several months. Pt ultimately would like an appt sooner than March to recheck labs and for some reassurance before heading into winter as he lives several hours away. Offered pt see a [facility] since Dr. Ayala has no openings soon. Advised pt call scheduling to make an appt w/[facility] for new enrollment as pt has not seen a [facility] in the past, with labs prior, sometime over the next month or so. Will send for [facility] records on Thursday when in clinic next. Jayne Kelly RN on 9/13/2022 at 12:56 PM        Narrative & Impression    [employer] CRT-D ICD Remote Device Check  AP: 20%    BiVP: 95 %    Mode: DDD     Presenting Rhythm: AS/BiVP and AP/BiVP rhythm    Heart Rate: Stable with good variability    Sensing: WNL  "   Pacing Threshold: RA: Stable, RV: Stable, LV: Not performed    Impedance: Stable    Battery Status: 2.9yrs    Atrial Arrhythmia: 2 mode switches logged with 2 EGM's available showing A's>V's suggestive of AT/AF lasting 4-6s with controlled V-rates in the 80's.    Ventricular Arrhythmia: 1 NSVT logged with 1 EGM available showing 16 beat run of NSVT with V-rates 170-190bpm.  ATP: 0    Shocks: 0    Care Plan: Remote 9/21/22.  Follow-up with ISIDORO MARI MD next year in March 2023.    Called pt and updated on results.      MARIELLA Sosa RN      I have reviewed and interpreted the device interrogation, settings, programming and nurse's summary. The device is functioning within normal device parameters. I agree with the current findings, assessment and plan.

## 2022-09-21 ENCOUNTER — ANCILLARY PROCEDURE (OUTPATIENT)
Dept: CARDIOLOGY | Facility: CLINIC | Age: 87
End: 2022-09-21
Attending: INTERNAL MEDICINE
Payer: MEDICARE

## 2022-09-21 DIAGNOSIS — I42.9 PRIMARY CARDIOMYOPATHY (H): ICD-10-CM

## 2022-09-21 DIAGNOSIS — Z95.810 ICD (IMPLANTABLE CARDIOVERTER-DEFIBRILLATOR) IN PLACE: ICD-10-CM

## 2022-09-22 PROCEDURE — 93295 DEV INTERROG REMOTE 1/2/MLT: CPT | Performed by: INTERNAL MEDICINE

## 2022-09-22 PROCEDURE — 93296 REM INTERROG EVL PM/IDS: CPT | Performed by: INTERNAL MEDICINE

## 2022-09-26 ENCOUNTER — TELEPHONE (OUTPATIENT)
Dept: CARDIOLOGY | Facility: CLINIC | Age: 87
End: 2022-09-26

## 2022-09-26 NOTE — TELEPHONE ENCOUNTER
"Alert received from patient's ICD for \"Percent BiV Pacing.\"    BiVP (HIS + RV) down to 86%. (Had been 94% on 9/21/22). Call out to patient to see how he has been feeling (his wife Radha states he will call us back). Will route to Dr. Ayala as an FYI.    CB RN  "

## 2022-09-27 LAB
MDC_IDC_LEAD_IMPLANT_DT: NORMAL
MDC_IDC_LEAD_LOCATION: NORMAL
MDC_IDC_LEAD_LOCATION_DETAIL_1: NORMAL
MDC_IDC_LEAD_MFG: NORMAL
MDC_IDC_LEAD_MODEL: NORMAL
MDC_IDC_LEAD_POLARITY_TYPE: NORMAL
MDC_IDC_LEAD_POLARITY_TYPE: NORMAL
MDC_IDC_LEAD_SERIAL: NORMAL
MDC_IDC_LEAD_SPECIAL_FUNCTION: NORMAL
MDC_IDC_MSMT_BATTERY_DTM: NORMAL
MDC_IDC_MSMT_BATTERY_REMAINING_LONGEVITY: 32 MO
MDC_IDC_MSMT_BATTERY_REMAINING_PERCENTAGE: 41 %
MDC_IDC_MSMT_BATTERY_RRT_TRIGGER: NORMAL
MDC_IDC_MSMT_BATTERY_STATUS: NORMAL
MDC_IDC_MSMT_BATTERY_VOLTAGE: 2.93 V
MDC_IDC_MSMT_CAP_CHARGE_DTM: NORMAL
MDC_IDC_MSMT_CAP_CHARGE_ENERGY: 40 J
MDC_IDC_MSMT_CAP_CHARGE_TIME: 9.1 S
MDC_IDC_MSMT_CAP_CHARGE_TYPE: NORMAL
MDC_IDC_MSMT_LEADCHNL_LV_IMPEDANCE_VALUE: 380 OHM
MDC_IDC_MSMT_LEADCHNL_LV_LEAD_CHANNEL_STATUS: NORMAL
MDC_IDC_MSMT_LEADCHNL_LV_PACING_THRESHOLD_AMPLITUDE: 0.5 V
MDC_IDC_MSMT_LEADCHNL_LV_PACING_THRESHOLD_PULSEWIDTH: 0.5 MS
MDC_IDC_MSMT_LEADCHNL_RA_IMPEDANCE_VALUE: 440 OHM
MDC_IDC_MSMT_LEADCHNL_RA_LEAD_CHANNEL_STATUS: NORMAL
MDC_IDC_MSMT_LEADCHNL_RA_PACING_THRESHOLD_AMPLITUDE: 0.5 V
MDC_IDC_MSMT_LEADCHNL_RA_PACING_THRESHOLD_PULSEWIDTH: 0.5 MS
MDC_IDC_MSMT_LEADCHNL_RA_SENSING_INTR_AMPL: 3.8 MV
MDC_IDC_MSMT_LEADCHNL_RV_IMPEDANCE_VALUE: 390 OHM
MDC_IDC_MSMT_LEADCHNL_RV_LEAD_CHANNEL_STATUS: NORMAL
MDC_IDC_MSMT_LEADCHNL_RV_PACING_THRESHOLD_AMPLITUDE: 1.25 V
MDC_IDC_MSMT_LEADCHNL_RV_PACING_THRESHOLD_PULSEWIDTH: 0.5 MS
MDC_IDC_MSMT_LEADCHNL_RV_SENSING_INTR_AMPL: 12 MV
MDC_IDC_PG_IMPLANT_DTM: NORMAL
MDC_IDC_PG_MFG: NORMAL
MDC_IDC_PG_MODEL: NORMAL
MDC_IDC_PG_SERIAL: NORMAL
MDC_IDC_PG_TYPE: NORMAL
MDC_IDC_SESS_CLINIC_NAME: NORMAL
MDC_IDC_SESS_DTM: NORMAL
MDC_IDC_SESS_REPROGRAMMED: NO
MDC_IDC_SESS_TYPE: NORMAL
MDC_IDC_SET_BRADY_AT_MODE_SWITCH_MODE: NORMAL
MDC_IDC_SET_BRADY_AT_MODE_SWITCH_RATE: 170 {BEATS}/MIN
MDC_IDC_SET_BRADY_LOWRATE: 60 {BEATS}/MIN
MDC_IDC_SET_BRADY_MAX_SENSOR_RATE: 120 {BEATS}/MIN
MDC_IDC_SET_BRADY_MAX_TRACKING_RATE: 120 {BEATS}/MIN
MDC_IDC_SET_BRADY_MODE: NORMAL
MDC_IDC_SET_BRADY_PAV_DELAY_HIGH: 100 MS
MDC_IDC_SET_BRADY_PAV_DELAY_LOW: 150 MS
MDC_IDC_SET_BRADY_SAV_DELAY_HIGH: 100 MS
MDC_IDC_SET_BRADY_SAV_DELAY_LOW: 120 MS
MDC_IDC_SET_CRT_LVRV_DELAY: 0 MS
MDC_IDC_SET_CRT_PACED_CHAMBERS: NORMAL
MDC_IDC_SET_LEADCHNL_LV_PACING_AMPLITUDE: 2 V
MDC_IDC_SET_LEADCHNL_LV_PACING_ANODE_ELECTRODE_1: NORMAL
MDC_IDC_SET_LEADCHNL_LV_PACING_ANODE_LOCATION_1: NORMAL
MDC_IDC_SET_LEADCHNL_LV_PACING_CAPTURE_MODE: NORMAL
MDC_IDC_SET_LEADCHNL_LV_PACING_CATHODE_ELECTRODE_1: NORMAL
MDC_IDC_SET_LEADCHNL_LV_PACING_CATHODE_LOCATION_1: NORMAL
MDC_IDC_SET_LEADCHNL_LV_PACING_POLARITY: NORMAL
MDC_IDC_SET_LEADCHNL_LV_PACING_PULSEWIDTH: 0.5 MS
MDC_IDC_SET_LEADCHNL_RA_PACING_AMPLITUDE: 1.5 V
MDC_IDC_SET_LEADCHNL_RA_PACING_ANODE_ELECTRODE_1: NORMAL
MDC_IDC_SET_LEADCHNL_RA_PACING_ANODE_LOCATION_1: NORMAL
MDC_IDC_SET_LEADCHNL_RA_PACING_CAPTURE_MODE: NORMAL
MDC_IDC_SET_LEADCHNL_RA_PACING_CATHODE_ELECTRODE_1: NORMAL
MDC_IDC_SET_LEADCHNL_RA_PACING_CATHODE_LOCATION_1: NORMAL
MDC_IDC_SET_LEADCHNL_RA_PACING_POLARITY: NORMAL
MDC_IDC_SET_LEADCHNL_RA_PACING_PULSEWIDTH: 0.5 MS
MDC_IDC_SET_LEADCHNL_RA_SENSING_ADAPTATION_MODE: NORMAL
MDC_IDC_SET_LEADCHNL_RA_SENSING_ANODE_ELECTRODE_1: NORMAL
MDC_IDC_SET_LEADCHNL_RA_SENSING_ANODE_LOCATION_1: NORMAL
MDC_IDC_SET_LEADCHNL_RA_SENSING_CATHODE_ELECTRODE_1: NORMAL
MDC_IDC_SET_LEADCHNL_RA_SENSING_CATHODE_LOCATION_1: NORMAL
MDC_IDC_SET_LEADCHNL_RA_SENSING_POLARITY: NORMAL
MDC_IDC_SET_LEADCHNL_RA_SENSING_SENSITIVITY: 0.3 MV
MDC_IDC_SET_LEADCHNL_RV_PACING_AMPLITUDE: 2.25 V
MDC_IDC_SET_LEADCHNL_RV_PACING_ANODE_ELECTRODE_1: NORMAL
MDC_IDC_SET_LEADCHNL_RV_PACING_ANODE_LOCATION_1: NORMAL
MDC_IDC_SET_LEADCHNL_RV_PACING_CAPTURE_MODE: NORMAL
MDC_IDC_SET_LEADCHNL_RV_PACING_CATHODE_ELECTRODE_1: NORMAL
MDC_IDC_SET_LEADCHNL_RV_PACING_CATHODE_LOCATION_1: NORMAL
MDC_IDC_SET_LEADCHNL_RV_PACING_POLARITY: NORMAL
MDC_IDC_SET_LEADCHNL_RV_PACING_PULSEWIDTH: 0.5 MS
MDC_IDC_SET_LEADCHNL_RV_SENSING_ADAPTATION_MODE: NORMAL
MDC_IDC_SET_LEADCHNL_RV_SENSING_ANODE_ELECTRODE_1: NORMAL
MDC_IDC_SET_LEADCHNL_RV_SENSING_ANODE_LOCATION_1: NORMAL
MDC_IDC_SET_LEADCHNL_RV_SENSING_CATHODE_ELECTRODE_1: NORMAL
MDC_IDC_SET_LEADCHNL_RV_SENSING_CATHODE_LOCATION_1: NORMAL
MDC_IDC_SET_LEADCHNL_RV_SENSING_POLARITY: NORMAL
MDC_IDC_SET_LEADCHNL_RV_SENSING_SENSITIVITY: 0.5 MV
MDC_IDC_SET_ZONE_DETECTION_INTERVAL: 300 MS
MDC_IDC_SET_ZONE_DETECTION_INTERVAL: 350 MS
MDC_IDC_SET_ZONE_TYPE: NORMAL
MDC_IDC_SET_ZONE_VENDOR_TYPE: NORMAL
MDC_IDC_STAT_AT_BURDEN_PERCENT: 0 %
MDC_IDC_STAT_AT_DTM_END: NORMAL
MDC_IDC_STAT_AT_DTM_START: NORMAL
MDC_IDC_STAT_AT_MODE_SW_COUNT: 0
MDC_IDC_STAT_AT_MODE_SW_COUNT_PER_DAY: 0
MDC_IDC_STAT_AT_MODE_SW_PERCENT_TIME: 0 %
MDC_IDC_STAT_BRADY_AP_VP_PERCENT: 30 %
MDC_IDC_STAT_BRADY_AP_VS_PERCENT: 1 %
MDC_IDC_STAT_BRADY_AS_VP_PERCENT: 63 %
MDC_IDC_STAT_BRADY_AS_VS_PERCENT: 1.9 %
MDC_IDC_STAT_BRADY_DTM_END: NORMAL
MDC_IDC_STAT_BRADY_DTM_START: NORMAL
MDC_IDC_STAT_BRADY_RA_PERCENT_PACED: 25 %
MDC_IDC_STAT_CRT_DTM_END: NORMAL
MDC_IDC_STAT_CRT_DTM_START: NORMAL
MDC_IDC_STAT_CRT_PERCENT_PACED: 94 %
MDC_IDC_STAT_EPISODE_RECENT_COUNT: 0
MDC_IDC_STAT_EPISODE_RECENT_COUNT: 0
MDC_IDC_STAT_EPISODE_RECENT_COUNT: 1
MDC_IDC_STAT_EPISODE_RECENT_COUNT_DTM_END: NORMAL
MDC_IDC_STAT_EPISODE_RECENT_COUNT_DTM_START: NORMAL
MDC_IDC_STAT_EPISODE_TYPE: NORMAL
MDC_IDC_STAT_EPISODE_VENDOR_TYPE: NORMAL
MDC_IDC_STAT_HEART_RATE_ATRIAL_MAX: 240 {BEATS}/MIN
MDC_IDC_STAT_HEART_RATE_ATRIAL_MEAN: 74 {BEATS}/MIN
MDC_IDC_STAT_HEART_RATE_ATRIAL_MIN: 50 {BEATS}/MIN
MDC_IDC_STAT_HEART_RATE_DTM_END: NORMAL
MDC_IDC_STAT_HEART_RATE_DTM_START: NORMAL
MDC_IDC_STAT_HEART_RATE_VENTRICULAR_MAX: 330 {BEATS}/MIN
MDC_IDC_STAT_HEART_RATE_VENTRICULAR_MEAN: 75 {BEATS}/MIN
MDC_IDC_STAT_HEART_RATE_VENTRICULAR_MIN: 50 {BEATS}/MIN
MDC_IDC_STAT_TACHYTHERAPY_ATP_DELIVERED_RECENT: 0
MDC_IDC_STAT_TACHYTHERAPY_RECENT_DTM_END: NORMAL
MDC_IDC_STAT_TACHYTHERAPY_RECENT_DTM_START: NORMAL
MDC_IDC_STAT_TACHYTHERAPY_SHOCKS_ABORTED_RECENT: 0
MDC_IDC_STAT_TACHYTHERAPY_SHOCKS_DELIVERED_RECENT: 0

## 2022-09-27 NOTE — TELEPHONE ENCOUNTER
Pt called back and left VM on device clinic line. Called pt back to discuss alert for low BVP. It is due to PVCs.     Explained the alert for low BVP of 86%. Told pt that we like BVP to be close to 100%, but in general the doctors are not too worried if patients are not having any symptoms. Pt said over the last few months he has been a little weak now and then, but today he is feeling very good. On Sunday he played a round of golf which he hasn't done in a while, and he had no problem with that.           Pt also asked me if any arrhythmias were recorded on 9/14/2022. He goes to [facility] for a special eye treatment that involves numbing his eyes and a flashbulb going off 16 times. He said the numbing drops barely help and it is extremely painful. He was concerned his heart rate would be elevated with the pain and it would record as an episode. I checked and there were no arrhythmias recorded on 9/14. Pt was relieved to hear this.

## 2022-09-27 NOTE — TELEPHONE ENCOUNTER
Pt left VM, there was one thing he forgot to mention earlier on the phone.     Called pt back. He was not available so I spoke with his wife. She said the only thing pt wanted to mention was it was 9/19 that he had his last eye procedure, not 9/14. I checked, and pt did not have any arrhythmias on 9/19 either. Wife informed. No further questions.

## 2022-10-05 ENCOUNTER — TELEPHONE (OUTPATIENT)
Dept: CARDIOLOGY | Facility: CLINIC | Age: 87
End: 2022-10-05

## 2022-10-05 NOTE — TELEPHONE ENCOUNTER
Pt called wanting to tell the clinic that he was feeling a lot better than last week.  He also wanted to know what his BiVP percentage was.     Reported that it was around 84-85% (about the same as last week which was 86%).      Told him that as long as he feels well, that was the important thing.      Katie asked what we would do if he wasn't feeling well.  I explained that perhaps we would have him come in and have an appointment with a MD so they could make sure all his medications were appropriate and order tests if needed.     Pt. Was appreciative for this information.  Would call clinic if needed anything else.

## 2022-10-06 NOTE — TELEPHONE ENCOUNTER
Call out to pt to update him that Dr. Ayala has no new recommendations at this time. Pt did not answer. Left VM for pt letting him know that no significant arrhythmias were found on the two days he felt some symptoms. Dr. Ayala has no changes recommended at this time. I asked pt to call back. Jayne Kelly RN on 10/6/2022 at 12:46 PM

## 2022-10-24 ENCOUNTER — TELEPHONE (OUTPATIENT)
Dept: CARDIOLOGY | Facility: CLINIC | Age: 87
End: 2022-10-24

## 2022-10-24 ENCOUNTER — LAB (OUTPATIENT)
Dept: LAB | Facility: CLINIC | Age: 87
End: 2022-10-24
Payer: MEDICARE

## 2022-10-24 ENCOUNTER — ANCILLARY PROCEDURE (OUTPATIENT)
Dept: CARDIOLOGY | Facility: CLINIC | Age: 87
End: 2022-10-24
Attending: INTERNAL MEDICINE
Payer: MEDICARE

## 2022-10-24 VITALS
SYSTOLIC BLOOD PRESSURE: 116 MMHG | WEIGHT: 165.5 LBS | BODY MASS INDEX: 25.98 KG/M2 | HEART RATE: 57 BPM | OXYGEN SATURATION: 97 % | DIASTOLIC BLOOD PRESSURE: 75 MMHG | HEIGHT: 67 IN

## 2022-10-24 DIAGNOSIS — E78.00 HYPERCHOLESTEROLEMIA: ICD-10-CM

## 2022-10-24 DIAGNOSIS — I50.23 ACUTE ON CHRONIC SYSTOLIC (CONGESTIVE) HEART FAILURE (H): ICD-10-CM

## 2022-10-24 DIAGNOSIS — I42.9 PRIMARY CARDIOMYOPATHY (H): ICD-10-CM

## 2022-10-24 DIAGNOSIS — R42 LIGHTHEADEDNESS: ICD-10-CM

## 2022-10-24 DIAGNOSIS — Z95.810 ICD (IMPLANTABLE CARDIOVERTER-DEFIBRILLATOR) IN PLACE: ICD-10-CM

## 2022-10-24 DIAGNOSIS — I50.22 CHRONIC SYSTOLIC HEART FAILURE (H): ICD-10-CM

## 2022-10-24 DIAGNOSIS — I42.8 OTHER CARDIOMYOPATHIES (H): ICD-10-CM

## 2022-10-24 DIAGNOSIS — I42.9 PRIMARY CARDIOMYOPATHY (H): Primary | ICD-10-CM

## 2022-10-24 LAB
ANION GAP SERPL CALCULATED.3IONS-SCNC: 2 MMOL/L (ref 3–14)
BUN SERPL-MCNC: 17 MG/DL (ref 7–30)
CALCIUM SERPL-MCNC: 8.6 MG/DL (ref 8.5–10.1)
CHLORIDE BLD-SCNC: 105 MMOL/L (ref 94–109)
CO2 SERPL-SCNC: 27 MMOL/L (ref 20–32)
CREAT SERPL-MCNC: 0.91 MG/DL (ref 0.66–1.25)
ERYTHROCYTE [DISTWIDTH] IN BLOOD BY AUTOMATED COUNT: 12.4 % (ref 10–15)
GFR SERPL CREATININE-BSD FRML MDRD: 83 ML/MIN/1.73M2
GLUCOSE BLD-MCNC: 106 MG/DL (ref 70–99)
HCT VFR BLD AUTO: 38.5 % (ref 40–53)
HGB BLD-MCNC: 13.4 G/DL (ref 13.3–17.7)
MCH RBC QN AUTO: 31.8 PG (ref 26.5–33)
MCHC RBC AUTO-ENTMCNC: 34.8 G/DL (ref 31.5–36.5)
MCV RBC AUTO: 91 FL (ref 78–100)
NT-PROBNP SERPL-MCNC: 3089 PG/ML (ref 0–1800)
PLATELET # BLD AUTO: 181 10E3/UL (ref 150–450)
POTASSIUM BLD-SCNC: 4 MMOL/L (ref 3.4–5.3)
RBC # BLD AUTO: 4.21 10E6/UL (ref 4.4–5.9)
SODIUM SERPL-SCNC: 134 MMOL/L (ref 133–144)
TSH SERPL DL<=0.005 MIU/L-ACNC: 2.16 MU/L (ref 0.4–4)
WBC # BLD AUTO: 7.6 10E3/UL (ref 4–11)

## 2022-10-24 PROCEDURE — 80048 BASIC METABOLIC PNL TOTAL CA: CPT | Performed by: PHYSICIAN ASSISTANT

## 2022-10-24 PROCEDURE — 93284 PRGRMG EVAL IMPLANTABLE DFB: CPT | Performed by: INTERNAL MEDICINE

## 2022-10-24 PROCEDURE — 84443 ASSAY THYROID STIM HORMONE: CPT | Performed by: INTERNAL MEDICINE

## 2022-10-24 PROCEDURE — 85027 COMPLETE CBC AUTOMATED: CPT | Performed by: INTERNAL MEDICINE

## 2022-10-24 PROCEDURE — 36415 COLL VENOUS BLD VENIPUNCTURE: CPT | Performed by: INTERNAL MEDICINE

## 2022-10-24 PROCEDURE — 99214 OFFICE O/P EST MOD 30 MIN: CPT | Mod: 25 | Performed by: PHYSICIAN ASSISTANT

## 2022-10-24 PROCEDURE — 83880 ASSAY OF NATRIURETIC PEPTIDE: CPT | Performed by: INTERNAL MEDICINE

## 2022-10-24 RX ORDER — SACUBITRIL AND VALSARTAN 97; 103 MG/1; MG/1
1 TABLET, FILM COATED ORAL 2 TIMES DAILY
Qty: 180 TABLET | Refills: 3 | Status: SHIPPED | OUTPATIENT
Start: 2022-10-24 | End: 2024-01-05

## 2022-10-24 RX ORDER — CARVEDILOL 12.5 MG/1
12.5 TABLET ORAL 2 TIMES DAILY WITH MEALS
Qty: 180 TABLET | Refills: 3 | Status: SHIPPED | OUTPATIENT
Start: 2022-10-24

## 2022-10-24 RX ORDER — ROSUVASTATIN CALCIUM 20 MG/1
20 TABLET, COATED ORAL DAILY
Qty: 90 TABLET | Refills: 3 | Status: SHIPPED | OUTPATIENT
Start: 2022-10-24

## 2022-10-24 RX ORDER — FUROSEMIDE 20 MG
80 TABLET ORAL DAILY
COMMUNITY

## 2022-10-24 RX ORDER — ISOSORBIDE MONONITRATE 60 MG/1
60 TABLET, EXTENDED RELEASE ORAL DAILY
Qty: 90 TABLET | Refills: 3 | Status: SHIPPED | OUTPATIENT
Start: 2022-10-24

## 2022-10-24 NOTE — LETTER
10/24/2022    Reba Gilmore MD  [facility] [address]  [facility] [address]    RE: Katie Shannon       Dear Colleague,     I had the pleasure of seeing Katie Shannon in the [facility].  [id number]      HPI and Plan:   See dictation    Orders this Visit:  Orders Placed This Encounter   Procedures     Basic metabolic panel     Basic metabolic panel     Hemoglobin     Follow-Up with Cardiology CORE (Self)     Echocardiogram Complete     Orders Placed This Encounter   Medications     furosemide (LASIX) 20 MG tablet     Sig: Take 20 mg by mouth daily     rosuvastatin (CRESTOR) 20 MG tablet     Sig: Take 1 tablet (20 mg) by mouth daily     Dispense:  90 tablet     Refill:  3     sacubitril-valsartan (ENTRESTO)  MG per tablet     Sig: Take 1 tablet by mouth 2 times daily     Dispense:  180 tablet     Refill:  3     carvedilol (COREG) 12.5 MG tablet     Sig: Take 1 tablet (12.5 mg) by mouth 2 times daily (with meals)     Dispense:  180 tablet     Refill:  3     isosorbide mononitrate (IMDUR) 60 MG 24 hr tablet     Sig: Take 1 tablet (60 mg) by mouth daily     Dispense:  90 tablet     Refill:  3     Medications Discontinued During This Encounter   Medication Reason     isosorbide mononitrate (IMDUR) 60 MG 24 hr tablet      carvedilol (COREG) 12.5 MG tablet      sacubitril-valsartan (ENTRESTO)  MG per tablet      rosuvastatin (CRESTOR) 20 MG tablet          Encounter Diagnoses   Name Primary?     Chronic systolic heart failure (H)      Hypercholesterolemia      Primary cardiomyopathy (H)        CURRENT MEDICATIONS:  Current Outpatient Medications   Medication Sig Dispense Refill     carvedilol (COREG) 12.5 MG tablet Take 1 tablet (12.5 mg) by mouth 2 times daily (with meals) 180 tablet 3     clopidogrel (PLAVIX) 75 MG tablet Take 75 mg by mouth daily       CYCLOBENZAPRINE HCL PO Take 5 mg by mouth At Bedtime Taking PRN (half tab)       Famotidine (PEPCID PO) Take 40 mg by mouth daily         furosemide (LASIX) 20 MG tablet Take 20 mg by mouth daily       isosorbide mononitrate (IMDUR) 60 MG 24 hr tablet Take 1 tablet (60 mg) by mouth daily 90 tablet 3     omeprazole (PRILOSEC) 20 MG DR capsule Take 20 mg by mouth daily       polyethylene glycol 400 (BLINK TEARS) 0.25 % SOLN ophthalmic solution Place 1 drop into both eyes 2 times daily as needed        rosuvastatin (CRESTOR) 20 MG tablet Take 1 tablet (20 mg) by mouth daily 90 tablet 3     sacubitril-valsartan (ENTRESTO)  MG per tablet Take 1 tablet by mouth 2 times daily 180 tablet 3     sotalol (BETAPACE) 80 MG tablet Take 1 tablet (80 mg) by mouth 2 times daily 60 tablet 11     tamsulosin (FLOMAX) 0.4 MG capsule Take 0.4 mg by mouth daily       White Petrolatum-Mineral Oil (RETAINE PM) OINT Place into both eyes At Bedtime         ALLERGIES     Allergies   Allergen Reactions     Atorvastatin      Muscle aches     Diphenhydramine Other (See Comments)     Lip swelling     Metoprolol Succinate [Metoprolol] Other (See Comments)     Blind in left half of face for 3 days     [place] Other (See Comments)     swelling     Sulfa Drugs Other (See Comments)     swelling       PAST MEDICAL HISTORY:  Past Medical History:   Diagnosis Date     CAD (coronary artery disease)      Congestive heart failure (H)      Heart disease     non-ischemic cardiomyopathy     HTN (hypertension)      Idiopathic cardiomyopathy (H)        PAST SURGICAL HISTORY:  Past Surgical History:   Procedure Laterality Date     COLONOSCOPY N/A 1/29/2020    Procedure: COLONOSCOPY;  Surgeon: Erica Duff MD;  Location:  GI     ESOPHAGOSCOPY, GASTROSCOPY, DUODENOSCOPY (EGD), COMBINED N/A 1/28/2020    Procedure: ESOPHAGOGASTRODUODENOSCOPY (EGD);  Surgeon: Erica Duff MD;  Location:  GI     EYE SURGERY  2006    cataract     ORTHOPEDIC SURGERY  01/2018    pinning of right hip       FAMILY HISTORY:  Family History   Problem Relation Age of Onset     Heart Disease Mother 88       "  mi     Cerebrovascular Disease Father 71       SOCIAL HISTORY:  Social History     Socioeconomic History     Marital status:      Spouse name: None     Number of children: None     Years of education: None     Highest education level: None   Tobacco Use     Smoking status: Never     Smokeless tobacco: Never   Substance and Sexual Activity     Alcohol use: No     Alcohol/week: 0.0 standard drinks     Drug use: No   Other Topics Concern     Caffeine Concern No     Sleep Concern No     Weight Concern No     Special Diet No     Exercise Yes     Comment: active on farm     Parent/sibling w/ CABG, MI or angioplasty before 65F 55M? No       Review of Systems:  Skin:  Negative     Eyes:  Positive for glasses  ENT:  Positive for hearing loss  Respiratory:  Negative shortness of breath;dyspnea on exertion;CPAP;sleep apnea  Cardiovascular:  Negative;chest pain;palpitations;edema;dizziness    Gastroenterology: Negative    Genitourinary:  Positive for    Musculoskeletal:       Neurologic:  Positive for numbness or tingling of hands  Psychiatric:  Negative    Heme/Lymph/Imm:  Positive for allergies  Endocrine:  Negative thyroid disorder;diabetes    Physical Exam:  Vitals: /75 (BP Location: Right arm, Cuff Size: Adult Regular)   Pulse 57   Ht 1.702 m (5' 7\")   Wt 75.1 kg (165 lb 8 oz)   SpO2 97%   BMI 25.92 kg/m     Please refer to dictation for physical exam    Recent Lab Results: all reviewed today  CBC RESULTS:  Lab Results   Component Value Date    WBC 7.6 10/24/2022    WBC 6.3 02/04/2020    RBC 4.21 (L) 10/24/2022    RBC 3.13 (L) 01/28/2020    HGB 13.4 10/24/2022    HGB 13.7 11/09/2020    HCT 38.5 (L) 10/24/2022    HCT 25.4 (A) 02/04/2020    MCV 91 10/24/2022    MCV 91.0 02/04/2020    MCH 31.8 10/24/2022    MCH 32.1 02/04/2020    MCHC 34.8 10/24/2022    MCHC 35.3 02/04/2020    RDW 12.4 10/24/2022    RDW 14.4 02/04/2020     10/24/2022     01/28/2020       BMP RESULTS:  Lab Results   Component " Value Date     (L) 2022     (L) 2020    POTASSIUM 3.8 2022    POTASSIUM 4.4 2020    CHLORIDE 100 2022    CHLORIDE 104 2020    CO2 26 2022    CO2 26 2020    ANIONGAP 6 2022    ANIONGAP 9.4 2020    GLC 96 2022     (H) 2020    BUN 15 2022    BUN 15 2020    CR 0.98 2022    CR 1.02 2020    GFRESTIMATED 76 2022    GFRESTIMATED 70 2020    GFRESTBLACK 84 2020    ELIZABETH 8.3 (L) 2022    ELIZABETH 8.8 2020        INR RESULTS:  Lab Results   Component Value Date    INR 1.19 (H) 2020    INR 1.11 2020           CC  Rah Ayala MD  [address] [facility] [address]  [place]  [place] [address]        Service Date: 10/24/2022    CLINIC VISIT     PRIMARY CARDIOLOGIST:  Rah Ayala MD    REASON FOR VISIT:  C.O.R.E. Clinic followup.    HISTORY OF PRESENT ILLNESS:  Mr. Shannon is an absolutely delightful, 87-year-old gentleman with a past medical history significant for the followin.  Long-standing nonischemic cardiomyopathy and heart failure with reduced ejection fraction dating back to  with EF as low as 30%-35% with a high of about 45% over the years, most recent echocardiogram done in 2021 showing EF of 30%-35%.  2.  Mild mitral regurg.  3.  ICD in place with His bundle pacing as well as RV lead.  4.  History of ventricular tachycardia, on sotalol.  5.  Dyslipidemia.  6.  Hypertension.  7.  Nonocclusive coronary artery disease on angiogram in .    Katie is coming in today for routine followup.  He tells me that earlier this summer, he was pretty tired.  His primary had him on steroids for unclear reasons, but he says that since then, he has felt stronger, and over the last several months, he has just felt great.  He denies chest pain, shortness of breath, orthopnea or PND.  He had a couple of falls this spring, the last time in May.  These have been with him tripping and losing his  balance.  They have not been related to syncope or presyncope.  He does take Zzzquil at night to help him sleep, but with this, he has no orthopnea or PND.  He is able to do all the work he wants at home without difficulty.  He denies chest pain or palpitations.    SOCIAL HISTORY:  He comes in today with his wife, Joanna. They have been  61 years.  They have 5 children, 4 girls and 1 boy.  He is retired from farming, mostly corn and soy beans.  Lifelong nonsmoker.  No alcohol use.  Exercises regularly.    PHYSICAL EXAMINATION:    GENERAL:  Well-developed, well-nourished gentleman in no acute distress.  HEENT:  Normocephalic and atraumatic.  HEART:  Regular in rate and rhythm without murmur, rub or gallop.  LUNGS:  Clear without wheezes, rales or rhonchi.  EXTREMITIES:  Without peripheral edema.  NECK:  The neck veins are flat at 30 degrees.  SKIN:  Warm and dry.    STUDIES:  Labs today show hemoglobin of 13.4.  The remainder of his labs are pending.    Device check today shows 87% His bundle pacing, 20% A pacing.  He had some atrial arrhythmias lasting just 4-10 seconds.  No VT or VF noted.    ASSESSMENT AND PLAN:    1.  Chronic heart failure with reduced ejection fraction and nonischemic cardiomyopathy with class II, stage C heart failure on excellent medications, including carvedilol 12.5 mg b.i.d., Entresto, full dose, and furosemide just 20 mg daily.  He is euvolemic at a home weight of 158 pounds.  We discussed today the addition of SGLT2 inhibitors like Entresto or Farxiga.  The pricing is unclear, so we will reach out to our pharmacy liaison team to get pricing and then can let him know.  In addition, he will talk to his  to see if they cannot get it covered this year, they can get it covered next year on their Part D insurance.  Depending on the price, we will make a decision to start it or not.  2.  Dyslipidemia.  Appropriately on statin.  3.  History of sustained ventricular  tachycardia.  Appropriately on sotalol without arrhythmia.  4.  Hypertension, excellent control.    Thank you for allowing me to participate in this delightful patient's care.  He will follow up with Dr. Ayala in the spring with labs and an echocardiogram before that visit.  He will call sooner with concerns.    Oneyda Matt PA-C        D: 10/24/2022   T: 10/24/2022   MT: fidel    Name:     RUPERT MAN  MRN:      [id number]        Account:      [id number]   :      1935           Service Date: 10/24/2022       Document: [id number]    Thank you for allowing me to participate in the care of your patient.      Sincerely,     Oneyda Matt PA-C     [facility]  cc:   Rah Ayala MD  [address] [place] [id number]

## 2022-10-24 NOTE — PROGRESS NOTES
[id number]      HPI and Plan:   See dictation    Orders this Visit:  Orders Placed This Encounter   Procedures     Basic metabolic panel     Basic metabolic panel     Hemoglobin     Follow-Up with Cardiology CORE (Self)     Echocardiogram Complete     Orders Placed This Encounter   Medications     furosemide (LASIX) 20 MG tablet     Sig: Take 20 mg by mouth daily     rosuvastatin (CRESTOR) 20 MG tablet     Sig: Take 1 tablet (20 mg) by mouth daily     Dispense:  90 tablet     Refill:  3     sacubitril-valsartan (ENTRESTO)  MG per tablet     Sig: Take 1 tablet by mouth 2 times daily     Dispense:  180 tablet     Refill:  3     carvedilol (COREG) 12.5 MG tablet     Sig: Take 1 tablet (12.5 mg) by mouth 2 times daily (with meals)     Dispense:  180 tablet     Refill:  3     isosorbide mononitrate (IMDUR) 60 MG 24 hr tablet     Sig: Take 1 tablet (60 mg) by mouth daily     Dispense:  90 tablet     Refill:  3     Medications Discontinued During This Encounter   Medication Reason     isosorbide mononitrate (IMDUR) 60 MG 24 hr tablet      carvedilol (COREG) 12.5 MG tablet      sacubitril-valsartan (ENTRESTO)  MG per tablet      rosuvastatin (CRESTOR) 20 MG tablet          Encounter Diagnoses   Name Primary?     Chronic systolic heart failure (H)      Hypercholesterolemia      Primary cardiomyopathy (H)        CURRENT MEDICATIONS:  Current Outpatient Medications   Medication Sig Dispense Refill     carvedilol (COREG) 12.5 MG tablet Take 1 tablet (12.5 mg) by mouth 2 times daily (with meals) 180 tablet 3     clopidogrel (PLAVIX) 75 MG tablet Take 75 mg by mouth daily       CYCLOBENZAPRINE HCL PO Take 5 mg by mouth At Bedtime Taking PRN (half tab)       Famotidine (PEPCID PO) Take 40 mg by mouth daily        furosemide (LASIX) 20 MG tablet Take 20 mg by mouth daily       isosorbide mononitrate (IMDUR) 60 MG 24 hr tablet Take 1 tablet (60 mg) by mouth daily 90 tablet 3     omeprazole (PRILOSEC) 20 MG DR capsule Take  20 mg by mouth daily       polyethylene glycol 400 (BLINK TEARS) 0.25 % SOLN ophthalmic solution Place 1 drop into both eyes 2 times daily as needed        rosuvastatin (CRESTOR) 20 MG tablet Take 1 tablet (20 mg) by mouth daily 90 tablet 3     sacubitril-valsartan (ENTRESTO)  MG per tablet Take 1 tablet by mouth 2 times daily 180 tablet 3     sotalol (BETAPACE) 80 MG tablet Take 1 tablet (80 mg) by mouth 2 times daily 60 tablet 11     tamsulosin (FLOMAX) 0.4 MG capsule Take 0.4 mg by mouth daily       White Petrolatum-Mineral Oil (RETAINE PM) OINT Place into both eyes At Bedtime         ALLERGIES     Allergies   Allergen Reactions     Atorvastatin      Muscle aches     Diphenhydramine Other (See Comments)     Lip swelling     Metoprolol Succinate [Metoprolol] Other (See Comments)     Blind in left half of face for 3 days     [place] Other (See Comments)     swelling     Sulfa Drugs Other (See Comments)     swelling       PAST MEDICAL HISTORY:  Past Medical History:   Diagnosis Date     CAD (coronary artery disease)      Congestive heart failure (H)      Heart disease     non-ischemic cardiomyopathy     HTN (hypertension)      Idiopathic cardiomyopathy (H)        PAST SURGICAL HISTORY:  Past Surgical History:   Procedure Laterality Date     COLONOSCOPY N/A 1/29/2020    Procedure: COLONOSCOPY;  Surgeon: Erica Duff MD;  Location:  GI     ESOPHAGOSCOPY, GASTROSCOPY, DUODENOSCOPY (EGD), COMBINED N/A 1/28/2020    Procedure: ESOPHAGOGASTRODUODENOSCOPY (EGD);  Surgeon: Erica Duff MD;  Location:  GI     EYE SURGERY  2006    cataract     ORTHOPEDIC SURGERY  01/2018    pinning of right hip       FAMILY HISTORY:  Family History   Problem Relation Age of Onset     Heart Disease Mother 88        mi     Cerebrovascular Disease Father 71       SOCIAL HISTORY:  Social History     Socioeconomic History     Marital status:      Spouse name: None     Number of children: None     Years of education: None  "    Highest education level: None   Tobacco Use     Smoking status: Never     Smokeless tobacco: Never   Substance and Sexual Activity     Alcohol use: No     Alcohol/week: 0.0 standard drinks     Drug use: No   Other Topics Concern     Caffeine Concern No     Sleep Concern No     Weight Concern No     Special Diet No     Exercise Yes     Comment: active on farm     Parent/sibling w/ CABG, MI or angioplasty before 65F 55M? No       Review of Systems:  Skin:  Negative     Eyes:  Positive for glasses  ENT:  Positive for hearing loss  Respiratory:  Negative shortness of breath;dyspnea on exertion;CPAP;sleep apnea  Cardiovascular:  Negative;chest pain;palpitations;edema;dizziness    Gastroenterology: Negative    Genitourinary:  Positive for    Musculoskeletal:       Neurologic:  Positive for numbness or tingling of hands  Psychiatric:  Negative    Heme/Lymph/Imm:  Positive for allergies  Endocrine:  Negative thyroid disorder;diabetes    Physical Exam:  Vitals: /75 (BP Location: Right arm, Cuff Size: Adult Regular)   Pulse 57   Ht 1.702 m (5' 7\")   Wt 75.1 kg (165 lb 8 oz)   SpO2 97%   BMI 25.92 kg/m     Please refer to dictation for physical exam    Recent Lab Results: all reviewed today  CBC RESULTS:  Lab Results   Component Value Date    WBC 7.6 10/24/2022    WBC 6.3 02/04/2020    RBC 4.21 (L) 10/24/2022    RBC 3.13 (L) 01/28/2020    HGB 13.4 10/24/2022    HGB 13.7 11/09/2020    HCT 38.5 (L) 10/24/2022    HCT 25.4 (A) 02/04/2020    MCV 91 10/24/2022    MCV 91.0 02/04/2020    MCH 31.8 10/24/2022    MCH 32.1 02/04/2020    MCHC 34.8 10/24/2022    MCHC 35.3 02/04/2020    RDW 12.4 10/24/2022    RDW 14.4 02/04/2020     10/24/2022     01/28/2020       BMP RESULTS:  Lab Results   Component Value Date     (L) 03/09/2022     (L) 11/09/2020    POTASSIUM 3.8 03/09/2022    POTASSIUM 4.4 11/09/2020    CHLORIDE 100 03/09/2022    CHLORIDE 104 11/09/2020    CO2 26 03/09/2022    CO2 26 11/09/2020    " ANIONGAP 6 03/09/2022    ANIONGAP 9.4 11/09/2020    GLC 96 03/09/2022     (H) 11/09/2020    BUN 15 03/09/2022    BUN 15 11/09/2020    CR 0.98 03/09/2022    CR 1.02 11/09/2020    GFRESTIMATED 76 03/09/2022    GFRESTIMATED 70 11/09/2020    GFRESTBLACK 84 11/09/2020    ELIZABETH 8.3 (L) 03/09/2022    ELIZABETH 8.8 11/09/2020        INR RESULTS:  Lab Results   Component Value Date    INR 1.19 (H) 01/28/2020    INR 1.11 01/28/2020           CC  Rah Ayala MD  [address]  [place] [id number]

## 2022-10-24 NOTE — PATIENT INSTRUCTIONS
Call CORE nurse for any questions or concerns Mon-Fri 8am-4pm:                                                #[phone]                                       For concerns after hours:                                               #[phone]     1: Medication changes: continue current medications.    Please talk to your insurance company about possibly starting Jardiance or Farxiga for next years insurance.  I'll also get pricing for this medication.      2: Plan from today: we'll do an echocardiogram, labs and visit with Dr. Ayala about 6 months.      3: Lab results: your labs look great so far, we'll send you the rest of the results when we get them.

## 2022-10-24 NOTE — TELEPHONE ENCOUNTER
----- Message from Cornelia Andrews sent at 10/24/2022  2:52 PM CDT -----  Regarding: RE: sglt2i  Patient has a Medicare D plan through [employer] that does not contract with [facility].  Pricing obtained from medicare.gov.    Jardiance/Farxiga:  --Patient will pay 100% of the first $480 in drugs costs (anything that remains will be added to the first fill)  --Subsequent fills will be $46/mo each.  --lf total drug costs exceed $4,430, price will increase to a 25% coinsurance, equivalent to $148/mo for Farxiga, or $154/mo for Jardiance.    Cornelia Andrews  Pharmacy Technician/Liaison, Discharge Pharmacy   [phone]  [email or web address]      ----- Message -----  From: Oneyda Matt PA-C  Sent: 10/24/2022   2:31 PM CDT  To: Cornelia Andrews  Subject: sglt2i                                           Cornelia,     I'd like Katie Rasconkerri to start a sglt2i for his heart failure.  Could you please help us figure out pricing/ prior auth?    Thank you!    Oneyda Matt PA-C  2:31 PM 10/24/2022   [facility]

## 2022-10-24 NOTE — PROGRESS NOTES
Service Date: 10/24/2022    CLINIC VISIT     PRIMARY CARDIOLOGIST:  Rah Ayala MD    REASON FOR VISIT:  C.O.R.E. Clinic followup.    HISTORY OF PRESENT ILLNESS:  Mr. Shannon is an absolutely delightful, 87-year-old gentleman with a past medical history significant for the followin.  Long-standing nonischemic cardiomyopathy and heart failure with reduced ejection fraction dating back to  with EF as low as 30%-35% with a high of about 45% over the years, most recent echocardiogram done in 2021 showing EF of 30%-35%.  2.  Mild mitral regurg.  3.  ICD in place with His bundle pacing as well as RV lead.  4.  History of ventricular tachycardia, on sotalol.  5.  Dyslipidemia.  6.  Hypertension.  7.  Nonocclusive coronary artery disease on angiogram in .    Katie is coming in today for routine followup.  He tells me that earlier this summer, he was pretty tired.  His primary had him on steroids for unclear reasons, but he says that since then, he has felt stronger, and over the last several months, he has just felt great.  He denies chest pain, shortness of breath, orthopnea or PND.  He had a couple of falls this spring, the last time in May.  These have been with him tripping and losing his balance.  They have not been related to syncope or presyncope.  He does take Zzzquil at night to help him sleep, but with this, he has no orthopnea or PND.  He is able to do all the work he wants at home without difficulty.  He denies chest pain or palpitations.    SOCIAL HISTORY:  He comes in today with his wife, Joanna. They have been  61 years.  They have 5 children, 4 girls and 1 boy.  He is retired from farming, mostly corn and soy beans.  Lifelong nonsmoker.  No alcohol use.  Exercises regularly.    PHYSICAL EXAMINATION:    GENERAL:  Well-developed, well-nourished gentleman in no acute distress.  HEENT:  Normocephalic and atraumatic.  HEART:  Regular in rate and rhythm without murmur, rub or gallop.  LUNGS:   Clear without wheezes, rales or rhonchi.  EXTREMITIES:  Without peripheral edema.  NECK:  The neck veins are flat at 30 degrees.  SKIN:  Warm and dry.    STUDIES:  Labs today show hemoglobin of 13.4.  The remainder of his labs are pending.    Device check today shows 87% His bundle pacing, 20% A pacing.  He had some atrial arrhythmias lasting just 4-10 seconds.  No VT or VF noted.    ASSESSMENT AND PLAN:    1.  Chronic heart failure with reduced ejection fraction and nonischemic cardiomyopathy with class II, stage C heart failure on excellent medications, including carvedilol 12.5 mg b.i.d., Entresto, full dose, and furosemide just 20 mg daily.  He is euvolemic at a home weight of 158 pounds.  We discussed today the addition of SGLT2 inhibitors like Entresto or Farxiga.  The pricing is unclear, so we will reach out to our pharmacy liaison team to get pricing and then can let him know.  In addition, he will talk to his  to see if they cannot get it covered this year, they can get it covered next year on their Part D insurance.  Depending on the price, we will make a decision to start it or not.  2.  Dyslipidemia.  Appropriately on statin.  3.  History of sustained ventricular tachycardia.  Appropriately on sotalol without arrhythmia.  4.  Hypertension, excellent control.    Thank you for allowing me to participate in this delightful patient's care.  He will follow up with Dr. Ayala in the spring with labs and an echocardiogram before that visit.  He will call sooner with concerns.    Oenyda Matt PA-C        D: 10/24/2022   T: 10/24/2022   MT: fidel    Name:     RUPERT MAN  MRN:      [id number]        Account:      [id number]   :      1935           Service Date: 10/24/2022       Document: [id number]

## 2022-10-24 NOTE — TELEPHONE ENCOUNTER
Pls let pt know the price, I think it's too expensive and he should not start it.  I'd like him to talk to his insurance company and see if they can get a better plan for next year.    Oneyda Matt PA-C 10/24/2022 4:47 PM

## 2022-10-25 ENCOUNTER — CARE COORDINATION (OUTPATIENT)
Dept: CARDIOLOGY | Facility: CLINIC | Age: 87
End: 2022-10-25

## 2022-10-25 NOTE — PROGRESS NOTES
[facility]    Called patient to review lab results and cost of SLGT2i. Per Oneyda Matt:  Hgb normal, tsh normal, bnp above normal- but no comparison.     Not all results were available at time of visit.  Please let the pt know.      Pls let pt know the price, I think it's too expensive and he should not start it.  I'd like him to talk to his insurance company and see if they can get a better plan for next year.    Regarding: RE: sglt2i  Patient has a Medicare D plan through [employer] that does not contract with [facility].  Pricing obtained from medicare.gov.     Jardiance/Farxiga:  --Patient will pay 100% of the first $480 in drugs costs (anything that remains will be added to the first fill)  --Subsequent fills will be $46/mo each.  --lf total drug costs exceed $4,430, price will increase to a 25% coinsurance, equivalent to $148/mo for Farxiga, or $154/mo for Jardiance.     Cornelia Andrews  Pharmacy Technician/Liaison, Discharge Pharmacy    Called patient to review above. Attempted both listed numbers. No answer, no VM. Will retry.       Second attempt to contact patient about lab results and recommendation of Oneyda Matt. LM on home #.  Brinda Askew RN on 10/25/2022 at 3:56 PM      Another attempt to connect w/patient, LM for call back.  Brinda Askew RN on 10/26/2022 at 2:09 PM      Patient returned my message. I reviewed above w/him and his wife. He verbalized understanding about labs. He will have his insurer look at other options for drug plans. Brinda Askew RN on 10/27/2022 at 11:43 AM

## 2022-10-28 LAB
MDC_IDC_LEAD_IMPLANT_DT: NORMAL
MDC_IDC_LEAD_LOCATION: NORMAL
MDC_IDC_LEAD_LOCATION_DETAIL_1: NORMAL
MDC_IDC_LEAD_MFG: NORMAL
MDC_IDC_LEAD_MODEL: NORMAL
MDC_IDC_LEAD_POLARITY_TYPE: NORMAL
MDC_IDC_LEAD_POLARITY_TYPE: NORMAL
MDC_IDC_LEAD_SERIAL: NORMAL
MDC_IDC_LEAD_SPECIAL_FUNCTION: NORMAL
MDC_IDC_MSMT_BATTERY_REMAINING_LONGEVITY: 32 MO
MDC_IDC_MSMT_CAP_CHARGE_DTM: NORMAL
MDC_IDC_MSMT_CAP_CHARGE_TIME: 9.12
MDC_IDC_MSMT_CAP_CHARGE_TYPE: NORMAL
MDC_IDC_MSMT_CAP_CHARGE_TYPE: NORMAL
MDC_IDC_MSMT_LEADCHNL_LV_IMPEDANCE_VALUE: 387.5 OHM
MDC_IDC_MSMT_LEADCHNL_LV_PACING_THRESHOLD_AMPLITUDE: 0.75 V
MDC_IDC_MSMT_LEADCHNL_LV_PACING_THRESHOLD_AMPLITUDE: 0.75 V
MDC_IDC_MSMT_LEADCHNL_LV_PACING_THRESHOLD_PULSEWIDTH: 0.5 MS
MDC_IDC_MSMT_LEADCHNL_LV_PACING_THRESHOLD_PULSEWIDTH: 0.5 MS
MDC_IDC_MSMT_LEADCHNL_RA_IMPEDANCE_VALUE: 437.5 OHM
MDC_IDC_MSMT_LEADCHNL_RA_PACING_THRESHOLD_AMPLITUDE: 0.5 V
MDC_IDC_MSMT_LEADCHNL_RA_PACING_THRESHOLD_AMPLITUDE: 0.5 V
MDC_IDC_MSMT_LEADCHNL_RA_PACING_THRESHOLD_PULSEWIDTH: 0.5 MS
MDC_IDC_MSMT_LEADCHNL_RA_PACING_THRESHOLD_PULSEWIDTH: 0.5 MS
MDC_IDC_MSMT_LEADCHNL_RA_SENSING_INTR_AMPL: 2.3 MV
MDC_IDC_MSMT_LEADCHNL_RV_IMPEDANCE_VALUE: 425 OHM
MDC_IDC_MSMT_LEADCHNL_RV_PACING_THRESHOLD_AMPLITUDE: 1.5 V
MDC_IDC_MSMT_LEADCHNL_RV_PACING_THRESHOLD_AMPLITUDE: 1.5 V
MDC_IDC_MSMT_LEADCHNL_RV_PACING_THRESHOLD_PULSEWIDTH: 0.5 MS
MDC_IDC_MSMT_LEADCHNL_RV_PACING_THRESHOLD_PULSEWIDTH: 0.5 MS
MDC_IDC_MSMT_LEADCHNL_RV_SENSING_INTR_AMPL: 11.7 MV
MDC_IDC_PG_IMPLANT_DTM: NORMAL
MDC_IDC_PG_MFG: NORMAL
MDC_IDC_PG_MODEL: NORMAL
MDC_IDC_PG_SERIAL: NORMAL
MDC_IDC_PG_TYPE: NORMAL
MDC_IDC_SESS_CLINIC_NAME: NORMAL
MDC_IDC_SESS_DTM: NORMAL
MDC_IDC_SESS_TYPE: NORMAL
MDC_IDC_SET_BRADY_AT_MODE_SWITCH_MODE: NORMAL
MDC_IDC_SET_BRADY_AT_MODE_SWITCH_RATE: 170 {BEATS}/MIN
MDC_IDC_SET_BRADY_HYSTRATE: NORMAL
MDC_IDC_SET_BRADY_LOWRATE: 60 {BEATS}/MIN
MDC_IDC_SET_BRADY_MAX_SENSOR_RATE: 120 {BEATS}/MIN
MDC_IDC_SET_BRADY_MAX_TRACKING_RATE: 120 {BEATS}/MIN
MDC_IDC_SET_BRADY_MODE: NORMAL
MDC_IDC_SET_BRADY_NIGHT_RATE: NORMAL
MDC_IDC_SET_BRADY_PAV_DELAY_HIGH: 100 MS
MDC_IDC_SET_BRADY_PAV_DELAY_LOW: 150 MS
MDC_IDC_SET_BRADY_SAV_DELAY_HIGH: 100 MS
MDC_IDC_SET_BRADY_SAV_DELAY_LOW: 120 MS
MDC_IDC_SET_CRT_LVRV_DELAY: 0 MS
MDC_IDC_SET_CRT_PACED_CHAMBERS: NORMAL
MDC_IDC_SET_LEADCHNL_LV_PACING_AMPLITUDE: 2 V
MDC_IDC_SET_LEADCHNL_LV_PACING_CAPTURE_MODE: NORMAL
MDC_IDC_SET_LEADCHNL_LV_PACING_POLARITY: NORMAL
MDC_IDC_SET_LEADCHNL_LV_PACING_PULSEWIDTH: 0.5 MS
MDC_IDC_SET_LEADCHNL_RA_PACING_AMPLITUDE: 1.62
MDC_IDC_SET_LEADCHNL_RA_PACING_CAPTURE_MODE: NORMAL
MDC_IDC_SET_LEADCHNL_RA_PACING_POLARITY: NORMAL
MDC_IDC_SET_LEADCHNL_RA_PACING_PULSEWIDTH: 0.5 MS
MDC_IDC_SET_LEADCHNL_RA_SENSING_ADAPTATION_MODE: NORMAL
MDC_IDC_SET_LEADCHNL_RA_SENSING_POLARITY: NORMAL
MDC_IDC_SET_LEADCHNL_RA_SENSING_SENSITIVITY: 0.3 MV
MDC_IDC_SET_LEADCHNL_RV_PACING_AMPLITUDE: 2.38
MDC_IDC_SET_LEADCHNL_RV_PACING_CAPTURE_MODE: NORMAL
MDC_IDC_SET_LEADCHNL_RV_PACING_POLARITY: NORMAL
MDC_IDC_SET_LEADCHNL_RV_PACING_PULSEWIDTH: 0.5 MS
MDC_IDC_SET_LEADCHNL_RV_SENSING_POLARITY: NORMAL
MDC_IDC_SET_LEADCHNL_RV_SENSING_SENSITIVITY: 0.5 MV
MDC_IDC_SET_ZONE_DETECTION_INTERVAL: 300 MS
MDC_IDC_SET_ZONE_DETECTION_INTERVAL: 350 MS
MDC_IDC_SET_ZONE_TYPE: NORMAL
MDC_IDC_SET_ZONE_VENDOR_TYPE: NORMAL
MDC_IDC_STAT_AT_DTM_END: NORMAL
MDC_IDC_STAT_AT_DTM_START: NORMAL
MDC_IDC_STAT_AT_MODE_SW_COUNT: 2
MDC_IDC_STAT_BRADY_DTM_END: NORMAL
MDC_IDC_STAT_BRADY_DTM_START: NORMAL
MDC_IDC_STAT_BRADY_RA_PERCENT_PACED: 20 %
MDC_IDC_STAT_BRADY_RV_PERCENT_PACED: 87 %
MDC_IDC_STAT_EPISODE_RECENT_COUNT: 0
MDC_IDC_STAT_EPISODE_RECENT_COUNT: 0
MDC_IDC_STAT_EPISODE_RECENT_COUNT_DTM_END: NORMAL
MDC_IDC_STAT_EPISODE_RECENT_COUNT_DTM_END: NORMAL
MDC_IDC_STAT_EPISODE_RECENT_COUNT_DTM_START: NORMAL
MDC_IDC_STAT_EPISODE_RECENT_COUNT_DTM_START: NORMAL
MDC_IDC_STAT_EPISODE_TYPE: NORMAL
MDC_IDC_STAT_EPISODE_TYPE: NORMAL
MDC_IDC_STAT_EPISODE_VENDOR_TYPE: NORMAL
MDC_IDC_STAT_HEART_RATE_DTM_END: NORMAL
MDC_IDC_STAT_HEART_RATE_DTM_START: NORMAL
MDC_IDC_STAT_TACHYTHERAPY_ATP_DELIVERED_RECENT: 0
MDC_IDC_STAT_TACHYTHERAPY_RECENT_DTM_END: NORMAL
MDC_IDC_STAT_TACHYTHERAPY_RECENT_DTM_START: NORMAL
MDC_IDC_STAT_TACHYTHERAPY_SHOCKS_ABORTED_RECENT: 0
MDC_IDC_STAT_TACHYTHERAPY_SHOCKS_DELIVERED_RECENT: 0

## 2023-01-25 ENCOUNTER — ANCILLARY PROCEDURE (OUTPATIENT)
Dept: CARDIOLOGY | Facility: CLINIC | Age: 88
End: 2023-01-25
Attending: INTERNAL MEDICINE
Payer: MEDICARE

## 2023-01-25 DIAGNOSIS — I42.9 PRIMARY CARDIOMYOPATHY (H): ICD-10-CM

## 2023-01-25 DIAGNOSIS — Z95.810 ICD (IMPLANTABLE CARDIOVERTER-DEFIBRILLATOR) IN PLACE: ICD-10-CM

## 2023-01-26 LAB
MDC_IDC_EPISODE_DTM: NORMAL
MDC_IDC_EPISODE_DURATION: 6 S
MDC_IDC_EPISODE_DURATION: 6 S
MDC_IDC_EPISODE_DURATION: 8 S
MDC_IDC_EPISODE_ID: NORMAL
MDC_IDC_EPISODE_TYPE: NORMAL
MDC_IDC_EPISODE_VENDOR_TYPE: NORMAL
MDC_IDC_LEAD_IMPLANT_DT: NORMAL
MDC_IDC_LEAD_LOCATION: NORMAL
MDC_IDC_LEAD_LOCATION_DETAIL_1: NORMAL
MDC_IDC_LEAD_MFG: NORMAL
MDC_IDC_LEAD_MODEL: NORMAL
MDC_IDC_LEAD_POLARITY_TYPE: NORMAL
MDC_IDC_LEAD_POLARITY_TYPE: NORMAL
MDC_IDC_LEAD_SERIAL: NORMAL
MDC_IDC_LEAD_SPECIAL_FUNCTION: NORMAL
MDC_IDC_MSMT_BATTERY_DTM: NORMAL
MDC_IDC_MSMT_BATTERY_REMAINING_LONGEVITY: 29 MO
MDC_IDC_MSMT_BATTERY_REMAINING_PERCENTAGE: 36 %
MDC_IDC_MSMT_BATTERY_RRT_TRIGGER: NORMAL
MDC_IDC_MSMT_BATTERY_STATUS: NORMAL
MDC_IDC_MSMT_BATTERY_VOLTAGE: 2.92 V
MDC_IDC_MSMT_CAP_CHARGE_DTM: NORMAL
MDC_IDC_MSMT_CAP_CHARGE_ENERGY: 40 J
MDC_IDC_MSMT_CAP_CHARGE_TIME: 9.4 S
MDC_IDC_MSMT_CAP_CHARGE_TYPE: NORMAL
MDC_IDC_MSMT_LEADCHNL_LV_IMPEDANCE_VALUE: 390 OHM
MDC_IDC_MSMT_LEADCHNL_LV_LEAD_CHANNEL_STATUS: NORMAL
MDC_IDC_MSMT_LEADCHNL_LV_PACING_THRESHOLD_AMPLITUDE: 0.75 V
MDC_IDC_MSMT_LEADCHNL_LV_PACING_THRESHOLD_PULSEWIDTH: 0.5 MS
MDC_IDC_MSMT_LEADCHNL_RA_IMPEDANCE_VALUE: 410 OHM
MDC_IDC_MSMT_LEADCHNL_RA_LEAD_CHANNEL_STATUS: NORMAL
MDC_IDC_MSMT_LEADCHNL_RA_PACING_THRESHOLD_AMPLITUDE: 0.5 V
MDC_IDC_MSMT_LEADCHNL_RA_PACING_THRESHOLD_PULSEWIDTH: 0.5 MS
MDC_IDC_MSMT_LEADCHNL_RA_SENSING_INTR_AMPL: 2.1 MV
MDC_IDC_MSMT_LEADCHNL_RV_IMPEDANCE_VALUE: 440 OHM
MDC_IDC_MSMT_LEADCHNL_RV_LEAD_CHANNEL_STATUS: NORMAL
MDC_IDC_MSMT_LEADCHNL_RV_PACING_THRESHOLD_AMPLITUDE: 1.5 V
MDC_IDC_MSMT_LEADCHNL_RV_PACING_THRESHOLD_PULSEWIDTH: 0.5 MS
MDC_IDC_MSMT_LEADCHNL_RV_SENSING_INTR_AMPL: 12 MV
MDC_IDC_PG_IMPLANT_DTM: NORMAL
MDC_IDC_PG_MFG: NORMAL
MDC_IDC_PG_MODEL: NORMAL
MDC_IDC_PG_SERIAL: NORMAL
MDC_IDC_PG_TYPE: NORMAL
MDC_IDC_SESS_CLINIC_NAME: NORMAL
MDC_IDC_SESS_DTM: NORMAL
MDC_IDC_SESS_REPROGRAMMED: NO
MDC_IDC_SESS_TYPE: NORMAL
MDC_IDC_SET_BRADY_AT_MODE_SWITCH_MODE: NORMAL
MDC_IDC_SET_BRADY_AT_MODE_SWITCH_RATE: 170 {BEATS}/MIN
MDC_IDC_SET_BRADY_LOWRATE: 60 {BEATS}/MIN
MDC_IDC_SET_BRADY_MAX_SENSOR_RATE: 120 {BEATS}/MIN
MDC_IDC_SET_BRADY_MAX_TRACKING_RATE: 120 {BEATS}/MIN
MDC_IDC_SET_BRADY_MODE: NORMAL
MDC_IDC_SET_BRADY_PAV_DELAY_HIGH: 100 MS
MDC_IDC_SET_BRADY_PAV_DELAY_LOW: 150 MS
MDC_IDC_SET_BRADY_SAV_DELAY_HIGH: 100 MS
MDC_IDC_SET_BRADY_SAV_DELAY_LOW: 120 MS
MDC_IDC_SET_CRT_LVRV_DELAY: 0 MS
MDC_IDC_SET_CRT_PACED_CHAMBERS: NORMAL
MDC_IDC_SET_LEADCHNL_LV_PACING_AMPLITUDE: 2 V
MDC_IDC_SET_LEADCHNL_LV_PACING_ANODE_ELECTRODE_1: NORMAL
MDC_IDC_SET_LEADCHNL_LV_PACING_ANODE_LOCATION_1: NORMAL
MDC_IDC_SET_LEADCHNL_LV_PACING_CAPTURE_MODE: NORMAL
MDC_IDC_SET_LEADCHNL_LV_PACING_CATHODE_ELECTRODE_1: NORMAL
MDC_IDC_SET_LEADCHNL_LV_PACING_CATHODE_LOCATION_1: NORMAL
MDC_IDC_SET_LEADCHNL_LV_PACING_POLARITY: NORMAL
MDC_IDC_SET_LEADCHNL_LV_PACING_PULSEWIDTH: 0.5 MS
MDC_IDC_SET_LEADCHNL_RA_PACING_AMPLITUDE: 1.5 V
MDC_IDC_SET_LEADCHNL_RA_PACING_ANODE_ELECTRODE_1: NORMAL
MDC_IDC_SET_LEADCHNL_RA_PACING_ANODE_LOCATION_1: NORMAL
MDC_IDC_SET_LEADCHNL_RA_PACING_CAPTURE_MODE: NORMAL
MDC_IDC_SET_LEADCHNL_RA_PACING_CATHODE_ELECTRODE_1: NORMAL
MDC_IDC_SET_LEADCHNL_RA_PACING_CATHODE_LOCATION_1: NORMAL
MDC_IDC_SET_LEADCHNL_RA_PACING_POLARITY: NORMAL
MDC_IDC_SET_LEADCHNL_RA_PACING_PULSEWIDTH: 0.5 MS
MDC_IDC_SET_LEADCHNL_RA_SENSING_ADAPTATION_MODE: NORMAL
MDC_IDC_SET_LEADCHNL_RA_SENSING_ANODE_ELECTRODE_1: NORMAL
MDC_IDC_SET_LEADCHNL_RA_SENSING_ANODE_LOCATION_1: NORMAL
MDC_IDC_SET_LEADCHNL_RA_SENSING_CATHODE_ELECTRODE_1: NORMAL
MDC_IDC_SET_LEADCHNL_RA_SENSING_CATHODE_LOCATION_1: NORMAL
MDC_IDC_SET_LEADCHNL_RA_SENSING_POLARITY: NORMAL
MDC_IDC_SET_LEADCHNL_RA_SENSING_SENSITIVITY: 0.3 MV
MDC_IDC_SET_LEADCHNL_RV_PACING_AMPLITUDE: 2.5 V
MDC_IDC_SET_LEADCHNL_RV_PACING_ANODE_ELECTRODE_1: NORMAL
MDC_IDC_SET_LEADCHNL_RV_PACING_ANODE_LOCATION_1: NORMAL
MDC_IDC_SET_LEADCHNL_RV_PACING_CAPTURE_MODE: NORMAL
MDC_IDC_SET_LEADCHNL_RV_PACING_CATHODE_ELECTRODE_1: NORMAL
MDC_IDC_SET_LEADCHNL_RV_PACING_CATHODE_LOCATION_1: NORMAL
MDC_IDC_SET_LEADCHNL_RV_PACING_POLARITY: NORMAL
MDC_IDC_SET_LEADCHNL_RV_PACING_PULSEWIDTH: 0.5 MS
MDC_IDC_SET_LEADCHNL_RV_SENSING_ADAPTATION_MODE: NORMAL
MDC_IDC_SET_LEADCHNL_RV_SENSING_ANODE_ELECTRODE_1: NORMAL
MDC_IDC_SET_LEADCHNL_RV_SENSING_ANODE_LOCATION_1: NORMAL
MDC_IDC_SET_LEADCHNL_RV_SENSING_CATHODE_ELECTRODE_1: NORMAL
MDC_IDC_SET_LEADCHNL_RV_SENSING_CATHODE_LOCATION_1: NORMAL
MDC_IDC_SET_LEADCHNL_RV_SENSING_POLARITY: NORMAL
MDC_IDC_SET_LEADCHNL_RV_SENSING_SENSITIVITY: 0.5 MV
MDC_IDC_SET_ZONE_DETECTION_INTERVAL: 300 MS
MDC_IDC_SET_ZONE_DETECTION_INTERVAL: 350 MS
MDC_IDC_SET_ZONE_TYPE: NORMAL
MDC_IDC_SET_ZONE_VENDOR_TYPE: NORMAL
MDC_IDC_STAT_AT_BURDEN_PERCENT: 0 %
MDC_IDC_STAT_AT_DTM_END: NORMAL
MDC_IDC_STAT_AT_DTM_START: NORMAL
MDC_IDC_STAT_AT_MODE_SW_COUNT: 2
MDC_IDC_STAT_AT_MODE_SW_COUNT_PER_DAY: 0
MDC_IDC_STAT_AT_MODE_SW_MAX_DURATION: 6 S
MDC_IDC_STAT_AT_MODE_SW_PERCENT_TIME: 1 %
MDC_IDC_STAT_BRADY_AP_VP_PERCENT: 31 %
MDC_IDC_STAT_BRADY_AP_VS_PERCENT: 1 %
MDC_IDC_STAT_BRADY_AS_VP_PERCENT: 60 %
MDC_IDC_STAT_BRADY_AS_VS_PERCENT: 3.1 %
MDC_IDC_STAT_BRADY_DTM_END: NORMAL
MDC_IDC_STAT_BRADY_DTM_START: NORMAL
MDC_IDC_STAT_BRADY_RA_PERCENT_PACED: 25 %
MDC_IDC_STAT_CRT_DTM_END: NORMAL
MDC_IDC_STAT_CRT_DTM_START: NORMAL
MDC_IDC_STAT_CRT_PERCENT_PACED: 91 %
MDC_IDC_STAT_EPISODE_RECENT_COUNT: 0
MDC_IDC_STAT_EPISODE_RECENT_COUNT: 0
MDC_IDC_STAT_EPISODE_RECENT_COUNT: 1
MDC_IDC_STAT_EPISODE_RECENT_COUNT_DTM_END: NORMAL
MDC_IDC_STAT_EPISODE_RECENT_COUNT_DTM_START: NORMAL
MDC_IDC_STAT_EPISODE_TYPE: NORMAL
MDC_IDC_STAT_EPISODE_VENDOR_TYPE: NORMAL
MDC_IDC_STAT_HEART_RATE_ATRIAL_MAX: 230 {BEATS}/MIN
MDC_IDC_STAT_HEART_RATE_ATRIAL_MEAN: 74 {BEATS}/MIN
MDC_IDC_STAT_HEART_RATE_ATRIAL_MIN: 50 {BEATS}/MIN
MDC_IDC_STAT_HEART_RATE_DTM_END: NORMAL
MDC_IDC_STAT_HEART_RATE_DTM_START: NORMAL
MDC_IDC_STAT_HEART_RATE_VENTRICULAR_MAX: 330 {BEATS}/MIN
MDC_IDC_STAT_HEART_RATE_VENTRICULAR_MEAN: 74 {BEATS}/MIN
MDC_IDC_STAT_HEART_RATE_VENTRICULAR_MIN: 40 {BEATS}/MIN
MDC_IDC_STAT_TACHYTHERAPY_ATP_DELIVERED_RECENT: 0
MDC_IDC_STAT_TACHYTHERAPY_RECENT_DTM_END: NORMAL
MDC_IDC_STAT_TACHYTHERAPY_RECENT_DTM_START: NORMAL
MDC_IDC_STAT_TACHYTHERAPY_SHOCKS_ABORTED_RECENT: 0
MDC_IDC_STAT_TACHYTHERAPY_SHOCKS_DELIVERED_RECENT: 0

## 2023-01-26 PROCEDURE — 93296 REM INTERROG EVL PM/IDS: CPT | Performed by: INTERNAL MEDICINE

## 2023-01-26 PROCEDURE — 93295 DEV INTERROG REMOTE 1/2/MLT: CPT | Performed by: INTERNAL MEDICINE

## 2023-02-14 ENCOUNTER — TELEPHONE (OUTPATIENT)
Dept: CARDIOLOGY | Facility: CLINIC | Age: 88
End: 2023-02-14
Payer: MEDICARE

## 2023-02-14 NOTE — TELEPHONE ENCOUNTER
Patient called wondering if anything was logged from his remote monitor last night. He woke up this morning and was quite dizzy and wondered if any arrhythmias were logged. Dizziness came upon getting up from bed and lasted for a few minutes. Patient did not check his BP at this time. No alerts received from his ICD but having patient send in a manual transmission to be sure no arrhythmias were logged.    CB HARRISON

## 2023-02-14 NOTE — TELEPHONE ENCOUNTER
Manual transmission received and confirmed stable lead measurements and no abnormal rhythms logged.    Patient is aware to contact his primary MD if any symptoms recur in the future. Confirmed that he has no symptoms currently and his BP is currently stop in the 100s/60s.     CB HARRISON

## 2023-04-26 ENCOUNTER — LAB (OUTPATIENT)
Dept: LAB | Facility: CLINIC | Age: 88
End: 2023-04-26
Payer: MEDICARE

## 2023-04-26 ENCOUNTER — OFFICE VISIT (OUTPATIENT)
Dept: CARDIOLOGY | Facility: CLINIC | Age: 88
End: 2023-04-26
Payer: MEDICARE

## 2023-04-26 ENCOUNTER — HOSPITAL ENCOUNTER (OUTPATIENT)
Dept: CARDIOLOGY | Facility: CLINIC | Age: 88
Discharge: HOME | End: 2023-04-26
Attending: INTERNAL MEDICINE | Admitting: INTERNAL MEDICINE
Payer: MEDICARE

## 2023-04-26 VITALS
OXYGEN SATURATION: 94 % | SYSTOLIC BLOOD PRESSURE: 114 MMHG | WEIGHT: 164.7 LBS | HEART RATE: 69 BPM | BODY MASS INDEX: 25.85 KG/M2 | DIASTOLIC BLOOD PRESSURE: 72 MMHG | HEIGHT: 67 IN

## 2023-04-26 DIAGNOSIS — I50.22 CHRONIC SYSTOLIC HEART FAILURE (H): ICD-10-CM

## 2023-04-26 DIAGNOSIS — I42.9 PRIMARY CARDIOMYOPATHY (H): ICD-10-CM

## 2023-04-26 DIAGNOSIS — Z95.810 ICD (IMPLANTABLE CARDIOVERTER-DEFIBRILLATOR) IN PLACE: ICD-10-CM

## 2023-04-26 DIAGNOSIS — I47.29 NSVT (NONSUSTAINED VENTRICULAR TACHYCARDIA) (H): ICD-10-CM

## 2023-04-26 DIAGNOSIS — I50.22 CHRONIC SYSTOLIC HEART FAILURE (H): Primary | ICD-10-CM

## 2023-04-26 DIAGNOSIS — E78.00 HYPERCHOLESTEROLEMIA: ICD-10-CM

## 2023-04-26 DIAGNOSIS — I10 ESSENTIAL HYPERTENSION WITH GOAL BLOOD PRESSURE LESS THAN 140/90: ICD-10-CM

## 2023-04-26 LAB
ANION GAP SERPL CALCULATED.3IONS-SCNC: 11 MMOL/L (ref 7–15)
BUN SERPL-MCNC: 17.9 MG/DL (ref 8–23)
CALCIUM SERPL-MCNC: 8.5 MG/DL (ref 8.8–10.2)
CHLORIDE SERPL-SCNC: 102 MMOL/L (ref 98–107)
CREAT SERPL-MCNC: 0.93 MG/DL (ref 0.67–1.17)
DEPRECATED HCO3 PLAS-SCNC: 24 MMOL/L (ref 22–29)
GFR SERPL CREATININE-BSD FRML MDRD: 80 ML/MIN/1.73M2
GLUCOSE SERPL-MCNC: 84 MG/DL (ref 70–99)
HGB BLD-MCNC: 13.4 G/DL (ref 13.3–17.7)
LVEF ECHO: NORMAL
POTASSIUM SERPL-SCNC: 3.9 MMOL/L (ref 3.4–5.3)
SODIUM SERPL-SCNC: 137 MMOL/L (ref 136–145)

## 2023-04-26 PROCEDURE — 80048 BASIC METABOLIC PNL TOTAL CA: CPT | Performed by: PHYSICIAN ASSISTANT

## 2023-04-26 PROCEDURE — 99214 OFFICE O/P EST MOD 30 MIN: CPT | Mod: 25 | Performed by: INTERNAL MEDICINE

## 2023-04-26 PROCEDURE — 93306 TTE W/DOPPLER COMPLETE: CPT | Mod: 26 | Performed by: INTERNAL MEDICINE

## 2023-04-26 PROCEDURE — 85018 HEMOGLOBIN: CPT | Performed by: PHYSICIAN ASSISTANT

## 2023-04-26 PROCEDURE — 93306 TTE W/DOPPLER COMPLETE: CPT

## 2023-04-26 PROCEDURE — 36415 COLL VENOUS BLD VENIPUNCTURE: CPT | Performed by: PHYSICIAN ASSISTANT

## 2023-04-26 RX ORDER — PREDNISOLONE ACETATE 10 MG/ML
SUSPENSION/ DROPS OPHTHALMIC
COMMUNITY
Start: 2023-04-13

## 2023-04-26 RX ORDER — CICLOPIROX 80 MG/ML
SOLUTION TOPICAL
COMMUNITY
Start: 2023-04-18

## 2023-04-26 NOTE — PATIENT INSTRUCTIONS
It was a pleasure seeing you today and thank you for allowing me to be a part of your health care team.  Should you have any questions regarding your visit or future needs please feel free to reach out to my care team for assistance.      Thank you, Dr. Rah Ayala        **Nursing: [phone]       **Scheduling: [phone]

## 2023-04-26 NOTE — PROGRESS NOTES
Service Date: 04/26/2023    HISTORY OF PRESENT ILLNESS:  I had the opportunity to see Mr. Katie Shannon in Cardiology Clinic today at [facility] in [place] for reevaluation of longstanding stable idiopathic cardiomyopathy and chronic systolic heart failure.      Mr. Shannon has a stable ejection fraction of 30%-35% by echocardiogram again this year with no change compared to his previous study from 2021.  He has an ICD in place, which is a biventricular device and he is consistently biventricular pacing.  He had an episode of ventricular tachycardia associated with lightheadedness, near syncope and chest discomfort in 06/2020.  He did not receive a shock.  At that time, we started him on sotalol and he has not had any recurrences of ventricular tachycardia that have lasted longer than a couple of seconds.    He continues to be very active around his home and in the yard.  He has no chest pain, lightheadedness or shortness of breath issues.  He has taken a couple of falls this year, but has not suffered any serious injuries.  A couple of years ago, he broke a hip.  He seems to have recovered from that well.    PHYSICAL EXAMINATION:  On examination here today, his blood pressure is 114/72, heart rate 69, weight 164 pounds and stable.  His lungs are clear.  Heart rhythm is regular.  He has no cardiac murmurs or carotid bruits.    IMPRESSIONS:  Mr. Katie Shannon is an 88-year-old gentleman with idiopathic cardiomyopathy and mild chronic systolic heart failure, New York Heart Association class I or II.  He has a biventricular ICD in place and had a symptomatic episode of ventricular tachycardia in the past.  Fortunately, this has not recurred after we initiated sotalol 80 mg p.o. b.i.d.    His laboratory studies continue to look good, including his basic metabolic panel and fasting lipid panel.  His medications are well adjusted and he is feeling well.  I will not make any changes today.  I will plan to  see him back in Cardiology Clinic in 1 year for reevaluation.    Rah Ayala MD, F.A.C.C.    cc:    [place] [address]     Rah Ayala MD, [facility]        D: 2023   T: 2023   MT: JENNYFER    Name:     RUPERT MAN  MRN:      [id number]        Account:      [id number]   :      1935           Service Date: 2023       Document: [id number]

## 2023-04-26 NOTE — PROGRESS NOTES
HPI and Plan:   See dictation    Today's clinic visit entailed:  Review of the result(s) of each unique test - icd check, bmp, flp, alt, echo  Ordering of each unique test  Prescription drug management  35 minutes spent by me on the date of the encounter doing chart review, review of test results, patient visit and documentation   Provider  Link to MDM Help Grid     The level of medical decision making during this visit was of moderate complexity.      Orders Placed This Encounter   Procedures     Basic metabolic panel     Hemoglobin     Lipid Profile     ALT     Follow-Up with Cardiology CORE (Self)       Orders Placed This Encounter   Medications     ciclopirox (PENLAC) 8 % external solution     Sig: APPLY ONCE DAILY TO BOTH GREAT TOE TOE NAILS AND THE EDGE OF SURROUNDING SKIN AND UNDERNEATH.     prednisoLONE acetate (PRED FORTE) 1 % ophthalmic suspension     Sig: INSTILL 1 DROP INTO LEFT EYE FOUR TIME DAILY       There are no discontinued medications.      Encounter Diagnoses   Name Primary?     Chronic systolic heart failure (H) Yes     Primary cardiomyopathy (H)      ICD (implantable cardioverter-defibrillator) in place      Hypercholesterolemia      NSVT (nonsustained ventricular tachycardia) (H)      Essential hypertension with goal blood pressure less than 140/90        CURRENT MEDICATIONS:  Current Outpatient Medications   Medication Sig Dispense Refill     carvedilol (COREG) 12.5 MG tablet Take 1 tablet (12.5 mg) by mouth 2 times daily (with meals) 180 tablet 3     ciclopirox (PENLAC) 8 % external solution APPLY ONCE DAILY TO BOTH GREAT TOE TOE NAILS AND THE EDGE OF SURROUNDING SKIN AND UNDERNEATH.       clopidogrel (PLAVIX) 75 MG tablet Take 75 mg by mouth daily       CYCLOBENZAPRINE HCL PO Take 5 mg by mouth At Bedtime Taking PRN (half tab)       Famotidine (PEPCID PO) Take 40 mg by mouth daily        furosemide (LASIX) 20 MG tablet Take 20 mg by mouth daily       isosorbide mononitrate (IMDUR) 60 MG 24  hr tablet Take 1 tablet (60 mg) by mouth daily 90 tablet 3     omeprazole (PRILOSEC) 20 MG DR capsule Take 20 mg by mouth daily       polyethylene glycol 400 (BLINK TEARS) 0.25 % SOLN ophthalmic solution Place 1 drop into both eyes 2 times daily as needed        prednisoLONE acetate (PRED FORTE) 1 % ophthalmic suspension INSTILL 1 DROP INTO LEFT EYE FOUR TIME DAILY       rosuvastatin (CRESTOR) 20 MG tablet Take 1 tablet (20 mg) by mouth daily 90 tablet 3     sacubitril-valsartan (ENTRESTO)  MG per tablet Take 1 tablet by mouth 2 times daily 180 tablet 3     sotalol (BETAPACE) 80 MG tablet Take 1 tablet (80 mg) by mouth 2 times daily 60 tablet 11     tamsulosin (FLOMAX) 0.4 MG capsule Take 0.4 mg by mouth daily       White Petrolatum-Mineral Oil (RETAINE PM) OINT Place into both eyes At Bedtime         ALLERGIES     Allergies   Allergen Reactions     Atorvastatin      Muscle aches     Diphenhydramine Other (See Comments)     Lip swelling     Metoprolol Succinate [Metoprolol] Other (See Comments)     Blind in left half of face for 3 days     Strawberry Extract Other (See Comments)     swelling     Sulfa Antibiotics Other (See Comments)     swelling       PAST MEDICAL HISTORY:  Past Medical History:   Diagnosis Date     CAD (coronary artery disease)      Congestive heart failure (H)      Heart disease     non-ischemic cardiomyopathy     HTN (hypertension)      Idiopathic cardiomyopathy (H)        PAST SURGICAL HISTORY:  Past Surgical History:   Procedure Laterality Date     COLONOSCOPY N/A 1/29/2020    Procedure: COLONOSCOPY;  Surgeon: Erica Duff MD;  Location:  GI     ESOPHAGOSCOPY, GASTROSCOPY, DUODENOSCOPY (EGD), COMBINED N/A 1/28/2020    Procedure: ESOPHAGOGASTRODUODENOSCOPY (EGD);  Surgeon: Erica Duff MD;  Location:  GI     EYE SURGERY  2006    cataract     ORTHOPEDIC SURGERY  01/2018    pinning of right hip       FAMILY HISTORY:  Family History   Problem Relation Age of Onset     Heart  "Disease Mother 88        mi     Cerebrovascular Disease Father 71       SOCIAL HISTORY:  Social History     Socioeconomic History     Marital status:      Spouse name: None     Number of children: None     Years of education: None     Highest education level: None   Tobacco Use     Smoking status: Never     Smokeless tobacco: Never   Substance and Sexual Activity     Alcohol use: No     Alcohol/week: 0.0 standard drinks of alcohol     Drug use: No   Other Topics Concern     Caffeine Concern No     Sleep Concern No     Weight Concern No     Special Diet No     Exercise Yes     Comment: active on farm     Parent/sibling w/ CABG, MI or angioplasty before 65F 55M? No       Review of Systems:  Skin:  Negative       Eyes:  Negative      ENT:  Negative      Respiratory:  Negative       Cardiovascular:  chest pain;Negative;palpitations;edema;fatigue;lightheadedness;dizziness      Gastroenterology: Negative      Genitourinary:  Negative      Musculoskeletal:  Negative      Neurologic:  Negative      Psychiatric:  Negative      Heme/Lymph/Imm:  Positive for allergies    Endocrine:  Negative        Physical Exam:  Vitals: /72 (BP Location: Left arm, Patient Position: Sitting)   Pulse 69   Ht 1.702 m (5' 7\")   Wt 74.7 kg (164 lb 11.2 oz)   SpO2 94%   BMI 25.80 kg/m      Constitutional:           Skin:             Head:           Eyes:           Lymph:      ENT:           Neck:           Respiratory:            Cardiac:                                                           GI:           Extremities and Muscular Skeletal:                 Neurological:           Psych:           CC  Oneyda Matt, PA-C  [address]  [place] [id number]              "

## 2023-04-26 NOTE — LETTER
4/26/2023    Reba Gilmore MD  [facility] [address] [facility] [place] [address]    RE: Katie Shannon       Dear Colleague,     I had the [facility] of seeing Katie Shannon in [facility].  HPI and Plan:   See dictation    Today's clinic visit entailed:  Review of the result(s) of each unique test - icd check, bmp, flp, alt, echo  Ordering of each unique test  Prescription drug management  35 minutes spent by me on the date of the encounter doing chart review, review of test results, patient visit and documentation   Provider  Link to MDM Help Grid     The level of medical decision making during this visit was of moderate complexity.      Orders Placed This Encounter   Procedures    Basic metabolic panel    Hemoglobin    Lipid Profile    ALT    Follow-Up with Cardiology CORE (Self)       Orders Placed This Encounter   Medications    ciclopirox (PENLAC) 8 % external solution     Sig: APPLY ONCE DAILY TO BOTH GREAT TOE TOE NAILS AND THE EDGE OF SURROUNDING SKIN AND UNDERNEATH.    prednisoLONE acetate (PRED FORTE) 1 % ophthalmic suspension     Sig: INSTILL 1 DROP INTO LEFT EYE FOUR TIME DAILY       There are no discontinued medications.      Encounter Diagnoses   Name Primary?    Chronic systolic heart failure (H) Yes    Primary cardiomyopathy (H)     ICD (implantable cardioverter-defibrillator) in place     Hypercholesterolemia     NSVT (nonsustained ventricular tachycardia) (H)     Essential hypertension with goal blood pressure less than 140/90        CURRENT MEDICATIONS:  Current Outpatient Medications   Medication Sig Dispense Refill    carvedilol (COREG) 12.5 MG tablet Take 1 tablet (12.5 mg) by mouth 2 times daily (with meals) 180 tablet 3    ciclopirox (PENLAC) 8 % external solution APPLY ONCE DAILY TO BOTH GREAT TOE TOE NAILS AND THE EDGE OF SURROUNDING SKIN AND UNDERNEATH.      clopidogrel (PLAVIX) 75 MG tablet Take 75 mg by mouth daily      CYCLOBENZAPRINE HCL PO Take 5 mg by mouth At  Bedtime Taking PRN (half tab)      Famotidine (PEPCID PO) Take 40 mg by mouth daily       furosemide (LASIX) 20 MG tablet Take 20 mg by mouth daily      isosorbide mononitrate (IMDUR) 60 MG 24 hr tablet Take 1 tablet (60 mg) by mouth daily 90 tablet 3    omeprazole (PRILOSEC) 20 MG DR capsule Take 20 mg by mouth daily      polyethylene glycol 400 (BLINK TEARS) 0.25 % SOLN ophthalmic solution Place 1 drop into both eyes 2 times daily as needed       prednisoLONE acetate (PRED FORTE) 1 % ophthalmic suspension INSTILL 1 DROP INTO LEFT EYE FOUR TIME DAILY      rosuvastatin (CRESTOR) 20 MG tablet Take 1 tablet (20 mg) by mouth daily 90 tablet 3    sacubitril-valsartan (ENTRESTO)  MG per tablet Take 1 tablet by mouth 2 times daily 180 tablet 3    sotalol (BETAPACE) 80 MG tablet Take 1 tablet (80 mg) by mouth 2 times daily 60 tablet 11    tamsulosin (FLOMAX) 0.4 MG capsule Take 0.4 mg by mouth daily      White Petrolatum-Mineral Oil (RETAINE PM) OINT Place into both eyes At Bedtime         ALLERGIES     Allergies   Allergen Reactions    Atorvastatin      Muscle aches    Diphenhydramine Other (See Comments)     Lip swelling    Metoprolol Succinate [Metoprolol] Other (See Comments)     Blind in left half of face for 3 days    Strawberry Extract Other (See Comments)     swelling    Sulfa Antibiotics Other (See Comments)     swelling       PAST MEDICAL HISTORY:  Past Medical History:   Diagnosis Date    CAD (coronary artery disease)     Congestive heart failure (H)     Heart disease     non-ischemic cardiomyopathy    HTN (hypertension)     Idiopathic cardiomyopathy (H)        PAST SURGICAL HISTORY:  Past Surgical History:   Procedure Laterality Date    COLONOSCOPY N/A 1/29/2020    Procedure: COLONOSCOPY;  Surgeon: Erica Duff MD;  Location:  GI    ESOPHAGOSCOPY, GASTROSCOPY, DUODENOSCOPY (EGD), COMBINED N/A 1/28/2020    Procedure: ESOPHAGOGASTRODUODENOSCOPY (EGD);  Surgeon: Erica Duff MD;  Location:  GI     "EYE SURGERY  2006    cataract    ORTHOPEDIC SURGERY  01/2018    pinning of right hip       FAMILY HISTORY:  Family History   Problem Relation Age of Onset    Heart Disease Mother 88        mi    Cerebrovascular Disease Father 71       SOCIAL HISTORY:  Social History     Socioeconomic History    Marital status:      Spouse name: None    Number of children: None    Years of education: None    Highest education level: None   Tobacco Use    Smoking status: Never    Smokeless tobacco: Never   Substance and Sexual Activity    Alcohol use: No     Alcohol/week: 0.0 standard drinks of alcohol    Drug use: No   Other Topics Concern    Caffeine Concern No    Sleep Concern No    Weight Concern No    Special Diet No    Exercise Yes     Comment: active on farm    Parent/sibling w/ CABG, MI or angioplasty before 65F 55M? No       Review of Systems:  Skin:  Negative       Eyes:  Negative      ENT:  Negative      Respiratory:  Negative       Cardiovascular:  chest pain;Negative;palpitations;edema;fatigue;lightheadedness;dizziness      Gastroenterology: Negative      Genitourinary:  Negative      Musculoskeletal:  Negative      Neurologic:  Negative      Psychiatric:  Negative      Heme/Lymph/Imm:  Positive for allergies    Endocrine:  Negative        Physical Exam:  Vitals: /72 (BP Location: Left arm, Patient Position: Sitting)   Pulse 69   Ht 1.702 m (5' 7\")   Wt 74.7 kg (164 lb 11.2 oz)   SpO2 94%   BMI 25.80 kg/m      Constitutional:           Skin:             Head:           Eyes:           Lymph:      ENT:           Neck:           Respiratory:            Cardiac:                                                           GI:           Extremities and Muscular Skeletal:                 Neurological:           Psych:           CC  Oneyda Matt PA-C  [address]  [place] [id number]                Service Date: 04/26/2023    HISTORY OF PRESENT ILLNESS:  I had the opportunity to see Mr. Wilson " Mirtha in Cardiology Clinic today at [facility] in [place] for reevaluation of longstanding stable idiopathic cardiomyopathy and chronic systolic heart failure.      Mr. Shannon has a stable ejection fraction of 30%-35% by echocardiogram again this year with no change compared to his previous study from 2021.  He has an ICD in place, which is a biventricular device and he is consistently biventricular pacing.  He had an episode of ventricular tachycardia associated with lightheadedness, near syncope and chest discomfort in 06/2020.  He did not receive a shock.  At that time, we started him on sotalol and he has not had any recurrences of ventricular tachycardia that have lasted longer than a couple of seconds.    He continues to be very active around his home and in the yard.  He has no chest pain, lightheadedness or shortness of breath issues.  He has taken a couple of falls this year, but has not suffered any serious injuries.  A couple of years ago, he broke a hip.  He seems to have recovered from that well.    PHYSICAL EXAMINATION:  On examination here today, his blood pressure is 114/72, heart rate 69, weight 164 pounds and stable.  His lungs are clear.  Heart rhythm is regular.  He has no cardiac murmurs or carotid bruits.    IMPRESSIONS:  Mr. Katie Shannon is an 88-year-old gentleman with idiopathic cardiomyopathy and mild chronic systolic heart failure, New York Heart Association class I or II.  He has a biventricular ICD in place and had a symptomatic episode of ventricular tachycardia in the past.  Fortunately, this has not recurred after we initiated sotalol 80 mg p.o. b.i.d.    His laboratory studies continue to look good, including his basic metabolic panel and fasting lipid panel.  His medications are well adjusted and he is feeling well.  I will not make any changes today.  I will plan to see him back in Cardiology Clinic in 1 year for reevaluation.    Rah Ayala MD, F.A.C.C.    cc:     Reba Gilmore MD  [address] [address]     Isidoro Mari MD, [facility]        D: 2023   T: 2023   MT: JENNYFER    Name:     RUPERT MAN  MRN:      [id number]        Account:      [id number]   :      1935           Service Date: 2023       Document: [id number]     Thank you for allowing me to participate in the care of your patient.      Sincerely,     ISIDORO MARI MD     [facility]  cc:   Oneyda Matt PA-C  [address]  [place] [address]

## 2023-05-02 ENCOUNTER — ANCILLARY PROCEDURE (OUTPATIENT)
Dept: CARDIOLOGY | Facility: CLINIC | Age: 88
End: 2023-05-02
Attending: INTERNAL MEDICINE
Payer: MEDICARE

## 2023-05-02 DIAGNOSIS — I42.9 PRIMARY CARDIOMYOPATHY (H): ICD-10-CM

## 2023-05-02 DIAGNOSIS — Z95.810 ICD (IMPLANTABLE CARDIOVERTER-DEFIBRILLATOR) IN PLACE: ICD-10-CM

## 2023-05-02 PROCEDURE — 93296 REM INTERROG EVL PM/IDS: CPT | Performed by: INTERNAL MEDICINE

## 2023-05-02 PROCEDURE — 93295 DEV INTERROG REMOTE 1/2/MLT: CPT | Performed by: INTERNAL MEDICINE

## 2023-05-05 ENCOUNTER — TRANSFERRED RECORDS (OUTPATIENT)
Dept: HEALTH INFORMATION MANAGEMENT | Facility: CLINIC | Age: 88
End: 2023-05-05

## 2023-05-11 LAB
MDC_IDC_EPISODE_DTM: NORMAL
MDC_IDC_EPISODE_DURATION: 6 S
MDC_IDC_EPISODE_ID: NORMAL
MDC_IDC_EPISODE_TYPE: NORMAL
MDC_IDC_EPISODE_VENDOR_TYPE: NORMAL
MDC_IDC_LEAD_IMPLANT_DT: NORMAL
MDC_IDC_LEAD_LOCATION: NORMAL
MDC_IDC_LEAD_LOCATION_DETAIL_1: NORMAL
MDC_IDC_LEAD_MFG: NORMAL
MDC_IDC_LEAD_MODEL: NORMAL
MDC_IDC_LEAD_POLARITY_TYPE: NORMAL
MDC_IDC_LEAD_POLARITY_TYPE: NORMAL
MDC_IDC_LEAD_SERIAL: NORMAL
MDC_IDC_LEAD_SPECIAL_FUNCTION: NORMAL
MDC_IDC_MSMT_BATTERY_DTM: NORMAL
MDC_IDC_MSMT_BATTERY_REMAINING_LONGEVITY: 23 MO
MDC_IDC_MSMT_BATTERY_REMAINING_PERCENTAGE: 33 %
MDC_IDC_MSMT_BATTERY_RRT_TRIGGER: NORMAL
MDC_IDC_MSMT_BATTERY_STATUS: NORMAL
MDC_IDC_MSMT_BATTERY_VOLTAGE: 2.9 V
MDC_IDC_MSMT_CAP_CHARGE_DTM: NORMAL
MDC_IDC_MSMT_CAP_CHARGE_ENERGY: 40 J
MDC_IDC_MSMT_CAP_CHARGE_TIME: 9.4 S
MDC_IDC_MSMT_CAP_CHARGE_TYPE: NORMAL
MDC_IDC_MSMT_LEADCHNL_LV_IMPEDANCE_VALUE: 380 OHM
MDC_IDC_MSMT_LEADCHNL_LV_LEAD_CHANNEL_STATUS: NORMAL
MDC_IDC_MSMT_LEADCHNL_LV_PACING_THRESHOLD_AMPLITUDE: 0.75 V
MDC_IDC_MSMT_LEADCHNL_LV_PACING_THRESHOLD_PULSEWIDTH: 0.5 MS
MDC_IDC_MSMT_LEADCHNL_RA_IMPEDANCE_VALUE: 440 OHM
MDC_IDC_MSMT_LEADCHNL_RA_LEAD_CHANNEL_STATUS: NORMAL
MDC_IDC_MSMT_LEADCHNL_RA_PACING_THRESHOLD_AMPLITUDE: 0.5 V
MDC_IDC_MSMT_LEADCHNL_RA_PACING_THRESHOLD_PULSEWIDTH: 0.5 MS
MDC_IDC_MSMT_LEADCHNL_RA_SENSING_INTR_AMPL: 3.6 MV
MDC_IDC_MSMT_LEADCHNL_RV_IMPEDANCE_VALUE: 450 OHM
MDC_IDC_MSMT_LEADCHNL_RV_LEAD_CHANNEL_STATUS: NORMAL
MDC_IDC_MSMT_LEADCHNL_RV_PACING_THRESHOLD_AMPLITUDE: 1.75 V
MDC_IDC_MSMT_LEADCHNL_RV_PACING_THRESHOLD_PULSEWIDTH: 0.5 MS
MDC_IDC_MSMT_LEADCHNL_RV_SENSING_INTR_AMPL: 12 MV
MDC_IDC_PG_IMPLANT_DTM: NORMAL
MDC_IDC_PG_MFG: NORMAL
MDC_IDC_PG_MODEL: NORMAL
MDC_IDC_PG_SERIAL: NORMAL
MDC_IDC_PG_TYPE: NORMAL
MDC_IDC_SESS_CLINIC_NAME: NORMAL
MDC_IDC_SESS_DTM: NORMAL
MDC_IDC_SESS_REPROGRAMMED: NO
MDC_IDC_SESS_TYPE: NORMAL
MDC_IDC_SET_BRADY_AT_MODE_SWITCH_MODE: NORMAL
MDC_IDC_SET_BRADY_AT_MODE_SWITCH_RATE: 170 {BEATS}/MIN
MDC_IDC_SET_BRADY_LOWRATE: 60 {BEATS}/MIN
MDC_IDC_SET_BRADY_MAX_SENSOR_RATE: 120 {BEATS}/MIN
MDC_IDC_SET_BRADY_MAX_TRACKING_RATE: 120 {BEATS}/MIN
MDC_IDC_SET_BRADY_MODE: NORMAL
MDC_IDC_SET_BRADY_PAV_DELAY_HIGH: 100 MS
MDC_IDC_SET_BRADY_PAV_DELAY_LOW: 150 MS
MDC_IDC_SET_BRADY_SAV_DELAY_HIGH: 100 MS
MDC_IDC_SET_BRADY_SAV_DELAY_LOW: 120 MS
MDC_IDC_SET_CRT_LVRV_DELAY: 0 MS
MDC_IDC_SET_CRT_PACED_CHAMBERS: NORMAL
MDC_IDC_SET_LEADCHNL_LV_PACING_AMPLITUDE: 2 V
MDC_IDC_SET_LEADCHNL_LV_PACING_ANODE_ELECTRODE_1: NORMAL
MDC_IDC_SET_LEADCHNL_LV_PACING_ANODE_LOCATION_1: NORMAL
MDC_IDC_SET_LEADCHNL_LV_PACING_CAPTURE_MODE: NORMAL
MDC_IDC_SET_LEADCHNL_LV_PACING_CATHODE_ELECTRODE_1: NORMAL
MDC_IDC_SET_LEADCHNL_LV_PACING_CATHODE_LOCATION_1: NORMAL
MDC_IDC_SET_LEADCHNL_LV_PACING_POLARITY: NORMAL
MDC_IDC_SET_LEADCHNL_LV_PACING_PULSEWIDTH: 0.5 MS
MDC_IDC_SET_LEADCHNL_RA_PACING_AMPLITUDE: 1.5 V
MDC_IDC_SET_LEADCHNL_RA_PACING_ANODE_ELECTRODE_1: NORMAL
MDC_IDC_SET_LEADCHNL_RA_PACING_ANODE_LOCATION_1: NORMAL
MDC_IDC_SET_LEADCHNL_RA_PACING_CAPTURE_MODE: NORMAL
MDC_IDC_SET_LEADCHNL_RA_PACING_CATHODE_ELECTRODE_1: NORMAL
MDC_IDC_SET_LEADCHNL_RA_PACING_CATHODE_LOCATION_1: NORMAL
MDC_IDC_SET_LEADCHNL_RA_PACING_POLARITY: NORMAL
MDC_IDC_SET_LEADCHNL_RA_PACING_PULSEWIDTH: 0.5 MS
MDC_IDC_SET_LEADCHNL_RA_SENSING_ADAPTATION_MODE: NORMAL
MDC_IDC_SET_LEADCHNL_RA_SENSING_ANODE_ELECTRODE_1: NORMAL
MDC_IDC_SET_LEADCHNL_RA_SENSING_ANODE_LOCATION_1: NORMAL
MDC_IDC_SET_LEADCHNL_RA_SENSING_CATHODE_ELECTRODE_1: NORMAL
MDC_IDC_SET_LEADCHNL_RA_SENSING_CATHODE_LOCATION_1: NORMAL
MDC_IDC_SET_LEADCHNL_RA_SENSING_POLARITY: NORMAL
MDC_IDC_SET_LEADCHNL_RA_SENSING_SENSITIVITY: 0.3 MV
MDC_IDC_SET_LEADCHNL_RV_PACING_AMPLITUDE: 2.75 V
MDC_IDC_SET_LEADCHNL_RV_PACING_ANODE_ELECTRODE_1: NORMAL
MDC_IDC_SET_LEADCHNL_RV_PACING_ANODE_LOCATION_1: NORMAL
MDC_IDC_SET_LEADCHNL_RV_PACING_CAPTURE_MODE: NORMAL
MDC_IDC_SET_LEADCHNL_RV_PACING_CATHODE_ELECTRODE_1: NORMAL
MDC_IDC_SET_LEADCHNL_RV_PACING_CATHODE_LOCATION_1: NORMAL
MDC_IDC_SET_LEADCHNL_RV_PACING_POLARITY: NORMAL
MDC_IDC_SET_LEADCHNL_RV_PACING_PULSEWIDTH: 0.5 MS
MDC_IDC_SET_LEADCHNL_RV_SENSING_ADAPTATION_MODE: NORMAL
MDC_IDC_SET_LEADCHNL_RV_SENSING_ANODE_ELECTRODE_1: NORMAL
MDC_IDC_SET_LEADCHNL_RV_SENSING_ANODE_LOCATION_1: NORMAL
MDC_IDC_SET_LEADCHNL_RV_SENSING_CATHODE_ELECTRODE_1: NORMAL
MDC_IDC_SET_LEADCHNL_RV_SENSING_CATHODE_LOCATION_1: NORMAL
MDC_IDC_SET_LEADCHNL_RV_SENSING_POLARITY: NORMAL
MDC_IDC_SET_LEADCHNL_RV_SENSING_SENSITIVITY: 0.5 MV
MDC_IDC_SET_ZONE_DETECTION_INTERVAL: 300 MS
MDC_IDC_SET_ZONE_DETECTION_INTERVAL: 350 MS
MDC_IDC_SET_ZONE_TYPE: NORMAL
MDC_IDC_SET_ZONE_VENDOR_TYPE: NORMAL
MDC_IDC_STAT_AT_BURDEN_PERCENT: 0 %
MDC_IDC_STAT_AT_DTM_END: NORMAL
MDC_IDC_STAT_AT_DTM_START: NORMAL
MDC_IDC_STAT_AT_MODE_SW_COUNT: 1
MDC_IDC_STAT_AT_MODE_SW_COUNT_PER_DAY: 0
MDC_IDC_STAT_AT_MODE_SW_MAX_DURATION: 6 S
MDC_IDC_STAT_AT_MODE_SW_PERCENT_TIME: 1 %
MDC_IDC_STAT_BRADY_AP_VP_PERCENT: 24 %
MDC_IDC_STAT_BRADY_AP_VS_PERCENT: 1 %
MDC_IDC_STAT_BRADY_AS_VP_PERCENT: 68 %
MDC_IDC_STAT_BRADY_AS_VS_PERCENT: 2.9 %
MDC_IDC_STAT_BRADY_DTM_END: NORMAL
MDC_IDC_STAT_BRADY_DTM_START: NORMAL
MDC_IDC_STAT_BRADY_RA_PERCENT_PACED: 19 %
MDC_IDC_STAT_CRT_DTM_END: NORMAL
MDC_IDC_STAT_CRT_DTM_START: NORMAL
MDC_IDC_STAT_CRT_PERCENT_PACED: 92 %
MDC_IDC_STAT_HEART_RATE_ATRIAL_MAX: 240 {BEATS}/MIN
MDC_IDC_STAT_HEART_RATE_ATRIAL_MEAN: 75 {BEATS}/MIN
MDC_IDC_STAT_HEART_RATE_ATRIAL_MIN: 50 {BEATS}/MIN
MDC_IDC_STAT_HEART_RATE_DTM_END: NORMAL
MDC_IDC_STAT_HEART_RATE_DTM_START: NORMAL
MDC_IDC_STAT_HEART_RATE_VENTRICULAR_MAX: 330 {BEATS}/MIN
MDC_IDC_STAT_HEART_RATE_VENTRICULAR_MEAN: 75 {BEATS}/MIN
MDC_IDC_STAT_HEART_RATE_VENTRICULAR_MIN: 40 {BEATS}/MIN
MDC_IDC_STAT_TACHYTHERAPY_ATP_DELIVERED_RECENT: 0
MDC_IDC_STAT_TACHYTHERAPY_RECENT_DTM_END: NORMAL
MDC_IDC_STAT_TACHYTHERAPY_RECENT_DTM_START: NORMAL
MDC_IDC_STAT_TACHYTHERAPY_SHOCKS_ABORTED_RECENT: 0
MDC_IDC_STAT_TACHYTHERAPY_SHOCKS_DELIVERED_RECENT: 0

## 2023-08-07 ENCOUNTER — ANCILLARY PROCEDURE (OUTPATIENT)
Dept: CARDIOLOGY | Facility: CLINIC | Age: 88
End: 2023-08-07
Attending: INTERNAL MEDICINE
Payer: MEDICARE

## 2023-08-07 DIAGNOSIS — I42.9 PRIMARY CARDIOMYOPATHY (H): ICD-10-CM

## 2023-08-07 DIAGNOSIS — Z95.810 ICD (IMPLANTABLE CARDIOVERTER-DEFIBRILLATOR) IN PLACE: ICD-10-CM

## 2023-08-07 PROCEDURE — 93295 DEV INTERROG REMOTE 1/2/MLT: CPT | Performed by: INTERNAL MEDICINE

## 2023-08-07 PROCEDURE — 93296 REM INTERROG EVL PM/IDS: CPT | Performed by: INTERNAL MEDICINE

## 2023-08-08 ENCOUNTER — CARE COORDINATION (OUTPATIENT)
Dept: CARDIOLOGY | Facility: CLINIC | Age: 88
End: 2023-08-08
Payer: MEDICARE

## 2023-08-08 NOTE — PROGRESS NOTES
Pt left VM stating he has option to get generic rx for a med but wants to discuss w/Dr. Ayala first.     Call out to pt. They report [facility] and they informed him instead of Entresto their formulary covers sacubitril-valsartan  mg. Pt/wife concerned whether this is an acceptable alternative. Informed them that is the same drug as Entresto. They request I update Dr. Ayala. They did not have any other questions or meds to review today. Jayne Kelly RN on 8/8/2023 at 11:09 AM

## 2023-08-08 NOTE — TELEPHONE ENCOUNTER
You are correct, Jayne. The sacubitril-valsartan is the generic name of Entresto, but there is no company that can make it yet outside of the patent, so it is all brand-name Entresto, no matter what they call it. In other words, it is ok to take the sacubitril-valsartan provided by the [facility]. It is the same drug. EE

## 2023-08-10 LAB
MDC_IDC_EPISODE_DTM: NORMAL
MDC_IDC_EPISODE_DURATION: 10 S
MDC_IDC_EPISODE_DURATION: 10 S
MDC_IDC_EPISODE_DURATION: 12 S
MDC_IDC_EPISODE_DURATION: 6 S
MDC_IDC_EPISODE_DURATION: 8 S
MDC_IDC_EPISODE_DURATION: 8 S
MDC_IDC_EPISODE_ID: NORMAL
MDC_IDC_EPISODE_TYPE: NORMAL
MDC_IDC_EPISODE_VENDOR_TYPE: NORMAL
MDC_IDC_LEAD_IMPLANT_DT: NORMAL
MDC_IDC_LEAD_LOCATION: NORMAL
MDC_IDC_LEAD_LOCATION_DETAIL_1: NORMAL
MDC_IDC_LEAD_MFG: NORMAL
MDC_IDC_LEAD_MODEL: NORMAL
MDC_IDC_LEAD_POLARITY_TYPE: NORMAL
MDC_IDC_LEAD_POLARITY_TYPE: NORMAL
MDC_IDC_LEAD_SERIAL: NORMAL
MDC_IDC_LEAD_SPECIAL_FUNCTION: NORMAL
MDC_IDC_MSMT_BATTERY_DTM: NORMAL
MDC_IDC_MSMT_BATTERY_REMAINING_LONGEVITY: 20 MO
MDC_IDC_MSMT_BATTERY_REMAINING_PERCENTAGE: 30 %
MDC_IDC_MSMT_BATTERY_RRT_TRIGGER: NORMAL
MDC_IDC_MSMT_BATTERY_STATUS: NORMAL
MDC_IDC_MSMT_BATTERY_VOLTAGE: 2.89 V
MDC_IDC_MSMT_CAP_CHARGE_DTM: NORMAL
MDC_IDC_MSMT_CAP_CHARGE_ENERGY: 40 J
MDC_IDC_MSMT_CAP_CHARGE_TIME: 9.4 S
MDC_IDC_MSMT_CAP_CHARGE_TYPE: NORMAL
MDC_IDC_MSMT_LEADCHNL_LV_IMPEDANCE_VALUE: 380 OHM
MDC_IDC_MSMT_LEADCHNL_LV_LEAD_CHANNEL_STATUS: NORMAL
MDC_IDC_MSMT_LEADCHNL_LV_PACING_THRESHOLD_AMPLITUDE: 0.75 V
MDC_IDC_MSMT_LEADCHNL_LV_PACING_THRESHOLD_PULSEWIDTH: 0.5 MS
MDC_IDC_MSMT_LEADCHNL_RA_IMPEDANCE_VALUE: 440 OHM
MDC_IDC_MSMT_LEADCHNL_RA_LEAD_CHANNEL_STATUS: NORMAL
MDC_IDC_MSMT_LEADCHNL_RA_PACING_THRESHOLD_AMPLITUDE: 0.5 V
MDC_IDC_MSMT_LEADCHNL_RA_PACING_THRESHOLD_PULSEWIDTH: 0.5 MS
MDC_IDC_MSMT_LEADCHNL_RA_SENSING_INTR_AMPL: 2.3 MV
MDC_IDC_MSMT_LEADCHNL_RV_IMPEDANCE_VALUE: 440 OHM
MDC_IDC_MSMT_LEADCHNL_RV_LEAD_CHANNEL_STATUS: NORMAL
MDC_IDC_MSMT_LEADCHNL_RV_PACING_THRESHOLD_AMPLITUDE: 1.88 V
MDC_IDC_MSMT_LEADCHNL_RV_PACING_THRESHOLD_PULSEWIDTH: 0.5 MS
MDC_IDC_MSMT_LEADCHNL_RV_SENSING_INTR_AMPL: 12 MV
MDC_IDC_PG_IMPLANT_DTM: NORMAL
MDC_IDC_PG_MFG: NORMAL
MDC_IDC_PG_MODEL: NORMAL
MDC_IDC_PG_SERIAL: NORMAL
MDC_IDC_PG_TYPE: NORMAL
MDC_IDC_SESS_CLINIC_NAME: NORMAL
MDC_IDC_SESS_DTM: NORMAL
MDC_IDC_SESS_REPROGRAMMED: NO
MDC_IDC_SESS_TYPE: NORMAL
MDC_IDC_SET_BRADY_AT_MODE_SWITCH_MODE: NORMAL
MDC_IDC_SET_BRADY_AT_MODE_SWITCH_RATE: 170 {BEATS}/MIN
MDC_IDC_SET_BRADY_LOWRATE: 60 {BEATS}/MIN
MDC_IDC_SET_BRADY_MAX_SENSOR_RATE: 120 {BEATS}/MIN
MDC_IDC_SET_BRADY_MAX_TRACKING_RATE: 120 {BEATS}/MIN
MDC_IDC_SET_BRADY_MODE: NORMAL
MDC_IDC_SET_BRADY_PAV_DELAY_HIGH: 100 MS
MDC_IDC_SET_BRADY_PAV_DELAY_LOW: 150 MS
MDC_IDC_SET_BRADY_SAV_DELAY_HIGH: 100 MS
MDC_IDC_SET_BRADY_SAV_DELAY_LOW: 120 MS
MDC_IDC_SET_CRT_LVRV_DELAY: 0 MS
MDC_IDC_SET_CRT_PACED_CHAMBERS: NORMAL
MDC_IDC_SET_LEADCHNL_LV_PACING_AMPLITUDE: 2 V
MDC_IDC_SET_LEADCHNL_LV_PACING_ANODE_ELECTRODE_1: NORMAL
MDC_IDC_SET_LEADCHNL_LV_PACING_ANODE_LOCATION_1: NORMAL
MDC_IDC_SET_LEADCHNL_LV_PACING_CAPTURE_MODE: NORMAL
MDC_IDC_SET_LEADCHNL_LV_PACING_CATHODE_ELECTRODE_1: NORMAL
MDC_IDC_SET_LEADCHNL_LV_PACING_CATHODE_LOCATION_1: NORMAL
MDC_IDC_SET_LEADCHNL_LV_PACING_POLARITY: NORMAL
MDC_IDC_SET_LEADCHNL_LV_PACING_PULSEWIDTH: 0.5 MS
MDC_IDC_SET_LEADCHNL_RA_PACING_AMPLITUDE: 1.5 V
MDC_IDC_SET_LEADCHNL_RA_PACING_ANODE_ELECTRODE_1: NORMAL
MDC_IDC_SET_LEADCHNL_RA_PACING_ANODE_LOCATION_1: NORMAL
MDC_IDC_SET_LEADCHNL_RA_PACING_CAPTURE_MODE: NORMAL
MDC_IDC_SET_LEADCHNL_RA_PACING_CATHODE_ELECTRODE_1: NORMAL
MDC_IDC_SET_LEADCHNL_RA_PACING_CATHODE_LOCATION_1: NORMAL
MDC_IDC_SET_LEADCHNL_RA_PACING_POLARITY: NORMAL
MDC_IDC_SET_LEADCHNL_RA_PACING_PULSEWIDTH: 0.5 MS
MDC_IDC_SET_LEADCHNL_RA_SENSING_ADAPTATION_MODE: NORMAL
MDC_IDC_SET_LEADCHNL_RA_SENSING_ANODE_ELECTRODE_1: NORMAL
MDC_IDC_SET_LEADCHNL_RA_SENSING_ANODE_LOCATION_1: NORMAL
MDC_IDC_SET_LEADCHNL_RA_SENSING_CATHODE_ELECTRODE_1: NORMAL
MDC_IDC_SET_LEADCHNL_RA_SENSING_CATHODE_LOCATION_1: NORMAL
MDC_IDC_SET_LEADCHNL_RA_SENSING_POLARITY: NORMAL
MDC_IDC_SET_LEADCHNL_RA_SENSING_SENSITIVITY: 0.3 MV
MDC_IDC_SET_LEADCHNL_RV_PACING_AMPLITUDE: 2.88
MDC_IDC_SET_LEADCHNL_RV_PACING_ANODE_ELECTRODE_1: NORMAL
MDC_IDC_SET_LEADCHNL_RV_PACING_ANODE_LOCATION_1: NORMAL
MDC_IDC_SET_LEADCHNL_RV_PACING_CAPTURE_MODE: NORMAL
MDC_IDC_SET_LEADCHNL_RV_PACING_CATHODE_ELECTRODE_1: NORMAL
MDC_IDC_SET_LEADCHNL_RV_PACING_CATHODE_LOCATION_1: NORMAL
MDC_IDC_SET_LEADCHNL_RV_PACING_POLARITY: NORMAL
MDC_IDC_SET_LEADCHNL_RV_PACING_PULSEWIDTH: 0.5 MS
MDC_IDC_SET_LEADCHNL_RV_SENSING_ADAPTATION_MODE: NORMAL
MDC_IDC_SET_LEADCHNL_RV_SENSING_ANODE_ELECTRODE_1: NORMAL
MDC_IDC_SET_LEADCHNL_RV_SENSING_ANODE_LOCATION_1: NORMAL
MDC_IDC_SET_LEADCHNL_RV_SENSING_CATHODE_ELECTRODE_1: NORMAL
MDC_IDC_SET_LEADCHNL_RV_SENSING_CATHODE_LOCATION_1: NORMAL
MDC_IDC_SET_LEADCHNL_RV_SENSING_POLARITY: NORMAL
MDC_IDC_SET_LEADCHNL_RV_SENSING_SENSITIVITY: 0.5 MV
MDC_IDC_SET_ZONE_DETECTION_INTERVAL: 300 MS
MDC_IDC_SET_ZONE_DETECTION_INTERVAL: 350 MS
MDC_IDC_SET_ZONE_TYPE: NORMAL
MDC_IDC_SET_ZONE_VENDOR_TYPE: NORMAL
MDC_IDC_STAT_AT_BURDEN_PERCENT: 1 %
MDC_IDC_STAT_AT_DTM_END: NORMAL
MDC_IDC_STAT_AT_DTM_START: NORMAL
MDC_IDC_STAT_AT_MODE_SW_COUNT: 5
MDC_IDC_STAT_AT_MODE_SW_COUNT_PER_DAY: 0
MDC_IDC_STAT_AT_MODE_SW_MAX_DURATION: 10 S
MDC_IDC_STAT_AT_MODE_SW_PERCENT_TIME: 1 %
MDC_IDC_STAT_BRADY_AP_VP_PERCENT: 22 %
MDC_IDC_STAT_BRADY_AP_VS_PERCENT: 1 %
MDC_IDC_STAT_BRADY_AS_VP_PERCENT: 72 %
MDC_IDC_STAT_BRADY_AS_VS_PERCENT: 2.6 %
MDC_IDC_STAT_BRADY_DTM_END: NORMAL
MDC_IDC_STAT_BRADY_DTM_START: NORMAL
MDC_IDC_STAT_BRADY_RA_PERCENT_PACED: 17 %
MDC_IDC_STAT_CRT_DTM_END: NORMAL
MDC_IDC_STAT_CRT_DTM_START: NORMAL
MDC_IDC_STAT_CRT_PERCENT_PACED: 93 %
MDC_IDC_STAT_EPISODE_RECENT_COUNT: 0
MDC_IDC_STAT_EPISODE_RECENT_COUNT: 0
MDC_IDC_STAT_EPISODE_RECENT_COUNT: 1
MDC_IDC_STAT_EPISODE_RECENT_COUNT_DTM_END: NORMAL
MDC_IDC_STAT_EPISODE_RECENT_COUNT_DTM_START: NORMAL
MDC_IDC_STAT_EPISODE_TYPE: NORMAL
MDC_IDC_STAT_EPISODE_VENDOR_TYPE: NORMAL
MDC_IDC_STAT_HEART_RATE_ATRIAL_MAX: 250 {BEATS}/MIN
MDC_IDC_STAT_HEART_RATE_ATRIAL_MEAN: 77 {BEATS}/MIN
MDC_IDC_STAT_HEART_RATE_ATRIAL_MIN: 40 {BEATS}/MIN
MDC_IDC_STAT_HEART_RATE_DTM_END: NORMAL
MDC_IDC_STAT_HEART_RATE_DTM_START: NORMAL
MDC_IDC_STAT_HEART_RATE_VENTRICULAR_MAX: 330 {BEATS}/MIN
MDC_IDC_STAT_HEART_RATE_VENTRICULAR_MEAN: 77 {BEATS}/MIN
MDC_IDC_STAT_HEART_RATE_VENTRICULAR_MIN: 50 {BEATS}/MIN
MDC_IDC_STAT_TACHYTHERAPY_ATP_DELIVERED_RECENT: 0
MDC_IDC_STAT_TACHYTHERAPY_RECENT_DTM_END: NORMAL
MDC_IDC_STAT_TACHYTHERAPY_RECENT_DTM_START: NORMAL
MDC_IDC_STAT_TACHYTHERAPY_SHOCKS_ABORTED_RECENT: 0
MDC_IDC_STAT_TACHYTHERAPY_SHOCKS_DELIVERED_RECENT: 0

## 2023-08-14 NOTE — PROGRESS NOTES
8/14/2023 Called to patient and informed of Dr Ayala response: no generic for entresto and he may fill with the [facility].  Kelly Urbina RN 08/14/23 12:34 PM

## 2023-10-03 ENCOUNTER — CARE COORDINATION (OUTPATIENT)
Dept: CARDIOLOGY | Facility: CLINIC | Age: 88
End: 2023-10-03
Payer: MEDICARE

## 2023-10-03 DIAGNOSIS — I47.29 NSVT (NONSUSTAINED VENTRICULAR TACHYCARDIA) (H): ICD-10-CM

## 2023-10-03 NOTE — PROGRESS NOTES
Pt left message stating he would like to get his sotalol rx through the [facility]. I left message for pt to let him know that an updated rx can be faxed to the [facility]. Would like to know how many sotalol tablets he has left on his current rx as I will have to get a signed rx from  and it may take a couple of days as he is not at our [place] location today. Giovani HARRISON October 3, 2023, 11:42 AM

## 2023-10-04 NOTE — PROGRESS NOTES
Pt called back and said he has 60 tablets of sotalol left. Pt would like to get the sotalol through [facility] ([phone]). He already gets his entresto through the [facility]. Will fax sotalol prescription and most recent OV note, EKG to [facility] on 10/9/23 when I can get  to sign. Giovani HARRISON October 4, 2023, 9:29 AM

## 2023-10-09 RX ORDER — SOTALOL HYDROCHLORIDE 80 MG/1
80 TABLET ORAL 2 TIMES DAILY
Qty: 180 TABLET | Refills: 3 | Status: SHIPPED | OUTPATIENT
Start: 2023-10-09 | End: 2023-10-18

## 2023-10-09 NOTE — PROGRESS NOTES
Sotalol 80 mg twice daily prescription faxed to [facility] ([phone]). Giovani HARRISON October 9, 2023, 10:23 AM

## 2023-10-16 NOTE — PROGRESS NOTES
Received a fax from [facility] regarding pt's sotalol stating pt is not a [facility] pt. I will call the [facility] pharmacy tomorrow to discuss. Giovani HARRISON October 16, 2023, 4:57 PM

## 2023-10-17 NOTE — PROGRESS NOTES
I called [facility] pharmacy and was told to fax the sotalol prescription to [phone]. This is the fax for the [facility]. She requested most recent OV note and paper prescription. I will message the clinic RN to fax today. Giovani HARRISON October 17, 2023, 11:14 AM

## 2023-10-18 RX ORDER — SOTALOL HYDROCHLORIDE 80 MG/1
80 TABLET ORAL 2 TIMES DAILY
Qty: 180 TABLET | Refills: 3 | Status: SHIPPED | OUTPATIENT
Start: 2023-10-18 | End: 2023-12-15

## 2023-10-18 NOTE — PROGRESS NOTES
I called [facility]  to see if they received sotalol rx and OV note that was faxed yesterday. I was told they have not received it yet, but that Rx can be sent electronically. Resent the rx electronically and will check back tomorrow. She also requested the most recent OV note be faxed. I will right fax that to [phone]). These were also both faxed yesterday as paper copies. Giovani HARRISON October 18, 2023, 10:58 AM

## 2023-10-20 NOTE — PROGRESS NOTES
I called the [facility] pharmacy and I was told they have received the sotalol prescription and don't need anything from our clinic at this time. It now has to go through the specialty medication approval process for the [facility] and may take 4-6 weeks to process. I called pt with an update and his wife said she can get the next refill at their local pharmacy if needed as he still has refills on file there. She will call if he has any issues getting it. Giovani HARRISON October 20, 2023, 3:28 PM

## 2023-10-23 ENCOUNTER — TRANSCRIBE ORDERS (OUTPATIENT)
Dept: OTHER | Age: 88
End: 2023-10-23

## 2023-10-23 DIAGNOSIS — I47.29 OTHER VENTRICULAR TACHYCARDIA (H): Primary | ICD-10-CM

## 2023-10-23 DIAGNOSIS — Z95.0 STATUS CARDIAC PACEMAKER: Primary | ICD-10-CM

## 2023-10-25 ENCOUNTER — TELEPHONE (OUTPATIENT)
Dept: CARDIOLOGY | Facility: CLINIC | Age: 88
End: 2023-10-25
Payer: MEDICARE

## 2023-10-25 NOTE — TELEPHONE ENCOUNTER
Health Call Center    Phone Message    May a detailed message be left on voicemail: no     Reason for Call: Other: Pt/ pt's wife is wondering if EP appt is needed to continue Rx for sotalol or if Dr. Ayala.  Pt prefers to continue normal routine with Dr. Ayala if possible.  Pt does not want to travel for more visits than necessary.  Please call to clarify.     Action Taken: Message routed to:  Other: cardio    Travel Screening: Not Applicable

## 2023-10-25 NOTE — TELEPHONE ENCOUNTER
I called pt's wife to discuss his sotalol rx. Pt is trying to get his sotalol through the [facility]. Most recent OV note from  and sotalol rx were faxed to the [facility] pharmacy last week. Pt's PCP ended up faxing a referral to an EP provider to our clinic. Pt's wife wants to know why he needs to see an EP cardiologist. I told her that I already faxed 's note and prescription, but that the [facility] may have a protocol where pt has to see an EP provider in order to get sotalol. She is going to call [facility] the [facility] to find out if it's necessary for pt to see an EP cardiologist. She will let me know if they need anything further from  at this time. Giovani HARRISON October 25, 2023, 3:02 PM

## 2023-11-06 ENCOUNTER — TELEPHONE (OUTPATIENT)
Dept: CARDIOLOGY | Facility: CLINIC | Age: 88
End: 2023-11-06
Payer: MEDICARE

## 2023-11-06 NOTE — TELEPHONE ENCOUNTER
Health Call Center    Phone Message    May a detailed message be left on voicemail: yes     Reason for Call: Other: Suzanne from the [facility] wants to make sure any appts w/Dr. Ayala and any medication orders of Sotolol includes [id number] so the [facility] can be billed.    Action Taken: Message routed to:  [facility] ([facility] cardio    Travel Screening: Not Applicable    Thank you!  [facility]

## 2023-11-07 ENCOUNTER — ANCILLARY PROCEDURE (OUTPATIENT)
Dept: CARDIOLOGY | Facility: CLINIC | Age: 88
End: 2023-11-07
Attending: INTERNAL MEDICINE
Payer: MEDICARE

## 2023-11-07 DIAGNOSIS — I42.9 PRIMARY CARDIOMYOPATHY (H): ICD-10-CM

## 2023-11-07 DIAGNOSIS — Z95.810 ICD (IMPLANTABLE CARDIOVERTER-DEFIBRILLATOR) IN PLACE: ICD-10-CM

## 2023-11-07 PROCEDURE — 93284 PRGRMG EVAL IMPLANTABLE DFB: CPT | Performed by: INTERNAL MEDICINE

## 2023-11-08 LAB
MDC_IDC_LEAD_CONNECTION_STATUS: NORMAL
MDC_IDC_LEAD_IMPLANT_DT: NORMAL
MDC_IDC_LEAD_LOCATION: NORMAL
MDC_IDC_LEAD_LOCATION_DETAIL_1: NORMAL
MDC_IDC_LEAD_MFG: NORMAL
MDC_IDC_LEAD_MODEL: NORMAL
MDC_IDC_LEAD_POLARITY_TYPE: NORMAL
MDC_IDC_LEAD_POLARITY_TYPE: NORMAL
MDC_IDC_LEAD_SERIAL: NORMAL
MDC_IDC_LEAD_SPECIAL_FUNCTION: NORMAL
MDC_IDC_PG_IMPLANT_DTM: NORMAL
MDC_IDC_PG_MFG: NORMAL
MDC_IDC_PG_MODEL: NORMAL
MDC_IDC_PG_SERIAL: NORMAL
MDC_IDC_PG_TYPE: NORMAL
MDC_IDC_SESS_CLINIC_NAME: NORMAL
MDC_IDC_SESS_DTM: NORMAL
MDC_IDC_SESS_TYPE: NORMAL

## 2023-11-09 NOTE — TELEPHONE ENCOUNTER
Tried to call number listed back to discuss pt's sotalol and his [facility] number, but there were 10 callers in queue. Will try again later. Giovani HARRISON November 9, 2023, 1:19 PM

## 2023-11-17 ENCOUNTER — TELEPHONE (OUTPATIENT)
Dept: CARDIOLOGY | Facility: CLINIC | Age: 88
End: 2023-11-17
Payer: MEDICARE

## 2023-11-17 NOTE — TELEPHONE ENCOUNTER
[facility] Call Center    Phone Message    May a detailed message be left on voicemail: yes     Reason for Call: Other: Yodit states she is calling Yissel back. She states that the patient rissa like his meds managed by cardiology.She was supposed to let us know so we can schedule the patient accordingly. FYI. Call [facility] back if further clarification is needed.      Action Taken: Other: cardiology    Travel Screening: Not Applicable  Thank you!  [facility]

## 2023-11-17 NOTE — TELEPHONE ENCOUNTER
I tried calling Jed back at [facility], but it closes at 3:30. Will try again on Monday. Giovani HARRISON November 17, 2023, 4:02 PM

## 2023-11-30 NOTE — TELEPHONE ENCOUNTER
I called pt to see if he was able to get his sotalol from the [facility] yet. Pt's wife said she was told it's processing. He still has a 2 month supply left from his current refill. Giovani HARRISON November 30, 2023, 2:33 PM

## 2024-01-05 DIAGNOSIS — I50.22 CHRONIC SYSTOLIC HEART FAILURE (H): ICD-10-CM

## 2024-01-05 RX ORDER — SACUBITRIL AND VALSARTAN 97; 103 MG/1; MG/1
1 TABLET, FILM COATED ORAL 2 TIMES DAILY
Qty: 180 TABLET | Refills: 1 | Status: SHIPPED | OUTPATIENT
Start: 2024-01-05

## 2024-01-19 ENCOUNTER — TELEPHONE (OUTPATIENT)
Dept: CARDIOLOGY | Facility: CLINIC | Age: 89
End: 2024-01-19
Payer: MEDICARE

## 2024-01-19 DIAGNOSIS — I47.29 NSVT (NONSUSTAINED VENTRICULAR TACHYCARDIA) (H): Primary | ICD-10-CM

## 2024-01-19 NOTE — TELEPHONE ENCOUNTER
Alert received from pts  Shahid Assura CRT-D, implanted for primary prevention d/t cardiomyopathy, CHF and NSVT for ATP episode.     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 Battery has 1.3 years remaining, leads stable and WNL. AP: 22%, BiVP at 93%.     Tachy therapy: primary prevention  -[facility] Assura CRT-D (4/30/18-present): 1/18/24:1 successful ATP for VT. 1 aborted shock for VT.     Pt prescribed per med lists:   carvedilol (COREG) 12.5 MG tablet Take 1 tablet (12.5 mg) by mouth 2 times daily (with meals)     furosemide (LASIX) 20 MG tablet Take 20 mg by mouth daily       sacubitril-valsartan (ENTRESTO)  MG per tablet Take 1 tablet by mouth 2 times daily           sotalol (BETAPACE) 80 MG tablet Take 1 tablet (80 mg) by mouth 2 times daily   Pt reports he is taking: above medications as prescribed besides   Yesterday started 2 lasix = 40 mg. Daily dt increased lower leg edema.   Started melatonin 2 days ago  Last echo on 4/26/23 shows EF of 30-35%.      Pt reports he hasn't slept well for about 2m. His PCP had put him on melatonin 2 days prior to episode and feels like this may have caused anxiety d/t bad dreams. He tried to take it again on 1/17 and reports he couldn't lay flat or get a full breath and just couldn't lay there. He got up and tried to sleep in the recliner and same thing happened. He got scared to the point he went to the ER. Wed 1/17 at 3p and came home at 6a. Thursday am. He presented d/t SOB, not being able to take a full breath and felt like he was having an anxiety attack. He reports BP at that time was 157/107. When he got home at 6a was asleep by 7a. And slept hard. He does not recall symptoms at the time of the episode logged. He slept 5-6 hours and is feeling better. He has a follow up appointment with his PCP today.     ATP therapy and aborted shock episode on 1/18/24 at 0732 am. EMG shows AS/BiVP in 80s for 8s before PVC and then 5 sec run of VT in the  200s before 1 successful round of ATP to break rhythm.         Parameters:

## 2024-01-19 NOTE — TELEPHONE ENCOUNTER
Pt called back stating he wanted to add that he felt sick on 2 different occasions last week but can't remember when they were.   Reviewed and recommended he keep track of these episodes. Pt will contact primary MD if this persists.  Fabi HARRISON

## 2024-01-22 NOTE — TELEPHONE ENCOUNTER
Called  Dr. Reba Gilmore's office to confirm that pt had labs and EKG done and to request records. Clinic not open at this time, will call back during business hours. CHRISTY Aguilar

## 2024-01-22 NOTE — TELEPHONE ENCOUNTER
Called Reba Gilmore MD's office. LM with nurse line to call back to review what was done at in office appt. Spring<RN

## 2024-01-23 NOTE — TELEPHONE ENCOUNTER
Called Reba Martinez's office again and left another message for her nurse to fax over EKG and lab work. May have to bring pt in to our office to have these done since we have not heard back. CHRISTY Aguilar

## 2024-01-25 NOTE — TELEPHONE ENCOUNTER
Pt returning call. Pt wanted to let us know that he hasn't been sleeping more than 2 hours per night. He believes that this may be the cause of this episode. Spoke with wife as well and she has talked with Dr Mando Hernandez's nurse. She did receive request for information but did not have an EKG to send. Gave pt's wife Joanna our fax number for Mary to fax over any lab work. I will again call Dr. Gilmore's office for their fax number and fax over EKG orders. CHRISTY Aguilar

## 2024-01-25 NOTE — TELEPHONE ENCOUNTER
"EKG order and labs faxed to [phone] to Reba Gilmore's office. Returned call to pt. To let them know it may take a bit for it to make it to Dr. Gilmore's desk and to call before just \"showing up\" to do these tests, as they most likely want to put him on the schedule. Routed to device as well as [facility] should be faxing results to the device fax number. CHRISTY Aguilar  "

## 2024-01-25 NOTE — TELEPHONE ENCOUNTER
Called and spoke with pt and wife. Explained that Dr. Ayala would like him to have an EKG and Lab work done. I have called Reba Gilmore's office numerous times with out call back or obtaining results. Wife reports he had an EKG and labs done and they do not want to drive up to have these completed again. Wife will call Dr. Gilmore's office and retreive tests and results. Explained the importance of Dr. Ayala having this information. CHRISTY Aguilar

## 2024-01-26 ENCOUNTER — MEDICAL CORRESPONDENCE (OUTPATIENT)
Dept: HEALTH INFORMATION MANAGEMENT | Facility: CLINIC | Age: 89
End: 2024-01-26
Payer: MEDICARE

## 2024-01-26 ENCOUNTER — TRANSFERRED RECORDS (OUTPATIENT)
Dept: HEALTH INFORMATION MANAGEMENT | Facility: CLINIC | Age: 89
End: 2024-01-26
Payer: MEDICARE

## 2024-01-26 NOTE — TELEPHONE ENCOUNTER
Called Dr Gilmore's clinic and spoke with CHRISTY Mitchell.  Printed Dr Ayala's note and rewrite as an order on the bottom of these, sheet.  Faxed to secondary number.  Will check in this afternoon that order was received.    CHRISTY Salter

## 2024-01-26 NOTE — TELEPHONE ENCOUNTER
Pt's spouse called, gave two fax numbers for order as the last time she checked with the clinic it had not yet been received.  One fax number (which Previous nurse used) is [phone].  Secondary fax number of [phone] provided by patient if the original fax number does not work.  Will continue to assess, pt spouse aware.      CHRISTY Salter

## 2024-01-30 NOTE — TELEPHONE ENCOUNTER
Lab results noted. Will route update to  and device RN team. Giovani HARRISON January 30, 2024, 2:42 PM    Component      Latest Ref Rng 1/26/2024  1:27 PM   Sodium (External)      136 - 146 mmol/L 136 (E)   Potassium (External)      3.5 - 5.1 mmol/L 4.2 (E)   Chloride (External)      96 - 114 mmol/L 102 (E)   CO2 (External)      23 - 30 mmol/L 26 (E)   Anion Gap (External)      mmol/L 12.2 (E)   Creatinine (External)      0.70 - 1.30 mg/dL 0.79 (E)   Urea Nitrogen (External)      7.0 - 22.0 mg/dL 15.6 (E)   BUN/Creatinine Ratio (External)      ratio 19.75 (E)   Calcium (External)      8.5 - 10.5 mg/dL 8.6 (E)   Glucose (External)      70 - 105 mg/dL 92 (E)   GFR Estimated (External)      >=60 ml/min/1.73m2 >60 (E)   Protein Total (External)      6.4 - 8.3 g/dL 6.0 (L) (E)   Albumin (External)      3.5 - 5.0 g/dL 4.1 (E)   AST (External)      7 - 31 U/L 23 (E)   ALT (External)      5 - 30 U/L 11 (E)   Alk Phosphatase (External)      42 - 98 U/L 47 (E)   Bilirubin Total (External)      <=1.2 mg/dL 0.9 (E)      Legend:  (L) Low  (E) External lab result

## 2024-01-31 NOTE — TELEPHONE ENCOUNTER
Labs and kidney function look fine, but I don't have an ECG, or did I miss it? He seems to be having more CHF and orthopnea causing him not to sleep. He probably needs more diuresis. Have him keep track of weights and stay on the higher dose of lasix for a few more days and see if this helps. EE

## 2024-02-01 NOTE — TELEPHONE ENCOUNTER
I called pt to discuss his lab results. Pt said his breathing has improved, but he still has some LE edema. Pt going to see PCP tomorrow. I let him know  thinks his symptoms are likely from fluid  retention and  would like him to take an increased dose of lasix for a brief period of time. Pt has been taking lasix 20 mg daily most recently. He did take the 40 mg daily for 2-3 days a couple of weeks ago when he had the shortness of breath. I will clarify with  how long he wants pt to take 40 mg daily. Giovani HARRISON February 1, 2024, 3:16 PM

## 2024-02-01 NOTE — TELEPHONE ENCOUNTER
I called pt back to discuss 's review of ECG results. I also clarified with  pt's furosemide dosing. He wants pt to take lasix 40 mg daily for the next 3 days. Pt does keep track of his morning weight daily. His weight has been stable per pt's wife but he will continue to monitor. Pt to call with an update next week, or if he develops worsening LE edema, shortness of breath, or weight gain of 2-3# in a day. Device clinic will continue to monitor for arrhythmias. Pt's next device check is 2/19/24. Giovani HARRISON February 1, 2024, 4:20 PM

## 2024-02-01 NOTE — TELEPHONE ENCOUNTER
ECG reviewed. SR with Ventricular pacing. QTc is a little long at 548 ms, but VT did not look polymorphic on EGM, so I doubt that it was related to sotalol. Keep sotalol at the same dose, treat HF more aggressively and continue to monitor for ventricular arrhythmias. EE

## 2024-02-02 NOTE — TELEPHONE ENCOUNTER
Alert received from patients ICD for ATP episode. Recent ATP episode 1/18. We are monitoring for further episodes.     Spoke with pt and wife. He does not recall symptoms. He does have a follow up this afternoon with his PCP and will also mention this episode.       Episode logged on 2/1/24 at 2030p    EGM shows AP/BiVP rhythm in the 60-80s with PVCs every 3-4 beats before run of VF in the 210-220s, lasting 6 seconds before successful round of ATP delivered to convert arrhythmia into AS/VS and AS/BiVP in the 70s. Shock aborted with successful ATP given.            WIll update Dr. Ayala and team. CHRISTY Aguilar

## 2024-02-02 NOTE — TELEPHONE ENCOUNTER
Update sent to Dr. Ayala. He recommends EP input at this time to determine wether to lower dose or stop sotolol.     EKG obtained after ATP for VT/VF episode on 1/18 (however, pt did not have EKG until, and we did not receive, until 1/26). See cardiology tab and 1/26/24 EKG scan for tracing. See episode below on 1/19.     Per Dr. Ayala at that time:    ECG reviewed. SR with Ventricular pacing. QTc is a little long at 548 ms, but VT did not look polymorphic on EGM, so I doubt that it was related to sotalol. Keep sotalol at the same dose, treat HF more aggressively and continue to monitor for ventricular arrhythmias. EE <<<pt to take lasix 40 mg daily for the next 3 days. Pt does keep track of his morning weight daily. His weight has been stable per pt's wife but he will continue to monitor. Pt to call with an update next week, or if he develops worsening LE edema, shortness of breath, or weight gain of 2-3# in a day.      See also recent labs. Per Dr. Ayala:   Labs and kidney function look fine       Pt had another VT/VF episode 2/1/24 with ATP successfully breaking rhythm. See episode below. Pt was asymptomatic with both episodes. Does report LLE.     Currently on and confirmed with pt on 1/19/24:     sotalol (BETAPACE) 80 MG tablet Take 1 tablet (80 mg) by mouth 2 times daily     See other meds reviewed below on 1/19/24.   Will route to EP MD of the day for input. CHRISTY Aguilar

## 2024-02-06 NOTE — TELEPHONE ENCOUNTER
[facility] [place]     YD  QT does not look significantly prolonged on EKG from 1/26 and agree the VT looks monomorphic so probably not due to sotalol/QT prolongation.  I would stay the course for now since he appears to be fluid overloaded and they are diuresing him.  If he continues to have VT/VF after diuresis, we may consider increasing the sotalol.  We can see him in clinic if the patient is agreeable.

## 2024-02-06 NOTE — TELEPHONE ENCOUNTER
I left message for pt to call back with an update on his weight and symptoms after increasing the furosemide x 3 days. Giovani HARRISON February 6, 2024, 11:15 AM

## 2024-02-08 NOTE — TELEPHONE ENCOUNTER
Pt called back with an update on his weight and symptoms. He increased the furosemide to 40 mg daily x 3 days as instructed by  last Friday. Pt said his weight trended down from 157# to 152# over those three days. He said his weight yesterday was also 152#. Pt did not weigh himself this morning, but said it seems like his lower legs have increased swelling today. He also had a brief episode of shortness of breath last night, but feels fine today. I told pt to make sure he is weighing himself every morning. Pt said he saw his PCP on Friday and he agreed with the increased furosemide and has pt getting a bmp lab in 2 weeks.  reviewed update on most recent ATP episodes and thinks it is likely from fluid overload, but said if they keep occurring pt will need to possibly increase sotalol and come for EP visit.    Pt would like to know if he can take 2-3 more days of the furosemide 40 mg daily as he felt better after doing so this past weekend.  is out of clinic until Monday 2/12. I told pt to weigh himself tomorrow morning. I will see if Oneyda Matt would be ok with him doing the increased furosemide for 2-3 more days over the weekend if weight trends up or symptoms worsen. Pt's bmp on 1/26/24 was stable. ESletteboe RN February 8, 2024, 12:27 PM    Component      Latest Ref Rng 1/26/2024  1:27 PM   Sodium (External)      136 - 146 mmol/L 136 (E)   Potassium (External)      3.5 - 5.1 mmol/L 4.2 (E)   Chloride (External)      96 - 114 mmol/L 102 (E)   CO2 (External)      23 - 30 mmol/L 26 (E)   Anion Gap (External)      mmol/L 12.2 (E)   Creatinine (External)      0.70 - 1.30 mg/dL 0.79 (E)   Urea Nitrogen (External)      7.0 - 22.0 mg/dL 15.6 (E)   BUN/Creatinine Ratio (External)      ratio 19.75 (E)   Calcium (External)      8.5 - 10.5 mg/dL 8.6 (E)   Glucose (External)      70 - 105 mg/dL 92 (E)   GFR Estimated (External)      >=60 ml/min/1.73m2 >60 (E)      Legend:  (E) External lab result

## 2024-02-08 NOTE — TELEPHONE ENCOUNTER
Notes reviewed, pt at baseline has normal renal function and mid range K.  Okay to continue Lasix 40 mg through the weekend.  Increase p.o. potassium intake.  Repeat BMP in the next 1 to 2 weeks..

## 2024-02-09 NOTE — TELEPHONE ENCOUNTER
I called pt to get an update on his weight and symptoms. Pt's wife answered. She said pt doesn't want to talk this morning because he had insomnia last night and is trying to get some rest this morning. Pt had not weighed himself yet. He did get up to weight while I was on the phone with his wife and said he is 156.8#. It was not clear whether this was after he had already eaten breakfast and with clothes on. I told pt's wife that Oneyda Matt said it's ok for him to take furosemide 40 mg daily through the weekend. Pt to eat some higher K food to help keep K stable with the higher dose of furosemide. I told pt's wife to remind him to weight first thing in the morning over the weekend so we can get accurate weights and make sure the furosemide is helping. I will call pt on Monday for an update on his weight and symptoms. Pt's wife confirmed he does have a bmp scheduled at PCP office on 2/23. I told her it may need to be moved up,depending on how he responds to the higher dose of furosemide over the weekend. Giovani HARRISON February 9, 2024, 11:03 AM

## 2024-02-12 NOTE — TELEPHONE ENCOUNTER
I called pt for an update on his weight and symptoms. Pt said he took the increased dose of furosemide 40 mg daily 2/9, 2/10,2/11. He took 20 mg this morning. Pt said his leg swelling has improved, but he still has a small amount. Pt still has episodes of occasional shortness of breath, and most recently had an episode when he got out of bed this morning. Pt's most recent weights are listed below.    2/9-156.4#  2/.5# (pt age a large amount for dinner than he typically would)  2/.0#  2/.4#    Pt currently has bmp lab  per PCP on 2/23. I will route  and update on pt's weight and symptoms, and call pt back with recommendations. Giovani HARRISON February 12, 2024, 11:53 AM

## 2024-02-14 ENCOUNTER — TRANSFERRED RECORDS (OUTPATIENT)
Dept: HEALTH INFORMATION MANAGEMENT | Facility: CLINIC | Age: 89
End: 2024-02-14
Payer: MEDICARE

## 2024-02-14 NOTE — TELEPHONE ENCOUNTER
I would recommend that he take lasix 40 mg on Monday, Wed, and Friday. 20 mg on all other days. If his weight is over 155, he should take an extra lasix 20 mg that day. OK to do labs on 2/23 as ordered. Reviewed Dr. Acosta's recommendations and I agree. We will stay the course with sotalol 80 mg bid for now. EE

## 2024-02-14 NOTE — TELEPHONE ENCOUNTER
reviewed update on pt earlier today and said he does recommend an echo, and that pt try the new furosemide dosing instructions to see if he notices improvement in his symptoms.       Pt did end up seeing Kelly Funez PA-C today and she called with an update. Her full note is in care everywhere. She thought pt's LE edema looked improved today, but is still 1+. Pt's anxiety and dementia symptoms have improved with trazodone and ativan. He did have some ongoing shortness of breath this morning, but no clear cause found based on his lab and chest xray results. She did order an echocardiogram and it will be done on 2/16/24. The results will be available in CARE EVERYWHERE. Will watch for results and send an update to . Will call pt to get an update on his symptoms later this week. Pt is willing to come to the [facility] for an earlier appt if needed. She said the only difference today is that pt was paced on his EKG where in the past at their clinic he was not pacing. Pt has a remote device check on 2/19. Will route an update to device. Giovani HARRISON February 14, 2024, 4:29 PM

## 2024-02-14 NOTE — TELEPHONE ENCOUNTER
I called pt to review recommendations from . I spoke to both pt and his wife. Pt said his weight today is 151.5#. His LE edema has improved, but he still has episodes of shortness of breath, especially at night. Pt will take the additional furosemide today (40 mg today) to see if that helps. I told him he has any worsening shortness of breath that doesn't improve with rest he should go to the ED. I also recommended pt come in for sooner follow up. He lives a few hours away and would prefer not to come to [place]. He is going to see if he can get in sooner with his PCP. He does have PCP visit and bmp lab on 2/23. I reviewed furosemide dosing instructions with pt and his wife.Pt will see if he feels better tomorrow after taking an increased dose of furosemide today. I will update  to see if he wants pt to have echo with his next visit. Giovani HARRISON February 14, 2024, 10:58 AM

## 2024-02-15 ENCOUNTER — ANCILLARY PROCEDURE (OUTPATIENT)
Dept: CARDIOLOGY | Facility: CLINIC | Age: 89
End: 2024-02-15
Attending: INTERNAL MEDICINE
Payer: MEDICARE

## 2024-02-15 DIAGNOSIS — I42.9 PRIMARY CARDIOMYOPATHY (H): ICD-10-CM

## 2024-02-15 DIAGNOSIS — Z95.810 ICD (IMPLANTABLE CARDIOVERTER-DEFIBRILLATOR) IN PLACE: ICD-10-CM

## 2024-02-15 PROCEDURE — 93296 REM INTERROG EVL PM/IDS: CPT | Performed by: INTERNAL MEDICINE

## 2024-02-15 PROCEDURE — 93295 DEV INTERROG REMOTE 1/2/MLT: CPT | Performed by: INTERNAL MEDICINE

## 2024-02-15 NOTE — TELEPHONE ENCOUNTER
Spoke with patient and had him send a manual transmission. Will complete his remote today as it has been >90 days.  CB HARRISON

## 2024-02-16 LAB
MDC_IDC_EPISODE_DTM: NORMAL
MDC_IDC_EPISODE_DURATION: 12 S
MDC_IDC_EPISODE_DURATION: 4 S
MDC_IDC_EPISODE_DURATION: 8 S
MDC_IDC_EPISODE_ID: NORMAL
MDC_IDC_EPISODE_THERAPY_RESULT: NORMAL
MDC_IDC_EPISODE_TYPE: NORMAL
MDC_IDC_EPISODE_VENDOR_TYPE: NORMAL
MDC_IDC_LEAD_CONNECTION_STATUS: NORMAL
MDC_IDC_LEAD_IMPLANT_DT: NORMAL
MDC_IDC_LEAD_LOCATION: NORMAL
MDC_IDC_LEAD_LOCATION_DETAIL_1: NORMAL
MDC_IDC_LEAD_MFG: NORMAL
MDC_IDC_LEAD_MODEL: NORMAL
MDC_IDC_LEAD_POLARITY_TYPE: NORMAL
MDC_IDC_LEAD_POLARITY_TYPE: NORMAL
MDC_IDC_LEAD_SERIAL: NORMAL
MDC_IDC_LEAD_SPECIAL_FUNCTION: NORMAL
MDC_IDC_MSMT_BATTERY_DTM: NORMAL
MDC_IDC_MSMT_BATTERY_REMAINING_LONGEVITY: 16 MO
MDC_IDC_MSMT_BATTERY_REMAINING_PERCENTAGE: 23 %
MDC_IDC_MSMT_BATTERY_RRT_TRIGGER: NORMAL
MDC_IDC_MSMT_BATTERY_STATUS: NORMAL
MDC_IDC_MSMT_BATTERY_VOLTAGE: 2.8 V
MDC_IDC_MSMT_CAP_CHARGE_DTM: NORMAL
MDC_IDC_MSMT_CAP_CHARGE_ENERGY: 40 J
MDC_IDC_MSMT_CAP_CHARGE_TIME: 9.5 S
MDC_IDC_MSMT_CAP_CHARGE_TYPE: NORMAL
MDC_IDC_MSMT_LEADCHNL_LV_IMPEDANCE_VALUE: 390 OHM
MDC_IDC_MSMT_LEADCHNL_LV_LEAD_CHANNEL_STATUS: NORMAL
MDC_IDC_MSMT_LEADCHNL_LV_PACING_THRESHOLD_AMPLITUDE: 1 V
MDC_IDC_MSMT_LEADCHNL_LV_PACING_THRESHOLD_PULSEWIDTH: 0.5 MS
MDC_IDC_MSMT_LEADCHNL_RA_IMPEDANCE_VALUE: 440 OHM
MDC_IDC_MSMT_LEADCHNL_RA_LEAD_CHANNEL_STATUS: NORMAL
MDC_IDC_MSMT_LEADCHNL_RA_PACING_THRESHOLD_AMPLITUDE: 0.75 V
MDC_IDC_MSMT_LEADCHNL_RA_PACING_THRESHOLD_PULSEWIDTH: 0.5 MS
MDC_IDC_MSMT_LEADCHNL_RA_SENSING_INTR_AMPL: 1.6 MV
MDC_IDC_MSMT_LEADCHNL_RV_IMPEDANCE_VALUE: 440 OHM
MDC_IDC_MSMT_LEADCHNL_RV_LEAD_CHANNEL_STATUS: NORMAL
MDC_IDC_MSMT_LEADCHNL_RV_PACING_THRESHOLD_AMPLITUDE: 1.75 V
MDC_IDC_MSMT_LEADCHNL_RV_PACING_THRESHOLD_PULSEWIDTH: 0.5 MS
MDC_IDC_MSMT_LEADCHNL_RV_SENSING_INTR_AMPL: 12 MV
MDC_IDC_PG_IMPLANT_DTM: NORMAL
MDC_IDC_PG_MFG: NORMAL
MDC_IDC_PG_MODEL: NORMAL
MDC_IDC_PG_SERIAL: NORMAL
MDC_IDC_PG_TYPE: NORMAL
MDC_IDC_SESS_CLINIC_NAME: NORMAL
MDC_IDC_SESS_DTM: NORMAL
MDC_IDC_SESS_REPROGRAMMED: NO
MDC_IDC_SESS_TYPE: NORMAL
MDC_IDC_SET_BRADY_AT_MODE_SWITCH_MODE: NORMAL
MDC_IDC_SET_BRADY_AT_MODE_SWITCH_RATE: 170 {BEATS}/MIN
MDC_IDC_SET_BRADY_LOWRATE: 60 {BEATS}/MIN
MDC_IDC_SET_BRADY_MAX_SENSOR_RATE: 120 {BEATS}/MIN
MDC_IDC_SET_BRADY_MAX_TRACKING_RATE: 120 {BEATS}/MIN
MDC_IDC_SET_BRADY_MODE: NORMAL
MDC_IDC_SET_BRADY_PAV_DELAY_HIGH: 100 MS
MDC_IDC_SET_BRADY_PAV_DELAY_LOW: 150 MS
MDC_IDC_SET_BRADY_SAV_DELAY_HIGH: 100 MS
MDC_IDC_SET_BRADY_SAV_DELAY_LOW: 120 MS
MDC_IDC_SET_CRT_LVRV_DELAY: 0 MS
MDC_IDC_SET_CRT_PACED_CHAMBERS: NORMAL
MDC_IDC_SET_LEADCHNL_LV_PACING_AMPLITUDE: 2 V
MDC_IDC_SET_LEADCHNL_LV_PACING_ANODE_ELECTRODE_1: NORMAL
MDC_IDC_SET_LEADCHNL_LV_PACING_ANODE_LOCATION_1: NORMAL
MDC_IDC_SET_LEADCHNL_LV_PACING_CAPTURE_MODE: NORMAL
MDC_IDC_SET_LEADCHNL_LV_PACING_CATHODE_ELECTRODE_1: NORMAL
MDC_IDC_SET_LEADCHNL_LV_PACING_CATHODE_LOCATION_1: NORMAL
MDC_IDC_SET_LEADCHNL_LV_PACING_POLARITY: NORMAL
MDC_IDC_SET_LEADCHNL_LV_PACING_PULSEWIDTH: 0.5 MS
MDC_IDC_SET_LEADCHNL_RA_PACING_AMPLITUDE: 1.75 V
MDC_IDC_SET_LEADCHNL_RA_PACING_ANODE_ELECTRODE_1: NORMAL
MDC_IDC_SET_LEADCHNL_RA_PACING_ANODE_LOCATION_1: NORMAL
MDC_IDC_SET_LEADCHNL_RA_PACING_CAPTURE_MODE: NORMAL
MDC_IDC_SET_LEADCHNL_RA_PACING_CATHODE_ELECTRODE_1: NORMAL
MDC_IDC_SET_LEADCHNL_RA_PACING_CATHODE_LOCATION_1: NORMAL
MDC_IDC_SET_LEADCHNL_RA_PACING_POLARITY: NORMAL
MDC_IDC_SET_LEADCHNL_RA_PACING_PULSEWIDTH: 0.5 MS
MDC_IDC_SET_LEADCHNL_RA_SENSING_ADAPTATION_MODE: NORMAL
MDC_IDC_SET_LEADCHNL_RA_SENSING_ANODE_ELECTRODE_1: NORMAL
MDC_IDC_SET_LEADCHNL_RA_SENSING_ANODE_LOCATION_1: NORMAL
MDC_IDC_SET_LEADCHNL_RA_SENSING_CATHODE_ELECTRODE_1: NORMAL
MDC_IDC_SET_LEADCHNL_RA_SENSING_CATHODE_LOCATION_1: NORMAL
MDC_IDC_SET_LEADCHNL_RA_SENSING_POLARITY: NORMAL
MDC_IDC_SET_LEADCHNL_RA_SENSING_SENSITIVITY: 0.3 MV
MDC_IDC_SET_LEADCHNL_RV_PACING_AMPLITUDE: 2.75 V
MDC_IDC_SET_LEADCHNL_RV_PACING_ANODE_ELECTRODE_1: NORMAL
MDC_IDC_SET_LEADCHNL_RV_PACING_ANODE_LOCATION_1: NORMAL
MDC_IDC_SET_LEADCHNL_RV_PACING_CAPTURE_MODE: NORMAL
MDC_IDC_SET_LEADCHNL_RV_PACING_CATHODE_ELECTRODE_1: NORMAL
MDC_IDC_SET_LEADCHNL_RV_PACING_CATHODE_LOCATION_1: NORMAL
MDC_IDC_SET_LEADCHNL_RV_PACING_POLARITY: NORMAL
MDC_IDC_SET_LEADCHNL_RV_PACING_PULSEWIDTH: 0.5 MS
MDC_IDC_SET_LEADCHNL_RV_SENSING_ADAPTATION_MODE: NORMAL
MDC_IDC_SET_LEADCHNL_RV_SENSING_ANODE_ELECTRODE_1: NORMAL
MDC_IDC_SET_LEADCHNL_RV_SENSING_ANODE_LOCATION_1: NORMAL
MDC_IDC_SET_LEADCHNL_RV_SENSING_CATHODE_ELECTRODE_1: NORMAL
MDC_IDC_SET_LEADCHNL_RV_SENSING_CATHODE_LOCATION_1: NORMAL
MDC_IDC_SET_LEADCHNL_RV_SENSING_POLARITY: NORMAL
MDC_IDC_SET_LEADCHNL_RV_SENSING_SENSITIVITY: 0.5 MV
MDC_IDC_SET_ZONE_DETECTION_INTERVAL: 300 MS
MDC_IDC_SET_ZONE_DETECTION_INTERVAL: 350 MS
MDC_IDC_SET_ZONE_STATUS: NORMAL
MDC_IDC_SET_ZONE_TYPE: NORMAL
MDC_IDC_SET_ZONE_VENDOR_TYPE: NORMAL
MDC_IDC_STAT_AT_BURDEN_PERCENT: 0 %
MDC_IDC_STAT_AT_DTM_END: NORMAL
MDC_IDC_STAT_AT_DTM_START: NORMAL
MDC_IDC_STAT_AT_MODE_SW_COUNT: 3
MDC_IDC_STAT_AT_MODE_SW_COUNT_PER_DAY: 0
MDC_IDC_STAT_AT_MODE_SW_MAX_DURATION: 12 S
MDC_IDC_STAT_AT_MODE_SW_PERCENT_TIME: 1 %
MDC_IDC_STAT_BRADY_AP_VP_PERCENT: 24 %
MDC_IDC_STAT_BRADY_AP_VS_PERCENT: 1 %
MDC_IDC_STAT_BRADY_AS_VP_PERCENT: 68 %
MDC_IDC_STAT_BRADY_AS_VS_PERCENT: 3.1 %
MDC_IDC_STAT_BRADY_DTM_END: NORMAL
MDC_IDC_STAT_BRADY_DTM_START: NORMAL
MDC_IDC_STAT_BRADY_RA_PERCENT_PACED: 20 %
MDC_IDC_STAT_CRT_DTM_END: NORMAL
MDC_IDC_STAT_CRT_DTM_START: NORMAL
MDC_IDC_STAT_CRT_PERCENT_PACED: 92 %
MDC_IDC_STAT_EPISODE_RECENT_COUNT: 2
MDC_IDC_STAT_EPISODE_RECENT_COUNT_DTM_END: NORMAL
MDC_IDC_STAT_EPISODE_RECENT_COUNT_DTM_START: NORMAL
MDC_IDC_STAT_EPISODE_TYPE: NORMAL
MDC_IDC_STAT_EPISODE_VENDOR_TYPE: NORMAL
MDC_IDC_STAT_HEART_RATE_ATRIAL_MAX: 330 {BEATS}/MIN
MDC_IDC_STAT_HEART_RATE_ATRIAL_MEAN: 77 {BEATS}/MIN
MDC_IDC_STAT_HEART_RATE_ATRIAL_MIN: 50 {BEATS}/MIN
MDC_IDC_STAT_HEART_RATE_DTM_END: NORMAL
MDC_IDC_STAT_HEART_RATE_DTM_START: NORMAL
MDC_IDC_STAT_HEART_RATE_VENTRICULAR_MAX: 330 {BEATS}/MIN
MDC_IDC_STAT_HEART_RATE_VENTRICULAR_MEAN: 77 {BEATS}/MIN
MDC_IDC_STAT_HEART_RATE_VENTRICULAR_MIN: 40 {BEATS}/MIN
MDC_IDC_STAT_TACHYTHERAPY_ATP_DELIVERED_RECENT: 2
MDC_IDC_STAT_TACHYTHERAPY_RECENT_DTM_END: NORMAL
MDC_IDC_STAT_TACHYTHERAPY_RECENT_DTM_START: NORMAL
MDC_IDC_STAT_TACHYTHERAPY_SHOCKS_ABORTED_RECENT: 2
MDC_IDC_STAT_TACHYTHERAPY_SHOCKS_DELIVERED_RECENT: 0

## 2024-02-19 ENCOUNTER — TRANSFERRED RECORDS (OUTPATIENT)
Dept: HEALTH INFORMATION MANAGEMENT | Facility: CLINIC | Age: 89
End: 2024-02-19

## 2024-02-19 ENCOUNTER — TELEPHONE (OUTPATIENT)
Dept: CARDIOLOGY | Facility: CLINIC | Age: 89
End: 2024-02-19

## 2024-02-19 NOTE — TELEPHONE ENCOUNTER
I called back Paula at [facility]. She said pt had his echo done and PCP wants  to be sent results to review. I told her I am unable to see the echo report in care everywhere so she will fax over the results to our nurse fax. Giovani HARRISON February 19, 2024, 10:28 AM

## 2024-02-19 NOTE — TELEPHONE ENCOUNTER
[facility] Call Center    Phone Message    May a detailed message be left on voicemail: no     Reason for Call: Requesting Results     Name/type of test: Echo   Date of test:   Was test done at a location other than [facility]?: Yes: Echo done at [facility]. Results on care everywhere. Minimal changes per Paula     Action Taken: Other: cardiology     Travel Screening: Not Applicable    Thank you!  [facility]

## 2024-02-19 NOTE — TELEPHONE ENCOUNTER
Received an ECG tracing from PCP office visit, but did not receive the echo report. I called PCP clinic back and requested that echo results be faxed as I am unable to see them in CARE EVERYWHERE. Giovani HARRISON February 19, 2024, 2:28 PM

## 2024-02-20 NOTE — TELEPHONE ENCOUNTER
Received copy of echo report from [facility] (see note by CHRISTY Dubose with copy of report). Call out to pt for update on weight/symptoms.    Weights:    2/20: 147  2/19: 149  2/18: 151.5  2/17: 150  2/16: 151    Symptoms:    Swelling decreased 95% from where he started per pt. No c/o shortness of breath or other HF symptoms. Pt denies any syncope or shocks from ICD.      Diuretic plan:    Lasix 40 mg on Monday, Wed, and Friday. 20 mg on all other days. If his weight is over 155, he should take an extra lasix 20 mg that day.     Pt will continue on same Lasix plan for now. Update to Dr. Ayala. Jayne Kelly RN on 2/20/2024 at 4:25 PM

## 2024-02-20 NOTE — TELEPHONE ENCOUNTER
I left message for pt to call back to discuss update on his symptoms and echo results. Will route an update to  once I hear back from pt. Giovani HARRISON February 20, 2024, 10:15 AM

## 2024-02-21 NOTE — TELEPHONE ENCOUNTER
Received 2/21 bmp results. Will add to previous update sent to  regarding pt's echo results and weights.

## 2024-02-23 NOTE — TELEPHONE ENCOUNTER
"I clarified with  whether he would like the echo to be arranged at this time or if pt can have on the same day as his visit in April since he lives several hours away. Also asked if I should request the echo CD images.    Per , \"April same day would be fine. We need some time to see if our current treatment plan works. If the CD can be sent, that would be helpful, then we might skip the TTE and go straight to [facility].\"    Will call PCP clinic[facility] on Monday to see if they are able to mail the CD images of his recent echo. Giovani HARRISON February 23, 2024, 4:57 PM      "

## 2024-02-23 NOTE — TELEPHONE ENCOUNTER
I called pt to let him know  reviewed labs and weight update. BMP stable so pt can continue on current diuretic program (currently taking furosemide 40 mg M,W,fri and 20 mg rest of the week.    Pt said his weight is down to 145# as of this morning. He doesn't have any swelling or shortness of breath. I reviewed update on echo results with pt. Pt concerned about the moderate, possibly severe MR. He wants to know if  has any other thoughts on his echo results at this time. Pt aware to call for any worsening shortness of breath or swelling prior to his visit in April. I will confirm with  whether he reviewed the echo results and call pt back. Giovani HARRISON February 23, 2024, 2:31 PM

## 2024-02-23 NOTE — TELEPHONE ENCOUNTER
"I reviewed the echo report from \"[facility]\" done on 2/19/24. LV function appears to be the same. The MV was reported to be \"normal, with moderate MR, possibly severe\" which is unclear to me. His previous echo here in April 2023 demonstrated mild MR. I don't have the images to review, so I cannot confirm or quantitate the severity of MR. This change in MV function could be a reason for worsening heart failure. I would recommend an echo here and possibly a LIZ, then discussion about catheter based options to fix the MV if it is confirmed to be severe MR. Continue current medical therapy until then. EE  "

## 2024-02-26 NOTE — TELEPHONE ENCOUNTER
I called PCP office to request echo CD images and was transferred to [facility] radiology dept [facility]. Echo images will be mailed out tomorrow and should arrive within the next 5-7 days. She will put attn:  and Gracy.     I called pt with an update on 's response to his echo results. I told him  would like to review the echo images himself so he can better quantify the severity of pt's MR. Pt to call if he has any worsening shortness of breath, LE edema, or weight gain. Pt will continue taking furosemide 40 mg M,W,Fri and 20 mg rest of the week. Pt said his weight has been stable around 145# the past 4-5 days and his ankles look great. Pt doesn't have any noticeable shortness of breath. He has been feeling better for the past week or so. I told him  will make further recommendations once he has been able to review the echo images. Giovani HARRISON February 26, 2024, 1:16 PM

## 2024-02-27 NOTE — TELEPHONE ENCOUNTER
Received echo CD images from [facility]. I updated  and placed on his desk for review. Giovani HARRISON February 27, 2024, 2:23 PM

## 2024-03-15 ENCOUNTER — TELEPHONE (OUTPATIENT)
Dept: CARDIOLOGY | Facility: CLINIC | Age: 89
End: 2024-03-15
Payer: MEDICARE

## 2024-03-15 NOTE — TELEPHONE ENCOUNTER
----- Message from Ivette Bertrand RN sent at 3/15/2024 10:30 AM CDT -----  Regarding: FW: Patient call    ----- Message -----  From: Shady Acosta MD  Sent: 3/15/2024   8:11 AM CDT  To: [facility] Heart Ep Nurse  Subject: Patient call                                     This patient called last night after waking up with a fast heart rate and wanted to see if anything showed up on his device.  He does not recall the exact time but said it occurred within 1 hour before I spoke with him on the phone at 2:30 AM.    He follows with Dr. Ayala and is being evaluated for possible valve surgery so he wanted to make sure he got the information to him in case it would affect his plans for surgery.

## 2024-03-15 NOTE — TELEPHONE ENCOUNTER
"Called patient to ask him to send a manual transmission from his remote monitor.  NO answer. Attempted to leave a message on his home line.. sounded as though someone answered mid-message and then went dead. Tried to call his mobile number, no answer and no voicemail set up. Tried his home line again, kept ringing until eventually said \"this number is no longer in service.\"    Hopefully patient will call the device clinic today regarding the episode he experienced last night. If not, will have to try to reach him later.    CB RN  "

## 2024-03-18 NOTE — TELEPHONE ENCOUNTER
Spoke with patient and had him send a transmission.  No episode logged at the time patient complained of symptoms.   There was one NSVT episode logged on 3/13 at 0107. EGM shows 6s NSVT, rates 170s. Patient confirmed this was not the night he had symptoms.     He was surprised to hear that he did not have any episodes logged on the night he had symptoms. He states it felt like his heart was racing and it really scared him. His V tachy detection zone is set to 170, so it is possible that he had a faster rhythm that fell below this rate.     Patient was grateful for the call.  CB HARRISON

## 2024-03-18 NOTE — TELEPHONE ENCOUNTER
Pt left message on our team phone returning a call from the device RN team. Will send them an update. Giovani HARRISON March 18, 2024, 3:05 PM

## 2024-04-25 ENCOUNTER — OFFICE VISIT (OUTPATIENT)
Dept: CARDIOLOGY | Facility: CLINIC | Age: 89
End: 2024-04-25
Payer: MEDICARE

## 2024-04-25 ENCOUNTER — LAB (OUTPATIENT)
Dept: LAB | Facility: CLINIC | Age: 89
End: 2024-04-25
Payer: MEDICARE

## 2024-04-25 VITALS
HEART RATE: 61 BPM | OXYGEN SATURATION: 96 % | HEIGHT: 67 IN | BODY MASS INDEX: 23.65 KG/M2 | SYSTOLIC BLOOD PRESSURE: 120 MMHG | DIASTOLIC BLOOD PRESSURE: 64 MMHG | WEIGHT: 150.7 LBS

## 2024-04-25 DIAGNOSIS — E78.00 HYPERCHOLESTEROLEMIA: ICD-10-CM

## 2024-04-25 DIAGNOSIS — I50.22 CHRONIC SYSTOLIC HEART FAILURE (H): ICD-10-CM

## 2024-04-25 DIAGNOSIS — Z95.810 ICD (IMPLANTABLE CARDIOVERTER-DEFIBRILLATOR) IN PLACE: ICD-10-CM

## 2024-04-25 DIAGNOSIS — I05.9 MITRAL VALVE DISEASE: ICD-10-CM

## 2024-04-25 DIAGNOSIS — I10 ESSENTIAL HYPERTENSION WITH GOAL BLOOD PRESSURE LESS THAN 140/90: ICD-10-CM

## 2024-04-25 DIAGNOSIS — I42.9 PRIMARY CARDIOMYOPATHY (H): Primary | ICD-10-CM

## 2024-04-25 LAB
ALT SERPL W P-5'-P-CCNC: 13 U/L (ref 0–70)
ANION GAP SERPL CALCULATED.3IONS-SCNC: 9 MMOL/L (ref 7–15)
BUN SERPL-MCNC: 17.9 MG/DL (ref 8–23)
CALCIUM SERPL-MCNC: 8.6 MG/DL (ref 8.8–10.2)
CHLORIDE SERPL-SCNC: 104 MMOL/L (ref 98–107)
CREAT SERPL-MCNC: 0.91 MG/DL (ref 0.67–1.17)
DEPRECATED HCO3 PLAS-SCNC: 27 MMOL/L (ref 22–29)
EGFRCR SERPLBLD CKD-EPI 2021: 82 ML/MIN/1.73M2
GLUCOSE SERPL-MCNC: 100 MG/DL (ref 70–99)
HGB BLD-MCNC: 12.5 G/DL (ref 13.3–17.7)
POTASSIUM SERPL-SCNC: 4.2 MMOL/L (ref 3.4–5.3)
SODIUM SERPL-SCNC: 140 MMOL/L (ref 135–145)

## 2024-04-25 PROCEDURE — 99215 OFFICE O/P EST HI 40 MIN: CPT | Performed by: INTERNAL MEDICINE

## 2024-04-25 PROCEDURE — 80048 BASIC METABOLIC PNL TOTAL CA: CPT | Performed by: INTERNAL MEDICINE

## 2024-04-25 PROCEDURE — 36415 COLL VENOUS BLD VENIPUNCTURE: CPT | Performed by: INTERNAL MEDICINE

## 2024-04-25 PROCEDURE — 80061 LIPID PANEL: CPT | Performed by: INTERNAL MEDICINE

## 2024-04-25 PROCEDURE — 84460 ALANINE AMINO (ALT) (SGPT): CPT | Performed by: INTERNAL MEDICINE

## 2024-04-25 PROCEDURE — G2211 COMPLEX E/M VISIT ADD ON: HCPCS | Performed by: INTERNAL MEDICINE

## 2024-04-25 PROCEDURE — 85018 HEMOGLOBIN: CPT | Performed by: INTERNAL MEDICINE

## 2024-04-25 RX ORDER — POTASSIUM CHLORIDE 750 MG/1
10 TABLET, EXTENDED RELEASE ORAL DAILY
COMMUNITY
End: 2024-07-29

## 2024-04-25 RX ORDER — LORAZEPAM 0.5 MG/1
0.5 TABLET ORAL
COMMUNITY

## 2024-04-25 RX ORDER — TRAZODONE HYDROCHLORIDE 50 MG/1
50 TABLET, FILM COATED ORAL AT BEDTIME
COMMUNITY

## 2024-04-25 RX ORDER — CARBOXYMETHYLCELLULOSE SODIUM 10 MG/ML
1 GEL OPHTHALMIC 3 TIMES DAILY
COMMUNITY

## 2024-04-25 NOTE — PROGRESS NOTES
General Cardiology Clinic Progress Note  Katie Shannon MRN# [id number]   YOB: 1935 Age: 89 year old       Reason for visit: Chronic systolic heart failure due to idiopathic cardiomyopathy    History of presenting illness:    I had the opportunity to see Katie Shannon at [facility] today for chronic systolic heart failure.      He has a longstanding nonischemic cardiomyopathy with mild to moderate diffuse nonocclusive coronary artery disease and ejection fraction of 30 to 35%.  He has a biventricular ICD in place with episodes of ventricular tachycardia.  He has been treated with sotalol for prevention of ventricular arrhythmias.    Usually he had been very active, doing work around the property, but a couple of months ago he developed worsening shortness of breath.  In fact he seemed to develop rather rapid onset of shortness of breath with orthopnea.  During that time, whenever he laid down or even sat back in his recliner, he became very short of breath and had to stand up in order to breathe.  He thought he was having panic attacks.  I believe he was having episodes of pulmonary edema.  He had an echocardiogram done near his home in [place], in [facility], and read by [employer] in [place], which I reviewed.  He has stable ejection fraction of about 30% with more mitral regurgitation that had been seen previously.  Echocardiograms previously demonstrated mild mitral regurgitation and now he appeared to demonstrate moderate or even severe mitral regurgitation.  Image quality prevented more accurate assessment of the degree of regurgitation.  The ERO with calculated at 0.47 cm .    He was treated with diuretic therapy and improved.  He states that he has been doing better over the last month or 2 with better stamina and no further episodes of orthopnea.  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  He is not having any palpitations, chest pain, lightheadedness or syncope.  He has not had any ICD shocks.  His last ICD check on 2/15/2024 demonstrated 92% biventricular pacing with no significant episodes of atrial arrhythmias and 2 episodes of ventricular arrhythmias aborted with ATP.  These occurred on 1/18 and 2/1/2024.  Labs today included a basic metabolic panel which look good.  He has normal creatinine and electrolytes.  Hemoglobin was minimally low at 12.5.  Chest x-ray shows some stable mild scarring at the lung bases and some hyperinflation but unremarkable.    On examination today his blood pressure is 120/64, heart rate 61, and weight 150 pounds.  His lungs sound clear but diminished.  His heart rhythm is regular with a very soft apical holosystolic murmur.  He has no edema.            Assessment and Plan:     ASSESSMENT:    Mr. Robb Shannon is an 89-year-old gentleman with nonischemic cardiomyopathy with a stable ejection fraction of 30 to 35% and mild to moderate nonocclusive coronary artery disease.  He has had ventricular arrhythmias including 2 recent episodes of VT/VF treated with ATP therapy.  He is also had some episodes of congestive heart failure recently primarily manifested as orthopnea managed with outpatient diuretic therapy.  His follow-up echocardiogram suggested significant increase in mitral regurgitation which may have precipitated his heart failure symptoms.    I am not sure that he is a good candidate for mitral valve repair or replacement, but he may be a candidate for MitraClip procedure.  We do not see the mitral valve very well and I cannot be confident about the assessment of his mitral regurgitation over the structure of his mitral valve.  I suggested that we do a transesophageal echocardiogram to assess his mitral valve in more detail and assess the degree of mitral regurgitation better.  He is agreeable to that.  He understands the risks of injury or trauma to  the esophagus and complications with sedation.    If his mitral regurgitation appears significant we will have him meet with structural heart clinic to discuss the possibility of MitraClip procedure.  Otherwise we will continue medical therapy that seems to be managing his symptoms adequately for now.    Rah Ayala MD    The longitudinal plan of care for the diagnosis(es)/condition(s) as documented were addressed during this visit. Due to the added complexity in care, I will continue to support [place] in the subsequent management and with ongoing continuity of care.          Orders this Visit:  Orders Placed This Encounter   Procedures    Follow-Up with Cardiology    LIZ Only- Transesophageal Echocardiogram     Orders Placed This Encounter   Medications    traZODone (DESYREL) 50 MG tablet     Sig: Take 50 mg by mouth at bedtime    potassium chloride ER (K-TAB/KLOR-CON) 10 MEQ CR tablet     Sig: Take 10 mEq by mouth daily    LORazepam (ATIVAN) 0.5 MG tablet     Sig: Take 0.5 mg by mouth Take one tablet by mouth if needed in the morning and bedtime for anxiety or sleep max 2 tablets daily    carboxymethylcellulose PF (REFRESH LIQUIGEL) 1 % ophthalmic gel     Sig: Place 1 drop into both eyes 3 times daily     There are no discontinued medications.    Today's clinic visit entailed:    50 minutes spent by me on the date of the encounter doing chart review, history and exam, documentation and further activities per the note  Provider  Link to [facility] Help Grid     The level of medical decision making during this visit was of high complexity.           Review of Systems:     Review of Systems:  Skin:        Eyes:       ENT:       Respiratory:       Cardiovascular:       Gastroenterology:      Genitourinary:       Musculoskeletal:       Neurologic:       Psychiatric:       Heme/Lymph/Imm:       Endocrine:                 Physical Exam:     Vitals: /64 (BP Location: Left arm, Patient Position: Sitting)   Pulse 61   Ht  "1.702 m (5' 7\")   Wt 68.4 kg (150 lb 11.2 oz)   SpO2 96%   BMI 23.60 kg/m    Constitutional: Well nourished and in no apparent distress.  Eyes: Pupils equal, round. Sclerae anicteric.   HEENT: Normocephalic, atraumatic.   Neck: Supple. JVD   Respiratory: Breathing non-labored. Lungs clear to auscultation bilaterally. No crackles, wheezes, rhonchi, or rales.  Cardiovascular:  Regular rate and rhythm, normal S1 and S2. No murmur, rub, or gallop.  Skin: Warm, dry. No rashes, cyanosis, or xanthelasma.  Extremities: No edema.  Neurologic: No gross motor deficits. Alert, awake, and oriented to person, place and time.  Psychiatric: Affect appropriate.             Medications:     Current Outpatient Medications   Medication Sig Dispense Refill    carboxymethylcellulose PF (REFRESH LIQUIGEL) 1 % ophthalmic gel Place 1 drop into both eyes 3 times daily      carvedilol (COREG) 12.5 MG tablet Take 1 tablet (12.5 mg) by mouth 2 times daily (with meals) 180 tablet 3    clopidogrel (PLAVIX) 75 MG tablet Take 75 mg by mouth daily      CYCLOBENZAPRINE HCL PO Take 5 mg by mouth At Bedtime Taking PRN (half tab)      Famotidine (PEPCID PO) Take 40 mg by mouth daily       furosemide (LASIX) 20 MG tablet Take 20 mg by mouth daily      isosorbide mononitrate (IMDUR) 60 MG 24 hr tablet Take 1 tablet (60 mg) by mouth daily 90 tablet 3    LORazepam (ATIVAN) 0.5 MG tablet Take 0.5 mg by mouth Take one tablet by mouth if needed in the morning and bedtime for anxiety or sleep max 2 tablets daily      omeprazole (PRILOSEC) 20 MG DR capsule Take 20 mg by mouth daily      potassium chloride ER (K-TAB/KLOR-CON) 10 MEQ CR tablet Take 10 mEq by mouth daily      prednisoLONE acetate (PRED FORTE) 1 % ophthalmic suspension INSTILL 1 DROP INTO LEFT EYE FOUR TIME DAILY      rosuvastatin (CRESTOR) 20 MG tablet Take 1 tablet (20 mg) by mouth daily 90 tablet 3    sacubitril-valsartan (ENTRESTO)  MG per tablet Take 1 tablet by mouth 2 times daily 180 " tablet 1    sotalol (BETAPACE) 80 MG tablet Take 1 tablet (80 mg) by mouth 2 times daily 180 tablet 2    tamsulosin (FLOMAX) 0.4 MG capsule Take 0.4 mg by mouth daily      traZODone (DESYREL) 50 MG tablet Take 50 mg by mouth at bedtime      White Petrolatum-Mineral Oil (RETAINE PM) OINT Place into both eyes At Bedtime      ciclopirox (PENLAC) 8 % external solution APPLY ONCE DAILY TO BOTH GREAT TOE TOE NAILS AND THE EDGE OF SURROUNDING SKIN AND UNDERNEATH. (Patient not taking: Reported on 4/25/2024)      polyethylene glycol 400 (BLINK TEARS) 0.25 % SOLN ophthalmic solution Place 1 drop into both eyes 2 times daily as needed  (Patient not taking: Reported on 4/25/2024)         Family History   Problem Relation Age of Onset    Heart Disease Mother 88        mi    Cerebrovascular Disease Father 71       Social History     Socioeconomic History    Marital status:      Spouse name: Not on file    Number of children: Not on file    Years of education: Not on file    Highest education level: Not on file   Occupational History    Not on file   Tobacco Use    Smoking status: Never    Smokeless tobacco: Never   Substance and Sexual Activity    Alcohol use: No     Alcohol/week: 0.0 standard drinks of alcohol    Drug use: No    Sexual activity: Not on file   Other Topics Concern     Service Not Asked    Blood Transfusions Not Asked    Caffeine Concern No    Occupational Exposure Not Asked    Hobby Hazards Not Asked    Sleep Concern No    Stress Concern Not Asked    Weight Concern No    Special Diet No    Back Care Not Asked    Exercise Yes     Comment: active on farm    Bike Helmet Not Asked    Seat Belt Not Asked    Self-Exams Not Asked    Parent/sibling w/ CABG, MI or angioplasty before 65F 55M? No   Social History Narrative    Not on file     Social Determinants of Health     Financial Resource Strain: Low Risk  (11/14/2022)    Received from [facility], [facility] and [facility]     Overall Financial Resource Strain (CARDIA)     Difficulty of Paying Living Expenses: Not hard at all   Food Insecurity: No Food Insecurity (11/28/2023)    [facility]    Hunger Vital Sign     Worried About Running Out of Food in the Last Year: Never true     Ran Out of Food in the Last Year: Never true   Transportation Needs: No Transportation Needs (11/14/2022)    [facility] from [facility], [facility]    PRAPARE - Transportation     Lack of Transportation (Medical): No     Lack of Transportation (Non-Medical): No   Physical Activity: Sufficiently Active (11/14/2022)    [facility]    Exercise Vital Sign     Days of Exercise per Week: 7 days     Minutes of Exercise per Session: 40 min   Stress: No Stress Concern Present (11/28/2023)    [facility] from [facility], [facility]    [employer] [place] [employer] - Occupational Stress Questionnaire     Feeling of Stress : Not at all   Social Connections: Socially Isolated (11/14/2022)    Received from [facility], [facility]    Social Connection and Isolation Panel [NHANES]     Frequency of Communication with Friends and Family: Once a week     Frequency of Social Gatherings with Friends and Family: Once a week     Attends [employer] Services: Never     Active Member of Clubs or Organizations: No     Attends Club or Organization Meetings: Never     Marital Status:    Interpersonal Safety: Not At Risk (4/9/2024)    Received from [facility]    Intimate Partner Violence     Are you in a relationship where you are physically hurt, threatened and/or made to feel afraid?: No   Housing Stability: Unknown (11/14/2022)    Received from [facility], [facility] and [facility]    Housing Stability     In the last 12 months, was there a time when you were not able to pay the mortgage or rent on time?: No     Number of Places Lived in the Last Year: Not on file     Number of Places Lived in the Last Year (Outpatient): Not on file     In the last 12 months, was there a time when you did not have a steady place to sleep or slept in a shelter (including now)?: No            Past Medical History:     Past Medical History:   Diagnosis Date    CAD (coronary artery disease)     Congestive heart failure (H)     Heart disease     non-ischemic cardiomyopathy    HTN (hypertension)     Idiopathic cardiomyopathy (H)               Past Surgical History:     Past Surgical History:   Procedure Laterality Date    COLONOSCOPY N/A 1/29/2020    Procedure: COLONOSCOPY;  Surgeon: Erica Duff MD;  Location:  GI    ESOPHAGOSCOPY, GASTROSCOPY, DUODENOSCOPY (EGD), COMBINED N/A 1/28/2020    Procedure: ESOPHAGOGASTRODUODENOSCOPY (EGD);  Surgeon: Erica Duff MD;  Location:  GI    EYE SURGERY  2006    cataract    ORTHOPEDIC SURGERY  01/2018    pinning of right hip              Allergies:   Atorvastatin, Diphenhydramine, Metoprolol succinate [metoprolol], Strawberry extract, and Sulfa antibiotics       Data:   All laboratory data reviewed:    Recent Labs   Lab Test 10/24/22  1344 05/08/20  1042 12/04/19  1205 06/08/17  0953   LDL  --  50 77 47   HDL  --  41 42 32*   NHDL  --  84 103 75   CHOL  --  125 145 107   TRIG  --  168* 130 142   TSH 2.16 3.08  --   --    NTBNP 3,089*  --   --   --        Lab Results   Component Value Date    WBC 7.6 10/24/2022    WBC 6.3 02/04/2020    RBC 4.21 (L) 10/24/2022    RBC 3.13 (L) 01/28/2020    HGB 12.5 (L) 04/25/2024    HGB 13.7 11/09/2020    HCT 38.5 (L) 10/24/2022    HCT 25.4 (A) 02/04/2020    MCV 91 10/24/2022    MCV 91.0 02/04/2020    MCH 31.8 10/24/2022    MCH 32.1 02/04/2020    MCHC 34.8 10/24/2022    MCHC 35.3 02/04/2020    RDW 12.4 10/24/2022  "   RDW 14.4 02/04/2020     10/24/2022     01/28/2020       Lab Results   Component Value Date     04/25/2024     (L) 11/09/2020    POTASSIUM 4.2 04/25/2024    POTASSIUM 4.0 10/24/2022    POTASSIUM 4.4 11/09/2020    CHLORIDE 104 04/25/2024    CHLORIDE 102 01/26/2024    CHLORIDE 104 11/09/2020    CO2 27 04/25/2024    CO2 27 10/24/2022    CO2 26 11/09/2020    ANIONGAP 9 04/25/2024    ANIONGAP 2 (L) 10/24/2022    ANIONGAP 9.4 11/09/2020     (H) 04/25/2024     (H) 10/24/2022     (H) 11/09/2020    BUN 17.9 04/25/2024    BUN 17 10/24/2022    BUN 15 11/09/2020    CR 0.91 04/25/2024    CR 1.02 11/09/2020    GFRESTIMATED 82 04/25/2024    GFRESTIMATED 70 11/09/2020    GFRESTBLACK 84 11/09/2020    ELIZABETH 8.6 (L) 04/25/2024    ELIZABETH 8.8 11/09/2020      Lab Results   Component Value Date    AST 22 02/04/2020    ALT 13 04/25/2024    ALT <5 (L) 05/08/2020       No results found for: \"A1C\"    Lab Results   Component Value Date    INR 1.19 (H) 01/28/2020    INR 1.11 01/28/2020         ISIDORO MARI MD  [facility]  "

## 2024-04-25 NOTE — LETTER
4/25/2024    Reba Gilmore MD  [address]  [facility] [address]    RE: Katie Shannon       Dear Colleague,     I had the pleasure of seeing Katie Shannon in the [facility].    General Cardiology Clinic Progress Note  Katie Shannon MRN# [id number]   YOB: 1935 Age: 89 year old       Reason for visit: Chronic systolic heart failure due to idiopathic cardiomyopathy    History of presenting illness:    I had the opportunity to see Katie Shannon at [facility] today for chronic systolic heart failure.      He has a longstanding nonischemic cardiomyopathy with mild to moderate diffuse nonocclusive coronary artery disease and ejection fraction of 30 to 35%.  He has a biventricular ICD in place with episodes of ventricular tachycardia.  He has been treated with sotalol for prevention of ventricular arrhythmias.    Usually he had been very active, doing work around the property, but a couple of months ago he developed worsening shortness of breath.  In fact he seemed to develop rather rapid onset of shortness of breath with orthopnea.  During that time, whenever he laid down or even sat back in his recliner, he became very short of breath and had to stand up in order to breathe.  He thought he was having panic attacks.  I believe he was having episodes of pulmonary edema.  He had an echocardiogram done near his home in [facility] in [facility], and read by [employer] in [place], which I reviewed.  He has stable ejection fraction of about 30% with more mitral regurgitation that had been seen previously.  Echocardiograms previously demonstrated mild mitral regurgitation and now he appeared to demonstrate moderate or even severe mitral regurgitation.  Image quality prevented more accurate assessment of the degree of regurgitation.  The ERO with calculated at 0.47 cm .    He was treated with diuretic therapy and improved.  He states that he has been doing better over the last  month or 2 with better stamina and no further episodes of orthopnea.  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  He is not having any palpitations, chest pain, lightheadedness or syncope.  He has not had any ICD shocks.  His last ICD check on 2/15/2024 demonstrated 92% biventricular pacing with no significant episodes of atrial arrhythmias and 2 episodes of ventricular arrhythmias aborted with ATP.  These occurred on 1/18 and 2/1/2024.  Labs today included a basic metabolic panel which look good.  He has normal creatinine and electrolytes.  Hemoglobin was minimally low at 12.5.  Chest x-ray shows some stable mild scarring at the lung bases and some hyperinflation but unremarkable.    On examination today his blood pressure is 120/64, heart rate 61, and weight 150 pounds.  His lungs sound clear but diminished.  His heart rhythm is regular with a very soft apical holosystolic murmur.  He has no edema.            Assessment and Plan:     ASSESSMENT:    Mr. Robb Shannon is an 89-year-old gentleman with nonischemic cardiomyopathy with a stable ejection fraction of 30 to 35% and mild to moderate nonocclusive coronary artery disease.  He has had ventricular arrhythmias including 2 recent episodes of VT/VF treated with ATP therapy.  He is also had some episodes of congestive heart failure recently primarily manifested as orthopnea managed with outpatient diuretic therapy.  His follow-up echocardiogram suggested significant increase in mitral regurgitation which may have precipitated his heart failure symptoms.    I am not sure that he is a good candidate for mitral valve repair or replacement, but he may be a candidate for MitraClip procedure.  We do not see the mitral valve very well and I cannot be confident about the assessment of his mitral regurgitation over the structure of his mitral valve.  I suggested that we do a  transesophageal echocardiogram to assess his mitral valve in more detail and assess the degree of mitral regurgitation better.  He is agreeable to that.  He understands the risks of injury or trauma to the esophagus and complications with sedation.    If his mitral regurgitation appears significant we will have him meet with structural heart clinic to discuss the possibility of MitraClip procedure.  Otherwise we will continue medical therapy that seems to be managing his symptoms adequately for now.    Rah Ayala MD    The longitudinal plan of care for the diagnosis(es)/condition(s) as documented were addressed during this visit. Due to the added complexity in care, I will continue to support [place] in the subsequent management and with ongoing continuity of care.          Orders this Visit:  Orders Placed This Encounter   Procedures    Follow-Up with Cardiology    LIZ Only- Transesophageal Echocardiogram     Orders Placed This Encounter   Medications    traZODone (DESYREL) 50 MG tablet     Sig: Take 50 mg by mouth at bedtime    potassium chloride ER (K-TAB/KLOR-CON) 10 MEQ CR tablet     Sig: Take 10 mEq by mouth daily    LORazepam (ATIVAN) 0.5 MG tablet     Sig: Take 0.5 mg by mouth Take one tablet by mouth if needed in the morning and bedtime for anxiety or sleep max 2 tablets daily    carboxymethylcellulose PF (REFRESH LIQUIGEL) 1 % ophthalmic gel     Sig: Place 1 drop into both eyes 3 times daily     There are no discontinued medications.    Today's clinic visit entailed:    50 minutes spent by me on the date of the encounter doing chart review, history and exam, documentation and further activities per the note  Provider  Link to [facility] Help Grid     The level of medical decision making during this visit was of high complexity.           Review of Systems:     Review of Systems:  Skin:        Eyes:       ENT:       Respiratory:       Cardiovascular:       Gastroenterology:      Genitourinary:      "  Musculoskeletal:       Neurologic:       Psychiatric:       Heme/Lymph/Imm:       Endocrine:                 Physical Exam:     Vitals: /64 (BP Location: Left arm, Patient Position: Sitting)   Pulse 61   Ht 1.702 m (5' 7\")   Wt 68.4 kg (150 lb 11.2 oz)   SpO2 96%   BMI 23.60 kg/m    Constitutional: Well nourished and in no apparent distress.  Eyes: Pupils equal, round. Sclerae anicteric.   HEENT: Normocephalic, atraumatic.   Neck: Supple. JVD   Respiratory: Breathing non-labored. Lungs clear to auscultation bilaterally. No crackles, wheezes, rhonchi, or rales.  Cardiovascular:  Regular rate and rhythm, normal S1 and S2. No murmur, rub, or gallop.  Skin: Warm, dry. No rashes, cyanosis, or xanthelasma.  Extremities: No edema.  Neurologic: No gross motor deficits. Alert, awake, and oriented to person, place and time.  Psychiatric: Affect appropriate.             Medications:     Current Outpatient Medications   Medication Sig Dispense Refill    carboxymethylcellulose PF (REFRESH LIQUIGEL) 1 % ophthalmic gel Place 1 drop into both eyes 3 times daily      carvedilol (COREG) 12.5 MG tablet Take 1 tablet (12.5 mg) by mouth 2 times daily (with meals) 180 tablet 3    clopidogrel (PLAVIX) 75 MG tablet Take 75 mg by mouth daily      CYCLOBENZAPRINE HCL PO Take 5 mg by mouth At Bedtime Taking PRN (half tab)      Famotidine (PEPCID PO) Take 40 mg by mouth daily       furosemide (LASIX) 20 MG tablet Take 20 mg by mouth daily      isosorbide mononitrate (IMDUR) 60 MG 24 hr tablet Take 1 tablet (60 mg) by mouth daily 90 tablet 3    LORazepam (ATIVAN) 0.5 MG tablet Take 0.5 mg by mouth Take one tablet by mouth if needed in the morning and bedtime for anxiety or sleep max 2 tablets daily      omeprazole (PRILOSEC) 20 MG DR capsule Take 20 mg by mouth daily      potassium chloride ER (K-TAB/KLOR-CON) 10 MEQ CR tablet Take 10 mEq by mouth daily      prednisoLONE acetate (PRED FORTE) 1 % ophthalmic suspension INSTILL 1 DROP " INTO LEFT EYE FOUR TIME DAILY      rosuvastatin (CRESTOR) 20 MG tablet Take 1 tablet (20 mg) by mouth daily 90 tablet 3    sacubitril-valsartan (ENTRESTO)  MG per tablet Take 1 tablet by mouth 2 times daily 180 tablet 1    sotalol (BETAPACE) 80 MG tablet Take 1 tablet (80 mg) by mouth 2 times daily 180 tablet 2    tamsulosin (FLOMAX) 0.4 MG capsule Take 0.4 mg by mouth daily      traZODone (DESYREL) 50 MG tablet Take 50 mg by mouth at bedtime      White Petrolatum-Mineral Oil (RETAINE PM) OINT Place into both eyes At Bedtime      ciclopirox (PENLAC) 8 % external solution APPLY ONCE DAILY TO BOTH GREAT TOE TOE NAILS AND THE EDGE OF SURROUNDING SKIN AND UNDERNEATH. (Patient not taking: Reported on 4/25/2024)      polyethylene glycol 400 (BLINK TEARS) 0.25 % SOLN ophthalmic solution Place 1 drop into both eyes 2 times daily as needed  (Patient not taking: Reported on 4/25/2024)         Family History   Problem Relation Age of Onset    Heart Disease Mother 88        mi    Cerebrovascular Disease Father 71       Social History     Socioeconomic History    Marital status:      Spouse name: Not on file    Number of children: Not on file    Years of education: Not on file    Highest education level: Not on file   Occupational History    Not on file   Tobacco Use    Smoking status: Never    Smokeless tobacco: Never   Substance and Sexual Activity    Alcohol use: No     Alcohol/week: 0.0 standard drinks of alcohol    Drug use: No    Sexual activity: Not on file   Other Topics Concern     Service Not Asked    Blood Transfusions Not Asked    Caffeine Concern No    Occupational Exposure Not Asked    Hobby Hazards Not Asked    Sleep Concern No    Stress Concern Not Asked    Weight Concern No    Special Diet No    Back Care Not Asked    Exercise Yes     Comment: active on farm    Bike Helmet Not Asked    Seat Belt Not Asked    Self-Exams Not Asked    Parent/sibling w/ CABG, MI or angioplasty before 65F 55M? No    Social History Narrative    Not on file     Social Determinants of Health     Financial Resource Strain: Low Risk  (11/14/2022)    [facility]    Overall Financial Resource Strain (CARDIA)     Difficulty of Paying Living Expenses: Not hard at all   Food Insecurity: No Food Insecurity (11/28/2023)    [facility]    Hunger Vital Sign     Worried About Running Out of Food in the Last Year: Never true     Ran Out of Food in the Last Year: Never true   Transportation Needs: No Transportation Needs (11/14/2022)    [facility]    PRAPARE - Transportation     Lack of Transportation (Medical): No     Lack of Transportation (Non-Medical): No   Physical Activity: Sufficiently Active (11/14/2022)    [facility]    Exercise Vital Sign     Days of Exercise per Week: 7 days     Minutes of Exercise per Session: 40 min   Stress: No Stress Concern Present (11/28/2023)    [facility]    [place] [place] [employer] - Occupational Stress Questionnaire     Feeling of Stress : Not at all   Social Connections: Socially Isolated (11/14/2022)    [facility] from [facility]    Social Connection and Isolation Panel [NHANES]     Frequency of Communication with Friends and Family: Once a week     Frequency of Social Gatherings with Friends and Family: Once a week     Attends Worship Services: Never     Active Member of Clubs or Organizations: No     Attends Club or Organization Meetings: Never     Marital Status:    Interpersonal Safety: Not At Risk (4/9/2024)    Received from [facility]    Intimate Partner  Violence     Are you in a relationship where you are physically hurt, threatened and/or made to feel afraid?: No   Housing Stability: Unknown (11/14/2022)    [facility]    Housing Stability     In the last 12 months, was there a time when you were not able to pay the mortgage or rent on time?: No     Number of Places Lived in the Last Year: Not on file     Number of Places Lived in the Last Year (Outpatient): Not on file     In the last 12 months, was there a time when you did not have a steady place to sleep or slept in a shelter (including now)?: No            Past Medical History:     Past Medical History:   Diagnosis Date    CAD (coronary artery disease)     Congestive heart failure (H)     Heart disease     non-ischemic cardiomyopathy    HTN (hypertension)     Idiopathic cardiomyopathy (H)               Past Surgical History:     Past Surgical History:   Procedure Laterality Date    COLONOSCOPY N/A 1/29/2020    Procedure: COLONOSCOPY;  Surgeon: Erica Duff MD;  Location:  GI    ESOPHAGOSCOPY, GASTROSCOPY, DUODENOSCOPY (EGD), COMBINED N/A 1/28/2020    Procedure: ESOPHAGOGASTRODUODENOSCOPY (EGD);  Surgeon: Erica Duff MD;  Location:  GI    EYE SURGERY  2006    cataract    ORTHOPEDIC SURGERY  01/2018    pinning of right hip              Allergies:   Atorvastatin, Diphenhydramine, Metoprolol succinate [metoprolol], Strawberry extract, and Sulfa antibiotics       Data:   All laboratory data reviewed:    Recent Labs   Lab Test 10/24/22  1344 05/08/20  1042 12/04/19  1205 06/08/17  0953   LDL  --  50 77 47   HDL  --  41 42 32*   NHDL  --  84 103 75   CHOL  --  125 145 107   TRIG  --  168* 130 142   TSH 2.16 3.08  --   --    NTBNP 3,089*  --   --   --        Lab Results   Component Value Date    WBC 7.6 10/24/2022    WBC 6.3 02/04/2020    RBC 4.21 (L) 10/24/2022    RBC 3.13 (L) 01/28/2020    HGB 12.5 (L) 04/25/2024    HGB 13.7  "11/09/2020    HCT 38.5 (L) 10/24/2022    HCT 25.4 (A) 02/04/2020    MCV 91 10/24/2022    MCV 91.0 02/04/2020    MCH 31.8 10/24/2022    MCH 32.1 02/04/2020    MCHC 34.8 10/24/2022    MCHC 35.3 02/04/2020    RDW 12.4 10/24/2022    RDW 14.4 02/04/2020     10/24/2022     01/28/2020       Lab Results   Component Value Date     04/25/2024     (L) 11/09/2020    POTASSIUM 4.2 04/25/2024    POTASSIUM 4.0 10/24/2022    POTASSIUM 4.4 11/09/2020    CHLORIDE 104 04/25/2024    CHLORIDE 102 01/26/2024    CHLORIDE 104 11/09/2020    CO2 27 04/25/2024    CO2 27 10/24/2022    CO2 26 11/09/2020    ANIONGAP 9 04/25/2024    ANIONGAP 2 (L) 10/24/2022    ANIONGAP 9.4 11/09/2020     (H) 04/25/2024     (H) 10/24/2022     (H) 11/09/2020    BUN 17.9 04/25/2024    BUN 17 10/24/2022    BUN 15 11/09/2020    CR 0.91 04/25/2024    CR 1.02 11/09/2020    GFRESTIMATED 82 04/25/2024    GFRESTIMATED 70 11/09/2020    GFRESTBLACK 84 11/09/2020    ELIZABETH 8.6 (L) 04/25/2024    ELIZABETH 8.8 11/09/2020      Lab Results   Component Value Date    AST 22 02/04/2020    ALT 13 04/25/2024    ALT <5 (L) 05/08/2020     No results found for: \"A1C\"    Lab Results   Component Value Date    INR 1.19 (H) 01/28/2020    INR 1.11 01/28/2020       ISIDORO AYALA MD  [facility]    Thank you for allowing me to participate in the care of your patient.      Sincerely,   ISIDORO AYALA MD   [facility]  cc:   Isidoro Ayala MD  [address]  [place] [address]      "

## 2024-04-25 NOTE — PATIENT INSTRUCTIONS
It was a pleasure seeing you today and thank you for allowing me to be a part of your health care team.  Should you have any questions regarding your visit or future needs please feel free to reach out to my care team for assistance.      Thank you, Dr. Rah Ayala        **Nursing: [phone]       **Scheduling: [phone]

## 2024-04-26 LAB
CHOLEST SERPL-MCNC: 119 MG/DL
FASTING STATUS PATIENT QL REPORTED: NO
HDLC SERPL-MCNC: 38 MG/DL
LDLC SERPL CALC-MCNC: 56 MG/DL
NONHDLC SERPL-MCNC: 81 MG/DL
TRIGL SERPL-MCNC: 124 MG/DL

## 2024-05-06 NOTE — PATIENT INSTRUCTIONS
No med changes recommended, BP looks fine per Dr. Ayala. Will continue w/plan -- pt to monitor BP periodically at home and notify us if BP starts to go back up. Jayne Kelly RN on 3/17/2022 at 11:49 AM      
There were no significant procedures or tests performed during this admission.

## 2024-05-09 ENCOUNTER — CARE COORDINATION (OUTPATIENT)
Dept: CARDIOLOGY | Facility: CLINIC | Age: 89
End: 2024-05-09
Payer: MEDICARE

## 2024-05-09 NOTE — PROGRESS NOTES
I called pt to review LIZ prep. Pt's wife answered and said he didn't sleep well last night so he went back to bed. She said she will review the instructions with him. She said pt had some muscle spasms in his arm earlier this morning so she has a call out to his PCP to discuss. I reviewed prep instructions with pt's wife. She had no other questions at this time. Giovani HARRISON May 9, 2024, 9:32 AM

## 2024-05-09 NOTE — PROGRESS NOTES
DCCV/LIZ prep instructions    Patient is scheduled for a LIZ at [facility] - [address], [place] [address] - Main Entrance of the Hospital, on 5/10/24.  Check in time is at 11:30 AM and procedure to follow.    Patient instructed to remain NPO for solid foods and liquids for 8 hours prior to arrival for their LIZ or LIZ with DCCV.        Patient is not diabetic.     Patient is not taking Digoxin.    Patient is taking taking furosemide and has been instructed to hold it the morning of procedure. and has been advised to hold this the morning of the procedure.    Pt is not on a SGLT2 inhibitor.    Pt is not on a GLP-1 Agonist    Patient advised to take their other daily medications the morning of the procedure with small sips of water.     Verified patient has someone available to drive them home from the hospital and can stay with them for 24 hours after the procedure.     Patient advised to notify care team with any new COVID like symptoms prior to procedure.    Patient advised to notify care team with any new COVID like symptoms prior to procedure. Day of procedure phone number: Dimitris at [phone]    Patient is aware of visitor policy.    Patient expresses understanding of above instructions and denies further questions at this time.      Gracy Zepeda RN  [facility]

## 2024-05-10 ENCOUNTER — HOSPITAL ENCOUNTER (OUTPATIENT)
Dept: CARDIOLOGY | Facility: CLINIC | Age: 89
Discharge: HOME | End: 2024-05-10
Attending: INTERNAL MEDICINE | Admitting: INTERNAL MEDICINE
Payer: MEDICARE

## 2024-05-10 ENCOUNTER — HOSPITAL ENCOUNTER (OUTPATIENT)
Facility: CLINIC | Age: 89
Discharge: HOME | End: 2024-05-10
Admitting: INTERNAL MEDICINE
Payer: MEDICARE

## 2024-05-10 VITALS
BODY MASS INDEX: 23.54 KG/M2 | SYSTOLIC BLOOD PRESSURE: 140 MMHG | DIASTOLIC BLOOD PRESSURE: 72 MMHG | HEART RATE: 61 BPM | RESPIRATION RATE: 18 BRPM | WEIGHT: 150 LBS | HEIGHT: 67 IN | OXYGEN SATURATION: 93 % | TEMPERATURE: 97.9 F

## 2024-05-10 DIAGNOSIS — I05.9 MITRAL VALVE DISEASE: ICD-10-CM

## 2024-05-10 LAB — LVEF ECHO: NORMAL

## 2024-05-10 PROCEDURE — 258N000003 HC RX IP 258 OP 636: Performed by: INTERNAL MEDICINE

## 2024-05-10 PROCEDURE — 93312 ECHO TRANSESOPHAGEAL: CPT | Mod: 26 | Performed by: INTERNAL MEDICINE

## 2024-05-10 PROCEDURE — 93325 DOPPLER ECHO COLOR FLOW MAPG: CPT | Mod: 26 | Performed by: INTERNAL MEDICINE

## 2024-05-10 PROCEDURE — 93325 DOPPLER ECHO COLOR FLOW MAPG: CPT

## 2024-05-10 PROCEDURE — 99152 MOD SED SAME PHYS/QHP 5/>YRS: CPT | Performed by: INTERNAL MEDICINE

## 2024-05-10 PROCEDURE — 250N000009 HC RX 250: Performed by: INTERNAL MEDICINE

## 2024-05-10 PROCEDURE — 999N000183 HC STATISTIC TEE INCLUDES SEDATION

## 2024-05-10 PROCEDURE — 250N000011 HC RX IP 250 OP 636: Performed by: INTERNAL MEDICINE

## 2024-05-10 PROCEDURE — 93320 DOPPLER ECHO COMPLETE: CPT | Mod: 26 | Performed by: INTERNAL MEDICINE

## 2024-05-10 RX ORDER — FENTANYL CITRATE 50 UG/ML
25 INJECTION, SOLUTION INTRAMUSCULAR; INTRAVENOUS
Status: DISCONTINUED | OUTPATIENT
Start: 2024-05-10 | End: 2024-05-10

## 2024-05-10 RX ORDER — NALOXONE HYDROCHLORIDE 0.4 MG/ML
0.2 INJECTION, SOLUTION INTRAMUSCULAR; INTRAVENOUS; SUBCUTANEOUS
Status: DISCONTINUED | OUTPATIENT
Start: 2024-05-10 | End: 2024-05-10

## 2024-05-10 RX ORDER — GLYCOPYRROLATE 0.2 MG/ML
0.1 INJECTION, SOLUTION INTRAMUSCULAR; INTRAVENOUS ONCE
Status: COMPLETED | OUTPATIENT
Start: 2024-05-10 | End: 2024-05-10

## 2024-05-10 RX ORDER — NALOXONE HYDROCHLORIDE 0.4 MG/ML
0.4 INJECTION, SOLUTION INTRAMUSCULAR; INTRAVENOUS; SUBCUTANEOUS
Status: DISCONTINUED | OUTPATIENT
Start: 2024-05-10 | End: 2024-05-10

## 2024-05-10 RX ORDER — SODIUM CHLORIDE 9 MG/ML
INJECTION, SOLUTION INTRAVENOUS CONTINUOUS PRN
Status: DISCONTINUED | OUTPATIENT
Start: 2024-05-10 | End: 2024-05-10

## 2024-05-10 RX ORDER — LIDOCAINE 50 MG/G
OINTMENT TOPICAL ONCE
Status: COMPLETED | OUTPATIENT
Start: 2024-05-10 | End: 2024-05-10

## 2024-05-10 RX ORDER — LIDOCAINE 40 MG/G
CREAM TOPICAL
Status: DISCONTINUED | OUTPATIENT
Start: 2024-05-10 | End: 2024-05-10

## 2024-05-10 RX ORDER — DEXTROSE MONOHYDRATE 25 G/50ML
9.5 INJECTION, SOLUTION INTRAVENOUS
Status: DISCONTINUED | OUTPATIENT
Start: 2024-05-10 | End: 2024-05-10

## 2024-05-10 RX ORDER — LIDOCAINE HYDROCHLORIDE 40 MG/ML
1.5 SOLUTION TOPICAL ONCE
Status: COMPLETED | OUTPATIENT
Start: 2024-05-10 | End: 2024-05-10

## 2024-05-10 RX ORDER — FLUMAZENIL 0.1 MG/ML
0.2 INJECTION, SOLUTION INTRAVENOUS
Status: DISCONTINUED | OUTPATIENT
Start: 2024-05-10 | End: 2024-05-10

## 2024-05-10 RX ADMIN — GLYCOPYRROLATE 0.1 MG: 0.2 INJECTION, SOLUTION INTRAMUSCULAR; INTRAVENOUS at 13:25

## 2024-05-10 RX ADMIN — TOPICAL ANESTHETIC 1 ML: 200 SPRAY DENTAL; PERIODONTAL at 13:39

## 2024-05-10 RX ADMIN — FENTANYL CITRATE 50 MCG: 50 INJECTION, SOLUTION INTRAMUSCULAR; INTRAVENOUS at 13:43

## 2024-05-10 RX ADMIN — MIDAZOLAM 1 MG: 1 INJECTION INTRAMUSCULAR; INTRAVENOUS at 13:42

## 2024-05-10 RX ADMIN — MIDAZOLAM 1 MG: 1 INJECTION INTRAMUSCULAR; INTRAVENOUS at 13:56

## 2024-05-10 RX ADMIN — LIDOCAINE HYDROCHLORIDE 1.5 ML: 40 SOLUTION TOPICAL at 13:24

## 2024-05-10 RX ADMIN — MIDAZOLAM 1 MG: 1 INJECTION INTRAMUSCULAR; INTRAVENOUS at 13:47

## 2024-05-10 RX ADMIN — SODIUM CHLORIDE: 9 INJECTION, SOLUTION INTRAVENOUS at 12:20

## 2024-05-10 ASSESSMENT — ACTIVITIES OF DAILY LIVING (ADL)
ADLS_ACUITY_SCORE: 38

## 2024-05-10 NOTE — PROGRESS NOTES
Care Suites Admission Nursing Note    Patient Information  Name: Katie Shannon  Age: 89 year old  Reason for admission: LIZ  Care Suites arrival time: 1100    Visitor Information  Name: wife     Patient Admission/Assessment   Pre-procedure assessment complete: Yes  If abnormal assessment/labs, provider notified: N/A  NPO: Yes  Medications held per instructions/orders: Yes  Consent: deferred  If applicable, pregnancy test status: deferred  Patient oriented to room: Yes  Education/questions answered: Yes, written discharge instructions reviewed with and given to wife and pt , questions answered and both state understanding.  Plan/other:     Discharge Planning  Discharge name/phone number:Tyrone harris [phone]  Overnight post sedation caregiver: family  Discharge location: home    Nancie Hill RN

## 2024-05-10 NOTE — PROGRESS NOTES
1225 A/O. Pt denies difficulty swallowing or sleep apnea. No dentures or loose teeth. NPO x 8+ hrs. Discharge / post procedure instructions given to pt pre procedure w/ verbal understanding received.  All questions & concerns addressed.       LIZ: Pt tolerated well. VSS. Total sedation given - 3 mg Versed & 50 mcg Fentanyl. Dr Ayala spoke with pt & family post procedure.See LIZ Flowsheet.     1530 Pt discharged per w/c to private vehicle. All personal belongings sent with pt. Pt to be NPO until 1545. Both pt & Family members informed. Verbal understanding received.

## 2024-05-10 NOTE — PRE-PROCEDURE
GENERAL PRE-PROCEDURE:   Procedure:  LIZ  Date/Time:  5/10/2024 1:43 PM    Verbal consent obtained?: Yes    Written consent obtained?: Yes    Risks and benefits: Risks, benefits and alternatives were discussed    DC Plan: Appropriate discharge home plan in place for patients who are going home after procedure   Consent given by:  Patient  Patient states understanding of procedure being performed: Yes    Patient's understanding of procedure matches consent: Yes    Procedure consent matches procedure scheduled: Yes    Expected level of sedation:  Moderate  Appropriately NPO:  Yes  ASA Class:  2  Mallampati  :  Grade 2- soft palate, base of uvula, tonsillar pillars, and portion of posterior pharyngeal wall visible  Lungs:  Lungs clear with good breath sounds bilaterally  Heart:  Normal heart sounds and rate  History & Physical reviewed:  History and physical reviewed and no updates needed  Statement of review:  I have reviewed the lab findings, diagnostic data, medications, and the plan for sedation

## 2024-05-10 NOTE — DISCHARGE INSTRUCTIONS
LIZ  (Transesophageal Echocardiogram)  Discharge Instructions    After you go home:    Have an adult stay with you for 6 hours.       For 24 hours - due to the sedation you received:  Relax and take it easy.  Do NOT make any important or legal decisions.  Do NOT drive or operate machines at home or at work.  Do NOT drink alcohol.    Diet:  You may resume your normal diet, but no scratchy foods for two days.  If your throat is sore, eat cold, bland or soft foods.  You may have heartburn if the tube used in the exam entered your stomach.  If so:   - Do not eat acidic and spicy foods.   - Do not eat three hours before bedtime. Clear liquids are okay.   - When lying down, use two pillows to raise your head.    Medicines:    Take your medications, including blood thinners, unless your provider tells you not to.  If you have stopped any medicines, check with your provider about when to restart them.  You may take Tylenol (Acetaminophen) if your throat is sore.  You may take antacids if you have heartburn.      Follow Up Appointments:    Follow up with your cardiologist at [facility] of patient preference as instructed.  Follow up with your primary care provider as needed.    Call the clinic if:    You have heartburn that is severe or lasts more than 72 hours.  You have a sore throat that feels worse after 72 hours.  You have shortness of breath, neck pain, chest pain, fever, chills, coughing up blood, or other unusual signs.  Questions or concerns      [facility] at [place]:    [phone] [facility] (7 days a week)    Or you can contact your provider via My Chart

## 2024-05-13 ENCOUNTER — CARE COORDINATION (OUTPATIENT)
Dept: CARDIOLOGY | Facility: CLINIC | Age: 89
End: 2024-05-13
Payer: MEDICARE

## 2024-05-13 NOTE — PROGRESS NOTES
LIZ results noted from 5/10/24. Will route an update on results to . Pt has follow up with  7/29/24. Giovani HARRISON May 13, 2024, 11:00 AM    Procedure  Complete LIZ Adult. LIZ Probe serial #[id number] was used during the procedure.  The heart rate, respiratory rate and response to care were monitored  throughout the procedure with the assistance of the nurse.  ______________________________________________________________________________  Interpretation Summary     The left ventricle is mildly dilated.  Left ventricular systolic function is moderate to severely reduced.  The visual ejection fraction is estimated at 30%.  Mildly decreased right ventricular systolic function  There is moderate (2+) mitral regurgitation.

## 2024-05-14 NOTE — TELEPHONE ENCOUNTER
I discussed the results of the LIZ with the patient and family at the time of the procedure but the patient will want to keep the appt time in July and we can discuss his medical treatment at that time. BENOIT

## 2024-05-14 NOTE — PROGRESS NOTES
Pt is scheduled to see  7/29. Will keep appt as scheduled. Giovani HARRISON May 14, 2024, 12:44 PM

## 2024-05-16 ENCOUNTER — ANCILLARY PROCEDURE (OUTPATIENT)
Dept: CARDIOLOGY | Facility: CLINIC | Age: 89
End: 2024-05-16
Attending: INTERNAL MEDICINE
Payer: MEDICARE

## 2024-05-16 DIAGNOSIS — Z95.810 ICD (IMPLANTABLE CARDIOVERTER-DEFIBRILLATOR) IN PLACE: ICD-10-CM

## 2024-05-16 DIAGNOSIS — I42.9 PRIMARY CARDIOMYOPATHY (H): ICD-10-CM

## 2024-05-16 PROCEDURE — 93296 REM INTERROG EVL PM/IDS: CPT | Performed by: INTERNAL MEDICINE

## 2024-05-16 PROCEDURE — 93295 DEV INTERROG REMOTE 1/2/MLT: CPT | Performed by: INTERNAL MEDICINE

## 2024-05-21 LAB
MDC_IDC_EPISODE_DTM: NORMAL
MDC_IDC_EPISODE_DURATION: 10 S
MDC_IDC_EPISODE_DURATION: 12 S
MDC_IDC_EPISODE_DURATION: 6 S
MDC_IDC_EPISODE_DURATION: 8 S
MDC_IDC_EPISODE_ID: NORMAL
MDC_IDC_EPISODE_TYPE: NORMAL
MDC_IDC_EPISODE_VENDOR_TYPE: NORMAL
MDC_IDC_LEAD_CONNECTION_STATUS: NORMAL
MDC_IDC_LEAD_IMPLANT_DT: NORMAL
MDC_IDC_LEAD_LOCATION: NORMAL
MDC_IDC_LEAD_LOCATION_DETAIL_1: NORMAL
MDC_IDC_LEAD_MFG: NORMAL
MDC_IDC_LEAD_MODEL: NORMAL
MDC_IDC_LEAD_POLARITY_TYPE: NORMAL
MDC_IDC_LEAD_POLARITY_TYPE: NORMAL
MDC_IDC_LEAD_SERIAL: NORMAL
MDC_IDC_LEAD_SPECIAL_FUNCTION: NORMAL
MDC_IDC_MSMT_BATTERY_DTM: NORMAL
MDC_IDC_MSMT_BATTERY_REMAINING_LONGEVITY: 12 MO
MDC_IDC_MSMT_BATTERY_REMAINING_PERCENTAGE: 23 %
MDC_IDC_MSMT_BATTERY_RRT_TRIGGER: NORMAL
MDC_IDC_MSMT_BATTERY_STATUS: NORMAL
MDC_IDC_MSMT_BATTERY_VOLTAGE: 2.8 V
MDC_IDC_MSMT_CAP_CHARGE_DTM: NORMAL
MDC_IDC_MSMT_CAP_CHARGE_ENERGY: 40 J
MDC_IDC_MSMT_CAP_CHARGE_TIME: 9.5 S
MDC_IDC_MSMT_CAP_CHARGE_TYPE: NORMAL
MDC_IDC_MSMT_LEADCHNL_LV_IMPEDANCE_VALUE: 380 OHM
MDC_IDC_MSMT_LEADCHNL_LV_LEAD_CHANNEL_STATUS: NORMAL
MDC_IDC_MSMT_LEADCHNL_LV_PACING_THRESHOLD_AMPLITUDE: 1 V
MDC_IDC_MSMT_LEADCHNL_LV_PACING_THRESHOLD_PULSEWIDTH: 0.5 MS
MDC_IDC_MSMT_LEADCHNL_RA_IMPEDANCE_VALUE: 400 OHM
MDC_IDC_MSMT_LEADCHNL_RA_LEAD_CHANNEL_STATUS: NORMAL
MDC_IDC_MSMT_LEADCHNL_RA_PACING_THRESHOLD_AMPLITUDE: 0.5 V
MDC_IDC_MSMT_LEADCHNL_RA_PACING_THRESHOLD_PULSEWIDTH: 0.5 MS
MDC_IDC_MSMT_LEADCHNL_RA_SENSING_INTR_AMPL: 0.9 MV
MDC_IDC_MSMT_LEADCHNL_RV_IMPEDANCE_VALUE: 410 OHM
MDC_IDC_MSMT_LEADCHNL_RV_LEAD_CHANNEL_STATUS: NORMAL
MDC_IDC_MSMT_LEADCHNL_RV_PACING_THRESHOLD_AMPLITUDE: 1.5 V
MDC_IDC_MSMT_LEADCHNL_RV_PACING_THRESHOLD_PULSEWIDTH: 0.5 MS
MDC_IDC_MSMT_LEADCHNL_RV_SENSING_INTR_AMPL: 12 MV
MDC_IDC_PG_IMPLANT_DTM: NORMAL
MDC_IDC_PG_MFG: NORMAL
MDC_IDC_PG_MODEL: NORMAL
MDC_IDC_PG_SERIAL: NORMAL
MDC_IDC_PG_TYPE: NORMAL
MDC_IDC_SESS_CLINIC_NAME: NORMAL
MDC_IDC_SESS_DTM: NORMAL
MDC_IDC_SESS_REPROGRAMMED: NO
MDC_IDC_SESS_TYPE: NORMAL
MDC_IDC_SET_BRADY_AT_MODE_SWITCH_MODE: NORMAL
MDC_IDC_SET_BRADY_AT_MODE_SWITCH_RATE: 170 {BEATS}/MIN
MDC_IDC_SET_BRADY_LOWRATE: 60 {BEATS}/MIN
MDC_IDC_SET_BRADY_MAX_SENSOR_RATE: 120 {BEATS}/MIN
MDC_IDC_SET_BRADY_MAX_TRACKING_RATE: 120 {BEATS}/MIN
MDC_IDC_SET_BRADY_MODE: NORMAL
MDC_IDC_SET_BRADY_PAV_DELAY_HIGH: 100 MS
MDC_IDC_SET_BRADY_PAV_DELAY_LOW: 150 MS
MDC_IDC_SET_BRADY_SAV_DELAY_HIGH: 100 MS
MDC_IDC_SET_BRADY_SAV_DELAY_LOW: 120 MS
MDC_IDC_SET_CRT_LVRV_DELAY: 0 MS
MDC_IDC_SET_CRT_PACED_CHAMBERS: NORMAL
MDC_IDC_SET_LEADCHNL_LV_PACING_AMPLITUDE: 2 V
MDC_IDC_SET_LEADCHNL_LV_PACING_ANODE_ELECTRODE_1: NORMAL
MDC_IDC_SET_LEADCHNL_LV_PACING_ANODE_LOCATION_1: NORMAL
MDC_IDC_SET_LEADCHNL_LV_PACING_CAPTURE_MODE: NORMAL
MDC_IDC_SET_LEADCHNL_LV_PACING_CATHODE_ELECTRODE_1: NORMAL
MDC_IDC_SET_LEADCHNL_LV_PACING_CATHODE_LOCATION_1: NORMAL
MDC_IDC_SET_LEADCHNL_LV_PACING_POLARITY: NORMAL
MDC_IDC_SET_LEADCHNL_LV_PACING_PULSEWIDTH: 0.5 MS
MDC_IDC_SET_LEADCHNL_RA_PACING_AMPLITUDE: 5 V
MDC_IDC_SET_LEADCHNL_RA_PACING_ANODE_ELECTRODE_1: NORMAL
MDC_IDC_SET_LEADCHNL_RA_PACING_ANODE_LOCATION_1: NORMAL
MDC_IDC_SET_LEADCHNL_RA_PACING_CAPTURE_MODE: NORMAL
MDC_IDC_SET_LEADCHNL_RA_PACING_CATHODE_ELECTRODE_1: NORMAL
MDC_IDC_SET_LEADCHNL_RA_PACING_CATHODE_LOCATION_1: NORMAL
MDC_IDC_SET_LEADCHNL_RA_PACING_POLARITY: NORMAL
MDC_IDC_SET_LEADCHNL_RA_PACING_PULSEWIDTH: 0.5 MS
MDC_IDC_SET_LEADCHNL_RA_SENSING_ADAPTATION_MODE: NORMAL
MDC_IDC_SET_LEADCHNL_RA_SENSING_ANODE_ELECTRODE_1: NORMAL
MDC_IDC_SET_LEADCHNL_RA_SENSING_ANODE_LOCATION_1: NORMAL
MDC_IDC_SET_LEADCHNL_RA_SENSING_CATHODE_ELECTRODE_1: NORMAL
MDC_IDC_SET_LEADCHNL_RA_SENSING_CATHODE_LOCATION_1: NORMAL
MDC_IDC_SET_LEADCHNL_RA_SENSING_POLARITY: NORMAL
MDC_IDC_SET_LEADCHNL_RA_SENSING_SENSITIVITY: 0.3 MV
MDC_IDC_SET_LEADCHNL_RV_PACING_AMPLITUDE: 2.5 V
MDC_IDC_SET_LEADCHNL_RV_PACING_ANODE_ELECTRODE_1: NORMAL
MDC_IDC_SET_LEADCHNL_RV_PACING_ANODE_LOCATION_1: NORMAL
MDC_IDC_SET_LEADCHNL_RV_PACING_CAPTURE_MODE: NORMAL
MDC_IDC_SET_LEADCHNL_RV_PACING_CATHODE_ELECTRODE_1: NORMAL
MDC_IDC_SET_LEADCHNL_RV_PACING_CATHODE_LOCATION_1: NORMAL
MDC_IDC_SET_LEADCHNL_RV_PACING_POLARITY: NORMAL
MDC_IDC_SET_LEADCHNL_RV_PACING_PULSEWIDTH: 0.5 MS
MDC_IDC_SET_LEADCHNL_RV_SENSING_ADAPTATION_MODE: NORMAL
MDC_IDC_SET_LEADCHNL_RV_SENSING_ANODE_ELECTRODE_1: NORMAL
MDC_IDC_SET_LEADCHNL_RV_SENSING_ANODE_LOCATION_1: NORMAL
MDC_IDC_SET_LEADCHNL_RV_SENSING_CATHODE_ELECTRODE_1: NORMAL
MDC_IDC_SET_LEADCHNL_RV_SENSING_CATHODE_LOCATION_1: NORMAL
MDC_IDC_SET_LEADCHNL_RV_SENSING_POLARITY: NORMAL
MDC_IDC_SET_LEADCHNL_RV_SENSING_SENSITIVITY: 0.5 MV
MDC_IDC_SET_ZONE_DETECTION_INTERVAL: 300 MS
MDC_IDC_SET_ZONE_DETECTION_INTERVAL: 350 MS
MDC_IDC_SET_ZONE_STATUS: NORMAL
MDC_IDC_SET_ZONE_TYPE: NORMAL
MDC_IDC_SET_ZONE_VENDOR_TYPE: NORMAL
MDC_IDC_STAT_AT_BURDEN_PERCENT: 1 %
MDC_IDC_STAT_AT_DTM_END: NORMAL
MDC_IDC_STAT_AT_DTM_START: NORMAL
MDC_IDC_STAT_AT_MODE_SW_COUNT: 3
MDC_IDC_STAT_AT_MODE_SW_COUNT_PER_DAY: 0
MDC_IDC_STAT_AT_MODE_SW_MAX_DURATION: 12 S
MDC_IDC_STAT_AT_MODE_SW_PERCENT_TIME: 1 %
MDC_IDC_STAT_BRADY_AP_VP_PERCENT: 24 %
MDC_IDC_STAT_BRADY_AP_VS_PERCENT: 1 %
MDC_IDC_STAT_BRADY_AS_VP_PERCENT: 66 %
MDC_IDC_STAT_BRADY_AS_VS_PERCENT: 2.8 %
MDC_IDC_STAT_BRADY_DTM_END: NORMAL
MDC_IDC_STAT_BRADY_DTM_START: NORMAL
MDC_IDC_STAT_BRADY_RA_PERCENT_PACED: 18 %
MDC_IDC_STAT_CRT_DTM_END: NORMAL
MDC_IDC_STAT_CRT_DTM_START: NORMAL
MDC_IDC_STAT_CRT_PERCENT_PACED: 91 %
MDC_IDC_STAT_EPISODE_RECENT_COUNT: 3
MDC_IDC_STAT_EPISODE_RECENT_COUNT_DTM_END: NORMAL
MDC_IDC_STAT_EPISODE_RECENT_COUNT_DTM_START: NORMAL
MDC_IDC_STAT_EPISODE_TYPE: NORMAL
MDC_IDC_STAT_EPISODE_VENDOR_TYPE: NORMAL
MDC_IDC_STAT_HEART_RATE_ATRIAL_MAX: 330 {BEATS}/MIN
MDC_IDC_STAT_HEART_RATE_ATRIAL_MEAN: 76 {BEATS}/MIN
MDC_IDC_STAT_HEART_RATE_ATRIAL_MIN: 40 {BEATS}/MIN
MDC_IDC_STAT_HEART_RATE_DTM_END: NORMAL
MDC_IDC_STAT_HEART_RATE_DTM_START: NORMAL
MDC_IDC_STAT_HEART_RATE_VENTRICULAR_MAX: 330 {BEATS}/MIN
MDC_IDC_STAT_HEART_RATE_VENTRICULAR_MEAN: 76 {BEATS}/MIN
MDC_IDC_STAT_HEART_RATE_VENTRICULAR_MIN: 50 {BEATS}/MIN
MDC_IDC_STAT_TACHYTHERAPY_ATP_DELIVERED_RECENT: 0
MDC_IDC_STAT_TACHYTHERAPY_RECENT_DTM_END: NORMAL
MDC_IDC_STAT_TACHYTHERAPY_RECENT_DTM_START: NORMAL
MDC_IDC_STAT_TACHYTHERAPY_SHOCKS_ABORTED_RECENT: 0
MDC_IDC_STAT_TACHYTHERAPY_SHOCKS_DELIVERED_RECENT: 0

## 2024-07-29 ENCOUNTER — OFFICE VISIT (OUTPATIENT)
Dept: CARDIOLOGY | Facility: CLINIC | Age: 89
End: 2024-07-29
Attending: INTERNAL MEDICINE
Payer: MEDICARE

## 2024-07-29 VITALS
BODY MASS INDEX: 23.56 KG/M2 | WEIGHT: 150.1 LBS | DIASTOLIC BLOOD PRESSURE: 63 MMHG | HEIGHT: 67 IN | OXYGEN SATURATION: 97 % | SYSTOLIC BLOOD PRESSURE: 110 MMHG | HEART RATE: 62 BPM

## 2024-07-29 DIAGNOSIS — I50.22 CHRONIC SYSTOLIC HEART FAILURE (H): ICD-10-CM

## 2024-07-29 DIAGNOSIS — I42.9 PRIMARY CARDIOMYOPATHY (H): Primary | ICD-10-CM

## 2024-07-29 DIAGNOSIS — I10 ESSENTIAL HYPERTENSION WITH GOAL BLOOD PRESSURE LESS THAN 140/90: ICD-10-CM

## 2024-07-29 DIAGNOSIS — E78.00 HYPERCHOLESTEROLEMIA: ICD-10-CM

## 2024-07-29 DIAGNOSIS — I05.9 MITRAL VALVE DISEASE: ICD-10-CM

## 2024-07-29 DIAGNOSIS — I47.29 NSVT (NONSUSTAINED VENTRICULAR TACHYCARDIA) (H): ICD-10-CM

## 2024-07-29 DIAGNOSIS — I25.10 CORONARY ARTERY DISEASE INVOLVING NATIVE CORONARY ARTERY OF NATIVE HEART WITHOUT ANGINA PECTORIS: ICD-10-CM

## 2024-07-29 DIAGNOSIS — Z95.810 ICD (IMPLANTABLE CARDIOVERTER-DEFIBRILLATOR) IN PLACE: ICD-10-CM

## 2024-07-29 PROCEDURE — 99214 OFFICE O/P EST MOD 30 MIN: CPT | Performed by: INTERNAL MEDICINE

## 2024-07-29 RX ORDER — POTASSIUM CHLORIDE 1500 MG/1
20 TABLET, EXTENDED RELEASE ORAL DAILY
Qty: 90 TABLET | Refills: 3 | Status: SHIPPED | OUTPATIENT
Start: 2024-07-29

## 2024-07-29 NOTE — PATIENT INSTRUCTIONS
It was a pleasure seeing you today and thank you for allowing me to be a part of your health care team.  Should you have any questions regarding your visit or future needs please feel free to reach out to my care team for assistance.      Thank you, Dr. Rah Ayala        **Nursing: [phone]       **Scheduling: [phone]

## 2024-07-29 NOTE — PROGRESS NOTES
General Cardiology Clinic Progress Note  Katie Shannon MRN# [id number]   YOB: 1935 Age: 89 year old       Reason for visit: Chronic systolic heart failure    History of presenting illness:    I had the opportunity to see Katie Shannon at [facility] today for reevaluation of chronic systolic heart failure.  I recently saw him in April 2024 after he had been experiencing some episodes of pulmonary edema characterized primarily as orthopnea and exertional dyspnea.  His echo at that time had suggested worsening mitral regurgitation, possibly severe, with a stable ejection fraction of 30%.  I referred him for a LIZ which I performed myself on 5/10/2024.  Fortunately that LIZ study confirmed that his mitral regurgitation appeared stable and moderate in severity.  I recommended continued medical management rather than percutaneous mitral valve repair procedure and he has done well.    He is currently taking Lasix 80 mg a day but seems to do very well with that.  He is not experiencing any symptoms of shortness of breath with exertion, PND, orthopnea.  He is still able to get out on his riding mower and do yard work.    He has no specific complaints today and seems very pleased with how he is feeling.    He also has a history of some episodes of ventricular tachycardia treated with antitachycardia pacing by his ICD in January and February.  Since then, he has been on sotalol and we have not seen any prolonged episodes of VT.  He has a few brief episodes of nonsustained VT that have not required any ICD treatments.  He is 1 year battery life remaining.    On examination today his blood pressure is 110/63, heart rate 62, and weight 150 pounds.    I reviewed the laboratory studies done by his primary provider on 6/21/2024, including a basic metabolic panel.  His potassium was 4.1 and creatinine 0.92              Assessment and Plan:     ASSESSMENT:    Mr. Katie Shannon is an 89-year-old gentleman with  chronic systolic heart failure due to nonischemic cardiomyopathy and moderate mitral regurgitation.  His ejection fraction is stable at 30%.  He has moderate mitral regurgitation which does not require any aggressive treatment at this time.    He is doing very well without ongoing symptoms of heart failure with normal activities.  Earlier this year he was having orthopnea symptoms but that has resolved with more aggressive diuretic treatment.  On furosemide 80 mg a day, he seems to remain euvolemic and continue to have normal kidney function and potassium.  He takes potassium supplement 20 mill equivalents daily.    I have not made any medication changes today.  He seems to be doing very well with this program.  I will plan to have him follow-up with me again in 6 months for reevaluation.    Rah Ayala MD         Orders this Visit:  Orders Placed This Encounter   Procedures    Basic metabolic panel    Hemoglobin    Follow-Up with Cardiology [facility] (Self)    Echocardiogram Complete     Orders Placed This Encounter   Medications    potassium chloride ER (K-TAB) 20 MEQ CR tablet     Sig: Take 1 tablet (20 mEq) by mouth daily     Dispense:  90 tablet     Refill:  3     Medications Discontinued During This Encounter   Medication Reason    potassium chloride ER (K-TAB/KLOR-CON) 10 MEQ CR tablet Reorder (No AVS)       Today's clinic visit entailed:    30 minutes spent by me on the date of the encounter doing chart review, history and exam, documentation and further activities per the note  Provider  Link to [facility] Help Grid     The level of medical decision making during this visit was of high complexity.           Review of Systems:     Review of Systems:  Skin:  Negative     Eyes:  Negative glasses  ENT:  Negative hearing loss  Respiratory:  Negative shortness of breath;dyspnea on exertion;CPAP;sleep apnea  Cardiovascular:  chest pain;Negative;palpitations;edema;fatigue;lightheadedness;dizziness Positive  "for;edema  Gastroenterology: Negative    Genitourinary:  Negative prostate problem;nocturia  Musculoskeletal:  Negative arthritis  Neurologic:  Negative numbness or tingling of hands  Psychiatric:  Negative    Heme/Lymph/Imm:  Positive for allergies  Endocrine:  Negative thyroid disorder;diabetes            Physical Exam:     Vitals: /63 (BP Location: Right arm, Patient Position: Sitting)   Pulse 62   Ht 1.702 m (5' 7\")   Wt 68.1 kg (150 lb 1.6 oz)   SpO2 97%   BMI 23.51 kg/m    Constitutional: Well nourished and in no apparent distress.  Eyes: Pupils equal, round. Sclerae anicteric.   HEENT: Normocephalic, atraumatic.   Neck: Supple. JVD   Respiratory: Breathing non-labored. Lungs clear to auscultation bilaterally. No crackles, wheezes, rhonchi, or rales.  Cardiovascular:  Regular rate and rhythm, normal S1 and S2. No murmur, rub, or gallop.  Skin: Warm, dry. No rashes, cyanosis, or xanthelasma.  Extremities: No edema.  Neurologic: No gross motor deficits. Alert, awake, and oriented to person, place and time.  Psychiatric: Affect appropriate.             Medications:     Current Outpatient Medications   Medication Sig Dispense Refill    carboxymethylcellulose PF (REFRESH LIQUIGEL) 1 % ophthalmic gel Place 1 drop into both eyes 3 times daily      carvedilol (COREG) 12.5 MG tablet Take 1 tablet (12.5 mg) by mouth 2 times daily (with meals) 180 tablet 3    ciclopirox (PENLAC) 8 % external solution       clopidogrel (PLAVIX) 75 MG tablet Take 75 mg by mouth daily      CYCLOBENZAPRINE HCL PO Take 5 mg by mouth At Bedtime Taking PRN (half tab)      Famotidine (PEPCID PO) Take 40 mg by mouth daily       furosemide (LASIX) 20 MG tablet Take 80 mg by mouth daily      isosorbide mononitrate (IMDUR) 60 MG 24 hr tablet Take 1 tablet (60 mg) by mouth daily 90 tablet 3    omeprazole (PRILOSEC) 20 MG DR capsule Take 20 mg by mouth daily      polyethylene glycol 400 (BLINK TEARS) 0.25 % SOLN ophthalmic solution Place 1 " drop into both eyes 2 times daily as needed      potassium chloride ER (K-TAB) 20 MEQ CR tablet Take 1 tablet (20 mEq) by mouth daily 90 tablet 3    prednisoLONE acetate (PRED FORTE) 1 % ophthalmic suspension INSTILL 1 DROP INTO LEFT EYE FOUR TIME DAILY      rosuvastatin (CRESTOR) 20 MG tablet Take 1 tablet (20 mg) by mouth daily 90 tablet 3    sacubitril-valsartan (ENTRESTO)  MG per tablet Take 1 tablet by mouth 2 times daily 180 tablet 1    sotalol (BETAPACE) 80 MG tablet Take 1 tablet (80 mg) by mouth 2 times daily 180 tablet 2    tamsulosin (FLOMAX) 0.4 MG capsule Take 0.4 mg by mouth daily      White Petrolatum-Mineral Oil (RETAINE PM) OINT Place into both eyes At Bedtime      LORazepam (ATIVAN) 0.5 MG tablet Take 0.5 mg by mouth Take one tablet by mouth if needed in the morning and bedtime for anxiety or sleep max 2 tablets daily (Patient not taking: Reported on 7/29/2024)      traZODone (DESYREL) 50 MG tablet Take 50 mg by mouth at bedtime (Patient not taking: Reported on 7/29/2024)         Family History   Problem Relation Age of Onset    Heart Disease Mother 88        mi    Cerebrovascular Disease Father 71       Social History     Socioeconomic History    Marital status:      Spouse name: Not on file    Number of children: Not on file    Years of education: Not on file    Highest education level: Not on file   Occupational History    Not on file   Tobacco Use    Smoking status: Never    Smokeless tobacco: Never   Substance and Sexual Activity    Alcohol use: No     Alcohol/week: 0.0 standard drinks of alcohol    Drug use: No    Sexual activity: Not on file   Other Topics Concern     Service Not Asked    Blood Transfusions Not Asked    Caffeine Concern No    Occupational Exposure Not Asked    Hobby Hazards Not Asked    Sleep Concern No    Stress Concern Not Asked    Weight Concern No    Special Diet No    Back Care Not Asked    Exercise Yes     Comment: active on farm    Bike Helmet Not  Asked    Seat Belt Not Asked    Self-Exams Not Asked    Parent/sibling w/ CABG, MI or angioplasty before 65F 55M? No   Social History Narrative    Not on file     Social Determinants of Health     Financial Resource Strain: Low Risk  (11/14/2022)    [facility]    Overall Financial Resource Strain (CARDIA)     Difficulty of Paying Living Expenses: Not hard at all   Food Insecurity: No Food Insecurity (11/28/2023)    Received from [facility], [facility]    Hunger Vital Sign     Worried About Running Out of Food in the Last Year: Never true     Ran Out of Food in the Last Year: Never true   Transportation Needs: No Transportation Needs (11/14/2022)    Received from [facility], [facility]    PRAPARE - Transportation     Lack of Transportation (Medical): No     Lack of Transportation (Non-Medical): No   Physical Activity: Sufficiently Active (11/14/2022)    Received from [facility], [facility]    Exercise Vital Sign     Days of Exercise per Week: 7 days     Minutes of Exercise per Session: 40 min   Stress: No Stress Concern Present (11/28/2023)    Received from [facility], [facility]    Citizen of [place] of Occupational [employer] - Occupational Stress Questionnaire     Feeling of Stress : Not at all   Social Connections: Socially Isolated (11/14/2022)    Received from [facility], [facility]    Social Connection and Isolation Panel [NHANES]     Frequency of Communication with Friends and Family: Once a week     Frequency of Social Gatherings with Friends and Family: Once a week     Attends [employer] Services: Never     Active Member of Clubs or Organizations: No     Attends Club or Organization Meetings: Never     Marital Status:     Interpersonal Safety: Not At Risk (7/15/2024)    Received from [facility]    Intimate Partner Violence     Are you in a relationship where you are physically hurt, threatened and/or made to feel afraid?: No   Housing Stability: Unknown (11/14/2022)    Received from [facility], [facility]    Housing Stability     In the last 12 months, was there a time when you were not able to pay the mortgage or rent on time?: No     Number of Places Lived in the Last Year: Not on file     Number of Places Lived in the Last Year (Outpatient): Not on file     In the last 12 months, was there a time when you did not have a steady place to sleep or slept in a shelter (including now)?: No            Past Medical History:     Past Medical History:   Diagnosis Date    CAD (coronary artery disease)     Congestive heart failure (H)     Heart disease     non-ischemic cardiomyopathy    HTN (hypertension)     Idiopathic cardiomyopathy (H)               Past Surgical History:     Past Surgical History:   Procedure Laterality Date    COLONOSCOPY N/A 1/29/2020    Procedure: COLONOSCOPY;  Surgeon: Erica Duff MD;  Location: [facility]    ESOPHAGOSCOPY, GASTROSCOPY, DUODENOSCOPY (EGD), COMBINED N/A 1/28/2020    Procedure: ESOPHAGOGASTRODUODENOSCOPY (EGD);  Surgeon: Erica Duff MD;  Location:  GI    EYE SURGERY  2006    cataract    ORTHOPEDIC SURGERY  01/2018    pinning of right hip              Allergies:   Atorvastatin, Diphenhydramine, Metoprolol succinate [metoprolol], Strawberry extract, and Sulfa antibiotics       Data:   All laboratory data reviewed:    Recent Labs   Lab Test 04/25/24  1258 10/24/22  1344 05/08/20  1042 12/04/19  1205   LDL 56  --  50 77   HDL 38*  --  41 42   NHDL 81  --  84 103   CHOL 119  --  125 145   TRIG 124  --  168* 130   TSH  --  2.16 3.08  --    NTBNP  --  3,089*  --   --        Lab Results   Component Value Date    WBC 7.6 10/24/2022    WBC  "6.3 02/04/2020    RBC 4.21 (L) 10/24/2022    RBC 3.13 (L) 01/28/2020    HGB 12.5 (L) 04/25/2024    HGB 13.7 11/09/2020    HCT 38.5 (L) 10/24/2022    HCT 25.4 (A) 02/04/2020    MCV 91 10/24/2022    MCV 91.0 02/04/2020    MCH 31.8 10/24/2022    MCH 32.1 02/04/2020    MCHC 34.8 10/24/2022    MCHC 35.3 02/04/2020    RDW 12.4 10/24/2022    RDW 14.4 02/04/2020     10/24/2022     01/28/2020       Lab Results   Component Value Date     04/25/2024     (L) 11/09/2020    POTASSIUM 4.2 04/25/2024    POTASSIUM 4.0 10/24/2022    POTASSIUM 4.4 11/09/2020    CHLORIDE 104 04/25/2024    CHLORIDE 102 01/26/2024    CHLORIDE 104 11/09/2020    CO2 27 04/25/2024    CO2 27 10/24/2022    CO2 26 11/09/2020    ANIONGAP 9 04/25/2024    ANIONGAP 2 (L) 10/24/2022    ANIONGAP 9.4 11/09/2020     (H) 04/25/2024     (H) 10/24/2022     (H) 11/09/2020    BUN 17.9 04/25/2024    BUN 17 10/24/2022    BUN 15 11/09/2020    CR 0.91 04/25/2024    CR 1.02 11/09/2020    GFRESTIMATED 82 04/25/2024    GFRESTIMATED 70 11/09/2020    GFRESTBLACK 84 11/09/2020    ELIZABETH 8.6 (L) 04/25/2024    ELIZABETH 8.8 11/09/2020      Lab Results   Component Value Date    AST 22 02/04/2020    ALT 13 04/25/2024    ALT <5 (L) 05/08/2020       No results found for: \"A1C\"    Lab Results   Component Value Date    INR 1.19 (H) 01/28/2020    INR 1.11 01/28/2020         ISIDORO MARI MD  [facility]  "

## 2024-07-29 NOTE — LETTER
7/29/2024    Reba Gilmore MD  [address]  [facility] [id number]    RE: Katie Shannon       Dear Colleague,     I had the pleasure of seeing Katie Shannon in the [facility].    General Cardiology Clinic Progress Note  Katie Shannon MRN# [id number]   YOB: 1935 Age: 89 year old       Reason for visit: Chronic systolic heart failure    History of presenting illness:    I had the opportunity to see Katie Shannon at [facility] today for reevaluation of chronic systolic heart failure.  I recently saw him in April 2024 after he had been experiencing some episodes of pulmonary edema characterized primarily as orthopnea and exertional dyspnea.  His echo at that time had suggested worsening mitral regurgitation, possibly severe, with a stable ejection fraction of 30%.  I referred him for a LIZ which I performed myself on 5/10/2024.  Fortunately that LIZ study confirmed that his mitral regurgitation appeared stable and moderate in severity.  I recommended continued medical management rather than percutaneous mitral valve repair procedure and he has done well.    He is currently taking Lasix 80 mg a day but seems to do very well with that.  He is not experiencing any symptoms of shortness of breath with exertion, PND, orthopnea.  He is still able to get out on his riding mower and do yard work.    He has no specific complaints today and seems very pleased with how he is feeling.    He also has a history of some episodes of ventricular tachycardia treated with antitachycardia pacing by his ICD in January and February.  Since then, he has been on sotalol and we have not seen any prolonged episodes of VT.  He has a few brief episodes of nonsustained VT that have not required any ICD treatments.  He is 1 year battery life remaining.    On examination today his blood pressure is 110/63, heart rate 62, and weight 150 pounds.    I reviewed the laboratory studies done by his primary provider  on 6/21/2024, including a basic metabolic panel.  His potassium was 4.1 and creatinine 0.92              Assessment and Plan:     ASSESSMENT:    Mr. Katie Shannon is an 89-year-old gentleman with chronic systolic heart failure due to nonischemic cardiomyopathy and moderate mitral regurgitation.  His ejection fraction is stable at 30%.  He has moderate mitral regurgitation which does not require any aggressive treatment at this time.    He is doing very well without ongoing symptoms of heart failure with normal activities.  Earlier this year he was having orthopnea symptoms but that has resolved with more aggressive diuretic treatment.  On furosemide 80 mg a day, he seems to remain euvolemic and continue to have normal kidney function and potassium.  He takes potassium supplement 20 mill equivalents daily.    I have not made any medication changes today.  He seems to be doing very well with this program.  I will plan to have him follow-up with me again in 6 months for reevaluation.    Rah Ayala MD         Orders this Visit:  Orders Placed This Encounter   Procedures    Basic metabolic panel    Hemoglobin    Follow-Up with Cardiology C[facility] (Self)    Echocardiogram Complete     Orders Placed This Encounter   Medications    potassium chloride ER (K-TAB) 20 MEQ CR tablet     Sig: Take 1 tablet (20 mEq) by mouth daily     Dispense:  90 tablet     Refill:  3     Medications Discontinued During This Encounter   Medication Reason    potassium chloride ER (K-TAB/KLOR-CON) 10 MEQ CR tablet Reorder (No AVS)       Today's clinic visit entailed:    30 minutes spent by me on the date of the encounter doing chart review, history and exam, documentation and further activities per the note  Provider  Link to [facility] Help Grid     The level of medical decision making during this visit was of high complexity.           Review of Systems:     Review of Systems:  Skin:  Negative     Eyes:  Negative glasses  ENT:  Negative hearing  "loss  Respiratory:  Negative shortness of breath;dyspnea on exertion;CPAP;sleep apnea  Cardiovascular:  chest pain;Negative;palpitations;edema;fatigue;lightheadedness;dizziness Positive for;edema  Gastroenterology: Negative    Genitourinary:  Negative prostate problem;nocturia  Musculoskeletal:  Negative arthritis  Neurologic:  Negative numbness or tingling of hands  Psychiatric:  Negative    Heme/Lymph/Imm:  Positive for allergies  Endocrine:  Negative thyroid disorder;diabetes            Physical Exam:     Vitals: /63 (BP Location: Right arm, Patient Position: Sitting)   Pulse 62   Ht 1.702 m (5' 7\")   Wt 68.1 kg (150 lb 1.6 oz)   SpO2 97%   BMI 23.51 kg/m    Constitutional: Well nourished and in no apparent distress.  Eyes: Pupils equal, round. Sclerae anicteric.   HEENT: Normocephalic, atraumatic.   Neck: Supple. JVD   Respiratory: Breathing non-labored. Lungs clear to auscultation bilaterally. No crackles, wheezes, rhonchi, or rales.  Cardiovascular:  Regular rate and rhythm, normal S1 and S2. No murmur, rub, or gallop.  Skin: Warm, dry. No rashes, cyanosis, or xanthelasma.  Extremities: No edema.  Neurologic: No gross motor deficits. Alert, awake, and oriented to person, place and time.  Psychiatric: Affect appropriate.             Medications:     Current Outpatient Medications   Medication Sig Dispense Refill    carboxymethylcellulose PF (REFRESH LIQUIGEL) 1 % ophthalmic gel Place 1 drop into both eyes 3 times daily      carvedilol (COREG) 12.5 MG tablet Take 1 tablet (12.5 mg) by mouth 2 times daily (with meals) 180 tablet 3    ciclopirox (PENLAC) 8 % external solution       clopidogrel (PLAVIX) 75 MG tablet Take 75 mg by mouth daily      CYCLOBENZAPRINE HCL PO Take 5 mg by mouth At Bedtime Taking PRN (half tab)      Famotidine (PEPCID PO) Take 40 mg by mouth daily       furosemide (LASIX) 20 MG tablet Take 80 mg by mouth daily      isosorbide mononitrate (IMDUR) 60 MG 24 hr tablet Take 1 tablet " (60 mg) by mouth daily 90 tablet 3    omeprazole (PRILOSEC) 20 MG DR capsule Take 20 mg by mouth daily      polyethylene glycol 400 (BLINK TEARS) 0.25 % SOLN ophthalmic solution Place 1 drop into both eyes 2 times daily as needed      potassium chloride ER (K-TAB) 20 MEQ CR tablet Take 1 tablet (20 mEq) by mouth daily 90 tablet 3    prednisoLONE acetate (PRED FORTE) 1 % ophthalmic suspension INSTILL 1 DROP INTO LEFT EYE FOUR TIME DAILY      rosuvastatin (CRESTOR) 20 MG tablet Take 1 tablet (20 mg) by mouth daily 90 tablet 3    sacubitril-valsartan (ENTRESTO)  MG per tablet Take 1 tablet by mouth 2 times daily 180 tablet 1    sotalol (BETAPACE) 80 MG tablet Take 1 tablet (80 mg) by mouth 2 times daily 180 tablet 2    tamsulosin (FLOMAX) 0.4 MG capsule Take 0.4 mg by mouth daily      White Petrolatum-Mineral Oil (RETAINE PM) OINT Place into both eyes At Bedtime      LORazepam (ATIVAN) 0.5 MG tablet Take 0.5 mg by mouth Take one tablet by mouth if needed in the morning and bedtime for anxiety or sleep max 2 tablets daily (Patient not taking: Reported on 7/29/2024)      traZODone (DESYREL) 50 MG tablet Take 50 mg by mouth at bedtime (Patient not taking: Reported on 7/29/2024)         Family History   Problem Relation Age of Onset    Heart Disease Mother 88        mi    Cerebrovascular Disease Father 71       Social History     Socioeconomic History    Marital status:      Spouse name: Not on file    Number of children: Not on file    Years of education: Not on file    Highest education level: Not on file   Occupational History    Not on file   Tobacco Use    Smoking status: Never    Smokeless tobacco: Never   Substance and Sexual Activity    Alcohol use: No     Alcohol/week: 0.0 standard drinks of alcohol    Drug use: No    Sexual activity: Not on file   Other Topics Concern     Service Not Asked    Blood Transfusions Not Asked    Caffeine Concern No    Occupational Exposure Not Asked    Hobby  Hazards Not Asked    Sleep Concern No    Stress Concern Not Asked    Weight Concern No    Special Diet No    Back Care Not Asked    Exercise Yes     Comment: active on farm    Bike Helmet Not Asked    Seat Belt Not Asked    Self-Exams Not Asked    Parent/sibling w/ CABG, MI or angioplasty before 65F 55M? No   Social History Narrative    Not on file     Social Determinants of Health     Financial Resource Strain: Low Risk  (11/14/2022)    [facility]    Overall Financial Resource Strain (CARDIA)     Difficulty of Paying Living Expenses: Not hard at all   Food Insecurity: No Food Insecurity (11/28/2023)    [facility]    Hunger Vital Sign     Worried About Running Out of Food in the Last Year: Never true     Ran Out of Food in the Last Year: Never true   Transportation Needs: No Transportation Needs (11/14/2022)    [facility]    PRAPARE - Transportation     Lack of Transportation (Medical): No     Lack of Transportation (Non-Medical): No   Physical Activity: Sufficiently Active (11/14/2022)    [facility]    Exercise Vital Sign     Days of Exercise per Week: 7 days     Minutes of Exercise per Session: 40 min   Stress: No Stress Concern Present (11/28/2023)    [facility]    [place] [employer] - Occupational Stress Questionnaire     Feeling of Stress : Not at all   Social Connections: Socially Isolated (11/14/2022)    [facility]    Social Connection and Isolation Panel [NHANES]     Frequency of Communication with Friends and Family: Once a week     Frequency of Social Gatherings with  Friends and Family: Once a week     Attends Church Services: Never     Active Member of Clubs or Organizations: No     Attends Club or Organization Meetings: Never     Marital Status:    Interpersonal Safety: Not At Risk (7/15/2024)    Received from [facility]    Intimate Partner Violence     Are you in a relationship where you are physically hurt, threatened and/or made to feel afraid?: No   Housing Stability: Unknown (11/14/2022)    [facility]    Housing Stability     In the last 12 months, was there a time when you were not able to pay the mortgage or rent on time?: No     Number of Places Lived in the Last Year: Not on file     Number of Places Lived in the Last Year (Outpatient): Not on file     In the last 12 months, was there a time when you did not have a steady place to sleep or slept in a shelter (including now)?: No            Past Medical History:     Past Medical History:   Diagnosis Date    CAD (coronary artery disease)     Congestive heart failure (H)     Heart disease     non-ischemic cardiomyopathy    HTN (hypertension)     Idiopathic cardiomyopathy (H)               Past Surgical History:     Past Surgical History:   Procedure Laterality Date    COLONOSCOPY N/A 1/29/2020    Procedure: COLONOSCOPY;  Surgeon: Erica Duff MD;  Location:  GI    ESOPHAGOSCOPY, GASTROSCOPY, DUODENOSCOPY (EGD), COMBINED N/A 1/28/2020    Procedure: ESOPHAGOGASTRODUODENOSCOPY (EGD);  Surgeon: Erica Duff MD;  Location:  GI    EYE SURGERY  2006    cataract    ORTHOPEDIC SURGERY  01/2018    pinning of right hip              Allergies:   Atorvastatin, Diphenhydramine, Metoprolol succinate [metoprolol], Strawberry extract, and Sulfa antibiotics       Data:   All laboratory data reviewed:    Recent Labs   Lab Test 04/25/24  1258 10/24/22  1344 05/08/20  1042 12/04/19  1205   LDL 56  --  50 77   HDL 38*  --  41 42  "  NHDL 81  --  84 103   CHOL 119  --  125 145   TRIG 124  --  168* 130   TSH  --  2.16 3.08  --    NTBNP  --  3,089*  --   --        Lab Results   Component Value Date    WBC 7.6 10/24/2022    WBC 6.3 02/04/2020    RBC 4.21 (L) 10/24/2022    RBC 3.13 (L) 01/28/2020    HGB 12.5 (L) 04/25/2024    HGB 13.7 11/09/2020    HCT 38.5 (L) 10/24/2022    HCT 25.4 (A) 02/04/2020    MCV 91 10/24/2022    MCV 91.0 02/04/2020    MCH 31.8 10/24/2022    MCH 32.1 02/04/2020    MCHC 34.8 10/24/2022    MCHC 35.3 02/04/2020    RDW 12.4 10/24/2022    RDW 14.4 02/04/2020     10/24/2022     01/28/2020       Lab Results   Component Value Date     04/25/2024     (L) 11/09/2020    POTASSIUM 4.2 04/25/2024    POTASSIUM 4.0 10/24/2022    POTASSIUM 4.4 11/09/2020    CHLORIDE 104 04/25/2024    CHLORIDE 102 01/26/2024    CHLORIDE 104 11/09/2020    CO2 27 04/25/2024    CO2 27 10/24/2022    CO2 26 11/09/2020    ANIONGAP 9 04/25/2024    ANIONGAP 2 (L) 10/24/2022    ANIONGAP 9.4 11/09/2020     (H) 04/25/2024     (H) 10/24/2022     (H) 11/09/2020    BUN 17.9 04/25/2024    BUN 17 10/24/2022    BUN 15 11/09/2020    CR 0.91 04/25/2024    CR 1.02 11/09/2020    GFRESTIMATED 82 04/25/2024    GFRESTIMATED 70 11/09/2020    GFRESTBLACK 84 11/09/2020    ELIZABETH 8.6 (L) 04/25/2024    ELIZABETH 8.8 11/09/2020      Lab Results   Component Value Date    AST 22 02/04/2020    ALT 13 04/25/2024    ALT <5 (L) 05/08/2020       No results found for: \"A1C\"    Lab Results   Component Value Date    INR 1.19 (H) 01/28/2020    INR 1.11 01/28/2020         RAH AYALA MD  [facility]    Thank you for allowing me to participate in the care of your patient.      Sincerely,     RAH AYALA MD     [facility]  cc:   Rah Ayala MD  [address] [place] [id number]      "

## 2024-08-19 ENCOUNTER — ANCILLARY PROCEDURE (OUTPATIENT)
Dept: CARDIOLOGY | Facility: CLINIC | Age: 89
End: 2024-08-19
Attending: INTERNAL MEDICINE
Payer: MEDICARE

## 2024-08-19 DIAGNOSIS — I42.9 PRIMARY CARDIOMYOPATHY (H): ICD-10-CM

## 2024-08-19 DIAGNOSIS — Z95.810 ICD (IMPLANTABLE CARDIOVERTER-DEFIBRILLATOR) IN PLACE: ICD-10-CM

## 2024-08-19 PROCEDURE — 93296 REM INTERROG EVL PM/IDS: CPT | Performed by: INTERNAL MEDICINE

## 2024-08-19 PROCEDURE — 93295 DEV INTERROG REMOTE 1/2/MLT: CPT | Performed by: INTERNAL MEDICINE

## 2024-08-21 ENCOUNTER — TELEPHONE (OUTPATIENT)
Dept: CARDIOLOGY | Facility: CLINIC | Age: 89
End: 2024-08-21
Payer: MEDICARE

## 2024-08-21 DIAGNOSIS — I42.9 PRIMARY CARDIOMYOPATHY (H): Primary | ICD-10-CM

## 2024-08-21 DIAGNOSIS — Z95.810 ICD (IMPLANTABLE CARDIOVERTER-DEFIBRILLATOR) IN PLACE: ICD-10-CM

## 2024-08-21 DIAGNOSIS — I50.22 CHRONIC SYSTOLIC HEART FAILURE (H): ICD-10-CM

## 2024-08-21 DIAGNOSIS — I47.29 NSVT (NONSUSTAINED VENTRICULAR TACHYCARDIA) (H): ICD-10-CM

## 2024-08-21 NOTE — TELEPHONE ENCOUNTER
"Remote device check:   Pt noted to have long episode of NSVT 7/1/2024 @ 11:50 am.   EGM has no onset of NSVT that lasted 18 seconds with rates 190-220. Perhaps because episode started out slower in the 130-140's then accelerated the last 9 seconds when it spontaneously converted. VT zone set at 170bpm/ VF set at 200 bpm (see below)  Called pt who states he does not recall any symptoms. States he is very good about taking his meds.   Pt takes Sotolol and is on Coreg.  Per 7/29/2024 OV with Dr Ayala:  Pt has a HX of \"longstanding nonischemic cardiomyopathy with mild to moderate diffuse nonocclusive coronary artery disease and ejection fraction of 30 to 35%. He has a biventricular ICD in place with episodes of ventricular tachycardia. \"  Will route to Dr Ayala and Team 7 for review and follow up  Fabi HARRISON          "

## 2024-08-22 LAB
MDC_IDC_EPISODE_DTM: NORMAL
MDC_IDC_EPISODE_DURATION: 12 S
MDC_IDC_EPISODE_DURATION: 6 S
MDC_IDC_EPISODE_DURATION: 8 S
MDC_IDC_EPISODE_ID: NORMAL
MDC_IDC_EPISODE_TYPE: NORMAL
MDC_IDC_EPISODE_VENDOR_TYPE: NORMAL
MDC_IDC_LEAD_CONNECTION_STATUS: NORMAL
MDC_IDC_LEAD_IMPLANT_DT: NORMAL
MDC_IDC_LEAD_LOCATION: NORMAL
MDC_IDC_LEAD_LOCATION_DETAIL_1: NORMAL
MDC_IDC_LEAD_MFG: NORMAL
MDC_IDC_LEAD_MODEL: NORMAL
MDC_IDC_LEAD_POLARITY_TYPE: NORMAL
MDC_IDC_LEAD_POLARITY_TYPE: NORMAL
MDC_IDC_LEAD_SERIAL: NORMAL
MDC_IDC_LEAD_SPECIAL_FUNCTION: NORMAL
MDC_IDC_MSMT_BATTERY_DTM: NORMAL
MDC_IDC_MSMT_BATTERY_REMAINING_LONGEVITY: 18 MO
MDC_IDC_MSMT_BATTERY_REMAINING_PERCENTAGE: 27 %
MDC_IDC_MSMT_BATTERY_RRT_TRIGGER: NORMAL
MDC_IDC_MSMT_BATTERY_STATUS: NORMAL
MDC_IDC_MSMT_BATTERY_VOLTAGE: 2.83 V
MDC_IDC_MSMT_CAP_CHARGE_DTM: NORMAL
MDC_IDC_MSMT_CAP_CHARGE_ENERGY: 40 J
MDC_IDC_MSMT_CAP_CHARGE_TIME: 9.5 S
MDC_IDC_MSMT_CAP_CHARGE_TYPE: NORMAL
MDC_IDC_MSMT_LEADCHNL_LV_IMPEDANCE_VALUE: 390 OHM
MDC_IDC_MSMT_LEADCHNL_LV_LEAD_CHANNEL_STATUS: NORMAL
MDC_IDC_MSMT_LEADCHNL_LV_PACING_THRESHOLD_AMPLITUDE: 1 V
MDC_IDC_MSMT_LEADCHNL_LV_PACING_THRESHOLD_PULSEWIDTH: 0.5 MS
MDC_IDC_MSMT_LEADCHNL_RA_IMPEDANCE_VALUE: 440 OHM
MDC_IDC_MSMT_LEADCHNL_RA_LEAD_CHANNEL_STATUS: NORMAL
MDC_IDC_MSMT_LEADCHNL_RA_PACING_THRESHOLD_AMPLITUDE: 1 V
MDC_IDC_MSMT_LEADCHNL_RA_PACING_THRESHOLD_PULSEWIDTH: 0.5 MS
MDC_IDC_MSMT_LEADCHNL_RA_SENSING_INTR_AMPL: 1.7 MV
MDC_IDC_MSMT_LEADCHNL_RV_IMPEDANCE_VALUE: 440 OHM
MDC_IDC_MSMT_LEADCHNL_RV_LEAD_CHANNEL_STATUS: NORMAL
MDC_IDC_MSMT_LEADCHNL_RV_PACING_THRESHOLD_AMPLITUDE: 2.12 V
MDC_IDC_MSMT_LEADCHNL_RV_PACING_THRESHOLD_PULSEWIDTH: 0.5 MS
MDC_IDC_MSMT_LEADCHNL_RV_SENSING_INTR_AMPL: 12 MV
MDC_IDC_PG_IMPLANT_DTM: NORMAL
MDC_IDC_PG_MFG: NORMAL
MDC_IDC_PG_MODEL: NORMAL
MDC_IDC_PG_SERIAL: NORMAL
MDC_IDC_PG_TYPE: NORMAL
MDC_IDC_SESS_CLINIC_NAME: NORMAL
MDC_IDC_SESS_DTM: NORMAL
MDC_IDC_SESS_REPROGRAMMED: NO
MDC_IDC_SESS_TYPE: NORMAL
MDC_IDC_SET_BRADY_AT_MODE_SWITCH_MODE: NORMAL
MDC_IDC_SET_BRADY_AT_MODE_SWITCH_RATE: 170 {BEATS}/MIN
MDC_IDC_SET_BRADY_LOWRATE: 60 {BEATS}/MIN
MDC_IDC_SET_BRADY_MAX_SENSOR_RATE: 120 {BEATS}/MIN
MDC_IDC_SET_BRADY_MAX_TRACKING_RATE: 120 {BEATS}/MIN
MDC_IDC_SET_BRADY_MODE: NORMAL
MDC_IDC_SET_BRADY_PAV_DELAY_HIGH: 100 MS
MDC_IDC_SET_BRADY_PAV_DELAY_LOW: 150 MS
MDC_IDC_SET_BRADY_SAV_DELAY_HIGH: 100 MS
MDC_IDC_SET_BRADY_SAV_DELAY_LOW: 120 MS
MDC_IDC_SET_CRT_LVRV_DELAY: 0 MS
MDC_IDC_SET_CRT_PACED_CHAMBERS: NORMAL
MDC_IDC_SET_LEADCHNL_LV_PACING_AMPLITUDE: 2 V
MDC_IDC_SET_LEADCHNL_LV_PACING_ANODE_ELECTRODE_1: NORMAL
MDC_IDC_SET_LEADCHNL_LV_PACING_ANODE_LOCATION_1: NORMAL
MDC_IDC_SET_LEADCHNL_LV_PACING_CAPTURE_MODE: NORMAL
MDC_IDC_SET_LEADCHNL_LV_PACING_CATHODE_ELECTRODE_1: NORMAL
MDC_IDC_SET_LEADCHNL_LV_PACING_CATHODE_LOCATION_1: NORMAL
MDC_IDC_SET_LEADCHNL_LV_PACING_POLARITY: NORMAL
MDC_IDC_SET_LEADCHNL_LV_PACING_PULSEWIDTH: 0.5 MS
MDC_IDC_SET_LEADCHNL_RA_PACING_AMPLITUDE: 2 V
MDC_IDC_SET_LEADCHNL_RA_PACING_ANODE_ELECTRODE_1: NORMAL
MDC_IDC_SET_LEADCHNL_RA_PACING_ANODE_LOCATION_1: NORMAL
MDC_IDC_SET_LEADCHNL_RA_PACING_CAPTURE_MODE: NORMAL
MDC_IDC_SET_LEADCHNL_RA_PACING_CATHODE_ELECTRODE_1: NORMAL
MDC_IDC_SET_LEADCHNL_RA_PACING_CATHODE_LOCATION_1: NORMAL
MDC_IDC_SET_LEADCHNL_RA_PACING_POLARITY: NORMAL
MDC_IDC_SET_LEADCHNL_RA_PACING_PULSEWIDTH: 0.5 MS
MDC_IDC_SET_LEADCHNL_RA_SENSING_ADAPTATION_MODE: NORMAL
MDC_IDC_SET_LEADCHNL_RA_SENSING_ANODE_ELECTRODE_1: NORMAL
MDC_IDC_SET_LEADCHNL_RA_SENSING_ANODE_LOCATION_1: NORMAL
MDC_IDC_SET_LEADCHNL_RA_SENSING_CATHODE_ELECTRODE_1: NORMAL
MDC_IDC_SET_LEADCHNL_RA_SENSING_CATHODE_LOCATION_1: NORMAL
MDC_IDC_SET_LEADCHNL_RA_SENSING_POLARITY: NORMAL
MDC_IDC_SET_LEADCHNL_RA_SENSING_SENSITIVITY: 0.3 MV
MDC_IDC_SET_LEADCHNL_RV_PACING_AMPLITUDE: 3.12
MDC_IDC_SET_LEADCHNL_RV_PACING_ANODE_ELECTRODE_1: NORMAL
MDC_IDC_SET_LEADCHNL_RV_PACING_ANODE_LOCATION_1: NORMAL
MDC_IDC_SET_LEADCHNL_RV_PACING_CAPTURE_MODE: NORMAL
MDC_IDC_SET_LEADCHNL_RV_PACING_CATHODE_ELECTRODE_1: NORMAL
MDC_IDC_SET_LEADCHNL_RV_PACING_CATHODE_LOCATION_1: NORMAL
MDC_IDC_SET_LEADCHNL_RV_PACING_POLARITY: NORMAL
MDC_IDC_SET_LEADCHNL_RV_PACING_PULSEWIDTH: 0.5 MS
MDC_IDC_SET_LEADCHNL_RV_SENSING_ADAPTATION_MODE: NORMAL
MDC_IDC_SET_LEADCHNL_RV_SENSING_ANODE_ELECTRODE_1: NORMAL
MDC_IDC_SET_LEADCHNL_RV_SENSING_ANODE_LOCATION_1: NORMAL
MDC_IDC_SET_LEADCHNL_RV_SENSING_CATHODE_ELECTRODE_1: NORMAL
MDC_IDC_SET_LEADCHNL_RV_SENSING_CATHODE_LOCATION_1: NORMAL
MDC_IDC_SET_LEADCHNL_RV_SENSING_POLARITY: NORMAL
MDC_IDC_SET_LEADCHNL_RV_SENSING_SENSITIVITY: 0.5 MV
MDC_IDC_SET_ZONE_DETECTION_INTERVAL: 300 MS
MDC_IDC_SET_ZONE_DETECTION_INTERVAL: 350 MS
MDC_IDC_SET_ZONE_STATUS: NORMAL
MDC_IDC_SET_ZONE_TYPE: NORMAL
MDC_IDC_SET_ZONE_VENDOR_TYPE: NORMAL
MDC_IDC_STAT_AT_BURDEN_PERCENT: 0 %
MDC_IDC_STAT_AT_DTM_END: NORMAL
MDC_IDC_STAT_AT_DTM_START: NORMAL
MDC_IDC_STAT_AT_MODE_SW_COUNT: 1
MDC_IDC_STAT_AT_MODE_SW_COUNT_PER_DAY: 0
MDC_IDC_STAT_AT_MODE_SW_MAX_DURATION: 8 S
MDC_IDC_STAT_AT_MODE_SW_PERCENT_TIME: 1 %
MDC_IDC_STAT_BRADY_AP_VP_PERCENT: 32 %
MDC_IDC_STAT_BRADY_AP_VS_PERCENT: 1 %
MDC_IDC_STAT_BRADY_AS_VP_PERCENT: 58 %
MDC_IDC_STAT_BRADY_AS_VS_PERCENT: 3.1 %
MDC_IDC_STAT_BRADY_DTM_END: NORMAL
MDC_IDC_STAT_BRADY_DTM_START: NORMAL
MDC_IDC_STAT_BRADY_RA_PERCENT_PACED: 24 %
MDC_IDC_STAT_CRT_DTM_END: NORMAL
MDC_IDC_STAT_CRT_DTM_START: NORMAL
MDC_IDC_STAT_CRT_PERCENT_PACED: 90 %
MDC_IDC_STAT_EPISODE_RECENT_COUNT: 2
MDC_IDC_STAT_EPISODE_RECENT_COUNT_DTM_END: NORMAL
MDC_IDC_STAT_EPISODE_RECENT_COUNT_DTM_START: NORMAL
MDC_IDC_STAT_EPISODE_TYPE: NORMAL
MDC_IDC_STAT_EPISODE_VENDOR_TYPE: NORMAL
MDC_IDC_STAT_HEART_RATE_ATRIAL_MAX: 330 {BEATS}/MIN
MDC_IDC_STAT_HEART_RATE_ATRIAL_MEAN: 74 {BEATS}/MIN
MDC_IDC_STAT_HEART_RATE_ATRIAL_MIN: 50 {BEATS}/MIN
MDC_IDC_STAT_HEART_RATE_DTM_END: NORMAL
MDC_IDC_STAT_HEART_RATE_DTM_START: NORMAL
MDC_IDC_STAT_HEART_RATE_VENTRICULAR_MAX: 330 {BEATS}/MIN
MDC_IDC_STAT_HEART_RATE_VENTRICULAR_MEAN: 75 {BEATS}/MIN
MDC_IDC_STAT_HEART_RATE_VENTRICULAR_MIN: 50 {BEATS}/MIN
MDC_IDC_STAT_TACHYTHERAPY_ATP_DELIVERED_RECENT: 0
MDC_IDC_STAT_TACHYTHERAPY_RECENT_DTM_END: NORMAL
MDC_IDC_STAT_TACHYTHERAPY_RECENT_DTM_START: NORMAL
MDC_IDC_STAT_TACHYTHERAPY_SHOCKS_ABORTED_RECENT: 0
MDC_IDC_STAT_TACHYTHERAPY_SHOCKS_DELIVERED_RECENT: 0

## 2024-09-30 ENCOUNTER — TRANSFERRED RECORDS (OUTPATIENT)
Dept: HEALTH INFORMATION MANAGEMENT | Facility: CLINIC | Age: 89
End: 2024-09-30
Payer: MEDICARE

## 2024-10-01 ENCOUNTER — TRANSCRIBE ORDERS (OUTPATIENT)
Dept: OTHER | Age: 89
End: 2024-10-01

## 2024-10-01 DIAGNOSIS — Z95.0 PRESENCE OF CARDIAC PACEMAKER: Primary | ICD-10-CM

## 2024-11-14 ENCOUNTER — TELEPHONE (OUTPATIENT)
Dept: CARDIOLOGY | Facility: CLINIC | Age: 89
End: 2024-11-14
Payer: MEDICARE

## 2024-11-14 NOTE — TELEPHONE ENCOUNTER
Received message from patient with questions about the appointment on 11/21/24 and whether this was an in-clinic device check or a remote device check from home.     Called and left a message notifying them that this was a remote device check.  Device clinic phone number provided.      CHRISTY Camejo

## 2025-01-06 ENCOUNTER — LAB (OUTPATIENT)
Dept: LAB | Facility: CLINIC | Age: 89
End: 2025-01-06
Payer: MEDICARE

## 2025-01-06 ENCOUNTER — ANCILLARY PROCEDURE (OUTPATIENT)
Dept: CARDIOLOGY | Facility: CLINIC | Age: 89
End: 2025-01-06
Attending: INTERNAL MEDICINE
Payer: MEDICARE

## 2025-01-06 ENCOUNTER — HOSPITAL ENCOUNTER (OUTPATIENT)
Dept: CARDIOLOGY | Facility: CLINIC | Age: 89
Discharge: HOME | End: 2025-01-06
Attending: INTERNAL MEDICINE | Admitting: INTERNAL MEDICINE
Payer: MEDICARE

## 2025-01-06 VITALS
DIASTOLIC BLOOD PRESSURE: 47 MMHG | HEART RATE: 62 BPM | SYSTOLIC BLOOD PRESSURE: 94 MMHG | BODY MASS INDEX: 23.72 KG/M2 | HEIGHT: 67 IN | OXYGEN SATURATION: 98 % | WEIGHT: 151.1 LBS

## 2025-01-06 DIAGNOSIS — E78.00 HYPERCHOLESTEROLEMIA: ICD-10-CM

## 2025-01-06 DIAGNOSIS — I50.22 CHRONIC SYSTOLIC HEART FAILURE (H): ICD-10-CM

## 2025-01-06 DIAGNOSIS — I47.29 NSVT (NONSUSTAINED VENTRICULAR TACHYCARDIA) (H): ICD-10-CM

## 2025-01-06 DIAGNOSIS — I42.9 PRIMARY CARDIOMYOPATHY (H): ICD-10-CM

## 2025-01-06 DIAGNOSIS — Z95.810 ICD (IMPLANTABLE CARDIOVERTER-DEFIBRILLATOR) IN PLACE: ICD-10-CM

## 2025-01-06 LAB
ANION GAP SERPL CALCULATED.3IONS-SCNC: 7 MMOL/L (ref 7–15)
BUN SERPL-MCNC: 25 MG/DL (ref 8–23)
CALCIUM SERPL-MCNC: 8.8 MG/DL (ref 8.8–10.4)
CHLORIDE SERPL-SCNC: 105 MMOL/L (ref 98–107)
CREAT SERPL-MCNC: 1.29 MG/DL (ref 0.67–1.17)
EGFRCR SERPLBLD CKD-EPI 2021: 54 ML/MIN/1.73M2
GLUCOSE SERPL-MCNC: 81 MG/DL (ref 70–99)
HCO3 SERPL-SCNC: 28 MMOL/L (ref 22–29)
HGB BLD-MCNC: 12.5 G/DL (ref 13.3–17.7)
LVEF ECHO: NORMAL
MDC_IDC_LEAD_CONNECTION_STATUS: NORMAL
MDC_IDC_LEAD_IMPLANT_DT: NORMAL
MDC_IDC_LEAD_LOCATION: NORMAL
MDC_IDC_LEAD_LOCATION_DETAIL_1: NORMAL
MDC_IDC_LEAD_MFG: NORMAL
MDC_IDC_LEAD_MODEL: NORMAL
MDC_IDC_LEAD_POLARITY_TYPE: NORMAL
MDC_IDC_LEAD_POLARITY_TYPE: NORMAL
MDC_IDC_LEAD_SERIAL: NORMAL
MDC_IDC_LEAD_SPECIAL_FUNCTION: NORMAL
MDC_IDC_MSMT_BATTERY_REMAINING_LONGEVITY: 16 MO
MDC_IDC_MSMT_LEADCHNL_LV_IMPEDANCE_VALUE: 375 OHM
MDC_IDC_MSMT_LEADCHNL_LV_PACING_THRESHOLD_AMPLITUDE: 0.75 V
MDC_IDC_MSMT_LEADCHNL_LV_PACING_THRESHOLD_AMPLITUDE: 0.75 V
MDC_IDC_MSMT_LEADCHNL_LV_PACING_THRESHOLD_PULSEWIDTH: 0.5 MS
MDC_IDC_MSMT_LEADCHNL_LV_PACING_THRESHOLD_PULSEWIDTH: 0.5 MS
MDC_IDC_MSMT_LEADCHNL_RA_IMPEDANCE_VALUE: 425 OHM
MDC_IDC_MSMT_LEADCHNL_RA_PACING_THRESHOLD_AMPLITUDE: 0.75 V
MDC_IDC_MSMT_LEADCHNL_RA_PACING_THRESHOLD_AMPLITUDE: 0.75 V
MDC_IDC_MSMT_LEADCHNL_RA_PACING_THRESHOLD_PULSEWIDTH: 0.5 MS
MDC_IDC_MSMT_LEADCHNL_RA_PACING_THRESHOLD_PULSEWIDTH: 0.5 MS
MDC_IDC_MSMT_LEADCHNL_RA_SENSING_INTR_AMPL: 1.2 MV
MDC_IDC_MSMT_LEADCHNL_RV_IMPEDANCE_VALUE: 450 OHM
MDC_IDC_MSMT_LEADCHNL_RV_PACING_THRESHOLD_AMPLITUDE: 1.5 V
MDC_IDC_MSMT_LEADCHNL_RV_PACING_THRESHOLD_AMPLITUDE: 1.5 V
MDC_IDC_MSMT_LEADCHNL_RV_PACING_THRESHOLD_PULSEWIDTH: 0.5 MS
MDC_IDC_MSMT_LEADCHNL_RV_PACING_THRESHOLD_PULSEWIDTH: 0.5 MS
MDC_IDC_MSMT_LEADCHNL_RV_SENSING_INTR_AMPL: 11.3 MV
MDC_IDC_PG_IMPLANT_DTM: NORMAL
MDC_IDC_PG_MFG: NORMAL
MDC_IDC_PG_MODEL: NORMAL
MDC_IDC_PG_SERIAL: NORMAL
MDC_IDC_PG_TYPE: NORMAL
MDC_IDC_SESS_CLINIC_NAME: NORMAL
MDC_IDC_SESS_DTM: NORMAL
MDC_IDC_SESS_TYPE: NORMAL
MDC_IDC_SET_BRADY_AT_MODE_SWITCH_MODE: NORMAL
MDC_IDC_SET_BRADY_AT_MODE_SWITCH_RATE: 170 {BEATS}/MIN
MDC_IDC_SET_BRADY_HYSTRATE: NORMAL
MDC_IDC_SET_BRADY_LOWRATE: 60 {BEATS}/MIN
MDC_IDC_SET_BRADY_MAX_SENSOR_RATE: 120 {BEATS}/MIN
MDC_IDC_SET_BRADY_MAX_TRACKING_RATE: 120 {BEATS}/MIN
MDC_IDC_SET_BRADY_MODE: NORMAL
MDC_IDC_SET_BRADY_NIGHT_RATE: NORMAL
MDC_IDC_SET_BRADY_PAV_DELAY_HIGH: 100 MS
MDC_IDC_SET_BRADY_PAV_DELAY_LOW: 150 MS
MDC_IDC_SET_BRADY_SAV_DELAY_HIGH: 100 MS
MDC_IDC_SET_BRADY_SAV_DELAY_LOW: 120 MS
MDC_IDC_SET_CRT_LVRV_DELAY: 0 MS
MDC_IDC_SET_CRT_PACED_CHAMBERS: NORMAL
MDC_IDC_SET_LEADCHNL_LV_PACING_AMPLITUDE: 2 V
MDC_IDC_SET_LEADCHNL_LV_PACING_CAPTURE_MODE: NORMAL
MDC_IDC_SET_LEADCHNL_LV_PACING_POLARITY: NORMAL
MDC_IDC_SET_LEADCHNL_LV_PACING_PULSEWIDTH: 0.5 MS
MDC_IDC_SET_LEADCHNL_RA_PACING_AMPLITUDE: 1.75 V
MDC_IDC_SET_LEADCHNL_RA_PACING_CAPTURE_MODE: NORMAL
MDC_IDC_SET_LEADCHNL_RA_PACING_POLARITY: NORMAL
MDC_IDC_SET_LEADCHNL_RA_PACING_PULSEWIDTH: 0.5 MS
MDC_IDC_SET_LEADCHNL_RA_SENSING_ADAPTATION_MODE: NORMAL
MDC_IDC_SET_LEADCHNL_RA_SENSING_POLARITY: NORMAL
MDC_IDC_SET_LEADCHNL_RA_SENSING_SENSITIVITY: 0.3 MV
MDC_IDC_SET_LEADCHNL_RV_PACING_AMPLITUDE: 2.5 V
MDC_IDC_SET_LEADCHNL_RV_PACING_CAPTURE_MODE: NORMAL
MDC_IDC_SET_LEADCHNL_RV_PACING_POLARITY: NORMAL
MDC_IDC_SET_LEADCHNL_RV_PACING_PULSEWIDTH: 0.5 MS
MDC_IDC_SET_LEADCHNL_RV_SENSING_POLARITY: NORMAL
MDC_IDC_SET_LEADCHNL_RV_SENSING_SENSITIVITY: 0.5 MV
MDC_IDC_SET_ZONE_DETECTION_BEATS_DENOMINATOR: 16 {BEATS}
MDC_IDC_SET_ZONE_DETECTION_BEATS_DENOMINATOR: 30 {BEATS}
MDC_IDC_SET_ZONE_DETECTION_BEATS_NUMERATOR: 16 {BEATS}
MDC_IDC_SET_ZONE_DETECTION_BEATS_NUMERATOR: 30 {BEATS}
MDC_IDC_SET_ZONE_DETECTION_INTERVAL: 300 MS
MDC_IDC_SET_ZONE_DETECTION_INTERVAL: 350 MS
MDC_IDC_SET_ZONE_STATUS: NORMAL
MDC_IDC_SET_ZONE_TYPE: NORMAL
MDC_IDC_SET_ZONE_VENDOR_TYPE: NORMAL
MDC_IDC_STAT_AT_DTM_END: NORMAL
MDC_IDC_STAT_AT_DTM_START: NORMAL
MDC_IDC_STAT_AT_MODE_SW_COUNT: 2
MDC_IDC_STAT_BRADY_DTM_END: NORMAL
MDC_IDC_STAT_BRADY_DTM_START: NORMAL
MDC_IDC_STAT_BRADY_RA_PERCENT_PACED: 25 %
MDC_IDC_STAT_CRT_DTM_END: NORMAL
MDC_IDC_STAT_CRT_DTM_START: NORMAL
MDC_IDC_STAT_CRT_PERCENT_PACED: 98 %
MDC_IDC_STAT_EPISODE_RECENT_COUNT: 0
MDC_IDC_STAT_EPISODE_RECENT_COUNT: 0
MDC_IDC_STAT_EPISODE_RECENT_COUNT_DTM_END: NORMAL
MDC_IDC_STAT_EPISODE_RECENT_COUNT_DTM_END: NORMAL
MDC_IDC_STAT_EPISODE_RECENT_COUNT_DTM_START: NORMAL
MDC_IDC_STAT_EPISODE_RECENT_COUNT_DTM_START: NORMAL
MDC_IDC_STAT_EPISODE_TYPE: NORMAL
MDC_IDC_STAT_EPISODE_TYPE: NORMAL
MDC_IDC_STAT_EPISODE_VENDOR_TYPE: NORMAL
MDC_IDC_STAT_HEART_RATE_DTM_END: NORMAL
MDC_IDC_STAT_HEART_RATE_DTM_START: NORMAL
MDC_IDC_STAT_TACHYTHERAPY_ATP_DELIVERED_RECENT: 0
MDC_IDC_STAT_TACHYTHERAPY_RECENT_DTM_END: NORMAL
MDC_IDC_STAT_TACHYTHERAPY_RECENT_DTM_START: NORMAL
MDC_IDC_STAT_TACHYTHERAPY_SHOCKS_ABORTED_RECENT: 0
MDC_IDC_STAT_TACHYTHERAPY_SHOCKS_DELIVERED_RECENT: 0
POTASSIUM SERPL-SCNC: 4.2 MMOL/L (ref 3.4–5.3)
SODIUM SERPL-SCNC: 140 MMOL/L (ref 135–145)

## 2025-01-06 PROCEDURE — 255N000002 HC RX 255 OP 636: Performed by: INTERNAL MEDICINE

## 2025-01-06 PROCEDURE — 93284 PRGRMG EVAL IMPLANTABLE DFB: CPT | Performed by: INTERNAL MEDICINE

## 2025-01-06 PROCEDURE — 99215 OFFICE O/P EST HI 40 MIN: CPT | Mod: 25 | Performed by: INTERNAL MEDICINE

## 2025-01-06 PROCEDURE — 80048 BASIC METABOLIC PNL TOTAL CA: CPT | Performed by: INTERNAL MEDICINE

## 2025-01-06 PROCEDURE — 999N000208 ECHOCARDIOGRAM COMPLETE

## 2025-01-06 PROCEDURE — 93306 TTE W/DOPPLER COMPLETE: CPT | Mod: 26 | Performed by: INTERNAL MEDICINE

## 2025-01-06 PROCEDURE — C8929 TTE W OR WO FOL WCON,DOPPLER: HCPCS

## 2025-01-06 PROCEDURE — 85018 HEMOGLOBIN: CPT | Performed by: INTERNAL MEDICINE

## 2025-01-06 PROCEDURE — 36415 COLL VENOUS BLD VENIPUNCTURE: CPT | Performed by: INTERNAL MEDICINE

## 2025-01-06 RX ORDER — ACETAMINOPHEN 160 MG
4000 TABLET,DISINTEGRATING ORAL
COMMUNITY
Start: 2024-09-04 | End: 2025-09-04

## 2025-01-06 RX ORDER — CARVEDILOL 12.5 MG/1
12.5 TABLET ORAL 2 TIMES DAILY WITH MEALS
Qty: 180 TABLET | Refills: 3 | Status: SHIPPED | OUTPATIENT
Start: 2025-01-06

## 2025-01-06 RX ORDER — ROSUVASTATIN CALCIUM 20 MG/1
20 TABLET, COATED ORAL DAILY
Qty: 90 TABLET | Refills: 3 | Status: SHIPPED | OUTPATIENT
Start: 2025-01-06

## 2025-01-06 RX ORDER — LANOLIN ALCOHOL/MO/W.PET/CERES
1000 CREAM (GRAM) TOPICAL DAILY
COMMUNITY

## 2025-01-06 RX ORDER — SACUBITRIL AND VALSARTAN 97; 103 MG/1; MG/1
1 TABLET, FILM COATED ORAL 2 TIMES DAILY
Qty: 180 TABLET | Refills: 3 | Status: SHIPPED | OUTPATIENT
Start: 2025-01-06

## 2025-01-06 RX ORDER — POTASSIUM CHLORIDE 1500 MG/1
20 TABLET, EXTENDED RELEASE ORAL DAILY
Qty: 90 TABLET | Refills: 3 | Status: SHIPPED | OUTPATIENT
Start: 2025-01-06

## 2025-01-06 RX ORDER — FUROSEMIDE 20 MG/1
60 TABLET ORAL DAILY
Qty: 270 TABLET | Refills: 3 | Status: SHIPPED | OUTPATIENT
Start: 2025-01-06

## 2025-01-06 RX ORDER — SOTALOL HYDROCHLORIDE 80 MG/1
80 TABLET ORAL 2 TIMES DAILY
Qty: 180 TABLET | Refills: 3 | Status: SHIPPED | OUTPATIENT
Start: 2025-01-06

## 2025-01-06 RX ADMIN — HUMAN ALBUMIN MICROSPHERES AND PERFLUTREN 9 ML: 10; .22 INJECTION, SOLUTION INTRAVENOUS at 11:59

## 2025-01-06 NOTE — PROGRESS NOTES
General Cardiology Clinic Progress Note  Katie Shannon MRN# [id number]   YOB: 1935 Age: 89 year old       Reason for visit: Nonischemic cardiomyopathy, chronic systolic heart failure    History of presenting illness:    I had the opportunity to see Katie Shannon at [facility] today for reevaluation of nonischemic cardiomyopathy and chronic systolic heart failure.  He was having more shortness of breath including orthopnea and exertional dyspnea last spring.  I increased his furosemide to 80 mg a day and his shortness of breath improved significantly.  He has not had recurrent orthopnea issues.  He seems to be slowing down a bit, partially due to difficulty with his eyes and a chronic shoulder injury.  However, he seems to have no complaints about his breathing at this time.  He has also had some falling episodes which she attributes to his difficulty with depth perception.    He has also had some episodes of ventricular tachycardia identified on his ICD.  He has a device check later today.  His battery likely has less than a year of life remaining.    Laboratory studies today show creatinine which has increased from stable 0.9 to 1.29 with GFR of 54 and potassium 4.2    On examination today his blood pressure is 94/47, heart rate 62, and weight 151 pounds.  His lungs sound clear.  Heart rhythm is regular.            Assessment and Plan:     ASSESSMENT:    Mr. Katie Shannon is an 89-year-old gentleman with nonischemic cardiomyopathy with a stable ejection fraction of about 30% and chronic systolic heart failure.  He has had ventricular arrhythmias and has an ICD in place but those seem to be well-controlled now on sotalol.  He is on excellent medical therapy for his cardiomyopathy including Entresto, carvedilol,,Imdur, and Lasix.  He has developed some kidney dysfunction, likely as result of high-dose Lasix.  I will reduce his Lasix dose from 80 mg daily to 60 mg daily and have him take an  extra 20 mg on days when his weight is above 150 pounds or he feels short of breath or swelling.    Otherwise he seems to be doing quite well from a cardiac standpoint.  I will not make any other changes.  I will plan on seeing him back again in 6 months for reevaluation.    Rah Ayala MD           Orders this Visit:  Orders Placed This Encounter   Procedures    Basic metabolic panel    CBC with platelets    Follow-Up with Cardiology    Echocardiogram Complete     Orders Placed This Encounter   Medications    Cholecalciferol (VITAMIN D3) 50 MCG (2000 UT) CAPS     Sig: Take 4,000 Units by mouth.    cyanocobalamin (VITAMIN B-12) 1000 MCG tablet     Sig: Take 1,000 mcg by mouth daily.    psyllium (METAMUCIL/KONSYL) 58.6 % powder     Sig: Take by mouth daily.    carvedilol (COREG) 12.5 MG tablet     Sig: Take 1 tablet (12.5 mg) by mouth 2 times daily (with meals).     Dispense:  180 tablet     Refill:  3    potassium chloride ER (K-TAB) 20 MEQ CR tablet     Sig: Take 1 tablet (20 mEq) by mouth daily.     Dispense:  90 tablet     Refill:  3    rosuvastatin (CRESTOR) 20 MG tablet     Sig: Take 1 tablet (20 mg) by mouth daily.     Dispense:  90 tablet     Refill:  3    sacubitril-valsartan (ENTRESTO)  MG per tablet     Sig: Take 1 tablet by mouth 2 times daily.     Dispense:  180 tablet     Refill:  3    sotalol (BETAPACE) 80 MG tablet     Sig: Take 1 tablet (80 mg) by mouth 2 times daily.     Dispense:  180 tablet     Refill:  3    furosemide (LASIX) 20 MG tablet     Sig: Take 3 tablets (60 mg) by mouth daily.     Dispense:  270 tablet     Refill:  3     Medications Discontinued During This Encounter   Medication Reason    furosemide (LASIX) 20 MG tablet Reorder (No AVS)    rosuvastatin (CRESTOR) 20 MG tablet Reorder (No AVS)    carvedilol (COREG) 12.5 MG tablet Reorder (No AVS)    sotalol (BETAPACE) 80 MG tablet Reorder (No AVS)    sacubitril-valsartan (ENTRESTO)  MG per tablet Reorder (No AVS)    potassium  "chloride ER (K-TAB) 20 MEQ CR tablet Reorder (No AVS)       Today's clinic visit entailed:    40 minutes spent by me on the date of the encounter doing chart review, history and exam, documentation and further activities per the note  Provider  Link to [facility] Help Grid     The level of medical decision making during this visit was of high complexity.           Review of Systems:     Review of Systems:  Skin:  Negative rash, bruising, lumps or bumps   Eyes:  Positive for visual blurring  ENT:  Negative tinnitus  Respiratory:  Negative shortness of breath  Cardiovascular:  Negative, palpitations, chest pain, edema, dizziness Positive for, lightheadedness  Gastroenterology: Positive for poor appetite  Genitourinary:  Negative urinary frequency, urgency, hesitancy  Musculoskeletal:  Negative gout, fibromyalgia  Neurologic:  Negative migraine headaches, headaches, numbness or tingling of hands, numbness or tingling of feet  Psychiatric:  Positive for sleep disturbances  Heme/Lymph/Imm:  Negative anemia, bleeding disorder  Endocrine:  Negative thyroid disorder, diabetes            Physical Exam:     Vitals: BP 94/47 (BP Location: Right arm, Patient Position: Sitting)   Pulse 62   Ht 1.702 m (5' 7\")   Wt 68.5 kg (151 lb 1.6 oz)   SpO2 98%   BMI 23.67 kg/m    Constitutional: Well nourished and in no apparent distress.  Eyes: Pupils equal, round. Sclerae anicteric.   HEENT: Normocephalic, atraumatic.   Neck: Supple. JVD   Respiratory: Breathing non-labored. Lungs clear to auscultation bilaterally. No crackles, wheezes, rhonchi, or rales.  Cardiovascular:  Regular rate and rhythm, normal S1 and S2. No murmur, rub, or gallop.  Skin: Warm, dry. No rashes, cyanosis, or xanthelasma.  Extremities: No edema.  Neurologic: No gross motor deficits. Alert, awake, and oriented to person, place and time.  Psychiatric: Affect appropriate.             Medications:     Current Outpatient Medications   Medication Sig Dispense Refill    " carboxymethylcellulose PF (REFRESH LIQUIGEL) 1 % ophthalmic gel Place 1 drop into both eyes 3 times daily      carvedilol (COREG) 12.5 MG tablet Take 1 tablet (12.5 mg) by mouth 2 times daily (with meals). 180 tablet 3    Cholecalciferol (VITAMIN D3) 50 MCG (2000 UT) CAPS Take 4,000 Units by mouth.      clopidogrel (PLAVIX) 75 MG tablet Take 75 mg by mouth daily      cyanocobalamin (VITAMIN B-12) 1000 MCG tablet Take 1,000 mcg by mouth daily.      Famotidine (PEPCID PO) Take 40 mg by mouth daily       furosemide (LASIX) 20 MG tablet Take 3 tablets (60 mg) by mouth daily. 270 tablet 3    isosorbide mononitrate (IMDUR) 60 MG 24 hr tablet Take 1 tablet (60 mg) by mouth daily 90 tablet 3    omeprazole (PRILOSEC) 20 MG DR capsule Take 20 mg by mouth daily      potassium chloride ER (K-TAB) 20 MEQ CR tablet Take 1 tablet (20 mEq) by mouth daily. 90 tablet 3    psyllium (METAMUCIL/KONSYL) 58.6 % powder Take by mouth daily.      rosuvastatin (CRESTOR) 20 MG tablet Take 1 tablet (20 mg) by mouth daily. 90 tablet 3    sacubitril-valsartan (ENTRESTO)  MG per tablet Take 1 tablet by mouth 2 times daily. 180 tablet 3    sotalol (BETAPACE) 80 MG tablet Take 1 tablet (80 mg) by mouth 2 times daily. 180 tablet 3    tamsulosin (FLOMAX) 0.4 MG capsule Take 0.4 mg by mouth daily      White Petrolatum-Mineral Oil (RETAINE PM) OINT Place into both eyes At Bedtime      ciclopirox (PENLAC) 8 % external solution  (Patient not taking: Reported on 1/6/2025)      CYCLOBENZAPRINE HCL PO Take 5 mg by mouth At Bedtime Taking PRN (half tab) (Patient not taking: Reported on 1/6/2025)      LORazepam (ATIVAN) 0.5 MG tablet Take 0.5 mg by mouth Take one tablet by mouth if needed in the morning and bedtime for anxiety or sleep max 2 tablets daily (Patient not taking: Reported on 1/6/2025)      polyethylene glycol 400 (BLINK TEARS) 0.25 % SOLN ophthalmic solution Place 1 drop into both eyes 2 times daily as needed (Patient not taking: Reported on  1/6/2025)      prednisoLONE acetate (PRED FORTE) 1 % ophthalmic suspension INSTILL 1 DROP INTO LEFT EYE FOUR TIME DAILY (Patient not taking: Reported on 1/6/2025)      traZODone (DESYREL) 50 MG tablet Take 50 mg by mouth at bedtime (Patient not taking: Reported on 1/6/2025)         Family History   Problem Relation Age of Onset    Heart Disease Mother 88        mi    Cerebrovascular Disease Father 71       Social History     Socioeconomic History    Marital status:      Spouse name: Not on file    Number of children: Not on file    Years of education: Not on file    Highest education level: Not on file   Occupational History    Not on file   Tobacco Use    Smoking status: Never    Smokeless tobacco: Never   Substance and Sexual Activity    Alcohol use: No     Alcohol/week: 0.0 standard drinks of alcohol    Drug use: No    Sexual activity: Not on file   Other Topics Concern     Service Not Asked    Blood Transfusions Not Asked    Caffeine Concern No    Occupational Exposure Not Asked    Hobby Hazards Not Asked    Sleep Concern No    Stress Concern Not Asked    Weight Concern No    Special Diet No    Back Care Not Asked    Exercise Yes     Comment: active on farm    Bike Helmet Not Asked    Seat Belt Not Asked    Self-Exams Not Asked    Parent/sibling w/ CABG, MI or angioplasty before 65F 55M? No   Social History Narrative    Not on file     Social Drivers of Health     Financial Resource Strain: Low Risk  (11/14/2022)    [facility] [employer] [facility] [employer] [facility]    Overall Financial Resource Strain (CARDIA)     Difficulty of Paying Living Expenses: Not hard at all   Food Insecurity: No Food Insecurity (12/2/2024)    Received from [employer] [facility]    Hunger Vital Sign     Worried About Running Out of Food in the Last Year: Never true     Ran Out of Food in the Last Year: Never true   Transportation Needs: No Transportation Needs (11/14/2022)     Received from [facility], [facility]    PRAPARE - Transportation     Lack of Transportation (Medical): No     Lack of Transportation (Non-Medical): No   Physical Activity: Sufficiently Active (11/14/2022)    Received from [facility], [facility]    Exercise Vital Sign     Days of Exercise per Week: 7 days     Minutes of Exercise per Session: 40 min   Stress: Stress Concern Present (12/2/2024)    Received from [facility]    [employer] - Occupational Stress Questionnaire     Feeling of Stress : To some extent   Social Connections: Socially Isolated (11/14/2022)    Received from [facility], [facility]    Social Connection and Isolation Panel [NHANES]     Frequency of Communication with Friends and Family: Once a week     Frequency of Social Gatherings with Friends and Family: Once a week     Attends Hoahaoism Services: Never     Active Member of Clubs or Organizations: No     Attends Club or Organization Meetings: Never     Marital Status:    Interpersonal Safety: Not At Risk (12/2/2024)    Received from [facility]    Intimate Partner Violence     Are you in a relationship where you are physically hurt, threatened and/or made to feel afraid?: No   Housing Stability: Unknown (11/14/2022)    Received from [facility], [facility]    Housing Stability     In the last 12 months, was there a time when you were not able to pay the mortgage or rent on time?: No     Number of Places Lived in the Last Year: Not on file     Number of Places Lived in the Last Year (Outpatient): Not on file     In the last 12 months, was there a time when you did not have a steady place to sleep or slept in a shelter (including now)?: No            Past Medical History:     Past Medical History:    Diagnosis Date    CAD (coronary artery disease)     Congestive heart failure (H)     Heart disease     non-ischemic cardiomyopathy    HTN (hypertension)     Idiopathic cardiomyopathy (H)               Past Surgical History:     Past Surgical History:   Procedure Laterality Date    COLONOSCOPY N/A 1/29/2020    Procedure: COLONOSCOPY;  Surgeon: Erica Duff MD;  Location:  GI    ESOPHAGOSCOPY, GASTROSCOPY, DUODENOSCOPY (EGD), COMBINED N/A 1/28/2020    Procedure: ESOPHAGOGASTRODUODENOSCOPY (EGD);  Surgeon: Eirca Duff MD;  Location:  GI    EYE SURGERY  2006    cataract    ORTHOPEDIC SURGERY  01/2018    pinning of right hip              Allergies:   Atorvastatin, Diphenhydramine, Metoprolol succinate [metoprolol], Strawberry extract, and Sulfa antibiotics       Data:   All laboratory data reviewed:    Recent Labs   Lab Test 04/25/24  1258 10/24/22  1344 05/08/20  1042 12/04/19  1205   LDL 56  --  50 77   HDL 38*  --  41 42   NHDL 81  --  84 103   CHOL 119  --  125 145   TRIG 124  --  168* 130   TSH  --  2.16 3.08  --    NTBNP  --  3,089*  --   --        Lab Results   Component Value Date    WBC 7.6 10/24/2022    WBC 6.3 02/04/2020    RBC 4.21 (L) 10/24/2022    RBC 3.13 (L) 01/28/2020    HGB 12.5 (L) 01/06/2025    HGB 13.7 11/09/2020    HCT 38.5 (L) 10/24/2022    HCT 25.4 (A) 02/04/2020    MCV 91 10/24/2022    MCV 91.0 02/04/2020    MCH 31.8 10/24/2022    MCH 32.1 02/04/2020    MCHC 34.8 10/24/2022    MCHC 35.3 02/04/2020    RDW 12.4 10/24/2022    RDW 14.4 02/04/2020     10/24/2022     01/28/2020       Lab Results   Component Value Date     01/06/2025     (L) 11/09/2020    POTASSIUM 4.2 01/06/2025    POTASSIUM 4.0 10/24/2022    POTASSIUM 4.4 11/09/2020    CHLORIDE 105 01/06/2025    CHLORIDE 102 01/26/2024    CHLORIDE 104 11/09/2020    CO2 28 01/06/2025    CO2 27 10/24/2022    CO2 26 11/09/2020    ANIONGAP 7 01/06/2025    ANIONGAP 2 (L) 10/24/2022    ANIONGAP 9.4 11/09/2020    GLC  "81 01/06/2025     (H) 10/24/2022     (H) 11/09/2020    BUN 25.0 (H) 01/06/2025    BUN 17 10/24/2022    BUN 15 11/09/2020    CR 1.29 (H) 01/06/2025    CR 1.02 11/09/2020    GFRESTIMATED 54 (L) 01/06/2025    GFRESTIMATED 70 11/09/2020    GFRESTBLACK 84 11/09/2020    ELIZABETH 8.8 01/06/2025    ELIZABETH 8.8 11/09/2020      Lab Results   Component Value Date    AST 22 02/04/2020    ALT 13 04/25/2024    ALT <5 (L) 05/08/2020       No results found for: \"A1C\"    Lab Results   Component Value Date    INR 1.19 (H) 01/28/2020    INR 1.11 01/28/2020         ISIDORO MARI MD  [facility]  "

## 2025-01-06 NOTE — LETTER
1/6/2025    Reba Gilmore MD  [address] [facility] [address]    RE: Katie Shannon       Dear Colleague,     I had the pleasure of seeing Katie Shannon in the [facility].    General Cardiology Clinic Progress Note  Katie Shannon MRN# [id number]   YOB: 1935 Age: 89 year old       Reason for visit: Nonischemic cardiomyopathy, chronic systolic heart failure    History of presenting illness:    I had the opportunity to see Katie Shannon at [facility] today for reevaluation of nonischemic cardiomyopathy and chronic systolic heart failure.  He was having more shortness of breath including orthopnea and exertional dyspnea last spring.  I increased his furosemide to 80 mg a day and his shortness of breath improved significantly.  He has not had recurrent orthopnea issues.  He seems to be slowing down a bit, partially due to difficulty with his eyes and a chronic shoulder injury.  However, he seems to have no complaints about his breathing at this time.  He has also had some falling episodes which she attributes to his difficulty with depth perception.    He has also had some episodes of ventricular tachycardia identified on his ICD.  He has a device check later today.  His battery likely has less than a year of life remaining.    Laboratory studies today show creatinine which has increased from stable 0.9 to 1.29 with GFR of 54 and potassium 4.2    On examination today his blood pressure is 94/47, heart rate 62, and weight 151 pounds.  His lungs sound clear.  Heart rhythm is regular.            Assessment and Plan:     ASSESSMENT:    Mr. Katie Shannon is an 89-year-old gentleman with nonischemic cardiomyopathy with a stable ejection fraction of about 30% and chronic systolic heart failure.  He has had ventricular arrhythmias and has an ICD in place but those seem to be well-controlled now on sotalol.  He is on excellent medical therapy for his cardiomyopathy including Entresto,  carvedilol,,Imdur, and Lasix.  He has developed some kidney dysfunction, likely as result of high-dose Lasix.  I will reduce his Lasix dose from 80 mg daily to 60 mg daily and have him take an extra 20 mg on days when his weight is above 150 pounds or he feels short of breath or swelling.    Otherwise he seems to be doing quite well from a cardiac standpoint.  I will not make any other changes.  I will plan on seeing him back again in 6 months for reevaluation.    Rah Ayala MD           Orders this Visit:  Orders Placed This Encounter   Procedures     Basic metabolic panel     CBC with platelets     Follow-Up with Cardiology     Echocardiogram Complete     Orders Placed This Encounter   Medications     Cholecalciferol (VITAMIN D3) 50 MCG (2000 UT) CAPS     Sig: Take 4,000 Units by mouth.     cyanocobalamin (VITAMIN B-12) 1000 MCG tablet     Sig: Take 1,000 mcg by mouth daily.     psyllium (METAMUCIL/KONSYL) 58.6 % powder     Sig: Take by mouth daily.     carvedilol (COREG) 12.5 MG tablet     Sig: Take 1 tablet (12.5 mg) by mouth 2 times daily (with meals).     Dispense:  180 tablet     Refill:  3     potassium chloride ER (K-TAB) 20 MEQ CR tablet     Sig: Take 1 tablet (20 mEq) by mouth daily.     Dispense:  90 tablet     Refill:  3     rosuvastatin (CRESTOR) 20 MG tablet     Sig: Take 1 tablet (20 mg) by mouth daily.     Dispense:  90 tablet     Refill:  3     sacubitril-valsartan (ENTRESTO)  MG per tablet     Sig: Take 1 tablet by mouth 2 times daily.     Dispense:  180 tablet     Refill:  3     sotalol (BETAPACE) 80 MG tablet     Sig: Take 1 tablet (80 mg) by mouth 2 times daily.     Dispense:  180 tablet     Refill:  3     furosemide (LASIX) 20 MG tablet     Sig: Take 3 tablets (60 mg) by mouth daily.     Dispense:  270 tablet     Refill:  3     Medications Discontinued During This Encounter   Medication Reason     furosemide (LASIX) 20 MG tablet Reorder (No AVS)     rosuvastatin (CRESTOR) 20 MG tablet  "Reorder (No AVS)     carvedilol (COREG) 12.5 MG tablet Reorder (No AVS)     sotalol (BETAPACE) 80 MG tablet Reorder (No AVS)     sacubitril-valsartan (ENTRESTO)  MG per tablet Reorder (No AVS)     potassium chloride ER (K-TAB) 20 MEQ CR tablet Reorder (No AVS)       Today's clinic visit entailed:    40 minutes spent by me on the date of the encounter doing chart review, history and exam, documentation and further activities per the note  Provider  Link to [facility] Help Grid     The level of medical decision making during this visit was of high complexity.           Review of Systems:     Review of Systems:  Skin:  Negative rash, bruising, lumps or bumps   Eyes:  Positive for visual blurring  ENT:  Negative tinnitus  Respiratory:  Negative shortness of breath  Cardiovascular:  Negative, palpitations, chest pain, edema, dizziness Positive for, lightheadedness  Gastroenterology: Positive for poor appetite  Genitourinary:  Negative urinary frequency, urgency, hesitancy  Musculoskeletal:  Negative gout, fibromyalgia  Neurologic:  Negative migraine headaches, headaches, numbness or tingling of hands, numbness or tingling of feet  Psychiatric:  Positive for sleep disturbances  Heme/Lymph/Imm:  Negative anemia, bleeding disorder  Endocrine:  Negative thyroid disorder, diabetes            Physical Exam:     Vitals: BP 94/47 (BP Location: Right arm, Patient Position: Sitting)   Pulse 62   Ht 1.702 m (5' 7\")   Wt 68.5 kg (151 lb 1.6 oz)   SpO2 98%   BMI 23.67 kg/m    Constitutional: Well nourished and in no apparent distress.  Eyes: Pupils equal, round. Sclerae anicteric.   HEENT: Normocephalic, atraumatic.   Neck: Supple. JVD   Respiratory: Breathing non-labored. Lungs clear to auscultation bilaterally. No crackles, wheezes, rhonchi, or rales.  Cardiovascular:  Regular rate and rhythm, normal S1 and S2. No murmur, rub, or gallop.  Skin: Warm, dry. No rashes, cyanosis, or xanthelasma.  Extremities: No " edema.  Neurologic: No gross motor deficits. Alert, awake, and oriented to person, place and time.  Psychiatric: Affect appropriate.             Medications:     Current Outpatient Medications   Medication Sig Dispense Refill     carboxymethylcellulose PF (REFRESH LIQUIGEL) 1 % ophthalmic gel Place 1 drop into both eyes 3 times daily       carvedilol (COREG) 12.5 MG tablet Take 1 tablet (12.5 mg) by mouth 2 times daily (with meals). 180 tablet 3     Cholecalciferol (VITAMIN D3) 50 MCG (2000 UT) CAPS Take 4,000 Units by mouth.       clopidogrel (PLAVIX) 75 MG tablet Take 75 mg by mouth daily       cyanocobalamin (VITAMIN B-12) 1000 MCG tablet Take 1,000 mcg by mouth daily.       Famotidine (PEPCID PO) Take 40 mg by mouth daily        furosemide (LASIX) 20 MG tablet Take 3 tablets (60 mg) by mouth daily. 270 tablet 3     isosorbide mononitrate (IMDUR) 60 MG 24 hr tablet Take 1 tablet (60 mg) by mouth daily 90 tablet 3     omeprazole (PRILOSEC) 20 MG DR capsule Take 20 mg by mouth daily       potassium chloride ER (K-TAB) 20 MEQ CR tablet Take 1 tablet (20 mEq) by mouth daily. 90 tablet 3     psyllium (METAMUCIL/KONSYL) 58.6 % powder Take by mouth daily.       rosuvastatin (CRESTOR) 20 MG tablet Take 1 tablet (20 mg) by mouth daily. 90 tablet 3     sacubitril-valsartan (ENTRESTO)  MG per tablet Take 1 tablet by mouth 2 times daily. 180 tablet 3     sotalol (BETAPACE) 80 MG tablet Take 1 tablet (80 mg) by mouth 2 times daily. 180 tablet 3     tamsulosin (FLOMAX) 0.4 MG capsule Take 0.4 mg by mouth daily       White Petrolatum-Mineral Oil (RETAINE PM) OINT Place into both eyes At Bedtime       ciclopirox (PENLAC) 8 % external solution  (Patient not taking: Reported on 1/6/2025)       CYCLOBENZAPRINE HCL PO Take 5 mg by mouth At Bedtime Taking PRN (half tab) (Patient not taking: Reported on 1/6/2025)       LORazepam (ATIVAN) 0.5 MG tablet Take 0.5 mg by mouth Take one tablet by mouth if needed in the morning and  bedtime for anxiety or sleep max 2 tablets daily (Patient not taking: Reported on 1/6/2025)       polyethylene glycol 400 (BLINK TEARS) 0.25 % SOLN ophthalmic solution Place 1 drop into both eyes 2 times daily as needed (Patient not taking: Reported on 1/6/2025)       prednisoLONE acetate (PRED FORTE) 1 % ophthalmic suspension INSTILL 1 DROP INTO LEFT EYE FOUR TIME DAILY (Patient not taking: Reported on 1/6/2025)       traZODone (DESYREL) 50 MG tablet Take 50 mg by mouth at bedtime (Patient not taking: Reported on 1/6/2025)         Family History   Problem Relation Age of Onset     Heart Disease Mother 88        mi     Cerebrovascular Disease Father 71       Social History     Socioeconomic History     Marital status:      Spouse name: Not on file     Number of children: Not on file     Years of education: Not on file     Highest education level: Not on file   Occupational History     Not on file   Tobacco Use     Smoking status: Never     Smokeless tobacco: Never   Substance and Sexual Activity     Alcohol use: No     Alcohol/week: 0.0 standard drinks of alcohol     Drug use: No     Sexual activity: Not on file   Other Topics Concern      Service Not Asked     Blood Transfusions Not Asked     Caffeine Concern No     Occupational Exposure Not Asked     Hobby Hazards Not Asked     Sleep Concern No     Stress Concern Not Asked     Weight Concern No     Special Diet No     Back Care Not Asked     Exercise Yes     Comment: active on farm     Bike Helmet Not Asked     Seat Belt Not Asked     Self-Exams Not Asked     Parent/sibling w/ CABG, MI or angioplasty before 65F 55M? No   Social History Narrative     Not on file     Social Drivers of Health     Financial Resource Strain: Low Risk  (11/14/2022)    [facility] [employer] [facility]    Overall Financial Resource Strain (CARDIA)      Difficulty of Paying Living Expenses: Not hard at all   Food Insecurity: No  Food Insecurity (12/2/2024)    Received from [facility]    Hunger Vital Sign      Worried About Running Out of Food in the Last Year: Never true      Ran Out of Food in the Last Year: Never true   Transportation Needs: No Transportation Needs (11/14/2022)    Received from [facility], [facility]    PRAPARE - Transportation      Lack of Transportation (Medical): No      Lack of Transportation (Non-Medical): No   Physical Activity: Sufficiently Active (11/14/2022)    Received from [facility], [facility]    Exercise Vital Sign      Days of Exercise per Week: 7 days      Minutes of Exercise per Session: 40 min   Stress: Stress Concern Present (12/2/2024)    Received from [facility]    [employer] - Occupational Stress Questionnaire      Feeling of Stress : To some extent   Social Connections: Socially Isolated (11/14/2022)    Received from [facility], [facility]    Social Connection and Isolation Panel [NHANES]      Frequency of Communication with Friends and Family: Once a week      Frequency of Social Gatherings with Friends and Family: Once a week      Attends Church Services: Never      Active Member of Clubs or Organizations: No      Attends Club or Organization Meetings: Never      Marital Status:    Interpersonal Safety: Not At Risk (12/2/2024)    Received from [facility]    Intimate Partner Violence      Are you in a relationship where you are physically hurt, threatened and/or made to feel afraid?: No   Housing Stability: Unknown (11/14/2022)    Received from [facility], [facility]    Housing Stability      In the last 12 months, was there a time when you were not able to pay the mortgage or rent on time?: No      Number of Places Lived  in the Last Year: Not on file      Number of Places Lived in the Last Year (Outpatient): Not on file      In the last 12 months, was there a time when you did not have a steady place to sleep or slept in a shelter (including now)?: No            Past Medical History:     Past Medical History:   Diagnosis Date     CAD (coronary artery disease)      Congestive heart failure (H)      Heart disease     non-ischemic cardiomyopathy     HTN (hypertension)      Idiopathic cardiomyopathy (H)               Past Surgical History:     Past Surgical History:   Procedure Laterality Date     COLONOSCOPY N/A 1/29/2020    Procedure: COLONOSCOPY;  Surgeon: Erica Duff MD;  Location:  GI     ESOPHAGOSCOPY, GASTROSCOPY, DUODENOSCOPY (EGD), COMBINED N/A 1/28/2020    Procedure: ESOPHAGOGASTRODUODENOSCOPY (EGD);  Surgeon: Erica Duff MD;  Location:  GI     EYE SURGERY  2006    cataract     ORTHOPEDIC SURGERY  01/2018    pinning of right hip              Allergies:   Atorvastatin, Diphenhydramine, Metoprolol succinate [metoprolol], Strawberry extract, and Sulfa antibiotics       Data:   All laboratory data reviewed:    Recent Labs   Lab Test 04/25/24  1258 10/24/22  1344 05/08/20  1042 12/04/19  1205   LDL 56  --  50 77   HDL 38*  --  41 42   NHDL 81  --  84 103   CHOL 119  --  125 145   TRIG 124  --  168* 130   TSH  --  2.16 3.08  --    NTBNP  --  3,089*  --   --        Lab Results   Component Value Date    WBC 7.6 10/24/2022    WBC 6.3 02/04/2020    RBC 4.21 (L) 10/24/2022    RBC 3.13 (L) 01/28/2020    HGB 12.5 (L) 01/06/2025    HGB 13.7 11/09/2020    HCT 38.5 (L) 10/24/2022    HCT 25.4 (A) 02/04/2020    MCV 91 10/24/2022    MCV 91.0 02/04/2020    MCH 31.8 10/24/2022    MCH 32.1 02/04/2020    MCHC 34.8 10/24/2022    MCHC 35.3 02/04/2020    RDW 12.4 10/24/2022    RDW 14.4 02/04/2020     10/24/2022     01/28/2020       Lab Results   Component Value Date     01/06/2025     (L) 11/09/2020    POTASSIUM  "4.2 01/06/2025    POTASSIUM 4.0 10/24/2022    POTASSIUM 4.4 11/09/2020    CHLORIDE 105 01/06/2025    CHLORIDE 102 01/26/2024    CHLORIDE 104 11/09/2020    CO2 28 01/06/2025    CO2 27 10/24/2022    CO2 26 11/09/2020    ANIONGAP 7 01/06/2025    ANIONGAP 2 (L) 10/24/2022    ANIONGAP 9.4 11/09/2020    GLC 81 01/06/2025     (H) 10/24/2022     (H) 11/09/2020    BUN 25.0 (H) 01/06/2025    BUN 17 10/24/2022    BUN 15 11/09/2020    CR 1.29 (H) 01/06/2025    CR 1.02 11/09/2020    GFRESTIMATED 54 (L) 01/06/2025    GFRESTIMATED 70 11/09/2020    GFRESTBLACK 84 11/09/2020    ELIZABETH 8.8 01/06/2025    ELIZABETH 8.8 11/09/2020      Lab Results   Component Value Date    AST 22 02/04/2020    ALT 13 04/25/2024    ALT <5 (L) 05/08/2020       No results found for: \"A1C\"    Lab Results   Component Value Date    INR 1.19 (H) 01/28/2020    INR 1.11 01/28/2020         ISIDORO AYALA MD  [facility]    Thank you for allowing me to participate in the care of your patient.      Sincerely,     ISIDORO AYALA MD     [facility]  cc:   Isidoro Ayala MD  [address]  [place] [address]      "

## 2025-01-06 NOTE — PATIENT INSTRUCTIONS
It was a pleasure seeing you today and thank you for allowing me to be a part of your health care team.  Should you have any questions regarding your visit or future needs please feel free to reach out to my care team for assistance.      Thank you, Dr. Rah Ayala        **Nursing: [phone]       **Scheduling: [phone]

## 2025-01-07 ENCOUNTER — TELEPHONE (OUTPATIENT)
Dept: CARDIOLOGY | Facility: CLINIC | Age: 89
End: 2025-01-07
Payer: MEDICARE

## 2025-01-07 NOTE — TELEPHONE ENCOUNTER
Health Call Center    Phone Message    May a detailed message be left on voicemail: yes     Reason for Call: Other: Patience, pharmacist with [facility] will like to speak with someone on the care team for clarifications on a few medication orders they received. Please call Patience back at [phone] to further discuss.     Action Taken: Other: Cardiology    Travel Screening: Not Applicable     Thank you!  [facility]

## 2025-01-07 NOTE — TELEPHONE ENCOUNTER
I called Patience, pharmacist at [facility] back. She wanted to confirm pt's sotalol, furosemide, and KCL dosing/formation. I confirmed pt takes sotalol 80 mg twice daily, furosemide decreased to 60 mg daily, and KCL 20 meq daily. Giovani HARRISON January 7, 2025, 11:05 AM

## 2025-01-13 LAB
MDC_IDC_LEAD_CONNECTION_STATUS: NORMAL
MDC_IDC_LEAD_IMPLANT_DT: NORMAL
MDC_IDC_LEAD_LOCATION: NORMAL
MDC_IDC_LEAD_LOCATION_DETAIL_1: NORMAL
MDC_IDC_LEAD_MFG: NORMAL
MDC_IDC_LEAD_MODEL: NORMAL
MDC_IDC_LEAD_POLARITY_TYPE: NORMAL
MDC_IDC_LEAD_POLARITY_TYPE: NORMAL
MDC_IDC_LEAD_SERIAL: NORMAL
MDC_IDC_LEAD_SPECIAL_FUNCTION: NORMAL
MDC_IDC_MSMT_BATTERY_REMAINING_LONGEVITY: 16 MO
MDC_IDC_MSMT_LEADCHNL_LV_IMPEDANCE_VALUE: 375 OHM
MDC_IDC_MSMT_LEADCHNL_LV_PACING_THRESHOLD_AMPLITUDE: 0.75 V
MDC_IDC_MSMT_LEADCHNL_LV_PACING_THRESHOLD_AMPLITUDE: 0.75 V
MDC_IDC_MSMT_LEADCHNL_LV_PACING_THRESHOLD_PULSEWIDTH: 0.5 MS
MDC_IDC_MSMT_LEADCHNL_LV_PACING_THRESHOLD_PULSEWIDTH: 0.5 MS
MDC_IDC_MSMT_LEADCHNL_RA_IMPEDANCE_VALUE: 425 OHM
MDC_IDC_MSMT_LEADCHNL_RA_PACING_THRESHOLD_AMPLITUDE: 0.75 V
MDC_IDC_MSMT_LEADCHNL_RA_PACING_THRESHOLD_AMPLITUDE: 0.75 V
MDC_IDC_MSMT_LEADCHNL_RA_PACING_THRESHOLD_PULSEWIDTH: 0.5 MS
MDC_IDC_MSMT_LEADCHNL_RA_PACING_THRESHOLD_PULSEWIDTH: 0.5 MS
MDC_IDC_MSMT_LEADCHNL_RA_SENSING_INTR_AMPL: 1.2 MV
MDC_IDC_MSMT_LEADCHNL_RV_IMPEDANCE_VALUE: 450 OHM
MDC_IDC_MSMT_LEADCHNL_RV_PACING_THRESHOLD_AMPLITUDE: 1.5 V
MDC_IDC_MSMT_LEADCHNL_RV_PACING_THRESHOLD_AMPLITUDE: 1.5 V
MDC_IDC_MSMT_LEADCHNL_RV_PACING_THRESHOLD_PULSEWIDTH: 0.5 MS
MDC_IDC_MSMT_LEADCHNL_RV_PACING_THRESHOLD_PULSEWIDTH: 0.5 MS
MDC_IDC_MSMT_LEADCHNL_RV_SENSING_INTR_AMPL: 11.3 MV
MDC_IDC_PG_IMPLANT_DTM: NORMAL
MDC_IDC_PG_MFG: NORMAL
MDC_IDC_PG_MODEL: NORMAL
MDC_IDC_PG_SERIAL: NORMAL
MDC_IDC_PG_TYPE: NORMAL
MDC_IDC_SESS_CLINIC_NAME: NORMAL
MDC_IDC_SESS_DTM: NORMAL
MDC_IDC_SESS_TYPE: NORMAL
MDC_IDC_SET_BRADY_AT_MODE_SWITCH_MODE: NORMAL
MDC_IDC_SET_BRADY_AT_MODE_SWITCH_RATE: 170 {BEATS}/MIN
MDC_IDC_SET_BRADY_HYSTRATE: NORMAL
MDC_IDC_SET_BRADY_LOWRATE: 60 {BEATS}/MIN
MDC_IDC_SET_BRADY_MAX_SENSOR_RATE: 120 {BEATS}/MIN
MDC_IDC_SET_BRADY_MAX_TRACKING_RATE: 120 {BEATS}/MIN
MDC_IDC_SET_BRADY_MODE: NORMAL
MDC_IDC_SET_BRADY_NIGHT_RATE: NORMAL
MDC_IDC_SET_BRADY_PAV_DELAY_HIGH: 100 MS
MDC_IDC_SET_BRADY_PAV_DELAY_LOW: 150 MS
MDC_IDC_SET_BRADY_SAV_DELAY_HIGH: 100 MS
MDC_IDC_SET_BRADY_SAV_DELAY_LOW: 120 MS
MDC_IDC_SET_CRT_LVRV_DELAY: 0 MS
MDC_IDC_SET_CRT_PACED_CHAMBERS: NORMAL
MDC_IDC_SET_LEADCHNL_LV_PACING_AMPLITUDE: 2 V
MDC_IDC_SET_LEADCHNL_LV_PACING_CAPTURE_MODE: NORMAL
MDC_IDC_SET_LEADCHNL_LV_PACING_POLARITY: NORMAL
MDC_IDC_SET_LEADCHNL_LV_PACING_PULSEWIDTH: 0.5 MS
MDC_IDC_SET_LEADCHNL_RA_PACING_AMPLITUDE: 1.75 V
MDC_IDC_SET_LEADCHNL_RA_PACING_CAPTURE_MODE: NORMAL
MDC_IDC_SET_LEADCHNL_RA_PACING_POLARITY: NORMAL
MDC_IDC_SET_LEADCHNL_RA_PACING_PULSEWIDTH: 0.5 MS
MDC_IDC_SET_LEADCHNL_RA_SENSING_ADAPTATION_MODE: NORMAL
MDC_IDC_SET_LEADCHNL_RA_SENSING_POLARITY: NORMAL
MDC_IDC_SET_LEADCHNL_RA_SENSING_SENSITIVITY: 0.3 MV
MDC_IDC_SET_LEADCHNL_RV_PACING_AMPLITUDE: 2.5 V
MDC_IDC_SET_LEADCHNL_RV_PACING_CAPTURE_MODE: NORMAL
MDC_IDC_SET_LEADCHNL_RV_PACING_POLARITY: NORMAL
MDC_IDC_SET_LEADCHNL_RV_PACING_PULSEWIDTH: 0.5 MS
MDC_IDC_SET_LEADCHNL_RV_SENSING_POLARITY: NORMAL
MDC_IDC_SET_LEADCHNL_RV_SENSING_SENSITIVITY: 0.5 MV
MDC_IDC_SET_ZONE_DETECTION_BEATS_DENOMINATOR: 16 {BEATS}
MDC_IDC_SET_ZONE_DETECTION_BEATS_DENOMINATOR: 30 {BEATS}
MDC_IDC_SET_ZONE_DETECTION_BEATS_NUMERATOR: 16 {BEATS}
MDC_IDC_SET_ZONE_DETECTION_BEATS_NUMERATOR: 30 {BEATS}
MDC_IDC_SET_ZONE_DETECTION_INTERVAL: 300 MS
MDC_IDC_SET_ZONE_DETECTION_INTERVAL: 350 MS
MDC_IDC_SET_ZONE_STATUS: NORMAL
MDC_IDC_SET_ZONE_TYPE: NORMAL
MDC_IDC_SET_ZONE_VENDOR_TYPE: NORMAL
MDC_IDC_STAT_AT_DTM_END: NORMAL
MDC_IDC_STAT_AT_DTM_START: NORMAL
MDC_IDC_STAT_AT_MODE_SW_COUNT: 2
MDC_IDC_STAT_BRADY_DTM_END: NORMAL
MDC_IDC_STAT_BRADY_DTM_START: NORMAL
MDC_IDC_STAT_BRADY_RA_PERCENT_PACED: 25 %
MDC_IDC_STAT_CRT_DTM_END: NORMAL
MDC_IDC_STAT_CRT_DTM_START: NORMAL
MDC_IDC_STAT_CRT_PERCENT_PACED: 98 %
MDC_IDC_STAT_EPISODE_RECENT_COUNT: 0
MDC_IDC_STAT_EPISODE_RECENT_COUNT: 0
MDC_IDC_STAT_EPISODE_RECENT_COUNT_DTM_END: NORMAL
MDC_IDC_STAT_EPISODE_RECENT_COUNT_DTM_END: NORMAL
MDC_IDC_STAT_EPISODE_RECENT_COUNT_DTM_START: NORMAL
MDC_IDC_STAT_EPISODE_RECENT_COUNT_DTM_START: NORMAL
MDC_IDC_STAT_EPISODE_TYPE: NORMAL
MDC_IDC_STAT_EPISODE_TYPE: NORMAL
MDC_IDC_STAT_EPISODE_VENDOR_TYPE: NORMAL
MDC_IDC_STAT_HEART_RATE_DTM_END: NORMAL
MDC_IDC_STAT_HEART_RATE_DTM_START: NORMAL
MDC_IDC_STAT_TACHYTHERAPY_ATP_DELIVERED_RECENT: 0
MDC_IDC_STAT_TACHYTHERAPY_RECENT_DTM_END: NORMAL
MDC_IDC_STAT_TACHYTHERAPY_RECENT_DTM_START: NORMAL
MDC_IDC_STAT_TACHYTHERAPY_SHOCKS_ABORTED_RECENT: 0
MDC_IDC_STAT_TACHYTHERAPY_SHOCKS_DELIVERED_RECENT: 0

## 2025-04-07 ENCOUNTER — ANCILLARY PROCEDURE (OUTPATIENT)
Dept: CARDIOLOGY | Facility: CLINIC | Age: 89
End: 2025-04-07
Attending: INTERNAL MEDICINE
Payer: MEDICARE

## 2025-04-07 DIAGNOSIS — I50.22 CHRONIC SYSTOLIC HEART FAILURE (H): ICD-10-CM

## 2025-04-07 DIAGNOSIS — I47.29 NSVT (NONSUSTAINED VENTRICULAR TACHYCARDIA) (H): ICD-10-CM

## 2025-04-07 DIAGNOSIS — Z95.810 ICD (IMPLANTABLE CARDIOVERTER-DEFIBRILLATOR) IN PLACE: ICD-10-CM

## 2025-04-07 DIAGNOSIS — I42.9 PRIMARY CARDIOMYOPATHY (H): ICD-10-CM

## 2025-04-07 PROCEDURE — 93296 REM INTERROG EVL PM/IDS: CPT | Performed by: INTERNAL MEDICINE

## 2025-04-07 PROCEDURE — 93295 DEV INTERROG REMOTE 1/2/MLT: CPT | Performed by: INTERNAL MEDICINE

## 2025-04-08 LAB
MDC_IDC_EPISODE_DTM: NORMAL
MDC_IDC_EPISODE_DURATION: 6 S
MDC_IDC_EPISODE_DURATION: 8 S
MDC_IDC_EPISODE_ID: NORMAL
MDC_IDC_EPISODE_TYPE: NORMAL
MDC_IDC_EPISODE_VENDOR_TYPE: NORMAL
MDC_IDC_LEAD_CONNECTION_STATUS: NORMAL
MDC_IDC_LEAD_IMPLANT_DT: NORMAL
MDC_IDC_LEAD_LOCATION: NORMAL
MDC_IDC_LEAD_LOCATION_DETAIL_1: NORMAL
MDC_IDC_LEAD_MFG: NORMAL
MDC_IDC_LEAD_MODEL: NORMAL
MDC_IDC_LEAD_POLARITY_TYPE: NORMAL
MDC_IDC_LEAD_POLARITY_TYPE: NORMAL
MDC_IDC_LEAD_SERIAL: NORMAL
MDC_IDC_LEAD_SPECIAL_FUNCTION: NORMAL
MDC_IDC_MSMT_BATTERY_DTM: NORMAL
MDC_IDC_MSMT_BATTERY_REMAINING_LONGEVITY: 11 MO
MDC_IDC_MSMT_BATTERY_REMAINING_PERCENTAGE: 16 %
MDC_IDC_MSMT_BATTERY_RRT_TRIGGER: NORMAL
MDC_IDC_MSMT_BATTERY_STATUS: NORMAL
MDC_IDC_MSMT_BATTERY_VOLTAGE: 2.74 V
MDC_IDC_MSMT_CAP_CHARGE_DTM: NORMAL
MDC_IDC_MSMT_CAP_CHARGE_ENERGY: 40 J
MDC_IDC_MSMT_CAP_CHARGE_TIME: 9.9 S
MDC_IDC_MSMT_CAP_CHARGE_TYPE: NORMAL
MDC_IDC_MSMT_LEADCHNL_LV_IMPEDANCE_VALUE: 380 OHM
MDC_IDC_MSMT_LEADCHNL_LV_LEAD_CHANNEL_STATUS: NORMAL
MDC_IDC_MSMT_LEADCHNL_LV_PACING_THRESHOLD_AMPLITUDE: 0.75 V
MDC_IDC_MSMT_LEADCHNL_LV_PACING_THRESHOLD_PULSEWIDTH: 0.5 MS
MDC_IDC_MSMT_LEADCHNL_RA_IMPEDANCE_VALUE: 400 OHM
MDC_IDC_MSMT_LEADCHNL_RA_LEAD_CHANNEL_STATUS: NORMAL
MDC_IDC_MSMT_LEADCHNL_RA_PACING_THRESHOLD_AMPLITUDE: 0.62 V
MDC_IDC_MSMT_LEADCHNL_RA_PACING_THRESHOLD_PULSEWIDTH: 0.5 MS
MDC_IDC_MSMT_LEADCHNL_RA_SENSING_INTR_AMPL: 1.6 MV
MDC_IDC_MSMT_LEADCHNL_RV_IMPEDANCE_VALUE: 440 OHM
MDC_IDC_MSMT_LEADCHNL_RV_LEAD_CHANNEL_STATUS: NORMAL
MDC_IDC_MSMT_LEADCHNL_RV_PACING_THRESHOLD_AMPLITUDE: 1.5 V
MDC_IDC_MSMT_LEADCHNL_RV_PACING_THRESHOLD_PULSEWIDTH: 0.5 MS
MDC_IDC_MSMT_LEADCHNL_RV_SENSING_INTR_AMPL: 4.6 MV
MDC_IDC_PG_IMPLANT_DTM: NORMAL
MDC_IDC_PG_MFG: NORMAL
MDC_IDC_PG_MODEL: NORMAL
MDC_IDC_PG_SERIAL: NORMAL
MDC_IDC_PG_TYPE: NORMAL
MDC_IDC_SESS_CLINIC_NAME: NORMAL
MDC_IDC_SESS_DTM: NORMAL
MDC_IDC_SESS_REPROGRAMMED: NO
MDC_IDC_SESS_TYPE: NORMAL
MDC_IDC_SET_BRADY_AT_MODE_SWITCH_MODE: NORMAL
MDC_IDC_SET_BRADY_AT_MODE_SWITCH_RATE: 170 {BEATS}/MIN
MDC_IDC_SET_BRADY_LOWRATE: 60 {BEATS}/MIN
MDC_IDC_SET_BRADY_MAX_SENSOR_RATE: 120 {BEATS}/MIN
MDC_IDC_SET_BRADY_MAX_TRACKING_RATE: 120 {BEATS}/MIN
MDC_IDC_SET_BRADY_MODE: NORMAL
MDC_IDC_SET_BRADY_PAV_DELAY_HIGH: 100 MS
MDC_IDC_SET_BRADY_PAV_DELAY_LOW: 150 MS
MDC_IDC_SET_BRADY_SAV_DELAY_HIGH: 100 MS
MDC_IDC_SET_BRADY_SAV_DELAY_LOW: 120 MS
MDC_IDC_SET_CRT_LVRV_DELAY: 0 MS
MDC_IDC_SET_CRT_PACED_CHAMBERS: NORMAL
MDC_IDC_SET_LEADCHNL_LV_PACING_AMPLITUDE: 2 V
MDC_IDC_SET_LEADCHNL_LV_PACING_ANODE_ELECTRODE_1: NORMAL
MDC_IDC_SET_LEADCHNL_LV_PACING_ANODE_LOCATION_1: NORMAL
MDC_IDC_SET_LEADCHNL_LV_PACING_CAPTURE_MODE: NORMAL
MDC_IDC_SET_LEADCHNL_LV_PACING_CATHODE_ELECTRODE_1: NORMAL
MDC_IDC_SET_LEADCHNL_LV_PACING_CATHODE_LOCATION_1: NORMAL
MDC_IDC_SET_LEADCHNL_LV_PACING_POLARITY: NORMAL
MDC_IDC_SET_LEADCHNL_LV_PACING_PULSEWIDTH: 0.5 MS
MDC_IDC_SET_LEADCHNL_RA_PACING_AMPLITUDE: 1.62
MDC_IDC_SET_LEADCHNL_RA_PACING_ANODE_ELECTRODE_1: NORMAL
MDC_IDC_SET_LEADCHNL_RA_PACING_ANODE_LOCATION_1: NORMAL
MDC_IDC_SET_LEADCHNL_RA_PACING_CAPTURE_MODE: NORMAL
MDC_IDC_SET_LEADCHNL_RA_PACING_CATHODE_ELECTRODE_1: NORMAL
MDC_IDC_SET_LEADCHNL_RA_PACING_CATHODE_LOCATION_1: NORMAL
MDC_IDC_SET_LEADCHNL_RA_PACING_POLARITY: NORMAL
MDC_IDC_SET_LEADCHNL_RA_PACING_PULSEWIDTH: 0.5 MS
MDC_IDC_SET_LEADCHNL_RA_SENSING_ADAPTATION_MODE: NORMAL
MDC_IDC_SET_LEADCHNL_RA_SENSING_ANODE_ELECTRODE_1: NORMAL
MDC_IDC_SET_LEADCHNL_RA_SENSING_ANODE_LOCATION_1: NORMAL
MDC_IDC_SET_LEADCHNL_RA_SENSING_CATHODE_ELECTRODE_1: NORMAL
MDC_IDC_SET_LEADCHNL_RA_SENSING_CATHODE_LOCATION_1: NORMAL
MDC_IDC_SET_LEADCHNL_RA_SENSING_POLARITY: NORMAL
MDC_IDC_SET_LEADCHNL_RA_SENSING_SENSITIVITY: 0.3 MV
MDC_IDC_SET_LEADCHNL_RV_PACING_AMPLITUDE: 2.5 V
MDC_IDC_SET_LEADCHNL_RV_PACING_ANODE_ELECTRODE_1: NORMAL
MDC_IDC_SET_LEADCHNL_RV_PACING_ANODE_LOCATION_1: NORMAL
MDC_IDC_SET_LEADCHNL_RV_PACING_CAPTURE_MODE: NORMAL
MDC_IDC_SET_LEADCHNL_RV_PACING_CATHODE_ELECTRODE_1: NORMAL
MDC_IDC_SET_LEADCHNL_RV_PACING_CATHODE_LOCATION_1: NORMAL
MDC_IDC_SET_LEADCHNL_RV_PACING_POLARITY: NORMAL
MDC_IDC_SET_LEADCHNL_RV_PACING_PULSEWIDTH: 0.5 MS
MDC_IDC_SET_LEADCHNL_RV_SENSING_ADAPTATION_MODE: NORMAL
MDC_IDC_SET_LEADCHNL_RV_SENSING_ANODE_ELECTRODE_1: NORMAL
MDC_IDC_SET_LEADCHNL_RV_SENSING_ANODE_LOCATION_1: NORMAL
MDC_IDC_SET_LEADCHNL_RV_SENSING_CATHODE_ELECTRODE_1: NORMAL
MDC_IDC_SET_LEADCHNL_RV_SENSING_CATHODE_LOCATION_1: NORMAL
MDC_IDC_SET_LEADCHNL_RV_SENSING_POLARITY: NORMAL
MDC_IDC_SET_LEADCHNL_RV_SENSING_SENSITIVITY: 0.5 MV
MDC_IDC_SET_ZONE_DETECTION_INTERVAL: 300 MS
MDC_IDC_SET_ZONE_DETECTION_INTERVAL: 350 MS
MDC_IDC_SET_ZONE_STATUS: NORMAL
MDC_IDC_SET_ZONE_TYPE: NORMAL
MDC_IDC_SET_ZONE_VENDOR_TYPE: NORMAL
MDC_IDC_STAT_AT_BURDEN_PERCENT: 0 %
MDC_IDC_STAT_AT_DTM_END: NORMAL
MDC_IDC_STAT_AT_DTM_START: NORMAL
MDC_IDC_STAT_AT_MODE_SW_COUNT: 4
MDC_IDC_STAT_AT_MODE_SW_COUNT_PER_DAY: 0
MDC_IDC_STAT_AT_MODE_SW_MAX_DURATION: 8 S
MDC_IDC_STAT_AT_MODE_SW_PERCENT_TIME: 1 %
MDC_IDC_STAT_BRADY_AP_VP_PERCENT: 31 %
MDC_IDC_STAT_BRADY_AP_VS_PERCENT: 1 %
MDC_IDC_STAT_BRADY_AS_VP_PERCENT: 67 %
MDC_IDC_STAT_BRADY_AS_VS_PERCENT: 1 %
MDC_IDC_STAT_BRADY_DTM_END: NORMAL
MDC_IDC_STAT_BRADY_DTM_START: NORMAL
MDC_IDC_STAT_BRADY_RA_PERCENT_PACED: 28 %
MDC_IDC_STAT_CRT_DTM_END: NORMAL
MDC_IDC_STAT_CRT_DTM_START: NORMAL
MDC_IDC_STAT_CRT_PERCENT_PACED: 98 %
MDC_IDC_STAT_HEART_RATE_ATRIAL_MAX: 320 {BEATS}/MIN
MDC_IDC_STAT_HEART_RATE_ATRIAL_MEAN: 74 {BEATS}/MIN
MDC_IDC_STAT_HEART_RATE_ATRIAL_MIN: 40 {BEATS}/MIN
MDC_IDC_STAT_HEART_RATE_DTM_END: NORMAL
MDC_IDC_STAT_HEART_RATE_DTM_START: NORMAL
MDC_IDC_STAT_HEART_RATE_VENTRICULAR_MAX: 330 {BEATS}/MIN
MDC_IDC_STAT_HEART_RATE_VENTRICULAR_MEAN: 74 {BEATS}/MIN
MDC_IDC_STAT_HEART_RATE_VENTRICULAR_MIN: 50 {BEATS}/MIN
MDC_IDC_STAT_TACHYTHERAPY_ATP_DELIVERED_RECENT: 0
MDC_IDC_STAT_TACHYTHERAPY_RECENT_DTM_END: NORMAL
MDC_IDC_STAT_TACHYTHERAPY_RECENT_DTM_START: NORMAL
MDC_IDC_STAT_TACHYTHERAPY_SHOCKS_ABORTED_RECENT: 0
MDC_IDC_STAT_TACHYTHERAPY_SHOCKS_DELIVERED_RECENT: 0

## 2025-05-06 LAB
ALT SERPL-CCNC: 13 U/L (ref 0–55)
AST SERPL-CCNC: 26 U/L (ref 5–40)
CREATININE (EXTERNAL): 1.1 MG/DL (ref 0.5–1.5)
GLUCOSE (EXTERNAL): 118 MG/DL (ref 70–180)
POTASSIUM (EXTERNAL): 4.1 MMOL/L (ref 3.5–5)

## 2025-07-07 ENCOUNTER — TRANSFERRED RECORDS (OUTPATIENT)
Dept: HEALTH INFORMATION MANAGEMENT | Facility: CLINIC | Age: 89
End: 2025-07-07
Payer: MEDICARE

## 2025-07-14 ENCOUNTER — ANCILLARY PROCEDURE (OUTPATIENT)
Dept: CARDIOLOGY | Facility: CLINIC | Age: 89
End: 2025-07-14
Attending: INTERNAL MEDICINE
Payer: MEDICARE

## 2025-07-14 DIAGNOSIS — I47.29 NSVT (NONSUSTAINED VENTRICULAR TACHYCARDIA) (H): ICD-10-CM

## 2025-07-14 DIAGNOSIS — Z95.810 ICD (IMPLANTABLE CARDIOVERTER-DEFIBRILLATOR) IN PLACE: ICD-10-CM

## 2025-07-14 DIAGNOSIS — I42.9 PRIMARY CARDIOMYOPATHY (H): ICD-10-CM

## 2025-07-14 DIAGNOSIS — I50.22 CHRONIC SYSTOLIC HEART FAILURE (H): ICD-10-CM

## 2025-07-14 PROCEDURE — 93296 REM INTERROG EVL PM/IDS: CPT | Performed by: INTERNAL MEDICINE

## 2025-07-14 PROCEDURE — 93295 DEV INTERROG REMOTE 1/2/MLT: CPT | Performed by: INTERNAL MEDICINE

## 2025-07-16 LAB
MDC_IDC_EPISODE_DTM: NORMAL
MDC_IDC_EPISODE_DURATION: 10 S
MDC_IDC_EPISODE_DURATION: 10 S
MDC_IDC_EPISODE_DURATION: 4 S
MDC_IDC_EPISODE_DURATION: 4 S
MDC_IDC_EPISODE_DURATION: 6 S
MDC_IDC_EPISODE_DURATION: 8 S
MDC_IDC_EPISODE_ID: NORMAL
MDC_IDC_EPISODE_TYPE: NORMAL
MDC_IDC_EPISODE_VENDOR_TYPE: NORMAL
MDC_IDC_LEAD_CONNECTION_STATUS: NORMAL
MDC_IDC_LEAD_IMPLANT_DT: NORMAL
MDC_IDC_LEAD_LOCATION: NORMAL
MDC_IDC_LEAD_LOCATION_DETAIL_1: NORMAL
MDC_IDC_LEAD_MFG: NORMAL
MDC_IDC_LEAD_MODEL: NORMAL
MDC_IDC_LEAD_POLARITY_TYPE: NORMAL
MDC_IDC_LEAD_POLARITY_TYPE: NORMAL
MDC_IDC_LEAD_SERIAL: NORMAL
MDC_IDC_LEAD_SPECIAL_FUNCTION: NORMAL
MDC_IDC_MSMT_BATTERY_DTM: NORMAL
MDC_IDC_MSMT_BATTERY_REMAINING_LONGEVITY: 7 MO
MDC_IDC_MSMT_BATTERY_REMAINING_PERCENTAGE: 10 %
MDC_IDC_MSMT_BATTERY_RRT_TRIGGER: NORMAL
MDC_IDC_MSMT_BATTERY_STATUS: NORMAL
MDC_IDC_MSMT_BATTERY_VOLTAGE: 2.68 V
MDC_IDC_MSMT_CAP_CHARGE_DTM: NORMAL
MDC_IDC_MSMT_CAP_CHARGE_ENERGY: 40 J
MDC_IDC_MSMT_CAP_CHARGE_TIME: 10 S
MDC_IDC_MSMT_CAP_CHARGE_TYPE: NORMAL
MDC_IDC_MSMT_LEADCHNL_LV_IMPEDANCE_VALUE: 360 OHM
MDC_IDC_MSMT_LEADCHNL_LV_LEAD_CHANNEL_STATUS: NORMAL
MDC_IDC_MSMT_LEADCHNL_LV_PACING_THRESHOLD_AMPLITUDE: 0.75 V
MDC_IDC_MSMT_LEADCHNL_LV_PACING_THRESHOLD_PULSEWIDTH: 0.5 MS
MDC_IDC_MSMT_LEADCHNL_RA_IMPEDANCE_VALUE: 430 OHM
MDC_IDC_MSMT_LEADCHNL_RA_LEAD_CHANNEL_STATUS: NORMAL
MDC_IDC_MSMT_LEADCHNL_RA_PACING_THRESHOLD_AMPLITUDE: 0.88 V
MDC_IDC_MSMT_LEADCHNL_RA_PACING_THRESHOLD_PULSEWIDTH: 0.5 MS
MDC_IDC_MSMT_LEADCHNL_RA_SENSING_INTR_AMPL: 1.8 MV
MDC_IDC_MSMT_LEADCHNL_RV_IMPEDANCE_VALUE: 440 OHM
MDC_IDC_MSMT_LEADCHNL_RV_LEAD_CHANNEL_STATUS: NORMAL
MDC_IDC_MSMT_LEADCHNL_RV_PACING_THRESHOLD_AMPLITUDE: 1.5 V
MDC_IDC_MSMT_LEADCHNL_RV_PACING_THRESHOLD_PULSEWIDTH: 0.5 MS
MDC_IDC_MSMT_LEADCHNL_RV_SENSING_INTR_AMPL: 12 MV
MDC_IDC_PG_IMPLANT_DTM: NORMAL
MDC_IDC_PG_MFG: NORMAL
MDC_IDC_PG_MODEL: NORMAL
MDC_IDC_PG_SERIAL: NORMAL
MDC_IDC_PG_TYPE: NORMAL
MDC_IDC_SESS_CLINIC_NAME: NORMAL
MDC_IDC_SESS_DTM: NORMAL
MDC_IDC_SESS_REPROGRAMMED: NO
MDC_IDC_SESS_TYPE: NORMAL
MDC_IDC_SET_BRADY_AT_MODE_SWITCH_MODE: NORMAL
MDC_IDC_SET_BRADY_AT_MODE_SWITCH_RATE: 170 {BEATS}/MIN
MDC_IDC_SET_BRADY_LOWRATE: 60 {BEATS}/MIN
MDC_IDC_SET_BRADY_MAX_SENSOR_RATE: 120 {BEATS}/MIN
MDC_IDC_SET_BRADY_MAX_TRACKING_RATE: 120 {BEATS}/MIN
MDC_IDC_SET_BRADY_MODE: NORMAL
MDC_IDC_SET_BRADY_PAV_DELAY_HIGH: 100 MS
MDC_IDC_SET_BRADY_PAV_DELAY_LOW: 150 MS
MDC_IDC_SET_BRADY_SAV_DELAY_HIGH: 100 MS
MDC_IDC_SET_BRADY_SAV_DELAY_LOW: 120 MS
MDC_IDC_SET_CRT_LVRV_DELAY: 0 MS
MDC_IDC_SET_CRT_PACED_CHAMBERS: NORMAL
MDC_IDC_SET_LEADCHNL_LV_PACING_AMPLITUDE: 2 V
MDC_IDC_SET_LEADCHNL_LV_PACING_ANODE_ELECTRODE_1: NORMAL
MDC_IDC_SET_LEADCHNL_LV_PACING_ANODE_LOCATION_1: NORMAL
MDC_IDC_SET_LEADCHNL_LV_PACING_CAPTURE_MODE: NORMAL
MDC_IDC_SET_LEADCHNL_LV_PACING_CATHODE_ELECTRODE_1: NORMAL
MDC_IDC_SET_LEADCHNL_LV_PACING_CATHODE_LOCATION_1: NORMAL
MDC_IDC_SET_LEADCHNL_LV_PACING_POLARITY: NORMAL
MDC_IDC_SET_LEADCHNL_LV_PACING_PULSEWIDTH: 0.5 MS
MDC_IDC_SET_LEADCHNL_RA_PACING_AMPLITUDE: 1.88
MDC_IDC_SET_LEADCHNL_RA_PACING_ANODE_ELECTRODE_1: NORMAL
MDC_IDC_SET_LEADCHNL_RA_PACING_ANODE_LOCATION_1: NORMAL
MDC_IDC_SET_LEADCHNL_RA_PACING_CAPTURE_MODE: NORMAL
MDC_IDC_SET_LEADCHNL_RA_PACING_CATHODE_ELECTRODE_1: NORMAL
MDC_IDC_SET_LEADCHNL_RA_PACING_CATHODE_LOCATION_1: NORMAL
MDC_IDC_SET_LEADCHNL_RA_PACING_POLARITY: NORMAL
MDC_IDC_SET_LEADCHNL_RA_PACING_PULSEWIDTH: 0.5 MS
MDC_IDC_SET_LEADCHNL_RA_SENSING_ADAPTATION_MODE: NORMAL
MDC_IDC_SET_LEADCHNL_RA_SENSING_ANODE_ELECTRODE_1: NORMAL
MDC_IDC_SET_LEADCHNL_RA_SENSING_ANODE_LOCATION_1: NORMAL
MDC_IDC_SET_LEADCHNL_RA_SENSING_CATHODE_ELECTRODE_1: NORMAL
MDC_IDC_SET_LEADCHNL_RA_SENSING_CATHODE_LOCATION_1: NORMAL
MDC_IDC_SET_LEADCHNL_RA_SENSING_POLARITY: NORMAL
MDC_IDC_SET_LEADCHNL_RA_SENSING_SENSITIVITY: 0.3 MV
MDC_IDC_SET_LEADCHNL_RV_PACING_AMPLITUDE: 2.5 V
MDC_IDC_SET_LEADCHNL_RV_PACING_ANODE_ELECTRODE_1: NORMAL
MDC_IDC_SET_LEADCHNL_RV_PACING_ANODE_LOCATION_1: NORMAL
MDC_IDC_SET_LEADCHNL_RV_PACING_CAPTURE_MODE: NORMAL
MDC_IDC_SET_LEADCHNL_RV_PACING_CATHODE_ELECTRODE_1: NORMAL
MDC_IDC_SET_LEADCHNL_RV_PACING_CATHODE_LOCATION_1: NORMAL
MDC_IDC_SET_LEADCHNL_RV_PACING_POLARITY: NORMAL
MDC_IDC_SET_LEADCHNL_RV_PACING_PULSEWIDTH: 0.5 MS
MDC_IDC_SET_LEADCHNL_RV_SENSING_ADAPTATION_MODE: NORMAL
MDC_IDC_SET_LEADCHNL_RV_SENSING_ANODE_ELECTRODE_1: NORMAL
MDC_IDC_SET_LEADCHNL_RV_SENSING_ANODE_LOCATION_1: NORMAL
MDC_IDC_SET_LEADCHNL_RV_SENSING_CATHODE_ELECTRODE_1: NORMAL
MDC_IDC_SET_LEADCHNL_RV_SENSING_CATHODE_LOCATION_1: NORMAL
MDC_IDC_SET_LEADCHNL_RV_SENSING_POLARITY: NORMAL
MDC_IDC_SET_LEADCHNL_RV_SENSING_SENSITIVITY: 0.5 MV
MDC_IDC_SET_ZONE_DETECTION_INTERVAL: 300 MS
MDC_IDC_SET_ZONE_DETECTION_INTERVAL: 350 MS
MDC_IDC_SET_ZONE_STATUS: NORMAL
MDC_IDC_SET_ZONE_TYPE: NORMAL
MDC_IDC_SET_ZONE_VENDOR_TYPE: NORMAL
MDC_IDC_STAT_AT_BURDEN_PERCENT: 1 %
MDC_IDC_STAT_AT_DTM_END: NORMAL
MDC_IDC_STAT_AT_DTM_START: NORMAL
MDC_IDC_STAT_AT_MODE_SW_COUNT: 12
MDC_IDC_STAT_AT_MODE_SW_COUNT_PER_DAY: 0
MDC_IDC_STAT_AT_MODE_SW_MAX_DURATION: 10 S
MDC_IDC_STAT_AT_MODE_SW_PERCENT_TIME: 1 %
MDC_IDC_STAT_BRADY_AP_VP_PERCENT: 12 %
MDC_IDC_STAT_BRADY_AP_VS_PERCENT: 1 %
MDC_IDC_STAT_BRADY_AS_VP_PERCENT: 85 %
MDC_IDC_STAT_BRADY_AS_VS_PERCENT: 1.1 %
MDC_IDC_STAT_BRADY_DTM_END: NORMAL
MDC_IDC_STAT_BRADY_DTM_START: NORMAL
MDC_IDC_STAT_BRADY_RA_PERCENT_PACED: 11 %
MDC_IDC_STAT_CRT_DTM_END: NORMAL
MDC_IDC_STAT_CRT_DTM_START: NORMAL
MDC_IDC_STAT_CRT_PERCENT_PACED: 97 %
MDC_IDC_STAT_EPISODE_RECENT_COUNT: 2
MDC_IDC_STAT_EPISODE_RECENT_COUNT_DTM_END: NORMAL
MDC_IDC_STAT_EPISODE_RECENT_COUNT_DTM_START: NORMAL
MDC_IDC_STAT_EPISODE_TYPE: NORMAL
MDC_IDC_STAT_EPISODE_VENDOR_TYPE: NORMAL
MDC_IDC_STAT_HEART_RATE_ATRIAL_MAX: 330 {BEATS}/MIN
MDC_IDC_STAT_HEART_RATE_ATRIAL_MEAN: 79 {BEATS}/MIN
MDC_IDC_STAT_HEART_RATE_ATRIAL_MIN: 40 {BEATS}/MIN
MDC_IDC_STAT_HEART_RATE_DTM_END: NORMAL
MDC_IDC_STAT_HEART_RATE_DTM_START: NORMAL
MDC_IDC_STAT_HEART_RATE_VENTRICULAR_MAX: 330 {BEATS}/MIN
MDC_IDC_STAT_HEART_RATE_VENTRICULAR_MEAN: 79 {BEATS}/MIN
MDC_IDC_STAT_HEART_RATE_VENTRICULAR_MIN: 50 {BEATS}/MIN
MDC_IDC_STAT_TACHYTHERAPY_ATP_DELIVERED_RECENT: 0
MDC_IDC_STAT_TACHYTHERAPY_RECENT_DTM_END: NORMAL
MDC_IDC_STAT_TACHYTHERAPY_RECENT_DTM_START: NORMAL
MDC_IDC_STAT_TACHYTHERAPY_SHOCKS_ABORTED_RECENT: 0
MDC_IDC_STAT_TACHYTHERAPY_SHOCKS_DELIVERED_RECENT: 0

## 2025-07-22 ENCOUNTER — LAB (OUTPATIENT)
Dept: LAB | Facility: CLINIC | Age: 89
End: 2025-07-22
Payer: MEDICARE

## 2025-07-22 ENCOUNTER — OFFICE VISIT (OUTPATIENT)
Dept: CARDIOLOGY | Facility: CLINIC | Age: 89
End: 2025-07-22
Attending: INTERNAL MEDICINE
Payer: MEDICARE

## 2025-07-22 ENCOUNTER — HOSPITAL ENCOUNTER (OUTPATIENT)
Dept: CARDIOLOGY | Facility: CLINIC | Age: 89
Discharge: HOME | End: 2025-07-22
Attending: INTERNAL MEDICINE
Payer: MEDICARE

## 2025-07-22 VITALS
OXYGEN SATURATION: 97 % | HEART RATE: 62 BPM | SYSTOLIC BLOOD PRESSURE: 94 MMHG | BODY MASS INDEX: 23.52 KG/M2 | WEIGHT: 150.2 LBS | DIASTOLIC BLOOD PRESSURE: 56 MMHG

## 2025-07-22 DIAGNOSIS — I50.22 CHRONIC SYSTOLIC HEART FAILURE (H): ICD-10-CM

## 2025-07-22 DIAGNOSIS — I42.9 PRIMARY CARDIOMYOPATHY (H): Primary | ICD-10-CM

## 2025-07-22 DIAGNOSIS — E78.00 HYPERCHOLESTEROLEMIA: ICD-10-CM

## 2025-07-22 DIAGNOSIS — I10 ESSENTIAL HYPERTENSION WITH GOAL BLOOD PRESSURE LESS THAN 140/90: ICD-10-CM

## 2025-07-22 DIAGNOSIS — Z95.810 ICD (IMPLANTABLE CARDIOVERTER-DEFIBRILLATOR) IN PLACE: ICD-10-CM

## 2025-07-22 DIAGNOSIS — I47.29 NSVT (NONSUSTAINED VENTRICULAR TACHYCARDIA) (H): ICD-10-CM

## 2025-07-22 DIAGNOSIS — I25.10 CORONARY ARTERY DISEASE INVOLVING NATIVE CORONARY ARTERY OF NATIVE HEART WITHOUT ANGINA PECTORIS: ICD-10-CM

## 2025-07-22 LAB
ANION GAP SERPL CALCULATED.3IONS-SCNC: 9 MMOL/L (ref 7–15)
BI-PLANE LVEF ECHO: NORMAL
BUN SERPL-MCNC: 12.7 MG/DL (ref 8–23)
CALCIUM SERPL-MCNC: 8.9 MG/DL (ref 8.8–10.4)
CHLORIDE SERPL-SCNC: 102 MMOL/L (ref 98–107)
CREAT SERPL-MCNC: 1.01 MG/DL (ref 0.67–1.17)
EGFRCR SERPLBLD CKD-EPI 2021: 72 ML/MIN/1.73M2
ERYTHROCYTE [DISTWIDTH] IN BLOOD BY AUTOMATED COUNT: 12.7 % (ref 10–15)
GLUCOSE SERPL-MCNC: 120 MG/DL (ref 70–99)
HCO3 SERPL-SCNC: 26 MMOL/L (ref 22–29)
HCT VFR BLD AUTO: 35.3 % (ref 40–53)
HGB BLD-MCNC: 11.9 G/DL (ref 13.3–17.7)
LVEF ECHO: NORMAL
MCH RBC QN AUTO: 32.6 PG (ref 26.5–33)
MCHC RBC AUTO-ENTMCNC: 33.7 G/DL (ref 31.5–36.5)
MCV RBC AUTO: 97 FL (ref 78–100)
PLATELET # BLD AUTO: 211 10E3/UL (ref 150–450)
POTASSIUM SERPL-SCNC: 4.3 MMOL/L (ref 3.4–5.3)
RBC # BLD AUTO: 3.65 10E6/UL (ref 4.4–5.9)
SODIUM SERPL-SCNC: 137 MMOL/L (ref 135–145)
WBC # BLD AUTO: 7.1 10E3/UL (ref 4–11)

## 2025-07-22 PROCEDURE — 999N000208 ECHOCARDIOGRAM COMPLETE

## 2025-07-22 PROCEDURE — 255N000002 HC RX 255 OP 636: Performed by: INTERNAL MEDICINE

## 2025-07-22 RX ADMIN — HUMAN ALBUMIN MICROSPHERES AND PERFLUTREN 9 ML (DILUTED): 10; .22 INJECTION, SOLUTION INTRAVENOUS at 10:27

## 2025-07-22 NOTE — LETTER
7/22/2025    Reba Gilmore MD  [address]  [facility] [id number]    RE: Katie Shannon       Dear Colleague,     I had the pleasure of seeing Katie Shannon in the [facility].    General Cardiology Clinic Progress Note  Katie Shannon MRN# [id number]   YOB: 1935 Age: 89 year old       Reason for visit: Nonischemic cardiomyopathy, chronic systolic heart failure    History of presenting illness:    I had the opportunity to see Katie Shannon at [facility] today for reevaluation of nonischemic cardiomyopathy and chronic systolic heart failure.  I talked for quite a while about different things, including his difficulty with sleep, but overall he sounds like he is doing well.  He has been playing golf when the bugs are not too bad.  He does not have shortness of breath PND or orthopnea.  He has no chest pain, lightheadedness, or syncope.  His echocardiogram looks quite stable, with an ejection fraction of 30 to 35% and mild aortic stenosis which is unchanged compared to the previous study.    He has an ICD in place and had an ICD check a week ago showing 97% BiV pacing and 7 months of battery life remaining.  He had a few brief episodes of mode switch, probably due to atrial tachycardia or may be A-fib.  He had a couple of brief episodes of NSVT as well, but has not had any VT requiring therapy since January and February 2024.  He had 2 successful ATP therapies at that time.    On examination today his blood pressure was 94/56, heart rate 62, and weight 150 pounds and stable.  His lungs sound clear.  Heart rhythm is regular.  He has distant heart tones and a soft systolic murmur.              Assessment and Plan:     ASSESSMENT:    Mr. 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  He has had ventricular tachycardia requiring ATP therapy from his  device last year, but no recurrence of prolonged ventricular arrhythmias recently.  He remains on sotalol 80 mg p.o. twice daily for prevention of arrhythmias.  His echo shows stable, mild aortic stenosis and normal right sided filling pressures.  I will not change his furosemide dose, 60 mg daily.  His basic metabolic panel continues to be normal.    His hypercholesterolemia is well treated with rosuvastatin and his blood pressures are normal to low on his current medication program for GDMT of HFrEF.    I suggested he could try melatonin to help him sleep.    I will plan to see him back again in 6 months for reevaluation.    Rah Ayala MD    The longitudinal plan of care for the diagnosis(es)/condition(s) as documented were addressed during this visit. Due to the added complexity in care, I will continue to support Katie in the subsequent management and with ongoing continuity of care.        Orders this Visit:  Orders Placed This Encounter   Procedures     Lipid Profile     ALT     Basic metabolic panel     Follow-Up with Cardiology     No orders of the defined types were placed in this encounter.    Medications Discontinued During This Encounter   Medication Reason     ciclopirox (PENLAC) 8 % external solution      CYCLOBENZAPRINE HCL PO      LORazepam (ATIVAN) 0.5 MG tablet      polyethylene glycol 400 (BLINK TEARS) 0.25 % SOLN ophthalmic solution      prednisoLONE acetate (PRED FORTE) 1 % ophthalmic suspension      traZODone (DESYREL) 50 MG tablet        Today's clinic visit entailed:    35 minutes spent by me on the date of the encounter doing chart review, history and exam, documentation and further activities per the note  Provider  Link to [facility] Help Grid     The level of medical decision making during this visit was of high complexity.           Review of Systems:     Review of Systems:  Skin:  Negative rash, bruising, lumps or bumps   Eyes:  Positive for visual blurring  ENT:  Negative  tinnitus  Respiratory:  Negative shortness of breath  Cardiovascular:  Negative, palpitations, chest pain, edema, dizziness, lightheadedness Positive for, fatigue  Gastroenterology: Positive for poor appetite  Genitourinary:  Negative urinary frequency, urgency, hesitancy  Musculoskeletal:  Negative gout, fibromyalgia  Neurologic:  Negative migraine headaches, headaches, numbness or tingling of hands, numbness or tingling of feet  Psychiatric:  Positive for sleep disturbances  Heme/Lymph/Imm:  Negative anemia, bleeding disorder  Endocrine:  Negative thyroid disorder, diabetes            Physical Exam:     Vitals: BP 94/56 (BP Location: Left arm, Patient Position: Sitting, Cuff Size: Adult Regular)   Pulse 62   Wt 68.1 kg (150 lb 3.2 oz)   SpO2 97%   BMI 23.52 kg/m    Constitutional: Well nourished and in no apparent distress.  Eyes: Pupils equal, round. Sclerae anicteric.   HEENT: Normocephalic, atraumatic.   Neck: Supple. JVD   Respiratory: Breathing non-labored. Lungs clear to auscultation bilaterally. No crackles, wheezes, rhonchi, or rales.  Cardiovascular:  Regular rate and rhythm, normal S1 and S2. No murmur, rub, or gallop.  Skin: Warm, dry. No rashes, cyanosis, or xanthelasma.  Extremities: No edema.  Neurologic: No gross motor deficits. Alert, awake, and oriented to person, place and time.  Psychiatric: Affect appropriate.             Medications:     Current Outpatient Medications   Medication Sig Dispense Refill     carvedilol (COREG) 12.5 MG tablet Take 1 tablet (12.5 mg) by mouth 2 times daily (with meals). 180 tablet 3     Cholecalciferol (VITAMIN D3) 50 MCG (2000 UT) CAPS Take 4,000 Units by mouth.       clopidogrel (PLAVIX) 75 MG tablet Take 75 mg by mouth daily       cyanocobalamin (VITAMIN B-12) 1000 MCG tablet Take 1,000 mcg by mouth daily.       Famotidine (PEPCID PO) Take 40 mg by mouth daily        furosemide (LASIX) 20 MG tablet Take 3 tablets (60 mg) by mouth daily. 270 tablet 3      isosorbide mononitrate (IMDUR) 60 MG 24 hr tablet Take 1 tablet (60 mg) by mouth daily 90 tablet 3     omeprazole (PRILOSEC) 20 MG DR capsule Take 20 mg by mouth daily       potassium chloride ER (K-TAB) 20 MEQ CR tablet Take 1 tablet (20 mEq) by mouth daily. 90 tablet 3     psyllium (METAMUCIL/KONSYL) 58.6 % powder Take by mouth daily.       rosuvastatin (CRESTOR) 20 MG tablet Take 1 tablet (20 mg) by mouth daily. 90 tablet 3     sacubitril-valsartan (ENTRESTO)  MG per tablet Take 1 tablet by mouth 2 times daily. 180 tablet 3     sotalol (BETAPACE) 80 MG tablet Take 1 tablet (80 mg) by mouth 2 times daily. 180 tablet 3     tamsulosin (FLOMAX) 0.4 MG capsule Take 0.4 mg by mouth daily       White Petrolatum-Mineral Oil (RETAINE PM) OINT Place into both eyes At Bedtime       carboxymethylcellulose PF (REFRESH LIQUIGEL) 1 % ophthalmic gel Place 1 drop into both eyes 3 times daily         Family History   Problem Relation Age of Onset     Heart Disease Mother 88        mi     Cerebrovascular Disease Father 71       Social History     Socioeconomic History     Marital status:      Spouse name: Not on file     Number of children: Not on file     Years of education: Not on file     Highest education level: Not on file   Occupational History     Not on file   Tobacco Use     Smoking status: Never     Smokeless tobacco: Never   Substance and Sexual Activity     Alcohol use: No     Alcohol/week: 0.0 standard drinks of alcohol     Drug use: No     Sexual activity: Not on file   Other Topics Concern      Service Not Asked     Blood Transfusions Not Asked     Caffeine Concern No     Occupational Exposure Not Asked     Hobby Hazards Not Asked     Sleep Concern No     Stress Concern Not Asked     Weight Concern No     Special Diet No     Back Care Not Asked     Exercise Yes     Comment: active on farm     Bike Helmet Not Asked     Seat Belt Not Asked     Self-Exams Not Asked     Parent/sibling w/ CABG, MI or  angioplasty before 65F 55M? No   Social History Narrative     Not on file     Social Drivers of Health     Financial Resource Strain: Low Risk  (11/14/2022)    Received from [facility]    Overall Financial Resource Strain (CARDIA)      Difficulty of Paying Living Expenses: Not hard at all   Food Insecurity: No Food Insecurity (12/2/2024)    Received from [facility]    Hunger Vital Sign      Worried About Running Out of Food in the Last Year: Never true      Ran Out of Food in the Last Year: Never true   Transportation Needs: No Transportation Needs (11/14/2022)    Received from [facility]    PRAPARE - Transportation      Lack of Transportation (Medical): No      Lack of Transportation (Non-Medical): No   Physical Activity: Sufficiently Active (11/14/2022)    Received from [facility]    Exercise Vital Sign      Days of Exercise per Week: 7 days      Minutes of Exercise per Session: 40 min   Stress: Stress Concern Present (12/2/2024)    Received from [facility]    [employer] - Occupational Stress Questionnaire      Feeling of Stress : To some extent   Social Connections: Socially Isolated (11/14/2022)    Received from [facility]    Social Connection and Isolation Panel [NHANES]      Frequency of Communication with Friends and Family: Once a week      Frequency of Social Gatherings with Friends and Family: Once a week      Attends Yazidi Services: Never      Active Member of Clubs or Organizations: No      Attends Club or Organization Meetings: Never      Marital Status:    Interpersonal Safety: Not At Risk (7/18/2025)    [facility]    Intimate Partner Violence      Are you in a relationship where you are physically hurt, threatened and/or made to feel afraid?: No   Housing Stability: Unknown (11/14/2022)     [facility]    Housing Stability      In the last 12 months, was there a time when you were not able to pay the mortgage or rent on time?: No      Number of Places Lived in the Last Year: Not on file      Number of Places Lived in the Last Year (Outpatient): Not on file      In the last 12 months, was there a time when you did not have a steady place to sleep or slept in a shelter (including now)?: No            Past Medical History:     Past Medical History:   Diagnosis Date     CAD (coronary artery disease)      Congestive heart failure (H)      Heart disease     non-ischemic cardiomyopathy     HTN (hypertension)      Idiopathic cardiomyopathy (H)               Past Surgical History:     Past Surgical History:   Procedure Laterality Date     COLONOSCOPY N/A 1/29/2020    Procedure: COLONOSCOPY;  Surgeon: Erica Duff MD;  Location:  GI     ESOPHAGOSCOPY, GASTROSCOPY, DUODENOSCOPY (EGD), COMBINED N/A 1/28/2020    Procedure: ESOPHAGOGASTRODUODENOSCOPY (EGD);  Surgeon: Erica Duff MD;  Location:  GI     EYE SURGERY  2006    cataract     ORTHOPEDIC SURGERY  01/2018    pinning of right hip              Allergies:   Atorvastatin, Diphenhydramine, Metoprolol succinate [metoprolol], Strawberry extract, and Sulfa antibiotics       Data:   All laboratory data reviewed:    Recent Labs   Lab Test 04/25/24  1258 10/24/22  1344 05/08/20  1042 12/04/19  1205   LDL 56  --  50 77   HDL 38*  --  41 42   NHDL 81  --  84 103   CHOL 119  --  125 145   TRIG 124  --  168* 130   TSH  --  2.16 3.08  --    NTBNP  --  3,089*  --   --        Lab Results   Component Value Date    WBC 7.1 07/22/2025    WBC 6.3 02/04/2020    RBC 3.65 (L) 07/22/2025    RBC 3.13 (L) 01/28/2020    HGB 11.9 (L) 07/22/2025    HGB 13.7 11/09/2020    HCT 35.3 (L) 07/22/2025    HCT 25.4 (A) 02/04/2020    MCV 97 07/22/2025    MCV 91.0 02/04/2020    MCH 32.6 07/22/2025    MCH 32.1 02/04/2020    MCHC 33.7 07/22/2025    [facility]  "35.3 02/04/2020    RDW 12.7 07/22/2025    RDW 14.4 02/04/2020     07/22/2025     01/28/2020       Lab Results   Component Value Date     07/22/2025     (L) 11/09/2020    POTASSIUM 4.3 07/22/2025    POTASSIUM 4.0 10/24/2022    POTASSIUM 4.4 11/09/2020    CHLORIDE 102 07/22/2025    CHLORIDE 102 01/26/2024    CHLORIDE 104 11/09/2020    CO2 26 07/22/2025    CO2 27 10/24/2022    CO2 26 11/09/2020    ANIONGAP 9 07/22/2025    ANIONGAP 2 (L) 10/24/2022    ANIONGAP 9.4 11/09/2020     (H) 07/22/2025     (H) 10/24/2022     (H) 11/09/2020    BUN 12.7 07/22/2025    BUN 17 10/24/2022    BUN 15 11/09/2020    CR 1.01 07/22/2025    CR 1.02 11/09/2020    GFRESTIMATED 72 07/22/2025    GFRESTIMATED 70 11/09/2020    GFRESTBLACK 84 11/09/2020    ELIZABETH 8.9 07/22/2025    ELIZABETH 8.8 11/09/2020      Lab Results   Component Value Date    AST 22 02/04/2020    ALT 13 04/25/2024    ALT <5 (L) 05/08/2020       No results found for: \"A1C\"    Lab Results   Component Value Date    INR 1.19 (H) 01/28/2020    INR 1.11 01/28/2020         ISIDORO AYALA MD  [facility]    Thank you for allowing me to participate in the care of your patient.      Sincerely,     ISIDORO AYALA MD     [facility]  cc:   Isidoro Ayala MD  [address]  [place] [id number]      "

## 2025-07-22 NOTE — PATIENT INSTRUCTIONS
It was a pleasure seeing you today and thank you for allowing me to be a part of your health care team.  Please be aware that your follow-up visit may be scheduled with one of the Advanced Practice Providers (Nurse Practitioner or Physician Assistant) on our care team. We are a team and work closely together to meet your health care needs.  Should you have any questions regarding your visit or future needs please feel free to reach out to my care team for assistance.      Thank you, Dr. Rah Ayala        **Nursing: [phone]       **Scheduling: [phone]

## 2025-07-22 NOTE — PROGRESS NOTES
General Cardiology Clinic Progress Note  Katie Shannon MRN# [id number]   YOB: 1935 Age: 89 year old       Reason for visit: Nonischemic cardiomyopathy, chronic systolic heart failure    History of presenting illness:    I had the opportunity to see Katie Shannon at [facility] today for reevaluation of nonischemic cardiomyopathy and chronic systolic heart failure.  I talked for quite a while about different things, including his difficulty with sleep, but overall he sounds like he is doing well.  He has been playing golf when the bugs are not too bad.  He does not have shortness of breath PND or orthopnea.  He has no chest pain, lightheadedness, or syncope.  His echocardiogram looks quite stable, with an ejection fraction of 30 to 35% and mild aortic stenosis which is unchanged compared to the previous study.    He has an ICD in place and had an ICD check a week ago showing 97% BiV pacing and 7 months of battery life remaining.  He had a few brief episodes of mode switch, probably due to atrial tachycardia or may be A-fib.  He had a couple of brief episodes of NSVT as well, but has not had any VT requiring therapy since January and February 2024.  He had 2 successful ATP therapies at that time.    On examination today his blood pressure was 94/56, heart rate 62, and weight 150 pounds and stable.  His lungs sound clear.  Heart rhythm is regular.  He has distant heart tones and a soft systolic murmur.              Assessment and Plan:     ASSESSMENT:    Mr. 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  He has had ventricular tachycardia requiring ATP therapy from his device last year, but no recurrence of prolonged ventricular arrhythmias recently.  He remains on sotalol 80 mg p.o. twice daily for prevention of arrhythmias.  His echo shows stable, mild aortic  stenosis and normal right sided filling pressures.  I will not change his furosemide dose, 60 mg daily.  His basic metabolic panel continues to be normal.    His hypercholesterolemia is well treated with rosuvastatin and his blood pressures are normal to low on his current medication program for GDMT of HFrEF.    I suggested he could try melatonin to help him sleep.    I will plan to see him back again in 6 months for reevaluation.    Rah Ayala MD    The longitudinal plan of care for the diagnosis(es)/condition(s) as documented were addressed during this visit. Due to the added complexity in care, I will continue to support Katie in the subsequent management and with ongoing continuity of care.        Orders this Visit:  Orders Placed This Encounter   Procedures    Lipid Profile    ALT    Basic metabolic panel    Follow-Up with Cardiology     No orders of the defined types were placed in this encounter.    Medications Discontinued During This Encounter   Medication Reason    ciclopirox (PENLAC) 8 % external solution     CYCLOBENZAPRINE HCL PO     LORazepam (ATIVAN) 0.5 MG tablet     polyethylene glycol 400 (BLINK TEARS) 0.25 % SOLN ophthalmic solution     prednisoLONE acetate (PRED FORTE) 1 % ophthalmic suspension     traZODone (DESYREL) 50 MG tablet        Today's clinic visit entailed:    35 minutes spent by me on the date of the encounter doing chart review, history and exam, documentation and further activities per the note  Provider  Link to [facility] Help Grid     The level of medical decision making during this visit was of high complexity.           Review of Systems:     Review of Systems:  Skin:  Negative rash, bruising, lumps or bumps   Eyes:  Positive for visual blurring  ENT:  Negative tinnitus  Respiratory:  Negative shortness of breath  Cardiovascular:  Negative, palpitations, chest pain, edema, dizziness, lightheadedness Positive for, fatigue  Gastroenterology: Positive for poor  appetite  Genitourinary:  Negative urinary frequency, urgency, hesitancy  Musculoskeletal:  Negative gout, fibromyalgia  Neurologic:  Negative migraine headaches, headaches, numbness or tingling of hands, numbness or tingling of feet  Psychiatric:  Positive for sleep disturbances  Heme/Lymph/Imm:  Negative anemia, bleeding disorder  Endocrine:  Negative thyroid disorder, diabetes            Physical Exam:     Vitals: BP 94/56 (BP Location: Left arm, Patient Position: Sitting, Cuff Size: Adult Regular)   Pulse 62   Wt 68.1 kg (150 lb 3.2 oz)   SpO2 97%   BMI 23.52 kg/m    Constitutional: Well nourished and in no apparent distress.  Eyes: Pupils equal, round. Sclerae anicteric.   HEENT: Normocephalic, atraumatic.   Neck: Supple. JVD   Respiratory: Breathing non-labored. Lungs clear to auscultation bilaterally. No crackles, wheezes, rhonchi, or rales.  Cardiovascular:  Regular rate and rhythm, normal S1 and S2. No murmur, rub, or gallop.  Skin: Warm, dry. No rashes, cyanosis, or xanthelasma.  Extremities: No edema.  Neurologic: No gross motor deficits. Alert, awake, and oriented to person, place and time.  Psychiatric: Affect appropriate.             Medications:     Current Outpatient Medications   Medication Sig Dispense Refill    carvedilol (COREG) 12.5 MG tablet Take 1 tablet (12.5 mg) by mouth 2 times daily (with meals). 180 tablet 3    Cholecalciferol (VITAMIN D3) 50 MCG (2000 UT) CAPS Take 4,000 Units by mouth.      clopidogrel (PLAVIX) 75 MG tablet Take 75 mg by mouth daily      cyanocobalamin (VITAMIN B-12) 1000 MCG tablet Take 1,000 mcg by mouth daily.      Famotidine (PEPCID PO) Take 40 mg by mouth daily       furosemide (LASIX) 20 MG tablet Take 3 tablets (60 mg) by mouth daily. 270 tablet 3    isosorbide mononitrate (IMDUR) 60 MG 24 hr tablet Take 1 tablet (60 mg) by mouth daily 90 tablet 3    omeprazole (PRILOSEC) 20 MG DR capsule Take 20 mg by mouth daily      potassium chloride ER (K-TAB) 20 MEQ CR  tablet Take 1 tablet (20 mEq) by mouth daily. 90 tablet 3    psyllium (METAMUCIL/KONSYL) 58.6 % powder Take by mouth daily.      rosuvastatin (CRESTOR) 20 MG tablet Take 1 tablet (20 mg) by mouth daily. 90 tablet 3    sacubitril-valsartan (ENTRESTO)  MG per tablet Take 1 tablet by mouth 2 times daily. 180 tablet 3    sotalol (BETAPACE) 80 MG tablet Take 1 tablet (80 mg) by mouth 2 times daily. 180 tablet 3    tamsulosin (FLOMAX) 0.4 MG capsule Take 0.4 mg by mouth daily      White Petrolatum-Mineral Oil (RETAINE PM) OINT Place into both eyes At Bedtime      carboxymethylcellulose PF (REFRESH LIQUIGEL) 1 % ophthalmic gel Place 1 drop into both eyes 3 times daily         Family History   Problem Relation Age of Onset    Heart Disease Mother 88        mi    Cerebrovascular Disease Father 71       Social History     Socioeconomic History    Marital status:      Spouse name: Not on file    Number of children: Not on file    Years of education: Not on file    Highest education level: Not on file   Occupational History    Not on file   Tobacco Use    Smoking status: Never    Smokeless tobacco: Never   Substance and Sexual Activity    Alcohol use: No     Alcohol/week: 0.0 standard drinks of alcohol    Drug use: No    Sexual activity: Not on file   Other Topics Concern     Service Not Asked    Blood Transfusions Not Asked    Caffeine Concern No    Occupational Exposure Not Asked    Hobby Hazards Not Asked    Sleep Concern No    Stress Concern Not Asked    Weight Concern No    Special Diet No    Back Care Not Asked    Exercise Yes     Comment: active on farm    Bike Helmet Not Asked    Seat Belt Not Asked    Self-Exams Not Asked    Parent/sibling w/ CABG, MI or angioplasty before 65F 55M? No   Social History Narrative    Not on file     Social Drivers of Health     Financial Resource Strain: Low Risk  (11/14/2022)    Received from [employer] [facility] [employer]    Overall Financial Resource Strain  (CARDIA)     Difficulty of Paying Living Expenses: Not hard at all   Food Insecurity: No Food Insecurity (12/2/2024)    [facility]    Hunger Vital Sign     Worried About Running Out of Food in the Last Year: Never true     Ran Out of Food in the Last Year: Never true   Transportation Needs: No Transportation Needs (11/14/2022)    [facility]    PRAPARE - Transportation     Lack of Transportation (Medical): No     Lack of Transportation (Non-Medical): No   Physical Activity: Sufficiently Active (11/14/2022)    Received from [facility]    Exercise Vital Sign     Days of Exercise per Week: 7 days     Minutes of Exercise per Session: 40 min   Stress: Stress Concern Present (12/2/2024)    Received from [facility] [employer] [facility]    [employer] - Occupational Stress Questionnaire     Feeling of Stress : To some extent   Social Connections: Socially Isolated (11/14/2022)    [facility]    Social Connection and Isolation Panel [NHANES]     Frequency of Communication with Friends and Family: Once a week     Frequency of Social Gatherings with Friends and Family: Once a week     Attends Faith Services: Never     Active Member of Clubs or Organizations: No     Attends Club or Organization Meetings: Never     Marital Status:    Interpersonal Safety: Not At Risk (7/18/2025)    [facility]    Intimate Partner Violence     Are you in a relationship where you are physically hurt, threatened and/or made to feel afraid?: No   Housing Stability: Unknown (11/14/2022)    [facility]    Housing Stability     In the last 12 months, was there a time when you were not able to pay the mortgage or rent on time?: No     Number of Places Lived in the Last Year: Not on file     Number of Places Lived in  the Last Year (Outpatient): Not on file     In the last 12 months, was there a time when you did not have a steady place to sleep or slept in a shelter (including now)?: No            Past Medical History:     Past Medical History:   Diagnosis Date    CAD (coronary artery disease)     Congestive heart failure (H)     Heart disease     non-ischemic cardiomyopathy    HTN (hypertension)     Idiopathic cardiomyopathy (H)               Past Surgical History:     Past Surgical History:   Procedure Laterality Date    COLONOSCOPY N/A 1/29/2020    Procedure: COLONOSCOPY;  Surgeon: Erica Duff MD;  Location:  GI    ESOPHAGOSCOPY, GASTROSCOPY, DUODENOSCOPY (EGD), COMBINED N/A 1/28/2020    Procedure: ESOPHAGOGASTRODUODENOSCOPY (EGD);  Surgeon: Erica Duff MD;  Location:  GI    EYE SURGERY  2006    cataract    ORTHOPEDIC SURGERY  01/2018    pinning of right hip              Allergies:   Atorvastatin, Diphenhydramine, Metoprolol succinate [metoprolol], Strawberry extract, and Sulfa antibiotics       Data:   All laboratory data reviewed:    Recent Labs   Lab Test 04/25/24  1258 10/24/22  1344 05/08/20  1042 12/04/19  1205   LDL 56  --  50 77   HDL 38*  --  41 42   NHDL 81  --  84 103   CHOL 119  --  125 145   TRIG 124  --  168* 130   TSH  --  2.16 3.08  --    NTBNP  --  3,089*  --   --        Lab Results   Component Value Date    WBC 7.1 07/22/2025    WBC 6.3 02/04/2020    RBC 3.65 (L) 07/22/2025    RBC 3.13 (L) 01/28/2020    HGB 11.9 (L) 07/22/2025    HGB 13.7 11/09/2020    HCT 35.3 (L) 07/22/2025    HCT 25.4 (A) 02/04/2020    MCV 97 07/22/2025    MCV 91.0 02/04/2020    MCH 32.6 07/22/2025    MCH 32.1 02/04/2020    MCHC 33.7 07/22/2025    MCHC 35.3 02/04/2020    RDW 12.7 07/22/2025    RDW 14.4 02/04/2020     07/22/2025     01/28/2020       Lab Results   Component Value Date     07/22/2025     (L) 11/09/2020    POTASSIUM 4.3 07/22/2025    POTASSIUM 4.0 10/24/2022    POTASSIUM 4.4 11/09/2020  "   CHLORIDE 102 07/22/2025    CHLORIDE 102 01/26/2024    CHLORIDE 104 11/09/2020    CO2 26 07/22/2025    CO2 27 10/24/2022    CO2 26 11/09/2020    ANIONGAP 9 07/22/2025    ANIONGAP 2 (L) 10/24/2022    ANIONGAP 9.4 11/09/2020     (H) 07/22/2025     (H) 10/24/2022     (H) 11/09/2020    BUN 12.7 07/22/2025    BUN 17 10/24/2022    BUN 15 11/09/2020    CR 1.01 07/22/2025    CR 1.02 11/09/2020    GFRESTIMATED 72 07/22/2025    GFRESTIMATED 70 11/09/2020    GFRESTBLACK 84 11/09/2020    ELIZABETH 8.9 07/22/2025    ELIZABETH 8.8 11/09/2020      Lab Results   Component Value Date    AST 22 02/04/2020    ALT 13 04/25/2024    ALT <5 (L) 05/08/2020       No results found for: \"A1C\"    Lab Results   Component Value Date    INR 1.19 (H) 01/28/2020    INR 1.11 01/28/2020         ISIDORO MARI MD  [facility]  "

## (undated) RX ORDER — AMINOPHYLLINE 25 MG/ML
INJECTION, SOLUTION INTRAVENOUS
Status: DISPENSED
Start: 2017-03-23

## (undated) RX ORDER — FENTANYL CITRATE 50 UG/ML
INJECTION, SOLUTION INTRAMUSCULAR; INTRAVENOUS
Status: DISPENSED
Start: 2018-04-30

## (undated) RX ORDER — GLYCOPYRROLATE 0.2 MG/ML
INJECTION, SOLUTION INTRAMUSCULAR; INTRAVENOUS
Status: DISPENSED
Start: 2024-05-10

## (undated) RX ORDER — LIDOCAINE HYDROCHLORIDE 10 MG/ML
INJECTION, SOLUTION EPIDURAL; INFILTRATION; INTRACAUDAL; PERINEURAL
Status: DISPENSED
Start: 2018-04-30

## (undated) RX ORDER — CEFAZOLIN SODIUM 2 G/100ML
INJECTION, SOLUTION INTRAVENOUS
Status: DISPENSED
Start: 2018-04-30

## (undated) RX ORDER — FENTANYL CITRATE 50 UG/ML
INJECTION, SOLUTION INTRAMUSCULAR; INTRAVENOUS
Status: DISPENSED
Start: 2024-05-10

## (undated) RX ORDER — LIDOCAINE HYDROCHLORIDE 40 MG/ML
SOLUTION TOPICAL
Status: DISPENSED
Start: 2024-05-10

## (undated) RX ORDER — LIDOCAINE HYDROCHLORIDE 20 MG/ML
JELLY TOPICAL
Status: DISPENSED
Start: 2020-01-29

## (undated) RX ORDER — REGADENOSON 0.08 MG/ML
INJECTION, SOLUTION INTRAVENOUS
Status: DISPENSED
Start: 2017-03-23

## (undated) RX ORDER — FENTANYL CITRATE 50 UG/ML
INJECTION, SOLUTION INTRAMUSCULAR; INTRAVENOUS
Status: DISPENSED
Start: 2020-01-28

## (undated) RX ORDER — NALOXONE HYDROCHLORIDE 0.4 MG/ML
INJECTION, SOLUTION INTRAMUSCULAR; INTRAVENOUS; SUBCUTANEOUS
Status: DISPENSED
Start: 2024-05-10

## (undated) RX ORDER — ONDANSETRON 2 MG/ML
INJECTION INTRAMUSCULAR; INTRAVENOUS
Status: DISPENSED
Start: 2020-01-28

## (undated) RX ORDER — FENTANYL CITRATE 50 UG/ML
INJECTION, SOLUTION INTRAMUSCULAR; INTRAVENOUS
Status: DISPENSED
Start: 2020-01-29

## (undated) RX ORDER — FLUMAZENIL 0.1 MG/ML
INJECTION, SOLUTION INTRAVENOUS
Status: DISPENSED
Start: 2024-05-10

## (undated) RX ORDER — BUPIVACAINE HYDROCHLORIDE 2.5 MG/ML
INJECTION, SOLUTION EPIDURAL; INFILTRATION; INTRACAUDAL
Status: DISPENSED
Start: 2018-04-30